# Patient Record
Sex: MALE | Race: BLACK OR AFRICAN AMERICAN | Employment: PART TIME | URBAN - METROPOLITAN AREA
[De-identification: names, ages, dates, MRNs, and addresses within clinical notes are randomized per-mention and may not be internally consistent; named-entity substitution may affect disease eponyms.]

---

## 2017-09-12 ENCOUNTER — HOSPITAL ENCOUNTER (EMERGENCY)
Age: 52
Discharge: HOME | End: 2017-09-12
Attending: EMERGENCY MEDICINE | Admitting: EMERGENCY MEDICINE
Payer: SELF-PAY

## 2017-09-12 ENCOUNTER — APPOINTMENT (OUTPATIENT)
Dept: CT IMAGING | Age: 52
End: 2017-09-12
Payer: SELF-PAY

## 2017-09-12 ENCOUNTER — APPOINTMENT (OUTPATIENT)
Dept: GENERAL RADIOLOGY | Age: 52
End: 2017-09-12
Payer: SELF-PAY

## 2017-09-12 VITALS
SYSTOLIC BLOOD PRESSURE: 172 MMHG | BODY MASS INDEX: 29.17 KG/M2 | HEIGHT: 72 IN | OXYGEN SATURATION: 94 % | DIASTOLIC BLOOD PRESSURE: 98 MMHG | RESPIRATION RATE: 13 BRPM | HEART RATE: 65 BPM | TEMPERATURE: 98.3 F | WEIGHT: 215.39 LBS

## 2017-09-12 DIAGNOSIS — R20.2 NUMBNESS AND TINGLING: ICD-10-CM

## 2017-09-12 DIAGNOSIS — I10 UNCONTROLLED HYPERTENSION: Primary | ICD-10-CM

## 2017-09-12 DIAGNOSIS — R20.0 NUMBNESS AND TINGLING: ICD-10-CM

## 2017-09-12 DIAGNOSIS — M50.30 DDD (DEGENERATIVE DISC DISEASE), CERVICAL: ICD-10-CM

## 2017-09-12 LAB
ALBUMIN SERPL-MCNC: 3.4 G/DL (ref 3.5–5)
ALBUMIN/GLOB SERPL: 0.8 {RATIO} (ref 1.1–2.2)
ALP SERPL-CCNC: 115 U/L (ref 45–117)
ALT SERPL-CCNC: 52 U/L (ref 12–78)
ANION GAP SERPL CALC-SCNC: 7 MMOL/L (ref 5–15)
AST SERPL-CCNC: 15 U/L (ref 15–37)
ATRIAL RATE: 75 BPM
BASOPHILS # BLD: 0 K/UL (ref 0–0.1)
BASOPHILS NFR BLD: 0 % (ref 0–1)
BILIRUB SERPL-MCNC: 0.3 MG/DL (ref 0.2–1)
BUN SERPL-MCNC: 15 MG/DL (ref 6–20)
BUN/CREAT SERPL: 12 (ref 12–20)
CALCIUM SERPL-MCNC: 8.7 MG/DL (ref 8.5–10.1)
CALCULATED P AXIS, ECG09: 29 DEGREES
CALCULATED R AXIS, ECG10: 0 DEGREES
CALCULATED T AXIS, ECG11: 31 DEGREES
CHLORIDE SERPL-SCNC: 105 MMOL/L (ref 97–108)
CK SERPL-CCNC: 168 U/L (ref 39–308)
CO2 SERPL-SCNC: 28 MMOL/L (ref 21–32)
CREAT SERPL-MCNC: 1.25 MG/DL (ref 0.7–1.3)
DIAGNOSIS, 93000: NORMAL
EOSINOPHIL # BLD: 0.3 K/UL (ref 0–0.4)
EOSINOPHIL NFR BLD: 3 % (ref 0–7)
ERYTHROCYTE [DISTWIDTH] IN BLOOD BY AUTOMATED COUNT: 15.8 % (ref 11.5–14.5)
GLOBULIN SER CALC-MCNC: 4.5 G/DL (ref 2–4)
GLUCOSE SERPL-MCNC: 105 MG/DL (ref 65–100)
HCT VFR BLD AUTO: 42.4 % (ref 36.6–50.3)
HGB BLD-MCNC: 14.4 G/DL (ref 12.1–17)
LYMPHOCYTES # BLD: 4.3 K/UL (ref 0.8–3.5)
LYMPHOCYTES NFR BLD: 47 % (ref 12–49)
MCH RBC QN AUTO: 28 PG (ref 26–34)
MCHC RBC AUTO-ENTMCNC: 34 G/DL (ref 30–36.5)
MCV RBC AUTO: 82.5 FL (ref 80–99)
MONOCYTES # BLD: 0.7 K/UL (ref 0–1)
MONOCYTES NFR BLD: 7 % (ref 5–13)
NEUTS SEG # BLD: 3.9 K/UL (ref 1.8–8)
NEUTS SEG NFR BLD: 43 % (ref 32–75)
P-R INTERVAL, ECG05: 176 MS
PLATELET # BLD AUTO: 363 K/UL (ref 150–400)
POTASSIUM SERPL-SCNC: 3.7 MMOL/L (ref 3.5–5.1)
PROT SERPL-MCNC: 7.9 G/DL (ref 6.4–8.2)
Q-T INTERVAL, ECG07: 380 MS
QRS DURATION, ECG06: 94 MS
QTC CALCULATION (BEZET), ECG08: 424 MS
RBC # BLD AUTO: 5.14 M/UL (ref 4.1–5.7)
SODIUM SERPL-SCNC: 140 MMOL/L (ref 136–145)
TROPONIN I SERPL-MCNC: <0.04 NG/ML
VENTRICULAR RATE, ECG03: 75 BPM
WBC # BLD AUTO: 9.2 K/UL (ref 4.1–11.1)

## 2017-09-12 PROCEDURE — 82550 ASSAY OF CK (CPK): CPT | Performed by: PHYSICIAN ASSISTANT

## 2017-09-12 PROCEDURE — 84484 ASSAY OF TROPONIN QUANT: CPT | Performed by: PHYSICIAN ASSISTANT

## 2017-09-12 PROCEDURE — 70450 CT HEAD/BRAIN W/O DYE: CPT

## 2017-09-12 PROCEDURE — 99285 EMERGENCY DEPT VISIT HI MDM: CPT

## 2017-09-12 PROCEDURE — 71020 XR CHEST PA LAT: CPT

## 2017-09-12 PROCEDURE — 80053 COMPREHEN METABOLIC PANEL: CPT | Performed by: PHYSICIAN ASSISTANT

## 2017-09-12 PROCEDURE — 74011250637 HC RX REV CODE- 250/637: Performed by: PHYSICIAN ASSISTANT

## 2017-09-12 PROCEDURE — 72125 CT NECK SPINE W/O DYE: CPT

## 2017-09-12 PROCEDURE — 93005 ELECTROCARDIOGRAM TRACING: CPT

## 2017-09-12 PROCEDURE — 85025 COMPLETE CBC W/AUTO DIFF WBC: CPT | Performed by: PHYSICIAN ASSISTANT

## 2017-09-12 PROCEDURE — 96374 THER/PROPH/DIAG INJ IV PUSH: CPT

## 2017-09-12 PROCEDURE — 36415 COLL VENOUS BLD VENIPUNCTURE: CPT | Performed by: PHYSICIAN ASSISTANT

## 2017-09-12 PROCEDURE — 96375 TX/PRO/DX INJ NEW DRUG ADDON: CPT

## 2017-09-12 PROCEDURE — 74011250636 HC RX REV CODE- 250/636: Performed by: PHYSICIAN ASSISTANT

## 2017-09-12 RX ORDER — HYDROMORPHONE HYDROCHLORIDE 2 MG/ML
1 INJECTION, SOLUTION INTRAMUSCULAR; INTRAVENOUS; SUBCUTANEOUS
Status: COMPLETED | OUTPATIENT
Start: 2017-09-12 | End: 2017-09-12

## 2017-09-12 RX ORDER — SODIUM CHLORIDE 0.9 % (FLUSH) 0.9 %
5-10 SYRINGE (ML) INJECTION AS NEEDED
Status: DISCONTINUED | OUTPATIENT
Start: 2017-09-12 | End: 2017-09-12 | Stop reason: HOSPADM

## 2017-09-12 RX ORDER — ONDANSETRON 2 MG/ML
4 INJECTION INTRAMUSCULAR; INTRAVENOUS
Status: COMPLETED | OUTPATIENT
Start: 2017-09-12 | End: 2017-09-12

## 2017-09-12 RX ORDER — DIAZEPAM 5 MG/1
5 TABLET ORAL
Status: COMPLETED | OUTPATIENT
Start: 2017-09-12 | End: 2017-09-12

## 2017-09-12 RX ORDER — OXYCODONE AND ACETAMINOPHEN 5; 325 MG/1; MG/1
1 TABLET ORAL
Qty: 15 TAB | Refills: 0 | Status: SHIPPED | OUTPATIENT
Start: 2017-09-12 | End: 2019-12-08

## 2017-09-12 RX ORDER — OXYCODONE AND ACETAMINOPHEN 5; 325 MG/1; MG/1
1 TABLET ORAL
Status: COMPLETED | OUTPATIENT
Start: 2017-09-12 | End: 2017-09-12

## 2017-09-12 RX ORDER — AMLODIPINE BESYLATE 5 MG/1
5 TABLET ORAL DAILY
Qty: 30 TAB | Refills: 0 | Status: SHIPPED | OUTPATIENT
Start: 2017-09-12 | End: 2017-10-12

## 2017-09-12 RX ORDER — AMLODIPINE BESYLATE 5 MG/1
5 TABLET ORAL
Status: COMPLETED | OUTPATIENT
Start: 2017-09-12 | End: 2017-09-12

## 2017-09-12 RX ORDER — SODIUM CHLORIDE 0.9 % (FLUSH) 0.9 %
5-10 SYRINGE (ML) INJECTION EVERY 8 HOURS
Status: DISCONTINUED | OUTPATIENT
Start: 2017-09-12 | End: 2017-09-12 | Stop reason: HOSPADM

## 2017-09-12 RX ORDER — PREDNISONE 20 MG/1
60 TABLET ORAL DAILY
Qty: 15 TAB | Refills: 0 | Status: SHIPPED | OUTPATIENT
Start: 2017-09-12 | End: 2017-09-17

## 2017-09-12 RX ORDER — METHOCARBAMOL 750 MG/1
750 TABLET, FILM COATED ORAL 3 TIMES DAILY
Qty: 15 TAB | Refills: 0 | Status: SHIPPED | OUTPATIENT
Start: 2017-09-12 | End: 2017-09-17

## 2017-09-12 RX ORDER — DIAZEPAM 10 MG/2ML
5 INJECTION INTRAMUSCULAR
Status: DISCONTINUED | OUTPATIENT
Start: 2017-09-12 | End: 2017-09-12

## 2017-09-12 RX ADMIN — Medication 10 ML: at 16:09

## 2017-09-12 RX ADMIN — DIAZEPAM 5 MG: 5 TABLET ORAL at 16:05

## 2017-09-12 RX ADMIN — HYDROMORPHONE HYDROCHLORIDE 1 MG: 2 INJECTION, SOLUTION INTRAMUSCULAR; INTRAVENOUS; SUBCUTANEOUS at 16:16

## 2017-09-12 RX ADMIN — AMLODIPINE BESYLATE 5 MG: 5 TABLET ORAL at 16:05

## 2017-09-12 RX ADMIN — ONDANSETRON 4 MG: 2 INJECTION INTRAMUSCULAR; INTRAVENOUS at 16:06

## 2017-09-12 RX ADMIN — OXYCODONE HYDROCHLORIDE AND ACETAMINOPHEN 1 TABLET: 5; 325 TABLET ORAL at 17:01

## 2017-09-12 NOTE — ED NOTES
Bedside and Verbal shift change report given to Denisse Graham (oncoming nurse) by Mauro Chapman  (offgoing nurse). Report included the following information SBAR and ED Summary    Pt resting on stretcher with family at bedside. Pt updated on plan of care. Aditi Segura

## 2017-09-12 NOTE — ED NOTES
Instructions provided by Michelle Huizar and patient discharged home with instructions. Medicated prior to discharge prior to discharge.

## 2017-09-12 NOTE — Clinical Note
Rest, take blood pressure medications daily. Follow up with primary care for recheck of current symptoms/blood pressure. List of primary care/clinics provided. Have also provided contact information for Orthopedist if symptoms persist.  Return to the  Emergency Dept for any worsening pain, numbness/tingling, weakness, confusion, or problems with speech/walking. [facility] Departments For adult and child immunizations, family planning, TB screening, STD testing and women's health services. [place]: Vinicio [phone] PACCAR [facility] [phone] [facility]   [phone] [place]   [phone] Haydee Wyattshorn   [phone] [place]: NORMA [phone] Roy [phone] Katelyn brown [phone] [address] [address] For primary care services, woman and child wellness, and some clinics providing specialty care. [facility] -- 1011 [facility]. [address] [place] [address] [phone] [facility] [address] [place] [address] [phone] [facility] [address] [phone] [address] [place] [address] [address]  [phone] 7700 [facility] [phone] [address] [phone] [facility] [address], 809 [facility]: [facility] [address], ext [phone] [facility] [address] [phone] [facility] 37 [phone] [address]  [phone] [employer]  [address], [phone] 3550 Gissel [address] ([email or web address]) [phone] Sexual Health/Woman Wellness Clinics For STD/HIV testing and treatment, pregnancy testing a nd services, men's health, birth control services, LGBT services, and hepatitis/HPV vaccine services. [place] [facility] [place] for [employer] [address] [facility] [phone] [facility] of [address] [phone] [place] [address] [phone] [address], 5th floor [phone] [facility] of 1065 [facility] 427 [facility] [address] Doug Rao [phone] Specialty Service C linics [address] [phone] 6655 [facility]   [phone] [place]   [phone] Women, Infant and Children's Services: [address] [phone] [address] [phone] 128 [facility] 66 7661 [facility]     [phone] 1325 [facility]   [phone] 2 [facility] [phone] [facility] [address] [facility] [address] Physicians [phone] MD Meera Cruz MD Caprice Doom, MD [facility] [phone] [facility] Kelly, [facility] MD Radha Maldonado MD Buck Doles, MD [address] [address] [phone] MD Ksenia Spencer MD [facility] [phone] MD Melani Shankar MD Linde Beverage, MD Lani Eglin, MD 
[facility] [phone] MD Dane Bradford MD Flo Paul, NP 3050 [facility] [address] [phone] MD Verónica Wade MD Lang Bongo, MD Octaviano Gayer, MD Emmit Joiner, MD Moss Como, MD Cheril Clas, Toshia Fagan [address] [phone] Chris Kathleen Brittani Irwin MD [facility] [phone] Luma Houser, MD Zenobia Cruz, NP Kaila Norris, MD Marcelo Alfredo, MD [place], MD Skyler Hood, MD Adis Santos, MD 
[facility] [phone] Dorene Ghosh, MD Vaibhav Styles, P Marylene Medici, NP 
Layla Campos, MD Juan Francisco Price, MD Claude Klein, MD Omi Javier, MD [facility] [phone] Blanch Shone, MD Luke Nice, MD Elmer Gordon, MD Juan Watkins, MD Lenis Nissen, MD 
1700 [facility] [phone] Isra Grant, MD Jessika Jeronimo, MD [facility] [phone] MD Helen Mcrae, MD Raul Nolen MD 
5073 [facility] [phone] Heidy Jimenez, MD Isis Reardon, MD Timi Huggins, MD Stephanie Cespedes, MD Barron Hickman, MD Steffi Boateng, NP May Rubinstein, MD 1619  [phone] MD Valerio Petersen MD Candido Knapp, MD 
2102 [facility] [phone] MD Albaro Brush, FNP Livia Auguste, BRIANA Auguste, FNP BRIANA Comer MD Chaney Balint, NP Janes Flores,  Miscellaneous: 
Karo Kapoor [phone]

## 2017-09-12 NOTE — DISCHARGE INSTRUCTIONS
Cervical Disc Disease: Care Instructions  Your Care Instructions    Cervical disc disease results from damage, disease, or wear and tear to the discs between the bones (vertebra) in your neck. The discs act as shock absorbers for the spine and keep the spine flexible. When a disc is damaged, it can bulge out and press against the nerve roots or spinal cord. This is sometimes called a herniated or \"slipped disc. \" This pressure can cause pain and numbness or tingling in your arms and hands. It can also cause weakness in your legs. An accident can damage a disc and cause it to break open (rupture). Aging and hard physical work can also cause damage to cervical discs. The first treatments for cervical disc disease include physical therapy, special neck exercises, heat, and pain medicine. If these fail, your doctor may inject steroids and pain medicine into your neck. Surgery is usually done only if other treatments have not worked. Follow-up care is a key part of your treatment and safety. Be sure to make and go to all appointments, and call your doctor if you are having problems. It's also a good idea to know your test results and keep a list of the medicines you take. How can you care for yourself at home? · Take pain medicines exactly as directed. ¨ If the doctor gave you a prescription medicine for pain, take it as prescribed. ¨ If you are not taking a prescription pain medicine, ask your doctor if you can take an over-the-counter medicine. · Don't spend too long in one position. Take short breaks to move around and change positions. · Wear a seat belt and shoulder harness when you are in a car. · Sleep with a pillow under your head and neck that keeps your neck straight. · Follow your doctor's instructions for gentle neck-stretching exercises. · Do not smoke. Smoking can slow healing of your discs. If you need help quitting, talk to your doctor about stop-smoking programs and medicines.  These can increase your chances of quitting for good. · Avoid strenuous work or exercise until your doctor says it is okay. When should you call for help? Call 911 anytime you think you may need emergency care. For example, call if:  · You are unable to move an arm or a leg at all. Call your doctor now or seek immediate medical care if:  · You have new or worse symptoms in your arms, legs, chest, belly, or buttocks. Symptoms may include:  ¨ Numbness or tingling. ¨ Weakness. ¨ Pain. · You lose bladder or bowel control. Watch closely for changes in your health, and be sure to contact your doctor if:  · You are not getting better as expected. Where can you learn more? Go to http://edilberto-gamal.info/. Enter N118 in the search box to learn more about \"Cervical Disc Disease: Care Instructions. \"  Current as of: March 21, 2017  Content Version: 11.3  © 4803-0825 [facility]. Care instructions adapted under license by [employer] (which disclaims liability or warranty for this information). If you have questions about a medical condition or this instruction, always ask your healthcare professional. [place] [address] any warranty or liability for your use of this information. High Blood Pressure: Care Instructions  Your Care Instructions  If your blood pressure is usually above 140/90, you have high blood pressure, or hypertension. That means the top number is 140 or higher or the bottom number is 90 or higher, or both. Despite what a lot of people think, high blood pressure usually doesn't cause headaches or make you feel dizzy or lightheaded. It usually has no symptoms. But it does increase your risk for heart attack, stroke, and kidney or eye damage. The higher your blood pressure, the more your risk increases. Your doctor will give you a goal for your blood pressure. Your goal will be based on your health and your age.  An example of a goal is to keep your blood pressure below 140/90. Lifestyle changes, such as eating healthy and being active, are always important to help lower blood pressure. You might also take medicine to reach your blood pressure goal.  Follow-up care is a key part of your treatment and safety. Be sure to make and go to all appointments, and call your doctor if you are having problems. It's also a good idea to know your test results and keep a list of the medicines you take. How can you care for yourself at home? Medical treatment  · If you stop taking your medicine, your blood pressure will go back up. You may take one or more types of medicine to lower your blood pressure. Be safe with medicines. Take your medicine exactly as prescribed. Call your doctor if you think you are having a problem with your medicine. · Talk to your doctor before you start taking aspirin every day. Aspirin can help certain people lower their risk of a heart attack or stroke. But taking aspirin isn't right for everyone, because it can cause serious bleeding. · See your doctor regularly. You may need to see the doctor more often at first or until your blood pressure comes down. · If you are taking blood pressure medicine, talk to your doctor before you take decongestants or anti-inflammatory medicine, such as ibuprofen. Some of these medicines can raise blood pressure. · Learn how to check your blood pressure at home. Lifestyle changes  · Stay at a healthy weight. This is especially important if you put on weight around the waist. Losing even 10 pounds can help you lower your blood pressure. · If your doctor recommends it, get more exercise. Walking is a good choice. Bit by bit, increase the amount you walk every day. Try for at least 30 minutes on most days of the week. You also may want to swim, bike, or do other activities. · Avoid or limit alcohol. Talk to your doctor about whether you can drink any alcohol.   · Try to limit how much sodium you eat to less than 2,300 milligrams (mg) a day. Your doctor may ask you to try to eat less than 1,500 mg a day. · Eat plenty of fruits (such as bananas and oranges), vegetables, legumes, whole grains, and low-fat dairy products. · Lower the amount of saturated fat in your diet. Saturated fat is found in animal products such as milk, cheese, and meat. Limiting these foods may help you lose weight and also lower your risk for heart disease. · Do not smoke. Smoking increases your risk for heart attack and stroke. If you need help quitting, talk to your doctor about stop-smoking programs and medicines. These can increase your chances of quitting for good. When should you call for help? Call 911 anytime you think you may need emergency care. This may mean having symptoms that suggest that your blood pressure is causing a serious heart or blood vessel problem. Your blood pressure may be over 180/110. For example, call 911 if:  · You have symptoms of a heart attack. These may include:  ¨ Chest pain or pressure, or a strange feeling in the chest.  ¨ Sweating. ¨ Shortness of breath. ¨ Nausea or vomiting. ¨ Pain, pressure, or a strange feeling in the back, neck, jaw, or upper belly or in one or both shoulders or arms. ¨ Lightheadedness or sudden weakness. ¨ A fast or irregular heartbeat. · You have symptoms of a stroke. These may include:  ¨ Sudden numbness, tingling, weakness, or loss of movement in your face, arm, or leg, especially on only one side of your body. ¨ Sudden vision changes. ¨ Sudden trouble speaking. ¨ Sudden confusion or trouble understanding simple statements. ¨ Sudden problems with walking or balance. ¨ A sudden, severe headache that is different from past headaches. · You have severe back or belly pain. Do not wait until your blood pressure comes down on its own. Get help right away.   Call your doctor now or seek immediate care if:  · Your blood pressure is much higher than normal (such as 180/110 or higher), but you don't have symptoms. · You think high blood pressure is causing symptoms, such as:  ¨ Severe headache. ¨ Blurry vision. Watch closely for changes in your health, and be sure to contact your doctor if:  · Your blood pressure measures 140/90 or higher at least 2 times. That means the top number is 140 or higher or the bottom number is 90 or higher, or both. · You think you may be having side effects from your blood pressure medicine. · Your blood pressure is usually normal, but it goes above normal at least 2 times. Where can you learn more? Go to http://edilberto-gamla.info/. Enter B716 in the search box to learn more about \"High Blood Pressure: Care Instructions. \"  Current as of: August 8, 2016  Content Version: 11.3  © 6997-9228 [employer]. Care instructions adapted under license by [employer] (which disclaims liability or warranty for this information). If you have questions about a medical condition or this instruction, always ask your healthcare professional. [place] [address] any warranty or liability for your use of this information. Numbness and Tingling: Care Instructions  Your Care Instructions  Many things can cause numbness or tingling. Swelling may put pressure on a nerve. This could cause you to lose feeling or have a pins-and-needles sensation on part of your body. Nerves may be damaged from trauma, toxins, or diseases, such as diabetes or multiple sclerosis (MS). Sometimes, though, the cause is not clear. If there is no clear reason for your symptoms, and you are not having any other symptoms, your doctor may suggest watching and waiting for a while to see if the numbness or tingling goes away on its own. Your doctor may want you to have blood or nerve tests to find the cause of your symptoms. Follow-up care is a key part of your treatment and safety.  Be sure to make and go to all appointments, and call your doctor if you are having problems. It's also a good idea to know your test results and keep a list of the medicines you take. How can you care for yourself at home? · If your doctor prescribes medicine, take it exactly as directed. Call your doctor if you think you are having a problem with your medicine. · If you have any swelling, put ice or a cold pack on the area for 10 to 20 minutes at a time. Put a thin cloth between the ice and your skin. When should you call for help? Call 911 anytime you think you may need emergency care. For example, call if:  · You have weakness, numbness, or tingling in both legs. · You lose bowel or bladder control. · You have symptoms of a stroke. These may include:  ¨ Sudden numbness, tingling, weakness, or loss of movement in your face, arm, or leg, especially on only one side of your body. ¨ Sudden vision changes. ¨ Sudden trouble speaking. ¨ Sudden confusion or trouble understanding simple statements. ¨ Sudden problems with walking or balance. ¨ A sudden, severe headache that is different from past headaches. Watch closely for changes in your health, and be sure to contact your doctor if you have any problems, or if:  · You do not get better as expected. Where can you learn more? Go to http://edilberto-gamal.info/. Enter I509 in the search box to learn more about \"Numbness and Tingling: Care Instructions. \"  Current as of: October 14, 2016  Content Version: 11.3  © 1730-3569 [employer]. Care instructions adapted under license by [facility] (which disclaims liability or warranty for this information). If you have questions about a medical condition or this instruction, always ask your healthcare professional. [address] any warranty or liability for your use of this information.

## 2017-09-12 NOTE — LETTER
[address] 
[facility] EMERGENCY DEPT 
[address] [place] [address] [address] [id number] [phone] Work/School Note Date: 9/12/2017 To Whom It May concern: 
 
Dax Mancia was seen and treated today in the emergency room. He may return to work in 1 to 2 days, as symptoms improve. Sincerely, Allison Franklin

## 2017-09-12 NOTE — ED PROVIDER NOTES
HPI Comments: Kee Tuttle is a 54 y.o. male with PMHx significant for HTN, who presents ambulatory to [facility] ED with cc of acute onset L posterior neck pain that radiates into his L shoulder and LUE x 1 week. He reports associated mild tingling in his L side. Pt notes subjective fever, but denies any documented fevers. He denies any change in physical activity, injury, or trauma. Pt denies a hx of neck or back pain. He states he has a hx of HTN, but does not believe he has any other PMHx and states he does not see a PCP. Pt specifically denies any recent weakness of LUE, slurred speech, confusion, visual disturbance, CP, cough, SOB, or N/V/D. Social History: (+1ppd) Tobacco, (+) EtOH, (-) Illicit Drugs         There are no other complaints, changes, or physical findings at this time. The history is provided by the patient. No  was used. Past Medical History:   Diagnosis Date    Hypertension        History reviewed. No pertinent surgical history. Family History:   Problem Relation Age of Onset    Stroke Father     Cancer Sister        Social History     Social History    Marital status: SINGLE     Spouse name: N/A    Number of children: N/A    Years of education: N/A     Occupational History    Not on file. Social History Main Topics    Smoking status: Current Every Day Smoker     Packs/day: 1.00    Smokeless tobacco: Never Used    Alcohol use 3.0 oz/week     6 Cans of beer per week      Comment: Occasionally    Drug use: No    Sexual activity: Yes     Partners: Female     Other Topics Concern    Not on file     Social History Narrative    ** Merged History Encounter **              ALLERGIES: Review of patient's allergies indicates no known allergies. Review of Systems   Constitutional: Negative for chills and fever. HENT: Negative for congestion, rhinorrhea and sore throat. Eyes: Negative for visual disturbance.    Respiratory: Negative for cough and shortness of breath. Cardiovascular: Negative for chest pain and palpitations. Gastrointestinal: Negative for abdominal pain, diarrhea, nausea and vomiting. Genitourinary: Negative for dysuria and hematuria. Musculoskeletal: Positive for arthralgias (L shoulder), myalgias (LUE) and neck pain (L posterior). Negative for neck stiffness. Skin: Negative for rash and wound. Neurological: Positive for numbness (tingling in L side). Negative for dizziness, speech difficulty, weakness and headaches. Psychiatric/Behavioral: Negative for agitation and confusion. Patient Vitals for the past 12 hrs:   Temp Pulse Resp BP SpO2   09/12/17 1645 - 65 13 (!) 172/98 94 %   09/12/17 1630 - 73 13 (!) 176/99 94 %   09/12/17 1615 - 67 16 (!) 173/107 97 %   09/12/17 1340 - - - - 97 %   09/12/17 1308 98.3 °F (36.8 °C) 68 16 (!) 209/117 97 %   09/12/17 1219 - 74 18 (!) 185/118 100 %   09/12/17 1124 98.3 °F (36.8 °C) 77 18 (!) 190/122 97 %       Physical Exam   Constitutional: He is oriented to person, place, and time. He appears well-developed and well-nourished. No distress. HENT:   Head: Normocephalic and atraumatic. Nose: Nose normal.   Mouth/Throat: Oropharynx is clear and moist. No oropharyngeal exudate. Large lipoma noted to L forehead, unchanged per pt   Eyes: Conjunctivae and EOM are normal. Right eye exhibits no discharge. Left eye exhibits no discharge. No scleral icterus. Neck: Normal range of motion. Neck supple. No JVD present. No tracheal deviation present. No thyromegaly present. Cardiovascular: Normal rate, regular rhythm and normal heart sounds. Pulmonary/Chest: Effort normal and breath sounds normal. No respiratory distress. He has no wheezes. Abdominal: Soft. There is no tenderness. Musculoskeletal: Normal range of motion. He exhibits no edema. Lymphadenopathy:     He has no cervical adenopathy. Neurological: He is alert and oriented to person, place, and time.  He exhibits normal muscle tone. Coordination normal.   CN 2-12 intact. F-N-F and H-T-S negative. No pronator drift. Skin: Skin is warm and dry. He is not diaphoretic. Psychiatric: He has a normal mood and affect. His behavior is normal. Judgment normal.   Nursing note and vitals reviewed. MDM  Number of Diagnoses or Management Options  DDD (degenerative disc disease), cervical:   Numbness and tingling:   Uncontrolled hypertension:   Diagnosis management comments: DDx: DDD, radiculopathy, ACS, ICH/mass       Amount and/or Complexity of Data Reviewed  Clinical lab tests: ordered and reviewed  Tests in the radiology section of CPT®: ordered and reviewed  Tests in the medicine section of CPT®: ordered and reviewed  Review and summarize past medical records: yes  Discuss the patient with other providers: yes (attending)  Independent visualization of images, tracings, or specimens: yes    Patient Progress  Patient progress: stable    ED Course       Procedures    EKG interpretation: (Preliminary) 11:37 AM  Rhythm: normal sinus rhythm; and regular . Rate (approx.): 75; Axis: normal; MO interval: normal; QRS interval: normal ; ST/T wave: normal;  Other findings: septal infarct, age undetermined. No acute ischemic changes. PROGRESS NOTE  2:45 PM   Discussed case with Katey Segura DO. He will evaluate pt and agrees with follow up and prescribing him BP medication. PROGRESS NOTE  2:58 PM   Katey Segura DO evaluated pt. He agrees with the plan of care. PROGRESS NOTE  3:02 PM   Just being notified by nursing staff pt has not been given any medication because he was taken directly to CT. Pharmacy also stating there is not IV Valium. Will change to oral Valium and IV pain medication. PROGRESS NOTE  3:20 PM   Checked with nursing staff. Pt has not been given any medication yet. PROGRESS NOTE  3:38 PM   Per nursing staff, pt has still not received any medication yet.     PROGRESS NOTE  4:00 PM   Spoke with charge nurse to assist with getting pt medicated. LABORATORY TESTS:  Recent Results (from the past 12 hour(s))   EKG, 12 LEAD, INITIAL    Collection Time: 09/12/17 11:37 AM   Result Value Ref Range    Ventricular Rate 75 BPM    Atrial Rate 75 BPM    P-R Interval 176 ms    QRS Duration 94 ms    Q-T Interval 380 ms    QTC Calculation (Bezet) 424 ms    Calculated P Axis 29 degrees    Calculated R Axis 0 degrees    Calculated T Axis 31 degrees    Diagnosis       Normal sinus rhythm  Septal infarct , age undetermined  Abnormal ECG  When compared with ECG of 04-DEC-2011 16:20,  No significant change was found     CBC WITH AUTOMATED DIFF    Collection Time: 09/12/17  1:45 PM   Result Value Ref Range    WBC 9.2 4.1 - 11.1 K/uL    RBC 5.14 4.10 - 5.70 M/uL    HGB 14.4 12.1 - 17.0 g/dL    HCT 42.4 36.6 - 50.3 %    MCV 82.5 80.0 - 99.0 FL    MCH 28.0 26.0 - 34.0 PG    MCHC 34.0 30.0 - 36.5 g/dL    RDW 15.8 (H) 11.5 - 14.5 %    PLATELET 211 872 - 364 K/uL    NEUTROPHILS 43 32 - 75 %    LYMPHOCYTES 47 12 - 49 %    MONOCYTES 7 5 - 13 %    EOSINOPHILS 3 0 - 7 %    BASOPHILS 0 0 - 1 %    ABS. NEUTROPHILS 3.9 1.8 - 8.0 K/UL    ABS. LYMPHOCYTES 4.3 (H) 0.8 - 3.5 K/UL    ABS. MONOCYTES 0.7 0.0 - 1.0 K/UL    ABS. EOSINOPHILS 0.3 0.0 - 0.4 K/UL    ABS. BASOPHILS 0.0 0.0 - 0.1 K/UL   METABOLIC PANEL, COMPREHENSIVE    Collection Time: 09/12/17  1:45 PM   Result Value Ref Range    Sodium 140 136 - 145 mmol/L    Potassium 3.7 3.5 - 5.1 mmol/L    Chloride 105 97 - 108 mmol/L    CO2 28 21 - 32 mmol/L    Anion gap 7 5 - 15 mmol/L    Glucose 105 (H) 65 - 100 mg/dL    BUN 15 6 - 20 MG/DL    Creatinine 1.25 0.70 - 1.30 MG/DL    BUN/Creatinine ratio 12 12 - 20      GFR est AA >60 >60 ml/min/1.73m2    GFR est non-AA >60 >60 ml/min/1.73m2    Calcium 8.7 8.5 - 10.1 MG/DL    Bilirubin, total 0.3 0.2 - 1.0 MG/DL    ALT (SGPT) 52 12 - 78 U/L    AST (SGOT) 15 15 - 37 U/L    Alk.  phosphatase 115 45 - 117 U/L    Protein, total 7.9 6.4 - 8.2 g/dL    Albumin 3.4 (L) 3.5 - 5.0 g/dL    Globulin 4.5 (H) 2.0 - 4.0 g/dL    A-G Ratio 0.8 (L) 1.1 - 2.2     CK W/ REFLX CKMB    Collection Time: 09/12/17  1:45 PM   Result Value Ref Range     39 - 308 U/L   TROPONIN I    Collection Time: 09/12/17  1:45 PM   Result Value Ref Range    Troponin-I, Qt. <0.04 <0.05 ng/mL       IMAGING RESULTS:  XR CHEST PA LAT   Final Result   PA AND LATERAL CHEST RADIOGRAPHS: 9/12/2017 2:39 PM     INDICATION: Neck pain, numbness, tingling extremities, hypertensive.     COMPARISON: 12/4/2011.     TECHNIQUE: Frontal and lateral radiographs of the chest.     FINDINGS:  The lungs are clear. The central airways are patent. The heart size is normal.  No pneumothorax or pleural effusion.     IMPRESSION  IMPRESSION:   Clear lungs. CT SPINE CERV WO CONT   Final Result   EXAM:  CT CERVICAL SPINE WITHOUT CONTRAST     INDICATION:   neck pain, numbness/tingling in the left upper and lower  extremities.     COMPARISON: None.     TECHNIQUE: Multislice helical CT of the cervical spine was performed without  intravenous contrast administration. Sagittal and coronal reconstructions were  generated. CT dose reduction was achieved through use of a standardized protocol  tailored for this examination and automatic exposure control for dose  modulation.     FINDINGS:  Cervical alignment is normal. No displaced fracture or dislocation. The  craniocervical junction is normal. Atrophic or surgically absent left thyroid  lobe. Bovine arch is a normal variant. Overlying shoulders cause photon  starvation artifact below C4 (602-32).    The cervical canal is congenitally narrow (8 mm at the level C4). C2-C3:  A disc bulge causes mild canal stenosis. Uncovertebral and facet  osteoarthritis cause left and mild right neural foraminal stenosis. C3-C4:  A disc osteophyte complex causes moderate canal stenosis in conjunction  with congenital canal narrowing.   C4-C5:  Disc osteophyte complex probably causes moderate to severe canal  stenosis. Overlying shoulders obscures somewhat. Uncovertebral and facet  osteoarthritis cause left neural foraminal stenosis. C5-C6:  Overlying shoulders obscure. A disc osteophyte complex probably causes  moderate to severe canal stenosis. Uncovertebral and facet osteoarthritis cause  left and mild right neural foraminal stenosis. C6-C7:  Overlying shoulders obscure. Uncovertebral and facet osteoarthritis  cause left neural foraminal stenosis. C7-T1:  Overlying shoulders obscure. No gross canal or neural foraminal  stenosis.     IMPRESSION  IMPRESSION:   1. No displaced fracture. 2. Congenitally narrow canal.  3. Degenerative disease and stenoses as described above. CT HEAD WO CONT   Final Result   HEAD CT WITHOUT CONTRAST: 9/12/2017 1:50 PM     INDICATION: Neck pain, numbness and tingling in the left upper and lower  extremities.     COMPARISON: None.     PROCEDURE: Axial images of the head were obtained without contrast. Coronal and  sagittal reformats were performed. CT dose reduction was achieved through use of  a standardized protocol tailored for this examination and automatic exposure  control for dose modulation.     FINDINGS: There is a relatively large lipoma over the left forehead/left frontal  lobe. The ventricles and sulci are appropriate in size and configuration for  age. No loss of gray-white differentiation to suggest late acute or early  subacute infarction. No mass effect or intracranial hemorrhage.     IMPRESSION  IMPRESSION: No acute intracranial abnormality. Left forehead lipoma.        MEDICATIONS GIVEN:  Medications   sodium chloride (NS) flush 5-10 mL (10 mL IntraVENous Given 9/12/17 1609)   sodium chloride (NS) flush 5-10 mL (10 mL IntraVENous Given 9/12/17 1609)   amLODIPine (NORVASC) tablet 5 mg (5 mg Oral Given 9/12/17 1605)   diazePAM (VALIUM) tablet 5 mg (5 mg Oral Given 9/12/17 1605)   HYDROmorphone (PF) (DILAUDID) injection 1 mg (1 mg IntraVENous Given 9/12/17 1616) ondansetron [facility]) injection 4 mg (4 mg IntraVENous Given 9/12/17 1606)   oxyCODONE-acetaminophen (PERCOCET) 5-325 mg per tablet 1 Tab (1 Tab Oral Given 9/12/17 1701)       IMPRESSION:  1. Uncontrolled hypertension    2. DDD (degenerative disc disease), cervical    3. Numbness and tingling        PLAN:  1. Current Discharge Medication List      START taking these medications    Details   oxyCODONE-acetaminophen (PERCOCET) 5-325 mg per tablet Take 1 Tab by mouth every six (6) hours as needed for Pain for up to 15 doses. Max Daily Amount: 4 Tabs. Qty: 15 Tab, Refills: 0      methocarbamol (ROBAXIN) 750 mg tablet Take 1 Tab by mouth three (3) times daily for 5 days. Qty: 15 Tab, Refills: 0      predniSONE (DELTASONE) 20 mg tablet Take 3 Tabs by mouth daily for 5 days. Qty: 15 Tab, Refills: 0      amLODIPine (NORVASC) 5 mg tablet Take 1 Tab by mouth daily for 30 days. Qty: 30 Tab, Refills: 0           2. Follow-up Information     Follow up With Details Comments [facility]   [address] [address]  [phone]    Ever Sanchez MD  As needed [address] [facility]  [address]  [facility]  [phone]      [facility] EMERGENCY DEPT  If symptoms worsen [address]  [address] [facility]  [phone]          Return to ED if worse     Discharge Note:  4:55 PM  The patient has been re-evaluated and is ready for discharge. Reviewed available results with patient. Counseled patient on diagnosis and care plan. Patient has expressed understanding, and all questions have been answered. Patient agrees with plan and agrees to follow up as recommended, or to return to the ED if their symptoms worsen. Discharge instructions have been provided and explained to the patient, along with reasons to return to the ED. Written by Gulshan Crowder, ED Scribe, as dictated by Elkin Tejada.     ATTESTATION:   This note is prepared by Gulshan Crowder, acting as Scribe for [employer]. BRIANA Amin: The scribe's documentation has been prepared under my direction and personally reviewed by me in its entirety. I confirm that the note above accurately reflects all work, treatment, procedures, and medical decision making performed by me.

## 2017-09-12 NOTE — ED TRIAGE NOTES
Patient states he had some neck stiffness about a week ago and around the same time, he began having pain and tingling to his left arm as well as his left hip/leg. Patient states when he turns his head to the right, it's painful to his left neck. Tingling and pain still present in bilateral limbs. Patient has not taken his BP medication in several weeks, hypertensive in triage.

## 2019-12-08 ENCOUNTER — APPOINTMENT (OUTPATIENT)
Dept: GENERAL RADIOLOGY | Age: 54
DRG: 281 | End: 2019-12-08
Attending: EMERGENCY MEDICINE
Payer: SELF-PAY

## 2019-12-08 ENCOUNTER — HOSPITAL ENCOUNTER (INPATIENT)
Age: 54
LOS: 3 days | Discharge: HOME | DRG: 281 | End: 2019-12-12
Attending: EMERGENCY MEDICINE | Admitting: INTERNAL MEDICINE
Payer: SELF-PAY

## 2019-12-08 DIAGNOSIS — R94.39 ABNORMAL STRESS ECG: ICD-10-CM

## 2019-12-08 DIAGNOSIS — I10 HYPERTENSION, UNSPECIFIED TYPE: ICD-10-CM

## 2019-12-08 DIAGNOSIS — R77.8 ELEVATED TROPONIN: ICD-10-CM

## 2019-12-08 DIAGNOSIS — R07.9 CHEST PAIN, UNSPECIFIED TYPE: Primary | ICD-10-CM

## 2019-12-08 LAB
ALBUMIN SERPL-MCNC: 3.2 G/DL (ref 3.5–5)
ALBUMIN/GLOB SERPL: 0.6 {RATIO} (ref 1.1–2.2)
ALP SERPL-CCNC: 102 U/L (ref 45–117)
ALT SERPL-CCNC: 30 U/L (ref 12–78)
ANION GAP SERPL CALC-SCNC: 5 MMOL/L (ref 5–15)
AST SERPL-CCNC: 18 U/L (ref 15–37)
BASOPHILS # BLD: 0 K/UL (ref 0–0.1)
BASOPHILS NFR BLD: 0 % (ref 0–1)
BILIRUB SERPL-MCNC: 0.3 MG/DL (ref 0.2–1)
BNP SERPL-MCNC: 576 PG/ML
BUN SERPL-MCNC: 23 MG/DL (ref 6–20)
BUN/CREAT SERPL: 16 (ref 12–20)
CALCIUM SERPL-MCNC: 9.5 MG/DL (ref 8.5–10.1)
CHLORIDE SERPL-SCNC: 107 MMOL/L (ref 97–108)
CO2 SERPL-SCNC: 28 MMOL/L (ref 21–32)
COMMENT, HOLDF: NORMAL
CREAT SERPL-MCNC: 1.41 MG/DL (ref 0.7–1.3)
DIFFERENTIAL METHOD BLD: ABNORMAL
EOSINOPHIL # BLD: 0.2 K/UL (ref 0–0.4)
EOSINOPHIL NFR BLD: 2 % (ref 0–7)
ERYTHROCYTE [DISTWIDTH] IN BLOOD BY AUTOMATED COUNT: 15.9 % (ref 11.5–14.5)
GLOBULIN SER CALC-MCNC: 5.2 G/DL (ref 2–4)
GLUCOSE SERPL-MCNC: 137 MG/DL (ref 65–100)
HCT VFR BLD AUTO: 43.7 % (ref 36.6–50.3)
HGB BLD-MCNC: 13.6 G/DL (ref 12.1–17)
IMM GRANULOCYTES # BLD AUTO: 0 K/UL (ref 0–0.04)
IMM GRANULOCYTES NFR BLD AUTO: 0 % (ref 0–0.5)
LYMPHOCYTES # BLD: 3.6 K/UL (ref 0.8–3.5)
LYMPHOCYTES NFR BLD: 41 % (ref 12–49)
MAGNESIUM SERPL-MCNC: 2 MG/DL (ref 1.6–2.4)
MCH RBC QN AUTO: 26.9 PG (ref 26–34)
MCHC RBC AUTO-ENTMCNC: 31.1 G/DL (ref 30–36.5)
MCV RBC AUTO: 86.4 FL (ref 80–99)
MONOCYTES # BLD: 0.7 K/UL (ref 0–1)
MONOCYTES NFR BLD: 8 % (ref 5–13)
NEUTS SEG # BLD: 4.3 K/UL (ref 1.8–8)
NEUTS SEG NFR BLD: 49 % (ref 32–75)
NRBC # BLD: 0 K/UL (ref 0–0.01)
NRBC BLD-RTO: 0 PER 100 WBC
PLATELET # BLD AUTO: 306 K/UL (ref 150–400)
PMV BLD AUTO: 9.4 FL (ref 8.9–12.9)
POTASSIUM SERPL-SCNC: 3.4 MMOL/L (ref 3.5–5.1)
PROT SERPL-MCNC: 8.4 G/DL (ref 6.4–8.2)
RBC # BLD AUTO: 5.06 M/UL (ref 4.1–5.7)
SAMPLES BEING HELD,HOLD: NORMAL
SODIUM SERPL-SCNC: 140 MMOL/L (ref 136–145)
TROPONIN I SERPL-MCNC: 0.17 NG/ML
WBC # BLD AUTO: 8.9 K/UL (ref 4.1–11.1)

## 2019-12-08 PROCEDURE — 36415 COLL VENOUS BLD VENIPUNCTURE: CPT

## 2019-12-08 PROCEDURE — 93005 ELECTROCARDIOGRAM TRACING: CPT

## 2019-12-08 PROCEDURE — 99284 EMERGENCY DEPT VISIT MOD MDM: CPT

## 2019-12-08 PROCEDURE — 74011250637 HC RX REV CODE- 250/637: Performed by: EMERGENCY MEDICINE

## 2019-12-08 PROCEDURE — 83735 ASSAY OF MAGNESIUM: CPT

## 2019-12-08 PROCEDURE — 85025 COMPLETE CBC W/AUTO DIFF WBC: CPT

## 2019-12-08 PROCEDURE — 74011250636 HC RX REV CODE- 250/636: Performed by: EMERGENCY MEDICINE

## 2019-12-08 PROCEDURE — 80053 COMPREHEN METABOLIC PANEL: CPT

## 2019-12-08 PROCEDURE — 71046 X-RAY EXAM CHEST 2 VIEWS: CPT

## 2019-12-08 PROCEDURE — 96372 THER/PROPH/DIAG INJ SC/IM: CPT

## 2019-12-08 PROCEDURE — 83880 ASSAY OF NATRIURETIC PEPTIDE: CPT

## 2019-12-08 PROCEDURE — 84484 ASSAY OF TROPONIN QUANT: CPT

## 2019-12-08 RX ORDER — CLONIDINE HYDROCHLORIDE 0.1 MG/1
0.1 TABLET ORAL
Status: COMPLETED | OUTPATIENT
Start: 2019-12-08 | End: 2019-12-08

## 2019-12-08 RX ORDER — GUAIFENESIN 100 MG/5ML
162 LIQUID (ML) ORAL
Status: COMPLETED | OUTPATIENT
Start: 2019-12-08 | End: 2019-12-08

## 2019-12-08 RX ORDER — AMLODIPINE BESYLATE 10 MG/1
10 TABLET ORAL DAILY
COMMUNITY
End: 2019-12-12

## 2019-12-08 RX ORDER — ENOXAPARIN SODIUM 100 MG/ML
1 INJECTION SUBCUTANEOUS
Status: COMPLETED | OUTPATIENT
Start: 2019-12-08 | End: 2019-12-08

## 2019-12-08 RX ADMIN — ASPIRIN 81 MG 162 MG: 81 TABLET ORAL at 23:55

## 2019-12-08 RX ADMIN — NITROGLYCERIN 1 INCH: 20 OINTMENT TOPICAL at 23:55

## 2019-12-08 RX ADMIN — ENOXAPARIN SODIUM 100 MG: 100 INJECTION SUBCUTANEOUS at 23:55

## 2019-12-08 RX ADMIN — CLONIDINE HYDROCHLORIDE 0.1 MG: 0.1 TABLET ORAL at 23:29

## 2019-12-08 NOTE — Clinical Note
TRANSFER - IN REPORT:     Verbal report received from: [address] Report consisted of patient's Situation, Background, Assessment and   Recommendations(SBAR). Opportunity for questions and clarification was provided. Assessment completed upon patient's arrival to unit and care assumed. Patient transported with a Registered Nurse.

## 2019-12-09 ENCOUNTER — APPOINTMENT (OUTPATIENT)
Dept: NON INVASIVE DIAGNOSTICS | Age: 54
DRG: 281 | End: 2019-12-09
Attending: INTERNAL MEDICINE
Payer: SELF-PAY

## 2019-12-09 PROBLEM — I21.4 NSTEMI (NON-ST ELEVATED MYOCARDIAL INFARCTION) (HCC): Status: ACTIVE | Noted: 2019-12-09

## 2019-12-09 LAB
ALBUMIN SERPL-MCNC: 3 G/DL (ref 3.5–5)
ALBUMIN/GLOB SERPL: 0.6 {RATIO} (ref 1.1–2.2)
ALP SERPL-CCNC: 93 U/L (ref 45–117)
ALT SERPL-CCNC: 29 U/L (ref 12–78)
ANION GAP SERPL CALC-SCNC: 5 MMOL/L (ref 5–15)
AST SERPL-CCNC: 17 U/L (ref 15–37)
ATRIAL RATE: 90 BPM
BILIRUB SERPL-MCNC: 0.4 MG/DL (ref 0.2–1)
BUN SERPL-MCNC: 20 MG/DL (ref 6–20)
BUN/CREAT SERPL: 15 (ref 12–20)
CALCIUM SERPL-MCNC: 9.2 MG/DL (ref 8.5–10.1)
CALCULATED P AXIS, ECG09: 61 DEGREES
CALCULATED R AXIS, ECG10: -31 DEGREES
CALCULATED T AXIS, ECG11: 64 DEGREES
CHLORIDE SERPL-SCNC: 105 MMOL/L (ref 97–108)
CHOLEST SERPL-MCNC: 166 MG/DL
CO2 SERPL-SCNC: 28 MMOL/L (ref 21–32)
CREAT SERPL-MCNC: 1.32 MG/DL (ref 0.7–1.3)
DIAGNOSIS, 93000: NORMAL
ERYTHROCYTE [DISTWIDTH] IN BLOOD BY AUTOMATED COUNT: 15.8 % (ref 11.5–14.5)
EST. AVERAGE GLUCOSE BLD GHB EST-MCNC: 128 MG/DL
GLOBULIN SER CALC-MCNC: 4.8 G/DL (ref 2–4)
GLUCOSE SERPL-MCNC: 117 MG/DL (ref 65–100)
HBA1C MFR BLD: 6.1 % (ref 4–5.6)
HCT VFR BLD AUTO: 41.5 % (ref 36.6–50.3)
HDLC SERPL-MCNC: 47 MG/DL
HDLC SERPL: 3.5 {RATIO} (ref 0–5)
HGB BLD-MCNC: 13 G/DL (ref 12.1–17)
LDLC SERPL CALC-MCNC: 99.6 MG/DL (ref 0–100)
LIPID PROFILE,FLP: NORMAL
MAGNESIUM SERPL-MCNC: 1.9 MG/DL (ref 1.6–2.4)
MCH RBC QN AUTO: 26.9 PG (ref 26–34)
MCHC RBC AUTO-ENTMCNC: 31.3 G/DL (ref 30–36.5)
MCV RBC AUTO: 85.9 FL (ref 80–99)
NRBC # BLD: 0 K/UL (ref 0–0.01)
NRBC BLD-RTO: 0 PER 100 WBC
P-R INTERVAL, ECG05: 176 MS
PLATELET # BLD AUTO: 300 K/UL (ref 150–400)
PMV BLD AUTO: 9.6 FL (ref 8.9–12.9)
POTASSIUM SERPL-SCNC: 3.5 MMOL/L (ref 3.5–5.1)
PROT SERPL-MCNC: 7.8 G/DL (ref 6.4–8.2)
Q-T INTERVAL, ECG07: 394 MS
QRS DURATION, ECG06: 100 MS
QTC CALCULATION (BEZET), ECG08: 481 MS
RBC # BLD AUTO: 4.83 M/UL (ref 4.1–5.7)
SODIUM SERPL-SCNC: 138 MMOL/L (ref 136–145)
TRIGL SERPL-MCNC: 97 MG/DL (ref ?–150)
TROPONIN I SERPL-MCNC: 0.13 NG/ML
TROPONIN I SERPL-MCNC: 0.17 NG/ML
TSH SERPL DL<=0.05 MIU/L-ACNC: 1.91 UIU/ML (ref 0.36–3.74)
VENTRICULAR RATE, ECG03: 90 BPM
VLDLC SERPL CALC-MCNC: 19.4 MG/DL
WBC # BLD AUTO: 9.3 K/UL (ref 4.1–11.1)

## 2019-12-09 PROCEDURE — 84443 ASSAY THYROID STIM HORMONE: CPT

## 2019-12-09 PROCEDURE — 94640 AIRWAY INHALATION TREATMENT: CPT

## 2019-12-09 PROCEDURE — 74011250637 HC RX REV CODE- 250/637: Performed by: NURSE PRACTITIONER

## 2019-12-09 PROCEDURE — 74011000250 HC RX REV CODE- 250: Performed by: INTERNAL MEDICINE

## 2019-12-09 PROCEDURE — 74011000250 HC RX REV CODE- 250: Performed by: NURSE PRACTITIONER

## 2019-12-09 PROCEDURE — 84484 ASSAY OF TROPONIN QUANT: CPT

## 2019-12-09 PROCEDURE — 80061 LIPID PANEL: CPT

## 2019-12-09 PROCEDURE — 74011250637 HC RX REV CODE- 250/637: Performed by: INTERNAL MEDICINE

## 2019-12-09 PROCEDURE — 93306 TTE W/DOPPLER COMPLETE: CPT

## 2019-12-09 PROCEDURE — 83735 ASSAY OF MAGNESIUM: CPT

## 2019-12-09 PROCEDURE — 36415 COLL VENOUS BLD VENIPUNCTURE: CPT

## 2019-12-09 PROCEDURE — 83036 HEMOGLOBIN GLYCOSYLATED A1C: CPT

## 2019-12-09 PROCEDURE — 94664 DEMO&/EVAL PT USE INHALER: CPT

## 2019-12-09 PROCEDURE — 74011250636 HC RX REV CODE- 250/636: Performed by: INTERNAL MEDICINE

## 2019-12-09 PROCEDURE — 74011250636 HC RX REV CODE- 250/636: Performed by: NURSE PRACTITIONER

## 2019-12-09 PROCEDURE — 65660000000 HC RM CCU STEPDOWN

## 2019-12-09 PROCEDURE — 80053 COMPREHEN METABOLIC PANEL: CPT

## 2019-12-09 PROCEDURE — 85027 COMPLETE CBC AUTOMATED: CPT

## 2019-12-09 RX ORDER — AMLODIPINE BESYLATE 5 MG/1
5 TABLET ORAL ONCE
Status: DISCONTINUED | OUTPATIENT
Start: 2019-12-09 | End: 2019-12-09

## 2019-12-09 RX ORDER — AMLODIPINE BESYLATE 5 MG/1
5 TABLET ORAL DAILY
Status: DISCONTINUED | OUTPATIENT
Start: 2019-12-09 | End: 2019-12-09

## 2019-12-09 RX ORDER — HYDRALAZINE HYDROCHLORIDE 20 MG/ML
10 INJECTION INTRAMUSCULAR; INTRAVENOUS ONCE
Status: DISCONTINUED | OUTPATIENT
Start: 2019-12-09 | End: 2019-12-09

## 2019-12-09 RX ORDER — GUAIFENESIN 100 MG/5ML
81 LIQUID (ML) ORAL DAILY
Status: DISCONTINUED | OUTPATIENT
Start: 2019-12-09 | End: 2019-12-12 | Stop reason: HOSPADM

## 2019-12-09 RX ORDER — GUAIFENESIN 600 MG/1
600 TABLET, EXTENDED RELEASE ORAL EVERY 12 HOURS
Status: DISCONTINUED | OUTPATIENT
Start: 2019-12-09 | End: 2019-12-12 | Stop reason: HOSPADM

## 2019-12-09 RX ORDER — SODIUM CHLORIDE 0.9 % (FLUSH) 0.9 %
5-40 SYRINGE (ML) INJECTION AS NEEDED
Status: DISCONTINUED | OUTPATIENT
Start: 2019-12-09 | End: 2019-12-12 | Stop reason: HOSPADM

## 2019-12-09 RX ORDER — ACETAMINOPHEN 325 MG/1
650 TABLET ORAL
Status: DISCONTINUED | OUTPATIENT
Start: 2019-12-09 | End: 2019-12-12 | Stop reason: HOSPADM

## 2019-12-09 RX ORDER — HYDRALAZINE HYDROCHLORIDE 20 MG/ML
20 INJECTION INTRAMUSCULAR; INTRAVENOUS
Status: DISCONTINUED | OUTPATIENT
Start: 2019-12-09 | End: 2019-12-12 | Stop reason: HOSPADM

## 2019-12-09 RX ORDER — CARVEDILOL 12.5 MG/1
12.5 TABLET ORAL 2 TIMES DAILY WITH MEALS
Status: DISCONTINUED | OUTPATIENT
Start: 2019-12-09 | End: 2019-12-11

## 2019-12-09 RX ORDER — SODIUM CHLORIDE 0.9 % (FLUSH) 0.9 %
5-40 SYRINGE (ML) INJECTION EVERY 8 HOURS
Status: DISCONTINUED | OUTPATIENT
Start: 2019-12-09 | End: 2019-12-12 | Stop reason: HOSPADM

## 2019-12-09 RX ORDER — FUROSEMIDE 10 MG/ML
20 INJECTION INTRAMUSCULAR; INTRAVENOUS ONCE
Status: COMPLETED | OUTPATIENT
Start: 2019-12-09 | End: 2019-12-09

## 2019-12-09 RX ORDER — ARFORMOTEROL TARTRATE 15 UG/2ML
15 SOLUTION RESPIRATORY (INHALATION)
Status: DISCONTINUED | OUTPATIENT
Start: 2019-12-09 | End: 2019-12-12 | Stop reason: HOSPADM

## 2019-12-09 RX ORDER — ENOXAPARIN SODIUM 100 MG/ML
1 INJECTION SUBCUTANEOUS EVERY 12 HOURS
Status: DISCONTINUED | OUTPATIENT
Start: 2019-12-09 | End: 2019-12-09

## 2019-12-09 RX ORDER — AMLODIPINE BESYLATE 5 MG/1
10 TABLET ORAL DAILY
Status: DISCONTINUED | OUTPATIENT
Start: 2019-12-10 | End: 2019-12-12 | Stop reason: HOSPADM

## 2019-12-09 RX ORDER — BUDESONIDE 0.5 MG/2ML
500 INHALANT ORAL
Status: DISCONTINUED | OUTPATIENT
Start: 2019-12-09 | End: 2019-12-12 | Stop reason: HOSPADM

## 2019-12-09 RX ORDER — PANTOPRAZOLE SODIUM 40 MG/1
40 TABLET, DELAYED RELEASE ORAL
Status: DISCONTINUED | OUTPATIENT
Start: 2019-12-09 | End: 2019-12-12 | Stop reason: HOSPADM

## 2019-12-09 RX ORDER — IPRATROPIUM BROMIDE AND ALBUTEROL SULFATE 2.5; .5 MG/3ML; MG/3ML
3 SOLUTION RESPIRATORY (INHALATION)
Status: DISCONTINUED | OUTPATIENT
Start: 2019-12-09 | End: 2019-12-11

## 2019-12-09 RX ORDER — METOPROLOL TARTRATE 25 MG/1
25 TABLET, FILM COATED ORAL ONCE
Status: COMPLETED | OUTPATIENT
Start: 2019-12-09 | End: 2019-12-09

## 2019-12-09 RX ORDER — HYDRALAZINE HYDROCHLORIDE 20 MG/ML
20 INJECTION INTRAMUSCULAR; INTRAVENOUS ONCE
Status: COMPLETED | OUTPATIENT
Start: 2019-12-09 | End: 2019-12-09

## 2019-12-09 RX ADMIN — BENZOCAINE AND MENTHOL 1 LOZENGE: 15; 3.6 LOZENGE ORAL at 18:46

## 2019-12-09 RX ADMIN — ACETAMINOPHEN 650 MG: 325 TABLET ORAL at 16:23

## 2019-12-09 RX ADMIN — AMLODIPINE BESYLATE 5 MG: 5 TABLET ORAL at 08:34

## 2019-12-09 RX ADMIN — BUDESONIDE 500 MCG: 0.5 INHALANT RESPIRATORY (INHALATION) at 08:55

## 2019-12-09 RX ADMIN — BUDESONIDE 500 MCG: 0.5 INHALANT RESPIRATORY (INHALATION) at 20:23

## 2019-12-09 RX ADMIN — LIDOCAINE HYDROCHLORIDE 40 ML: 20 SOLUTION ORAL; TOPICAL at 12:06

## 2019-12-09 RX ADMIN — BENZOCAINE AND MENTHOL 1 LOZENGE: 15; 3.6 LOZENGE ORAL at 21:33

## 2019-12-09 RX ADMIN — HYDRALAZINE HYDROCHLORIDE 20 MG: 20 INJECTION INTRAMUSCULAR; INTRAVENOUS at 11:33

## 2019-12-09 RX ADMIN — ACETAMINOPHEN 650 MG: 325 TABLET ORAL at 08:34

## 2019-12-09 RX ADMIN — FUROSEMIDE 20 MG: 10 INJECTION, SOLUTION INTRAMUSCULAR; INTRAVENOUS at 06:12

## 2019-12-09 RX ADMIN — Medication 20 ML: at 14:00

## 2019-12-09 RX ADMIN — PANTOPRAZOLE SODIUM 40 MG: 40 TABLET, DELAYED RELEASE ORAL at 12:10

## 2019-12-09 RX ADMIN — IPRATROPIUM BROMIDE AND ALBUTEROL SULFATE 3 ML: .5; 3 SOLUTION RESPIRATORY (INHALATION) at 08:55

## 2019-12-09 RX ADMIN — Medication 10 ML: at 03:10

## 2019-12-09 RX ADMIN — CARVEDILOL 12.5 MG: 12.5 TABLET, FILM COATED ORAL at 12:10

## 2019-12-09 RX ADMIN — IPRATROPIUM BROMIDE AND ALBUTEROL SULFATE 3 ML: .5; 3 SOLUTION RESPIRATORY (INHALATION) at 20:23

## 2019-12-09 RX ADMIN — Medication 10 ML: at 21:31

## 2019-12-09 RX ADMIN — ENOXAPARIN SODIUM 100 MG: 100 INJECTION SUBCUTANEOUS at 11:32

## 2019-12-09 RX ADMIN — IPRATROPIUM BROMIDE AND ALBUTEROL SULFATE 3 ML: .5; 3 SOLUTION RESPIRATORY (INHALATION) at 11:58

## 2019-12-09 RX ADMIN — ASPIRIN 81 MG 81 MG: 81 TABLET ORAL at 08:34

## 2019-12-09 RX ADMIN — CARVEDILOL 12.5 MG: 12.5 TABLET, FILM COATED ORAL at 16:23

## 2019-12-09 RX ADMIN — NITROGLYCERIN 1 INCH: 20 OINTMENT TOPICAL at 11:32

## 2019-12-09 RX ADMIN — GUAIFENESIN 600 MG: 600 TABLET, EXTENDED RELEASE ORAL at 20:35

## 2019-12-09 RX ADMIN — METOPROLOL TARTRATE 25 MG: 25 TABLET ORAL at 03:06

## 2019-12-09 NOTE — H&P
[address] [facility] Adult  Hospitalist Group  History and Physical    Primary Care Provider: None    Subjective: Josey Duong is a 48 y.o. male with HTN who presents with SOB and chest pain. Patient reports PLASCENCIA for 2 weeks but it was not until today patient started experiencing SOB and substernal chest pain. Pain is non radiating. Described as a pressure. Also, reports orthopnea and cough. No PND. No fever or chills or cough. Denies abdominal pain, nausea, vomiting or LE edema. In the ER he was found to be hypertensive. Given clonidine and nitropaste with improvement of his blood pressure, c chest pain and SOB. Work up revealed elevated troponin. Admission for NSTEMI requested. Review of Systems:    General: HPI, no fever, no changes of weight or sleep  HEENT: no headache, no vision changes, no nose discharge, no hearing changes   RES: no wheezing, no cough, reports sob  CVS: reports cp, no palpitation. Muscular: no joint swelling, no muscle pain, no leg swelling  Skin: no rash, no itching   GI: no vomiting, no diarrhea  : no dysuria, no hematuria  Hemo: no gum bleeding, no petechial   Neuro: no focal weakness, sensory disturbance. Endo: no polydipsia   Psych: denied depression     Past Medical History:   Diagnosis Date    Hypertension       History reviewed. No pertinent surgical history. Prior to Admission medications    Medication Sig Start Date End Date Taking? Authorizing Provider   amLODIPine (NORVASC) 10 mg tablet Take 10 mg by mouth daily. Pt. States he does not take regularly; only when he feels he needs it. Yes Other, MD Valerie     No Known Allergies   Family History   Problem Relation Age of Onset    Stroke Father     Cancer Sister         SOCIAL HISTORY:  Patient resides at home  Patient ambulates indepently  Smoking history: active tobacco use.  1ppd  Alcohol history: Occasional         Objective:       Physical Exam:   Visit Vitals  /85   Pulse 82   Temp 98.4 °F (36.9 °C) Resp 16   Ht 5' 9\" (1.753 m)   Wt 98.9 kg (218 lb 1.6 oz)   SpO2 95%   BMI 32.21 kg/m²     General:          Alert, cooperative, no distress, appears stated age. HEENT:           Atraumatic, anicteric sclerae, pink conjunctivae                          No oral ulcers, mucosa moist, throat clear, dentition fair  Neck:               Supple, symmetrical,  thyroid: non tender  Lungs:             Crackles at the bases. No Wheezing or Rhonchi. No rales. Chest wall:      No tenderness  No Accessory muscle use. Heart:              Regular  rhythm,  No  murmur   No edema  Abdomen:        Soft, non-tender. Not distended. Bowel sounds normal  Extremities:     No cyanosis. No clubbing,                            Skin turgor normal, Capillary refill normal, Radial dial pulse 2+  Skin:                Not pale. Not Jaundiced  No rashes   Psych:             Not anxious or agitated. Neurologic:      Alert, moves all extremities, oriented X 3. Assessment:     Active Problems:    NSTEMI (non-ST elevated myocardial infarction) ([facility] [address] (12/9/2019)        Plan:     1. NSTEMI- given risk factors concerned for acs. Elevated troponin could be also related to uncontrolled HTN  - trend CE  - Continue with lovenox 1mg/kg  - Cardiology consult  - Start beta blocker, asa 81mg daily, statin  - check A1c, lipid panel and TSH    2. Hypertensive urgency - likely due to lack of compliance with medications as he does not take his medications at home. - continue beta bocker. Restart amlodipine  - Echo in am.    3. SOB- in the setting of elevated probnp. No h/o HF  - check echo  - try lasix 20mg iv x1    DVT prophy: on lovenox  Code; full code   surrogate decision maker:  wife    FUNCTIONAL STATUS PRIOR TO HOSPITALIZATION: independent.      Signed By: Mag Patel MD     December 9, 2019

## 2019-12-09 NOTE — ED NOTES
[address] [facility] [address] Logan Porras @ bedside updating patient    [address] [facility] [address] TRANSFER - OUT REPORT:    Verbal report given to Ilir Muñiz RN (name) on Westwood Hayder  being transferred to CVICU (unit) for routine progression of care       Report consisted of patients Situation, Background, Assessment and   Recommendations(SBAR). Information from the following report(s) SBAR and ED Summary was reviewed with the receiving nurse. Lines:   Peripheral IV 12/08/19 Right Forearm (Active)   Site Assessment Clean, dry, & intact 12/8/2019 10:37 PM   Phlebitis Assessment 0 12/8/2019 10:37 PM   Infiltration Assessment 0 12/8/2019 10:37 PM   Dressing Status Clean, dry, & intact 12/8/2019 10:37 PM   Dressing Type Transparent 12/8/2019 10:37 PM   Hub Color/Line Status Pink 12/8/2019 10:37 PM   Action Taken Blood drawn 12/8/2019 10:37 PM   Alcohol Cap Used Yes 12/8/2019 10:37 PM        Opportunity for questions and clarification was provided.

## 2019-12-09 NOTE — PROGRESS NOTES
Spiritual Care Assessment/Progress Note  [facility]      NAME: Juliane Villegas      MRN: [id number]  AGE: 48 y.o. SEX: male  Rastafarian Affiliation: No Faith   Language: English     12/9/2019           Spiritual Assessment begun in [facility] [place] [facility] CV INTNSV CARE through conversation with:         [x]Patient        [x] Family    [] Friend(s)        Reason for Consult: Initial/Spiritual assessment, critical care     Spiritual beliefs: (Please include comment if needed)     [x] Identifies with a adryan tradition:  Congregation       [] Supported by a adryan community:            [] Claims no spiritual orientation:           [] Seeking spiritual identity:                [] Adheres to an individual form of spirituality:           [] Not able to assess:                           Identified resources for coping:      [x] Prayer                               [] Music                  [] Guided Imagery     [x] Family/friends                 [] Pet visits     [] Devotional reading                         [] Unknown     [] Other:                                               Interventions offered during this visit: (See comments for more details)    Patient Interventions: Affirmation of emotions/emotional suffering, Catharsis/review of pertinent events in supportive environment, Initial/Spiritual assessment, Critical care, Prayer (assurance of)     Family/Friend(s):  Affirmation of emotions/emotional suffering, Catharsis/review of pertinent events in supportive environment, Initial Assessment, Prayer (assurance of)     Plan of Care:     [] Support spiritual and/or cultural needs    [] Support AMD and/or advance care planning process      [] Support grieving process   [] Coordinate Rites and/or Rituals    [] Coordination with community clergy   [x] No spiritual needs identified at this time   [] Detailed Plan of Care below (See Comments)  [] Make referral to Music Therapy  [] Make referral to Pet Therapy     [] Make referral to Addiction services  [] Make referral to [facility]  [] Make referral to Spiritual Care Partner  [] No future visits requested        [x] Follow up visits as needed     Comments: Visited Mr Judy Vogel in [place] for initial spiritual assessment. Mr Judy Vogel was lying quietly in bed and his Significant Other (SO) was at his bedside. Provided active listening as Mr Judy Vogel shared that he was feeling about the same as upon admission. He and his SO denied having any concerns to share with . Mr Judy Vogel stated that he identified as [facility] and would appreciate being kept in prayer. Assured patient and family of prayers on their behalf and of ongoing  availability for support. : . Yocasta Gray.  Lj; to contact [phone] [address] call: [phone]

## 2019-12-09 NOTE — PROGRESS NOTES
Patient seen and examined. Admitted by my colleague, Teresa Hoffman, earlier this AM.     Patient reports history of progressive SOB with exertion and associated chest tightness. Significant tobacco abuse, 1 pack per day. Uncontrolled hypertension, noncompliant with outpatient regimen. Ha1c 6.1, LDL 99.6. In the ED, troponin elevated. EKG with no ST changes.      A/P:   NSTEMI:   -cardiology consult  -patient may benefit from further ischemic work up  -continue ASA  -keep NPO  -echo pending    Probable COPD with tobacco abuse:  -brovana/pulmicort, aa nebs as needed  -outpatient PFTs    Hypertension: home amlodipine    Transfer to telemetry      [employer] [place], DO

## 2019-12-09 NOTE — PROGRESS NOTES
Reason for Admission:                      RRAT Score:                     Plan for utilizing home health: No home health needs identified at this time                         Current Advanced Directive/Advance Care Plan: Full code, not on file                          Transition of Care Plan: Home    The CM met with the patient and his significant other Umm Sethi at bedside in order to introduce the role of CM and assess for patient needs. The patient lives at home in [facility] with his significant other. The patient verified demographics- currently uninsured. The patient is independent with ADLs and mobility, denied use of DME in the home. 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 The patient is uninsured, CM provided [place] at bedside- also sent referral to [facility] to have the patient screened for Medicaid. The patient endorses he can afford his medication- goes to [facility], however, he has not been taking it regularly due to lack of PCP and no connection to a Physician to write refills of prescriptions. The patient is agreeable for [facility] Referrals, lives in [place], [place] in [facility] and prefer clinic closer to their home- CM found [facility] and provided the patient with the intake phone number to get connected with the [facility]. The CM will continue to follow for transitions of care- will have to monitor discharging medications/discuss affordability upon discharge. The patient endorses Umm Sethi will transport home. CARMEN Enrique    Care Management Interventions  PCP Verified by CM: Yes(Patient does not have PCP- CM provided information for [facility] at bedside for patient and significant other )  Mode of Transport at Discharge:  Other (see comment)(Family to transport home )  Transition of Care Consult (CM Consult): Discharge Planning  MyChart Signup: No  Discharge Durable Medical Equipment: No  Health Maintenance Reviewed: Yes  Physical Therapy Consult: No  Occupational Therapy Consult: No  Speech Therapy Consult: No  Current Support Network: Own Home, Other(Patient lives with his significant other Ritu )  Confirm Follow Up Transport: Family  Plan discussed with Pt/Family/Caregiver: Yes  Discharge Location  Discharge Placement: Home

## 2019-12-09 NOTE — CONSULTS
[employer]: [employer]  [phone]    HPI: Mackenzie Soto, a 48y.o. year-old who presents for evaluation of PLASCENCIA. Has PMH of essential HTN. Reports new onset PLASCENCIA x 2 week which has progressively worsened. He reports he had some substernal chest pain at times with his symptoms but no radiation to jaw. Also reports nonproductive cough for past couple days and recent orthopnea. States that he started having chest pain this a.m. again after he drank a soda. Describes pain as a burning sensation and  it has been ongoing since it started. Reports left chest wall tenderness. He Reports he has numbness from left neck to left thumb which he has had for 1 month. Denies BLE edema, weight gain, abdominal swelling, fevers, chills, dizziness, lightheadedness, syncope. States he takes the amlodipine \"off and on\" trying to spare pills as he has no insurance. Work up thus far noted mild troponin elevation but flat at 0.17 x 2. EKG NSR with t wave abnormality lateral leads. He did receive clonidine and nitropaste with improvement in BP. He has also since received lopressor 25 mg po x 1 and amlodipine 5 mg. SH:  Current 1 ppd smoker x 31 years. Drinks socially but not every day. Works in lawn care  FH: no FH of early CAD    Old patient of Dr. Anitha Drew, he will assume care tomorrow. Assessment/Plan:    1. Elevated troponin:     -On ASA. -Troponin mildly elevated but flat (0.17-->> 0.17) not indicative of NSTEMI. -Chest pain atypical-   12 lead EKG NSR with LVH and t wave abnormality lateral leads   -Will trial GI cocktail and protonix for atypical chest discomfort (burning sensation)   -Suspect mildly elevated, flat troponin elevation due to HTN urgency. However, does have risk factors, will check echo and plan stress test tomorrow.    -Do not need to continue Lovenox 1 mg/kg.      2. HTN:  Hypertensive urgency: BP remains elevated    -Increase norvasc to 10 mg daily   -nitropaste 1 inch x 1 now   -Hydralazine 20 mg IV x 1 now   -Add coreg 12.5 mg BID    3. PLASCENCIA:     -Pro  (cutoff 900 for age). CXR negative for pulmonary edema.   -Lexiscan stress test in a.m.after improved blood pressure control. 4. Elevated creatinine:  Creatinine 1.32 (from 1.41 yesterday). -Monitor. 5. Tobacco abuse: discuss cessation. 6. Possible COPD:  Nebs per priamry team. Outpatient PFTs planned. Pt with chief c/o PLASCENCIA x 2 weeks, presented with hypertensive urgency. Troponin mildly elevated but flat, NOT indicative of NSTEMI. EKG with t wave abnormality lateral leads. His chest pain this a.m. occurred after drinking soda, will trial protonix and GI cocktail. Agree with echocardiogram. Given risk factors, would proceed with Lexiscan stress test tomorrow after improved BP control with above regimen. Dr. Jessica Serna will follow pt tomorrow. He  has a past medical history of Hypertension. Cardiovascular ROS: +chest \"burning, pressure. \", + PLASCENCIA, no syncope, no dizziness. Respiratory ROS: PLASCENCIA, + cough  Neurological ROS: + numbness left neck to thumb  All other systems negative except as above. PE  Vitals:    12/09/19 0800 12/09/19 0855 12/09/19 0900 12/09/19 1000   BP: (!) 158/112  (!) 150/121 (!) 157/119   Pulse: 87  78 77   Resp: 19  15 15   Temp: 98.4 °F (36.9 °C)      SpO2: 97% 97% 96% 95%   Weight:       Height:        Body mass index is 32.21 kg/m². General appearance - alert, well appearing, and in no distress  Mental status - affect appropriate to mood  Head:  Soft tissue swelling forehead  Eyes - sclera anicteric, moist mucous membranes  Neck - supple,   Lymphatics - no  lymphadenopathy  Chest - Wheezes noted bilaterally. Chest wall:  Midsternal, left chest wall mild ttp.   Heart - RRR, normal S1, S2, no murmurs, rubs, clicks or gallops  Abdomen - soft, nontender, nondistended, no masses or organomegaly  Back exam -symmetric  Neurological - alert, oriented, YANG  Musculoskeletal _+left chest wall ttp  Extremities - peripheral pulses normal, no pedal edema  Skin - normal coloration  no rashes    Recent Labs:  Lab Results   Component Value Date/Time    Cholesterol, total 166 12/09/2019 06:13 AM    HDL Cholesterol 47 12/09/2019 06:13 AM    LDL, calculated 99.6 12/09/2019 06:13 AM    Triglyceride 97 12/09/2019 06:13 AM    CHOL/HDL Ratio 3.5 12/09/2019 06:13 AM     Lab Results   Component Value Date/Time    Creatinine 1.32 (H) 12/09/2019 04:28 AM     Lab Results   Component Value Date/Time    BUN 20 12/09/2019 04:28 AM     Lab Results   Component Value Date/Time    Potassium 3.5 12/09/2019 04:28 AM     Lab Results   Component Value Date/Time    Hemoglobin A1c 6.1 (H) 12/09/2019 06:13 AM     Lab Results   Component Value Date/Time    HGB 13.0 12/09/2019 04:28 AM     Lab Results   Component Value Date/Time    PLATELET 036 80/43/4399 04:28 AM       Reviewed:  Past Medical History:   Diagnosis Date    Hypertension      Social History     Tobacco Use   Smoking Status Current Every Day Smoker    Packs/day: 1.00   Smokeless Tobacco Never Used     Social History     Substance and Sexual Activity   Alcohol Use Yes    Alcohol/week: 5.0 standard drinks    Types: 6 Cans of beer per week    Comment: Occasionally     No Known Allergies    Current Facility-Administered Medications   Medication Dose Route Frequency    sodium chloride (NS) flush 5-40 mL  5-40 mL IntraVENous Q8H    sodium chloride (NS) flush 5-40 mL  5-40 mL IntraVENous PRN    enoxaparin (LOVENOX) injection 100 mg  1 mg/kg SubCUTAneous Q12H    aspirin chewable tablet 81 mg  81 mg Oral DAILY    acetaminophen (TYLENOL) tablet 650 mg  650 mg Oral Q4H PRN    amLODIPine (NORVASC) tablet 5 mg  5 mg Oral DAILY    arformoterol (BROVANA) neb solution 15 mcg  15 mcg Nebulization BID RT    budesonide (PULMICORT) 500 mcg/2 ml nebulizer suspension  500 mcg Nebulization BID RT    albuterol-ipratropium (DUO-NEB) 2.5 MG-0.5 MG/3 ML  3 mL Nebulization Q4H RT    nitroglycerin (NITROBID) 2 % ointment 1 Inch  1 Inch Topical NOW       Raimundo Atkins.  JENNIFER Arita  [facility] [place]  [address]  [address] [place] [address]

## 2019-12-09 NOTE — PROGRESS NOTES
Problem: Unstable angina/NSTEMI: Day of Admission/Day 1  Goal: Activity/Safety  Outcome: Progressing Towards Goal  Goal: Diagnostic Test/Procedures  Outcome: Progressing Towards Goal  Goal: Medications  Outcome: Progressing Towards Goal  Goal: Respiratory  Outcome: Progressing Towards Goal  Goal: Psychosocial  Outcome: Progressing Towards Goal  Goal: *Hemodynamically stable  Outcome: Progressing Towards Goal  Goal: *Optimal pain control at patient's stated goal  Outcome: Progressing Towards Goal

## 2019-12-09 NOTE — PROGRESS NOTES
0730: Bedside and Verbal shift change report given to CHRISTY RN (oncoming nurse) by Faisal Ledesma RN (offgoing nurse). Report included the following information SBAR, Kardex, Intake/Output, MAR, Recent Results, Med Rec Status and Cardiac Rhythm NSR.   0840: Mitzy Kaur MD at bedside. Orders to keep patient NPO until cards sees him. Orders to start neb treatments for possible COPD. Orders placed for PRN PO Tylenol and start PO Norvasc daily for BP, and to start daily PO Aspirin. 1150: Tana Rodas MD at bedside. Orders for stress test tomorrow AM. Pt will be NPO at midnight. Pt back on cardiac regular diet for today. Orders to give 20 mg IV Hydralazine now, start patient on 12.5 PO Coreg and give another 5 mg Norvasc if pressure still doesn't come down. Will continue to monitor. 1300: 2D Echo at bedside. 1330: Jean-Paul Howard NP at bedside. Orders to hold 2nd 5 mg PO Norvasc d/t BP in the 556J-082K systolically. 1635: Spoke with Jean-Paul Howard NP regarding patient's BP elevated into the 738O systolically. Orders to give 20 mg IV PRN Hydralazine for systolic greater than 104. PO 12.5 mg Coreg given now. Will continue to monitor. 1640: Spoke with Mitzy Kaur MD regarding patient's cough throughout the day. Orders to give PRN Cepacol and scheduled Mucinex. 1930: Bedside and Verbal shift change report given to Harry Powell RN (oncoming nurse) by JAMES HARRISON (offgoing nurse). Report included the following information SBAR, Kardex, Intake/Output, MAR, Recent Results, Med Rec Status and Cardiac Rhythm NSR.

## 2019-12-09 NOTE — PROGRESS NOTES
[address] December 9, 2019       RE: Marta Cole      To Whom It May Concern,    This is to certify that Marta Cole was admitted into the hospital on 12/8 and is still here on 12/9/19. Haylie Michael (patient's spouse) has been at the bedside assisting with his hospital stay. Please feel free to contact my office if you have any questions or concerns. Thank you for your assistance in this matter.       Sincerely,  Greg Curry RN   [phone]

## 2019-12-09 NOTE — ED PROVIDER NOTES
HPI     74-year-old male with a history of hypertension, presents to the ED with shortness of breath with exertion for the past several weeks. He also reports feeling better sitting upright at night to sleep. He denies any calf pain or swelling. He does report some chest discomfort with his symptoms. But no radiation to his shoulder or jaw. He currently has no chest pain or shortness of breath while at rest.  He states when the symptoms come on while doing activity he sits down and rests in about 10 minutes his symptoms go away. He is never seen a cardiologist before. He is a smoker. He does have hypertension. He has a prescription for amlodipine but admits he does not take it on a regular basis. He denies any history of diabetes, high cholesterol, or family history of heart disease. He has never seen a cardiologist.  He denies any productive cough. He does have some nasal drainage. No fever. He has no recent travel or surgery. He denies calf pain or swelling. He denies history of DVT or PE. Past Medical History:   Diagnosis Date    Hypertension        History reviewed. No pertinent surgical history. Family History:   Problem Relation Age of Onset    Stroke Father     Cancer Sister        Social History     Socioeconomic History    Marital status: SINGLE     Spouse name: Not on file    Number of children: Not on file    Years of education: Not on file    Highest education level: Not on file   Occupational History    Not on file   Social Needs    Financial resource strain: Not on file    Food insecurity:     Worry: Not on file     Inability: Not on file    Transportation needs:     Medical: Not on file     Non-medical: Not on file   Tobacco Use    Smoking status: Current Every Day Smoker     Packs/day: 1.00    Smokeless tobacco: Never Used   Substance and Sexual Activity    Alcohol use:  Yes     Alcohol/week: 5.0 standard drinks     Types: 6 Cans of beer per week     Comment: Occasionally    Drug use: No    Sexual activity: Yes     Partners: Female   Lifestyle    Physical activity:     Days per week: Not on file     Minutes per session: Not on file    Stress: Not on file   Relationships    Social connections:     Talks on phone: Not on file     Gets together: Not on file     Attends Christian service: Not on file     Active member of club or organization: Not on file     Attends meetings of clubs or organizations: Not on file     Relationship status: Not on file    Intimate partner violence:     Fear of current or ex partner: Not on file     Emotionally abused: Not on file     Physically abused: Not on file     Forced sexual activity: Not on file   Other Topics Concern    Not on file   Social History Narrative    ** Merged History Encounter **              ALLERGIES: Patient has no known allergies. Review of Systems   Constitutional: Negative for fever. HENT: Positive for congestion. Eyes: Negative for visual disturbance. Respiratory: Positive for chest tightness and shortness of breath. Cardiovascular: Positive for chest pain. Negative for palpitations and leg swelling. Gastrointestinal: Negative for abdominal pain, nausea and vomiting. Genitourinary: Negative for dysuria. Musculoskeletal: Negative for gait problem. Skin: Negative for rash. Neurological: Negative for headaches. Psychiatric/Behavioral: Negative for dysphoric mood. Vitals:    12/08/19 2150   BP: (!) 178/123   Pulse: 98   Resp: 16   Temp: 98.3 °F (36.8 °C)   SpO2: 95%   Weight: 100.5 kg (221 lb 9 oz)   Height: 5' 9\" (1.753 m)            Physical Exam  Constitutional:       General: He is not in acute distress. Appearance: He is well-developed. HENT:      Head: Normocephalic and atraumatic. Mouth/Throat:      Pharynx: No oropharyngeal exudate. Eyes:      General: No scleral icterus. Right eye: No discharge. Left eye: No discharge.       Pupils: Pupils are equal, round, and reactive to light. Neck:      Musculoskeletal: Normal range of motion and neck supple. Vascular: No JVD. Cardiovascular:      Rate and Rhythm: Normal rate and regular rhythm. Heart sounds: Normal heart sounds. No murmur. Pulmonary:      Effort: Pulmonary effort is normal. No respiratory distress. Breath sounds: Normal breath sounds. No stridor. No wheezing or rales. Chest:      Chest wall: No tenderness. Abdominal:      General: Bowel sounds are normal. There is no distension. Palpations: Abdomen is soft. There is no mass. Tenderness: There is no tenderness. There is no guarding or rebound. Musculoskeletal: Normal range of motion. Skin:     General: Skin is warm and dry. Capillary Refill: Capillary refill takes less than 2 seconds. Findings: No rash. Neurological:      Mental Status: He is oriented to person, place, and time. Psychiatric:         Behavior: Behavior normal.         Thought Content: Thought content normal.         Judgment: Judgment normal.          MDM  Number of Diagnoses or Management Options  Chest pain, unspecified type:   Elevated troponin:   Hypertension, unspecified type:   Critical Care  Total time providing critical care: 30-74 minutes  35 minutes       Procedures          ED EKG interpretation:  Rhythm: normal sinus rhythm; and regular . Rate (approx.): 89; Axis: left axis deviation; P wave: normal; QRS interval: normal ; ST/T wave: non-specific changes; new T wave inversions I and aVL compared to prior EKG's, LVH. This EKG was interpreted by Robbin Cabot, MD,ED Provider. Exertional CP/SOB c/w angina, new lateral t wave inversion with elevated troponin. Aspirin, NItro paste, Lovenox and admit. Currently no symptoms. Carlita Pete Serve for Admission  11:50 PM    ED Room Number: ER09/09  Patient Name and age: Laura Casillas 48 y.o.  male  Working Diagnosis:   1. Chest pain, unspecified type    2. Elevated troponin    3. Hypertension, unspecified type      Readmission: no  Isolation Requirements:  no  Recommended Level of Care:  telemetry  Code Status:  Full Code  Department:[facility] Adult ED - [phone]  Other:  48 Year old male with h/o HTN, smoker, admittedly non compliant with BP meds, presents with exertional CP/SOB for several weeks. Currently symptom free. New t wave inversions laterally on EKG with Trop of 0.17. mild increase BNP at 576. No known cardiac disease. He has received aspirin, Nitro paste, clonidine for BP, and lovenox.

## 2019-12-09 NOTE — CARDIO/PULMONARY
Cardiac Rehab: Per EMR, patient is a current smoker.  Smoking Cessation Program information placed on the AVS.   
Elisabeth Mckoy RN

## 2019-12-09 NOTE — PROGRESS NOTES
0215 - TRANSFER - IN REPORT:    Verbal report received from Jazmin Del Valle RN(name) on Shaista Comment  being received from ED(unit) for routine progression of care      Report consisted of patients Situation, Background, Assessment and   Recommendations(SBAR). Information from the following report(s) SBAR, Kardex, Intake/Output, MAR, Recent Results, Cardiac Rhythm Sinus Rhythm and Alarm Parameters  was reviewed with the receiving nurse. Opportunity for questions and clarification was provided. Assessment completed upon patients arrival to unit and care assumed. Pt arrived via stretcher on room air and was ambulatory to standing scale and to bed. Pt AOx4, moving all extremities, following commands, no complaints of cp or shortness of breath, strong and nonproductive cough noted. 6394 - Dr. Jean-Paul Gilmore, hospitalist, in [place] [address], updated on pt condition and bp 142/102 (map 114), hr 89. Orders for 25mg po metoprolol ordered. Will continue to monitor. 3544 - Pt girlfriend, Judith Bean, at bedside. Updated on pt condition. Opportunity for questions and concerns provided. Pt gf states she will be waiting in pt waiting room. 1414 - Dr. Jean-Paul Gilmore, hospitalist, at pt bedside to assess pt.    0400 - Pt reassessed. Pt still has no complaints of cp or sob. 0430 - Troponin resulted = 0.17 - same value as 12/8/19 2239. Will continue to monitor. 7209 - Pt sat 88% when deep sleep - 2L NC placed while asleep.  0700 - Bedside and Verbal shift change report given to JAMES HARRISON (oncoming nurse) by Vinicio Chi RN (offgoing nurse). Report included the following information SBAR, Kardex, Intake/Output, MAR, Recent Results, Cardiac Rhythm Sinus Rhythm and Alarm Parameters .

## 2019-12-09 NOTE — ED TRIAGE NOTES
Ambulatory from home with c/o SOB X2 weeks. Cough, nonproductive. States has noticed that when he walks any distance he gets short winded. States his wife made him come tonight that he isn't feeling SOB right now. Wife states when he lays down is when he gets SOB. Denies chest pain.
no inflammation/no petechia/no scaly patches on skin/no vomiting/no fever/no chills/no decreased eating/drinking/no bruising

## 2019-12-10 ENCOUNTER — APPOINTMENT (OUTPATIENT)
Dept: NON INVASIVE DIAGNOSTICS | Age: 54
DRG: 281 | End: 2019-12-10
Attending: NURSE PRACTITIONER
Payer: SELF-PAY

## 2019-12-10 ENCOUNTER — APPOINTMENT (OUTPATIENT)
Dept: NUCLEAR MEDICINE | Age: 54
DRG: 281 | End: 2019-12-10
Attending: NURSE PRACTITIONER
Payer: SELF-PAY

## 2019-12-10 LAB
AMPHET UR QL SCN: NEGATIVE
ANION GAP SERPL CALC-SCNC: 6 MMOL/L (ref 5–15)
AV VELOCITY RATIO: 0.56
BARBITURATES UR QL SCN: NEGATIVE
BENZODIAZ UR QL: NEGATIVE
BUN SERPL-MCNC: 25 MG/DL (ref 6–20)
BUN/CREAT SERPL: 17 (ref 12–20)
CALCIUM SERPL-MCNC: 9.2 MG/DL (ref 8.5–10.1)
CANNABINOIDS UR QL SCN: NEGATIVE
CHLORIDE SERPL-SCNC: 103 MMOL/L (ref 97–108)
CO2 SERPL-SCNC: 27 MMOL/L (ref 21–32)
COCAINE UR QL SCN: NEGATIVE
CREAT SERPL-MCNC: 1.45 MG/DL (ref 0.7–1.3)
DRUG SCRN COMMENT,DRGCM: NORMAL
ECHO AO ROOT DIAM: 3.75 CM
ECHO AV AREA PEAK VELOCITY: 3.2 CM2
ECHO AV PEAK GRADIENT: 15.2 MMHG
ECHO AV PEAK VELOCITY: 194.84 CM/S
ECHO EST RA PRESSURE: 3 MMHG
ECHO LA AREA 4C: 24.2 CM2
ECHO LA MAJOR AXIS: 4.47 CM
ECHO LA TO AORTIC ROOT RATIO: 1.19
ECHO LA VOL 2C: 67.95 ML (ref 18–58)
ECHO LA VOL 4C: 71.07 ML (ref 18–58)
ECHO LA VOLUME INDEX A2C: 31.71 ML/M2 (ref 16–28)
ECHO LA VOLUME INDEX A4C: 33.16 ML/M2 (ref 16–28)
ECHO LV INTERNAL DIMENSION DIASTOLIC: 5.15 CM (ref 4.2–5.9)
ECHO LV INTERNAL DIMENSION SYSTOLIC: 3.72 CM
ECHO LV IVSD: 1.19 CM (ref 0.6–1)
ECHO LV MASS 2D: 359.7 G (ref 88–224)
ECHO LV MASS INDEX 2D: 167.8 G/M2 (ref 49–115)
ECHO LV POSTERIOR WALL DIASTOLIC: 1.57 CM (ref 0.6–1)
ECHO LVOT DIAM: 2.69 CM
ECHO LVOT PEAK GRADIENT: 4.8 MMHG
ECHO LVOT PEAK VELOCITY: 109.55 CM/S
ECHO MV A VELOCITY: 1.62 CM/S
ECHO MV AREA PHT: 5.7 CM2
ECHO MV E DECELERATION TIME (DT): 133.3 MS
ECHO MV E VELOCITY: 109.88 CM/S
ECHO MV E/A RATIO: 67.83
ECHO MV PRESSURE HALF TIME (PHT): 38.6 MS
ECHO PULMONARY ARTERY SYSTOLIC PRESSURE (PASP): 13.9 MMHG
ECHO PV MAX VELOCITY: 153.62 CM/S
ECHO PV PEAK GRADIENT: 9.4 MMHG
ECHO RIGHT VENTRICULAR SYSTOLIC PRESSURE (RVSP): 13.9 MMHG
ECHO RV INTERNAL DIMENSION: 4.73 CM
ECHO RV TAPSE: 3.17 CM (ref 1.5–2)
ECHO TV REGURGITANT MAX VELOCITY: 165.42 CM/S
ECHO TV REGURGITANT PEAK GRADIENT: 10.9 MMHG
GLUCOSE SERPL-MCNC: 113 MG/DL (ref 65–100)
LVFS 2D: 27.79 %
METHADONE UR QL: NEGATIVE
MV DEC SLOPE: 8.25
OPIATES UR QL: NEGATIVE
PCP UR QL: NEGATIVE
POTASSIUM SERPL-SCNC: 3.7 MMOL/L (ref 3.5–5.1)
SODIUM SERPL-SCNC: 136 MMOL/L (ref 136–145)
STRESS BASELINE HR: 85 BPM
STRESS ESTIMATED WORKLOAD: 1 METS
STRESS EXERCISE DUR MIN: NORMAL
STRESS PEAK DIAS BP: 107 MMHG
STRESS PEAK SYS BP: 157 MMHG
STRESS PERCENT HR ACHIEVED: 62 %
STRESS POST PEAK HR: 105 BPM
STRESS RATE PRESSURE PRODUCT: NORMAL BPM*MMHG
STRESS TARGET HR: 169 BPM

## 2019-12-10 PROCEDURE — 94640 AIRWAY INHALATION TREATMENT: CPT

## 2019-12-10 PROCEDURE — 74011250637 HC RX REV CODE- 250/637: Performed by: INTERNAL MEDICINE

## 2019-12-10 PROCEDURE — 74011250637 HC RX REV CODE- 250/637: Performed by: NURSE PRACTITIONER

## 2019-12-10 PROCEDURE — 78452 HT MUSCLE IMAGE SPECT MULT: CPT

## 2019-12-10 PROCEDURE — 80307 DRUG TEST PRSMV CHEM ANLYZR: CPT

## 2019-12-10 PROCEDURE — 65660000000 HC RM CCU STEPDOWN

## 2019-12-10 PROCEDURE — 74011250636 HC RX REV CODE- 250/636: Performed by: INTERNAL MEDICINE

## 2019-12-10 PROCEDURE — 77030040361 HC SLV COMPR DVT MDII -B

## 2019-12-10 PROCEDURE — 74011250636 HC RX REV CODE- 250/636: Performed by: NURSE PRACTITIONER

## 2019-12-10 PROCEDURE — 80048 BASIC METABOLIC PNL TOTAL CA: CPT

## 2019-12-10 PROCEDURE — A9500 TC99M SESTAMIBI: HCPCS

## 2019-12-10 PROCEDURE — 36415 COLL VENOUS BLD VENIPUNCTURE: CPT

## 2019-12-10 PROCEDURE — 74011000250 HC RX REV CODE- 250: Performed by: INTERNAL MEDICINE

## 2019-12-10 RX ORDER — SODIUM CHLORIDE 9 MG/ML
75 INJECTION, SOLUTION INTRAVENOUS CONTINUOUS
Status: DISCONTINUED | OUTPATIENT
Start: 2019-12-10 | End: 2019-12-11

## 2019-12-10 RX ORDER — SODIUM CHLORIDE 0.9 % (FLUSH) 0.9 %
20 SYRINGE (ML) INJECTION
Status: COMPLETED | OUTPATIENT
Start: 2019-12-10 | End: 2019-12-10

## 2019-12-10 RX ADMIN — CARVEDILOL 12.5 MG: 12.5 TABLET, FILM COATED ORAL at 16:09

## 2019-12-10 RX ADMIN — GUAIFENESIN 600 MG: 600 TABLET, EXTENDED RELEASE ORAL at 21:15

## 2019-12-10 RX ADMIN — GUAIFENESIN 600 MG: 600 TABLET, EXTENDED RELEASE ORAL at 08:22

## 2019-12-10 RX ADMIN — BENZOCAINE AND MENTHOL 1 LOZENGE: 15; 3.6 LOZENGE ORAL at 12:29

## 2019-12-10 RX ADMIN — REGADENOSON 0.4 MG: 0.08 INJECTION, SOLUTION INTRAVENOUS at 11:02

## 2019-12-10 RX ADMIN — BUDESONIDE 500 MCG: 0.5 INHALANT RESPIRATORY (INHALATION) at 20:15

## 2019-12-10 RX ADMIN — IPRATROPIUM BROMIDE AND ALBUTEROL SULFATE 3 ML: .5; 3 SOLUTION RESPIRATORY (INHALATION) at 04:08

## 2019-12-10 RX ADMIN — BENZOCAINE AND MENTHOL 1 LOZENGE: 15; 3.6 LOZENGE ORAL at 22:25

## 2019-12-10 RX ADMIN — IPRATROPIUM BROMIDE AND ALBUTEROL SULFATE 3 ML: .5; 3 SOLUTION RESPIRATORY (INHALATION) at 00:53

## 2019-12-10 RX ADMIN — AMLODIPINE BESYLATE 10 MG: 5 TABLET ORAL at 08:22

## 2019-12-10 RX ADMIN — Medication 20 ML: at 11:02

## 2019-12-10 RX ADMIN — Medication 10 ML: at 08:11

## 2019-12-10 RX ADMIN — SODIUM CHLORIDE 75 ML/HR: 900 INJECTION, SOLUTION INTRAVENOUS at 14:35

## 2019-12-10 RX ADMIN — IPRATROPIUM BROMIDE AND ALBUTEROL SULFATE 3 ML: .5; 3 SOLUTION RESPIRATORY (INHALATION) at 08:28

## 2019-12-10 RX ADMIN — BENZOCAINE AND MENTHOL 1 LOZENGE: 15; 3.6 LOZENGE ORAL at 16:09

## 2019-12-10 RX ADMIN — IPRATROPIUM BROMIDE AND ALBUTEROL SULFATE 3 ML: .5; 3 SOLUTION RESPIRATORY (INHALATION) at 20:15

## 2019-12-10 RX ADMIN — ASPIRIN 81 MG 81 MG: 81 TABLET ORAL at 08:22

## 2019-12-10 RX ADMIN — CARVEDILOL 12.5 MG: 12.5 TABLET, FILM COATED ORAL at 12:29

## 2019-12-10 RX ADMIN — Medication 10 ML: at 21:12

## 2019-12-10 RX ADMIN — BUDESONIDE 500 MCG: 0.5 INHALANT RESPIRATORY (INHALATION) at 08:28

## 2019-12-10 RX ADMIN — Medication 10 ML: at 14:36

## 2019-12-10 RX ADMIN — BENZOCAINE AND MENTHOL 1 LOZENGE: 15; 3.6 LOZENGE ORAL at 03:58

## 2019-12-10 NOTE — PROGRESS NOTES
Hospitalist Progress Note  Solange Parker MD  Answering service: [phone] from in house phone        Date of Service:  12/10/2019  NAME:  Zeus Vazquez  :  1965  MRN:  [id number]  PCP: None    Chief Complaint:   Chief Complaint   Patient presents with    Shortness of Breath         Admission Summary:     Zeus Vazquez is a 48 y.o. male who presented with  As per initial history   Zeus Vazquez is a 48 y.o. male with HTN who presents with SOB and chest pain. Patient reports PLASCENCIA for 2 weeks but it was not until today patient started experiencing SOB and substernal chest pain. Pain is non radiating. Described as a pressure. Also, reports orthopnea and cough. No PND. No fever or chills or cough. Denies abdominal pain, nausea, vomiting or LE edema. In the ER he was found to be hypertensive. Given clonidine and nitropaste with improvement of his blood pressure, c chest pain and SOB. Work up revealed elevated troponin. Admission for NSTEMI requested.       Interval history / Subjective:   Patient seen for Follow up of Chest pain     Patient seen and examined by the bedside, Labs, images and notes reviewed  Patient is comfortable, denies any chest pain, SOB, abdominal pain, fevers, chills. No N/V/D. However he mentioned that he somewhat get SOB on physical exertion   Discussed with nursing staff, no acute issues overnight, orders reviewed. Assessment & Plan:     1. NSTEMI- given risk factors concerned for acs. Elevated troponin could be also related to uncontrolled HTN  - trend CE  - initially started on lovenox but stopped by cardiology   - Cardiology consult  - Start beta blocker, asa 81mg daily, statin  - check A1c, lipid panel and TSH  -lexiscan today, echo today   Plan for cath tomorrowe     2. Hypertensive urgency - likely due to lack of compliance with medications as he does not take his medications at home. Resolving   - continue beta bocker. Restart amlodipine       3.  SOB- in the setting of elevated probnp. No h/o HF  - check echo  Improved      DVT prophy: on scds  Code; full code   surrogate decision maker:  wife    Code status: Full Code    DVT prophylaxis: scds     Care Plan discussed with: Patient/Family    Disposition: Home w/Family and TBD    Hospital Problems  Date Reviewed: 2016          Codes Class Noted POA    NSTEMI (non-ST elevated myocardial infarction) [facility]) ICD-10-CM: I21.4  ICD-9-CM: 410.70  2019 Unknown                Review of Systems:   A comprehensive review of systems was negative except for that written in the HPI. Physical Examination:     Visit Vitals  BP (!) 145/93 (BP 1 Location: Left arm, BP Patient Position: At rest)   Pulse 80   Temp 98.6 °F (37 °C)   Resp 18   Ht 5' 9\" (1.753 m)   Wt 97.5 kg (215 lb)   SpO2 94%   BMI 31.75 kg/m²     General:  Alert, cooperative, no distress, appears stated age. Head:  Normocephalic, without obvious abnormality, atraumatic. Lungs:   Clear to auscultation bilaterally. Heart:  Regular rate and rhythm, S1, S2 normal, no murmur, click, rub or gallop. Abdomen:   Soft, non-tender. Bowel sounds normal. No masses,  No organomegaly. Extremities: Extremities normal, atraumatic, no cyanosis or edema. Pulses: 2+ and symmetric all extremities. Skin: Skin color, texture, turgor normal. No rashes or lesions   Neurologic: CNII-XII intact. Normal strength, sensation and reflexes throughout. Vital Signs:    Last 24hrs VS reviewed since prior progress note.  Most recent are:    Visit Vitals  BP (!) 145/93 (BP 1 Location: Left arm, BP Patient Position: At rest)   Pulse 80   Temp 98.6 °F (37 °C)   Resp 18   Ht 5' 9\" (1.753 m)   Wt 97.5 kg (215 lb)   SpO2 94%   BMI 31.75 kg/m²         Intake/Output Summary (Last 24 hours) at 12/10/2019 1740  Last data filed at 12/10/2019 1600  Gross per 24 hour   Intake 886.25 ml   Output 1475 ml   Net -588.75 ml        Tmax:  Temp (24hrs), Av.3 °F (36.8 °C), Min:97.2 °F (36.2 °C), Max:99 °F (37.2 °C)      Data Review:   Data reviewed by myself:  Xr Chest Pa Lat    Result Date: 12/8/2019  INDICATION:   short of breath COMPARISON: September 12, 2017 FINDINGS: Frontal and lateral views of the chest demonstrate a normal cardiomediastinal silhouette. The lungs are adequately expanded. There is no edema, effusion, consolidation, or pneumothorax. The osseous structures are unremarkable. IMPRESSION: No acute process. No results found for: SDES  No results found for: CULT  All Micro Results     None          Labs: reviewed by myself. Recent Labs     12/09/19 0428 12/08/19 2238   WBC 9.3 8.9   HGB 13.0 13.6   HCT 41.5 43.7    306     Recent Labs     12/10/19  0355 12/09/19 0428 12/08/19 2239    138 140   K 3.7 3.5 3.4*    105 107   CO2 27 28 28   BUN 25* 20 23*   CREA 1.45* 1.32* 1.41*   * 117* 137*   CA 9.2 9.2 9.5   MG  --  1.9 2.0     Recent Labs     12/09/19 0428 12/08/19 2239   SGOT 17 18   ALT 29 30   AP 93 102   TBILI 0.4 0.3   TP 7.8 8.4*   ALB 3.0* 3.2*   GLOB 4.8* 5.2*     No results for input(s): INR, PTP, APTT, INREXT in the last 72 hours. No results for input(s): FE, TIBC, PSAT, FERR in the last 72 hours. No results found for: FOL, RBCF   No results for input(s): PH, PCO2, PO2 in the last 72 hours.   Recent Labs     12/09/19  1144 12/09/19 0428 12/08/19 2239   TROIQ 0.13* 0.17* 0.17*     Lab Results   Component Value Date/Time    Cholesterol, total 166 12/09/2019 06:13 AM    HDL Cholesterol 47 12/09/2019 06:13 AM    LDL, calculated 99.6 12/09/2019 06:13 AM    Triglyceride 97 12/09/2019 06:13 AM    CHOL/HDL Ratio 3.5 12/09/2019 06:13 AM     No results found for: GLUCPOC  Lab Results   Component Value Date/Time    Color YELLOW 12/04/2011 03:30 PM    Appearance CLEAR 12/04/2011 03:30 PM    Specific gravity 1.018 12/04/2011 03:30 PM    pH (UA) 7.0 12/04/2011 03:30 PM    Protein 30 (A) 12/04/2011 03:30 PM    Glucose NEGATIVE 12/04/2011 03:30 PM    Ketone NEGATIVE  12/04/2011 03:30 PM    Bilirubin NEGATIVE  12/04/2011 03:30 PM    Urobilinogen 1.0 12/04/2011 03:30 PM    Nitrites NEGATIVE  12/04/2011 03:30 PM    Leukocyte Esterase NEGATIVE  12/04/2011 03:30 PM    Epithelial cells 0-5 12/04/2011 03:30 PM    Bacteria NEGATIVE  12/04/2011 03:30 PM    WBC 0-4 12/04/2011 03:30 PM    RBC 0-3 12/04/2011 03:30 PM         Medications Reviewed:     Current Facility-Administered Medications   Medication Dose Route Frequency    0.9% sodium chloride infusion  75 mL/hr IntraVENous CONTINUOUS    sodium chloride (NS) flush 5-40 mL  5-40 mL IntraVENous Q8H    sodium chloride (NS) flush 5-40 mL  5-40 mL IntraVENous PRN    aspirin chewable tablet 81 mg  81 mg Oral DAILY    acetaminophen (TYLENOL) tablet 650 mg  650 mg Oral Q4H PRN    arformoterol (BROVANA) neb solution 15 mcg  15 mcg Nebulization BID RT    budesonide (PULMICORT) 500 mcg/2 ml nebulizer suspension  500 mcg Nebulization BID RT    albuterol-ipratropium (DUO-NEB) 2.5 MG-0.5 MG/3 ML  3 mL Nebulization Q4H RT    carvedilol (COREG) tablet 12.5 mg  12.5 mg Oral BID WITH MEALS    amLODIPine (NORVASC) tablet 10 mg  10 mg Oral DAILY    pantoprazole (PROTONIX) tablet 40 mg  40 mg Oral ACB    hydrALAZINE (APRESOLINE) 20 mg/mL injection 20 mg  20 mg IntraVENous Q6H PRN    guaiFENesin ER (MUCINEX) tablet 600 mg  600 mg Oral Q12H    benzocaine-menthol (CEPACOL) lozenge 1 Lozenge  1 Lozenge Mucous Membrane PRN     ______________________________________________________________________  EXPECTED LENGTH OF STAY: 1d 19h  ACTUAL LENGTH OF STAY:          1                 Yury Pacheco MD     Patient's emergency contacts:  Extended Emergency Contact Information  Primary Emergency Contact: [address] Phone: [phone]  Relation: Girlfriend

## 2019-12-10 NOTE — PROGRESS NOTES
Transitions of Care:     -Anticipate discharge home when medically stable   -Medicaid pending   -[employer] information/resources given to family    The CM participated in [id number] [facility] rounds with RN- patient is to receive stress test today, still managing BP. The patient's lovenox was discontinued. CM reviewed Account Notes and [facility] has accepted and completed screening for Medicaid- patient is currently Medicaid pending. The CM provided local free clinic information to the patient and his significant other Verdis Tian. CM to review discharging medications for cost. RN reports patient is up at ronal and now on room air, no mobility concerns at this time. CM will continue to follow for transitions of care.  CARMEN Avendano

## 2019-12-10 NOTE — CARDIO/PULMONARY
Cardiac Rehab: [place] Coronary artery procedure education  given to [employer]. Educated using teach back method. Reviewed purpose of cardiac catheterization and potential CAD diagnosis, for understanding. Discussed risk factors for CAD to include the following: family history, elevated BMI, hyperlipidemia, hypertension, diabetes, stress, and smoking. Smoking Cessation Program link added to AVS. Also discussed the role CAD can play in the weakened cardiac muscle noted today on his stress test. Encouraged consideration of lifestyle changes, regardless of cath outcome. Offered Cardiac Rehab Program for support in making lifestyle changes, if indicated. [employer] verbalized understanding with questions answered. Will continue to follow for educational needs and cardiac catheterization results.  Lonny Hernandez RN

## 2019-12-10 NOTE — PROGRESS NOTES
0730: Bedside and Verbal shift change report given to CHRISTY RN (oncoming nurse) by Stephany Emerson RN (offgoing nurse). Report included the following information SBAR, Kardex, Intake/Output, MAR, Recent Results, Med Rec Status and Cardiac Rhythm NSR.   0815: Dipti Marcos MD at bedside. Orders to hold PO Coreg this morning in anticipation of stress test. Will give PO Norvasc. Pt's wife at bedside during rounding. Update given. 0845: Pt injected with stress test lexiscan dye.   0945: Pt transported downstairs to stress test.  1235: Pt back on floor settled into room. VSS. 1430: Lew Bragg NP at bedside. Pt scheduled for cardiac cath tomorrow AM at 10:45 d/t abnormal stress test results. Pt also going to be started on 75 mL/hr of NS to protect kidneys. Will not LUCAS protocol tomorrow AM.  1445: Cardiac cath consent signed. Pt had no questions or concerns at this time. 1930: Bedside and Verbal shift change report given to , RN (oncoming nurse) by JAMES HARRISON (offgoing nurse). Report included the following information SBAR, Kardex, Intake/Output, MAR, Recent Results, Med Rec Status and Cardiac Rhythm NSR.

## 2019-12-10 NOTE — PROGRESS NOTES
Cardiology Progress Note            Admit Date: 12/8/2019  Admit Diagnosis: NSTEMI (non-ST elevated myocardial infarction) [facility]) [I21.4]  Date: 12/10/2019     Time: 1:23 PM    HPI: Natasha Alas, a 48y.o. year-old who presents for evaluation of PLASCENCIA. Has PMH of essential HTN. Reports new onset PLASCENCIA x 2 week which has progressively worsened. He reports he had some substernal chest pain at times with his symptoms but no radiation to jaw. Also reports nonproductive cough for past couple days and recent orthopnea. EKG with mildly elevated troponin but flat at 0.17. He was very hypertensive on admission and was not compliant with his medications. SH:  Current 1 ppd smoker x 31 years. Drinks socially but not every day. Works in lawn care  FH: no FH of early CAD        Assessment and Plan   1. Elevated troponin:                -On ASA. -Troponin mildly elevated but flat (0.17-->> 0.17-->>0.13) not consistent with NSTEMI. -Chest pain resolved. -Suspect mildly elevated, flat troponin elevation due to HTN urgency. However, does have risk factors, Echo read pending. Lexiscan stress test today.                  2. HTN/ Hypertensive urgency: BP now controlled              -Amlodipine  to 10 mg daily              -Continue coreg 12.5 mg BID   -prn iv hydralazine (none used since 11AM yesterday)   -Stressed importance of compliance with pt.      3. PLASCENCIA:                -Pro  (cutoff 900 for age). CXR negative for pulmonary edema.              -Lexiscan stress test test today.     4. Elevated creatinine:  Creatinine  Up to 1.45 today from 1.32 (from 1.41 12/8/19). -Monitor.    -?baseline creatinine  5. Tobacco abuse: discuss cessation. 6. Possible COPD:  Nebs per primary team. Outpatient PFTs planned.     Addendum:  Stress test noted LV dilation, EF 26%, fixed defect inferior wall akinetic, may be infarct vs diaphragmatic attenuation, global hypokinesia. Trying to track down echo result to confirm EF. Nonetheless, pt is currently scheduled for [facility] tomorrow at 10:45 AM with Dr. Kvng Sheikh. Cardiology attending: seen and examined. Agree with assess and plan  Still wheezing, no cp. bp overall better. Chest scattered wheezes, cv rrr no murmur, ext no edema. With low ef, elevated trop and stress test abnormalities, needs cath to further define risk. Will hydrate overnight. Subjective: Karrie Kelley denies chest pain. No SOB currently. PMH  Past Medical History:   Diagnosis Date    Hypertension       Social Hx  Social History     Socioeconomic History    Marital status: SINGLE     Spouse name: Not on file    Number of children: Not on file    Years of education: Not on file    Highest education level: Not on file   Occupational History    Not on file   Social Needs    Financial resource strain: Not on file    Food insecurity:     Worry: Not on file     Inability: Not on file    Transportation needs:     Medical: Not on file     Non-medical: Not on file   Tobacco Use    Smoking status: Current Every Day Smoker     Packs/day: 1.00    Smokeless tobacco: Never Used   Substance and Sexual Activity    Alcohol use:  Yes     Alcohol/week: 5.0 standard drinks     Types: 6 Cans of beer per week     Comment: Occasionally    Drug use: No    Sexual activity: Yes     Partners: Female   Lifestyle    Physical activity:     Days per week: Not on file     Minutes per session: Not on file    Stress: Not on file   Relationships    Social connections:     Talks on phone: Not on file     Gets together: Not on file     Attends [employer] service: Not on file     Active member of club or organization: Not on file     Attends meetings of clubs or organizations: Not on file     Relationship status: Not on file    Intimate partner violence:     Fear of current or ex partner: Not on file     Emotionally abused: Not on file     Physically abused: Not on file     Forced sexual activity: Not on file   Other Topics Concern    Not on file   Social History Narrative    ** Merged History Encounter **            Objective:      Physical Exam:                Visit Vitals  BP (!) 162/99 (BP 1 Location: Left arm, BP Patient Position: At rest)   Pulse 96   Temp 99 °F (37.2 °C)   Resp 16   Ht 5' 9\" (1.753 m)   Wt 215 lb (97.5 kg)   SpO2 96%   BMI 31.75 kg/m²          General Appearance:   Well developed, alert and oriented x 3, and   individual in no acute distress. Ears/Nose/Mouth/Throat:    Hearing grossly normal.         Neck:  Supple. Chest:    Lungs with some wheeze noted bilaterally. Cardiovascular:   Regular rate and rhythm, S1, S2 normal, no murmur. Abdomen:    Soft, non-tender, bowel sounds are active. Extremities:  No edema bilaterally. Skin:  Warm and dry.      Telemetry: NSR          Data Review:    Labs:    Recent Results (from the past 24 hour(s))   METABOLIC PANEL, BASIC    Collection Time: 12/10/19  3:55 AM   Result Value Ref Range    Sodium 136 136 - 145 mmol/L    Potassium 3.7 3.5 - 5.1 mmol/L    Chloride 103 97 - 108 mmol/L    CO2 27 21 - 32 mmol/L    Anion gap 6 5 - 15 mmol/L    Glucose 113 (H) 65 - 100 mg/dL    BUN 25 (H) 6 - 20 MG/DL    Creatinine 1.45 (H) 0.70 - 1.30 MG/DL    BUN/Creatinine ratio 17 12 - 20      GFR est AA >60 >60 ml/min/1.73m2    GFR est non-AA 51 (L) >60 ml/min/1.73m2    Calcium 9.2 8.5 - 10.1 MG/DL   DRUG SCREEN, URINE    Collection Time: 12/10/19  3:59 AM   Result Value Ref Range    AMPHETAMINES NEGATIVE  NEG      BARBITURATES NEGATIVE  NEG      BENZODIAZEPINES NEGATIVE  NEG      COCAINE NEGATIVE  NEG      METHADONE NEGATIVE  NEG      OPIATES NEGATIVE  NEG      PCP(PHENCYCLIDINE) NEGATIVE  NEG      THC (TH-CANNABINOL) NEGATIVE  NEG      Drug screen comment (NOTE)    NUCLEAR CARDIAC STRESS TEST    Collection Time: 12/10/19 12:39 PM   Result Value Ref Range    Target  bpm Exercise duration time 00:03:00     Stress Base Systolic  mmHg    Stress Base Diastolic  mmHg    Post peak  BPM    Baseline HR 85 BPM    Estimated workload 1.0 METS    Percent HR 62 %    Stress Rate Pressure Product 16,485 BPM*mmHg          Radiology:        Current Facility-Administered Medications   Medication Dose Route Frequency    sodium chloride (NS) flush 5-40 mL  5-40 mL IntraVENous Q8H    sodium chloride (NS) flush 5-40 mL  5-40 mL IntraVENous PRN    aspirin chewable tablet 81 mg  81 mg Oral DAILY    acetaminophen (TYLENOL) tablet 650 mg  650 mg Oral Q4H PRN    arformoterol (BROVANA) neb solution 15 mcg  15 mcg Nebulization BID RT    budesonide (PULMICORT) 500 mcg/2 ml nebulizer suspension  500 mcg Nebulization BID RT    albuterol-ipratropium (DUO-NEB) 2.5 MG-0.5 MG/3 ML  3 mL Nebulization Q4H RT    carvedilol (COREG) tablet 12.5 mg  12.5 mg Oral BID WITH MEALS    amLODIPine (NORVASC) tablet 10 mg  10 mg Oral DAILY    pantoprazole (PROTONIX) tablet 40 mg  40 mg Oral ACB    hydrALAZINE (APRESOLINE) 20 mg/mL injection 20 mg  20 mg IntraVENous Q6H PRN    guaiFENesin ER (MUCINEX) tablet 600 mg  600 mg Oral Q12H    benzocaine-menthol (CEPACOL) lozenge 1 Lozenge  1 Lozenge Mucous Membrane PRN          [place] Nail.  JENNIFER Arita     [facility] [address] [place] [address] [address]   Rubens Swenson   [phone]

## 2019-12-10 NOTE — PROGRESS NOTES
Problem: Unstable angina/NSTEMI: Day of Admission/Day 1  Goal: Off Pathway (Use only if patient is Off Pathway)  Outcome: Resolved/Met  Goal: Activity/Safety  Outcome: Resolved/Met  Goal: Consults, if ordered  Outcome: Resolved/Met  Goal: Diagnostic Test/Procedures  Outcome: Resolved/Met  Goal: Nutrition/Diet  Outcome: Resolved/Met  Goal: Discharge Planning  Outcome: Resolved/Met  Goal: Medications  Outcome: Resolved/Met  Goal: Respiratory  Outcome: Resolved/Met  Goal: Treatments/Interventions/Procedures  Outcome: Resolved/Met  Goal: Psychosocial  Outcome: Resolved/Met  Goal: *Hemodynamically stable  Outcome: Resolved/Met  Goal: *Optimal pain control at patient's stated goal  Outcome: Resolved/Met  Goal: *Lungs clear or at baseline  Outcome: Resolved/Met

## 2019-12-10 NOTE — PROGRESS NOTES
2000: Report received from CALLIE MONTGOMERY RN. No drips to verify. Pt sitting up in chair, watching tv, calm and cooperative, and on 2L NC sating low-mid 90s. Plan for stress test tmw, NPO at midnight. 2036: Pt sating 92%, increased to 3L NC.    2133: PRN throat lozenge given. 0000: Pt made NPO for stress test today. 0400: Pt requesting water, reminded of NPO status. Throat lozenge given. Pt verbalizing feeling worried regarding health status. Informed pt that we would know more once the 2D ECHO results are interpreted. 0430: Labs and drug urine screen sent. 0630: Pt removed 3L NC. ..on RA, sating mid 90s.    0800: Bedside and Verbal shift change report given to CALLIE MONTGOMERY RN. Report included the following information SBAR, Intake/Output, MAR, Recent Results, Cardiac Rhythm NSR and Alarm Parameters .

## 2019-12-10 NOTE — PROGRESS NOTES
Problem: Unstable angina/NSTEMI: Day 2  Goal: Activity/Safety  Outcome: Progressing Towards Goal  Goal: Consults, if ordered  Outcome: Progressing Towards Goal  Goal: Diagnostic Test/Procedures  Outcome: Progressing Towards Goal  Goal: Nutrition/Diet  Outcome: Progressing Towards Goal  Goal: Discharge Planning  Outcome: Progressing Towards Goal  Goal: Medications  Outcome: Progressing Towards Goal  Goal: Respiratory  Outcome: Progressing Towards Goal  Goal: Treatments/Interventions/Procedures  Outcome: Progressing Towards Goal  Goal: Psychosocial  Outcome: Progressing Towards Goal  Goal: *Hemodynamically stable  Outcome: Progressing Towards Goal  Note:   No drips. Transfer orders for tele. Goal: *Optimal pain control at patient's stated goal  Outcome: Progressing Towards Goal  Note:   PRN tylenol, pt denies pain. Goal: *Lungs clear or at baseline  Outcome: Progressing Towards Goal  Note:   Pt receiving q4h scheduled BTs. Mucinex added to [facility]. 3L NC. Problem: Falls - Risk of  Goal: *Absence of Falls  Description  Document Vickii Bridges Fall Risk and appropriate interventions in the flowsheet.   Outcome: Progressing Towards Goal  Note: Fall Risk Interventions:  Medication Interventions: Evaluate medications/consider consulting pharmacy  History of Falls Interventions: Consult care management for discharge planning, Evaluate medications/consider consulting pharmacy, Room close to nurse's station

## 2019-12-11 LAB
ANION GAP SERPL CALC-SCNC: 9 MMOL/L (ref 5–15)
BUN SERPL-MCNC: 27 MG/DL (ref 6–20)
BUN/CREAT SERPL: 20 (ref 12–20)
CALCIUM SERPL-MCNC: 8.9 MG/DL (ref 8.5–10.1)
CHLORIDE SERPL-SCNC: 105 MMOL/L (ref 97–108)
CO2 SERPL-SCNC: 24 MMOL/L (ref 21–32)
CREAT SERPL-MCNC: 1.38 MG/DL (ref 0.7–1.3)
GLUCOSE SERPL-MCNC: 125 MG/DL (ref 65–100)
POTASSIUM SERPL-SCNC: 3.9 MMOL/L (ref 3.5–5.1)
SODIUM SERPL-SCNC: 138 MMOL/L (ref 136–145)

## 2019-12-11 PROCEDURE — 65660000000 HC RM CCU STEPDOWN

## 2019-12-11 PROCEDURE — 77030010221 HC SPLNT WR POS TELE -B: Performed by: INTERNAL MEDICINE

## 2019-12-11 PROCEDURE — 77030019569 HC BND COMPR RAD TERU -B: Performed by: INTERNAL MEDICINE

## 2019-12-11 PROCEDURE — 74011636320 HC RX REV CODE- 636/320: Performed by: INTERNAL MEDICINE

## 2019-12-11 PROCEDURE — B2151ZZ FLUOROSCOPY OF LEFT HEART USING LOW OSMOLAR CONTRAST: ICD-10-PCS | Performed by: INTERNAL MEDICINE

## 2019-12-11 PROCEDURE — 74011250637 HC RX REV CODE- 250/637: Performed by: INTERNAL MEDICINE

## 2019-12-11 PROCEDURE — 99152 MOD SED SAME PHYS/QHP 5/>YRS: CPT | Performed by: INTERNAL MEDICINE

## 2019-12-11 PROCEDURE — 74011000250 HC RX REV CODE- 250: Performed by: INTERNAL MEDICINE

## 2019-12-11 PROCEDURE — 74011250637 HC RX REV CODE- 250/637: Performed by: NURSE PRACTITIONER

## 2019-12-11 PROCEDURE — 94640 AIRWAY INHALATION TREATMENT: CPT

## 2019-12-11 PROCEDURE — 93458 L HRT ARTERY/VENTRICLE ANGIO: CPT | Performed by: INTERNAL MEDICINE

## 2019-12-11 PROCEDURE — 80048 BASIC METABOLIC PNL TOTAL CA: CPT

## 2019-12-11 PROCEDURE — 74011250636 HC RX REV CODE- 250/636: Performed by: INTERNAL MEDICINE

## 2019-12-11 PROCEDURE — C1894 INTRO/SHEATH, NON-LASER: HCPCS | Performed by: INTERNAL MEDICINE

## 2019-12-11 PROCEDURE — 74011250636 HC RX REV CODE- 250/636: Performed by: NURSE PRACTITIONER

## 2019-12-11 PROCEDURE — 36415 COLL VENOUS BLD VENIPUNCTURE: CPT

## 2019-12-11 PROCEDURE — 77030004532 HC CATH ANGI DX IMP BSC -A: Performed by: INTERNAL MEDICINE

## 2019-12-11 PROCEDURE — 4A023N7 MEASUREMENT OF CARDIAC SAMPLING AND PRESSURE, LEFT HEART, PERCUTANEOUS APPROACH: ICD-10-PCS | Performed by: INTERNAL MEDICINE

## 2019-12-11 PROCEDURE — 77030013744: Performed by: INTERNAL MEDICINE

## 2019-12-11 PROCEDURE — B2111ZZ FLUOROSCOPY OF MULTIPLE CORONARY ARTERIES USING LOW OSMOLAR CONTRAST: ICD-10-PCS | Performed by: INTERNAL MEDICINE

## 2019-12-11 PROCEDURE — 74011250637 HC RX REV CODE- 250/637: Performed by: PHYSICIAN ASSISTANT

## 2019-12-11 RX ORDER — IPRATROPIUM BROMIDE AND ALBUTEROL SULFATE 2.5; .5 MG/3ML; MG/3ML
3 SOLUTION RESPIRATORY (INHALATION)
Status: DISCONTINUED | OUTPATIENT
Start: 2019-12-12 | End: 2019-12-11

## 2019-12-11 RX ORDER — ISOSORBIDE MONONITRATE 30 MG/1
30 TABLET, EXTENDED RELEASE ORAL DAILY
Status: DISCONTINUED | OUTPATIENT
Start: 2019-12-11 | End: 2019-12-12 | Stop reason: HOSPADM

## 2019-12-11 RX ORDER — HEPARIN SODIUM 1000 [USP'U]/ML
INJECTION, SOLUTION INTRAVENOUS; SUBCUTANEOUS AS NEEDED
Status: DISCONTINUED | OUTPATIENT
Start: 2019-12-11 | End: 2019-12-11 | Stop reason: HOSPADM

## 2019-12-11 RX ORDER — HEPARIN SODIUM 200 [USP'U]/100ML
INJECTION, SOLUTION INTRAVENOUS
Status: COMPLETED | OUTPATIENT
Start: 2019-12-11 | End: 2019-12-11

## 2019-12-11 RX ORDER — CARVEDILOL 12.5 MG/1
25 TABLET ORAL 2 TIMES DAILY WITH MEALS
Status: DISCONTINUED | OUTPATIENT
Start: 2019-12-11 | End: 2019-12-12 | Stop reason: HOSPADM

## 2019-12-11 RX ORDER — LIDOCAINE HYDROCHLORIDE 10 MG/ML
INJECTION INFILTRATION; PERINEURAL AS NEEDED
Status: DISCONTINUED | OUTPATIENT
Start: 2019-12-11 | End: 2019-12-11 | Stop reason: HOSPADM

## 2019-12-11 RX ORDER — FENTANYL CITRATE 50 UG/ML
INJECTION, SOLUTION INTRAMUSCULAR; INTRAVENOUS AS NEEDED
Status: DISCONTINUED | OUTPATIENT
Start: 2019-12-11 | End: 2019-12-11 | Stop reason: HOSPADM

## 2019-12-11 RX ORDER — MIDAZOLAM HYDROCHLORIDE 1 MG/ML
INJECTION, SOLUTION INTRAMUSCULAR; INTRAVENOUS AS NEEDED
Status: DISCONTINUED | OUTPATIENT
Start: 2019-12-11 | End: 2019-12-11 | Stop reason: HOSPADM

## 2019-12-11 RX ORDER — CARVEDILOL 12.5 MG/1
12.5 TABLET ORAL 2 TIMES DAILY WITH MEALS
Status: CANCELLED | OUTPATIENT
Start: 2019-12-11

## 2019-12-11 RX ORDER — IPRATROPIUM BROMIDE AND ALBUTEROL SULFATE 2.5; .5 MG/3ML; MG/3ML
3 SOLUTION RESPIRATORY (INHALATION)
Status: DISCONTINUED | OUTPATIENT
Start: 2019-12-11 | End: 2019-12-12 | Stop reason: HOSPADM

## 2019-12-11 RX ADMIN — CARVEDILOL 12.5 MG: 12.5 TABLET, FILM COATED ORAL at 08:30

## 2019-12-11 RX ADMIN — CARVEDILOL 25 MG: 12.5 TABLET, FILM COATED ORAL at 17:25

## 2019-12-11 RX ADMIN — IPRATROPIUM BROMIDE AND ALBUTEROL SULFATE 3 ML: .5; 3 SOLUTION RESPIRATORY (INHALATION) at 16:29

## 2019-12-11 RX ADMIN — Medication 10 ML: at 15:54

## 2019-12-11 RX ADMIN — AMLODIPINE BESYLATE 10 MG: 5 TABLET ORAL at 08:31

## 2019-12-11 RX ADMIN — IPRATROPIUM BROMIDE AND ALBUTEROL SULFATE 3 ML: .5; 3 SOLUTION RESPIRATORY (INHALATION) at 05:15

## 2019-12-11 RX ADMIN — SODIUM CHLORIDE 75 ML/HR: 900 INJECTION, SOLUTION INTRAVENOUS at 04:26

## 2019-12-11 RX ADMIN — IPRATROPIUM BROMIDE AND ALBUTEROL SULFATE 3 ML: .5; 3 SOLUTION RESPIRATORY (INHALATION) at 20:17

## 2019-12-11 RX ADMIN — GUAIFENESIN 600 MG: 600 TABLET, EXTENDED RELEASE ORAL at 08:30

## 2019-12-11 RX ADMIN — Medication 10 ML: at 06:47

## 2019-12-11 RX ADMIN — PANTOPRAZOLE SODIUM 40 MG: 40 TABLET, DELAYED RELEASE ORAL at 06:47

## 2019-12-11 RX ADMIN — GUAIFENESIN 600 MG: 600 TABLET, EXTENDED RELEASE ORAL at 20:48

## 2019-12-11 RX ADMIN — BUDESONIDE 500 MCG: 0.5 INHALANT RESPIRATORY (INHALATION) at 08:32

## 2019-12-11 RX ADMIN — HYDRALAZINE HYDROCHLORIDE 10 MG: 20 INJECTION INTRAMUSCULAR; INTRAVENOUS at 12:25

## 2019-12-11 RX ADMIN — ASPIRIN 81 MG 81 MG: 81 TABLET ORAL at 08:31

## 2019-12-11 RX ADMIN — IPRATROPIUM BROMIDE AND ALBUTEROL SULFATE 3 ML: .5; 3 SOLUTION RESPIRATORY (INHALATION) at 08:32

## 2019-12-11 RX ADMIN — Medication 20 ML: at 20:48

## 2019-12-11 RX ADMIN — BUDESONIDE 500 MCG: 0.5 INHALANT RESPIRATORY (INHALATION) at 20:17

## 2019-12-11 RX ADMIN — ACETAMINOPHEN 650 MG: 325 TABLET ORAL at 15:51

## 2019-12-11 RX ADMIN — ISOSORBIDE MONONITRATE 30 MG: 30 TABLET ORAL at 17:25

## 2019-12-11 NOTE — PROGRESS NOTES
1945: Report received from CALLIE MONTGOMERY RN. MIV drip verified. Pt sitting up in chair, on RA sating low to mid 90s. Plan for cath tmw, 12/11/19 around 1045.    2000: ST depression noted on tele rhythm strip. ~1-2 blocks measure. 2200: Preop CHG bath #1 completed. 0054: Pt sleeping and sating 90-91%, applied 2L NC.    0400: Pt removed NC, sating mid 90s. RA.    0600: Pt sleeping soundly. 0700: Wife at bedside before going to work, 2nd preop bath to be completed this morning prior to procedure. 0745: Bedside and Verbal shift change report given to Cassandra Nguyen RN. Report included the following information SBAR, Intake/Output, MAR, Recent Results, Cardiac Rhythm NSR-ST and Alarm Parameters . Problem: Unstable angina/NSTEMI: Day 2  Goal: Activity/Safety  Outcome: Progressing Towards Goal  Goal: Consults, if ordered  Outcome: Progressing Towards Goal  Goal: Diagnostic Test/Procedures  Outcome: Progressing Towards Goal  Goal: Nutrition/Diet  Outcome: Progressing Towards Goal  Goal: Discharge Planning  Outcome: Progressing Towards Goal  Goal: Medications  Outcome: Progressing Towards Goal  Goal: Respiratory  Outcome: Progressing Towards Goal  Goal: Treatments/Interventions/Procedures  Outcome: Progressing Towards Goal  Goal: Psychosocial  Outcome: Progressing Towards Goal  Goal: *Hemodynamically stable  Outcome: Progressing Towards Goal  Note:   Cath scheduled for this morning. No drips. Goal: *Optimal pain control at patient's stated goal  Outcome: Progressing Towards Goal  Goal: *Lungs clear or at baseline  Outcome: Progressing Towards Goal  Note:   Receiving breathing treatments. PRN throat lozenge. Problem: Falls - Risk of  Goal: *Absence of Falls  Description  Document Livia Jama Fall Risk and appropriate interventions in the flowsheet.   Outcome: Progressing Towards Goal  Note: Fall Risk Interventions:  Medication Interventions: Assess postural VS orthostatic hypotension, Bed/chair exit alarm, Evaluate medications/consider consulting pharmacy  History of Falls Interventions: Evaluate medications/consider consulting pharmacy, Investigate reason for fall, Room close to nurse's station

## 2019-12-11 NOTE — PROGRESS NOTES
Cardiology Progress Note            Admit Date: 12/8/2019  Admit Diagnosis: NSTEMI (non-ST elevated myocardial infarction) [facility]) [I21.4]  Date: 12/11/2019     Time: 1:23 PM    HPI: Greg Wilkins, a 48y.o. year-old who presents for evaluation of PLASCENCIA. Has PMH of essential HTN. Reports new onset PLASCENCIA x 2 week which has progressively worsened. He reports he had some substernal chest pain at times with his symptoms but no radiation to jaw. Also reports nonproductive cough for past couple days and recent orthopnea. EKG with mildly elevated troponin but flat at 0.17. He was very hypertensive on admission and was not compliant with his medications. SH:  Current 1 ppd smoker x 31 years. Drinks socially but not every day. Works in lawn care  FH: no FH of early CAD        Assessment and Plan   1. Elevated troponin:                -On ASA. -Troponin mildly elevated but flat (0.17-->> 0.17-->>0.13) not consistent with NSTEMI. -Chest pain resolved. Cardiac cath with normal coronaries--may be discharged following BP control. Follow up with Oj Buitrago in 7-10 days.              -Suspect mildly elevated, flat troponin elevation due to HTN urgency. However, does have risk factors, Echo EF 45%--mildly reduction likely due to hypertensive heart disease.                  2. HTN/ Hypertensive urgency: BP now controlled although diastolic high              -Amlodipine  to 10 mg daily              -Increase coreg to 25 mg BID at discharge   -prn iv hydralazine (none used since 11AM yesterday)   -Stressed importance of compliance with pt. Low salt diet     3. PLASCENCIA:                -Pro  (cutoff 900 for age).  CXR negative for pulmonary edema.              -Likely related to diastolic dysfunction with hypertensive heart disease and midly reduced LV function     4. Elevated creatinine:  Creatinine  Up to 1.45 today from 1.32 (from 1.41 12/8/19). -Monitor.    -Used 50 cc dye today--risk of LUCAS low--getting limited IV hydration due to elevated LVEDP. 5. Tobacco abuse: discuss cessation. 6. Possible COPD:  Nebs per primary team. Outpatient PFTs planned. Subjective: Harvinder Chong denies chest pain. No SOB currently. PMH  Past Medical History:   Diagnosis Date    Hypertension       Social Hx  Social History     Socioeconomic History    Marital status: SINGLE     Spouse name: Not on file    Number of children: Not on file    Years of education: Not on file    Highest education level: Not on file   Occupational History    Not on file   Social Needs    Financial resource strain: Not on file    Food insecurity:     Worry: Not on file     Inability: Not on file    Transportation needs:     Medical: Not on file     Non-medical: Not on file   Tobacco Use    Smoking status: Current Every Day Smoker     Packs/day: 1.00    Smokeless tobacco: Never Used   Substance and Sexual Activity    Alcohol use:  Yes     Alcohol/week: 5.0 standard drinks     Types: 6 Cans of beer per week     Comment: Occasionally    Drug use: No    Sexual activity: Yes     Partners: Female   Lifestyle    Physical activity:     Days per week: Not on file     Minutes per session: Not on file    Stress: Not on file   Relationships    Social connections:     Talks on phone: Not on file     Gets together: Not on file     Attends Taoist service: Not on file     Active member of club or organization: Not on file     Attends meetings of clubs or organizations: Not on file     Relationship status: Not on file    Intimate partner violence:     Fear of current or ex partner: Not on file     Emotionally abused: Not on file     Physically abused: Not on file     Forced sexual activity: Not on file   Other Topics Concern    Not on file   Social History Narrative    ** Merged History Encounter **            Objective:      Physical Exam:                Visit Vitals  BP (!) 148/109 (BP Patient Position: Sitting) Comment (BP Patient Position): side of bed   Pulse 78   Temp 98.5 °F (36.9 °C)   Resp 19   Ht 5' 9\" (1.753 m)   Wt 217 lb 6 oz (98.6 kg)   SpO2 94%   BMI 32.10 kg/m²          General Appearance:   Well developed, alert and oriented x 3, and   individual in no acute distress. Ears/Nose/Mouth/Throat:    Hearing grossly normal.         Neck:  Supple. Chest:    Lungs with some wheeze noted bilaterally. Cardiovascular:   Regular rate and rhythm, S1, S2 normal, no murmur. Abdomen:    Soft, non-tender, bowel sounds are active. Extremities:  No edema bilaterally. Skin:  Warm and dry.      Telemetry: NSR          Data Review:    Labs:    Recent Results (from the past 24 hour(s))   NUCLEAR CARDIAC STRESS TEST    Collection Time: 12/10/19 12:39 PM   Result Value Ref Range    Target  bpm    Exercise duration time 00:03:00     Stress Base Systolic  mmHg    Stress Base Diastolic  mmHg    Post peak  BPM    Baseline HR 85 BPM    Estimated workload 1.0 METS    Percent HR 62 %    Stress Rate Pressure Product 16,485 BPM*mmHg   METABOLIC PANEL, BASIC    Collection Time: 12/11/19  4:24 AM   Result Value Ref Range    Sodium 138 136 - 145 mmol/L    Potassium 3.9 3.5 - 5.1 mmol/L    Chloride 105 97 - 108 mmol/L    CO2 24 21 - 32 mmol/L    Anion gap 9 5 - 15 mmol/L    Glucose 125 (H) 65 - 100 mg/dL    BUN 27 (H) 6 - 20 MG/DL    Creatinine 1.38 (H) 0.70 - 1.30 MG/DL    BUN/Creatinine ratio 20 12 - 20      GFR est AA >60 >60 ml/min/1.73m2    GFR est non-AA 54 (L) >60 ml/min/1.73m2    Calcium 8.9 8.5 - 10.1 MG/DL          Radiology:        Current Facility-Administered Medications   Medication Dose Route Frequency    0.9% sodium chloride infusion  75 mL/hr IntraVENous CONTINUOUS    sodium chloride (NS) flush 5-40 mL  5-40 mL IntraVENous Q8H    sodium chloride (NS) flush 5-40 mL  5-40 mL IntraVENous PRN    aspirin chewable tablet 81 mg  81 mg Oral DAILY    acetaminophen (TYLENOL) tablet 650 mg  650 mg Oral Q4H PRN    arformoterol (BROVANA) neb solution 15 mcg  15 mcg Nebulization BID RT    budesonide (PULMICORT) 500 mcg/2 ml nebulizer suspension  500 mcg Nebulization BID RT    albuterol-ipratropium (DUO-NEB) 2.5 MG-0.5 MG/3 ML  3 mL Nebulization Q4H RT    carvedilol (COREG) tablet 12.5 mg  12.5 mg Oral BID WITH MEALS    amLODIPine (NORVASC) tablet 10 mg  10 mg Oral DAILY    pantoprazole (PROTONIX) tablet 40 mg  40 mg Oral ACB    hydrALAZINE (APRESOLINE) 20 mg/mL injection 20 mg  20 mg IntraVENous Q6H PRN    guaiFENesin ER (MUCINEX) tablet 600 mg  600 mg Oral Q12H    benzocaine-menthol (CEPACOL) lozenge 1 Lozenge  1 Lozenge Mucous Membrane PRN          Marah Bennett MD     [facility] [address] [place] [address] [address] [facility] [address]   Rubens Swenson   [phone]

## 2019-12-11 NOTE — PROGRESS NOTES
TRANSFER - OUT REPORT:    Verbal report given to Angela Menjivar RN on Fredia Meter being transferred to CVICU for routine progression of care       Report consisted of patients Situation, Background, Assessment and   Recommendations(SBAR). Information from the following report(s) Procedure Summary was reviewed with the receiving nurse. Opportunity for questions and clarification was provided.

## 2019-12-11 NOTE — PROGRESS NOTES
Hospitalist Progress Note  Mary Liu MD  Answering service: [phone] from in house phone        Date of Service:  2019  NAME:  Harry Maya  :  1965  MRN:  [id number]  PCP: None    Chief Complaint:   Chief Complaint   Patient presents with    Shortness of Breath         Admission Summary:     Harry Maya is a 48 y.o. male who presented with  As per initial history   Harry Maya is a 48 y.o. male with HTN who presents with SOB and chest pain. Patient reports PLASCENCIA for 2 weeks but it was not until today patient started experiencing SOB and substernal chest pain. Pain is non radiating. Described as a pressure. Also, reports orthopnea and cough. No PND. No fever or chills or cough. Denies abdominal pain, nausea, vomiting or LE edema. In the ER he was found to be hypertensive. Given clonidine and nitropaste with improvement of his blood pressure, c chest pain and SOB. Work up revealed elevated troponin. Admission for NSTEMI requested.       Interval history / Subjective:   Patient seen for Follow up of Chest pain     Patient seen and examined by the bedside, Labs, images and notes reviewed  Patient is comfortable, denies any chest pain, SOB, abdominal pain, fevers, chills. No N/V/D. However he mentioned that he somewhat get SOB on physical exertion   Discussed with nursing staff, no acute issues overnight, orders reviewed. Assessment & Plan:     1. NSTEMI- given risk factors concerned for acs. Elevated troponin could be also related to uncontrolled HTN  - trend CE  - initially started on lovenox but stopped by cardiology   - Cardiology consult  - Start beta blocker, asa 81mg daily, statin  - check A1c, lipid panel and TSH  -as per cardio Cardiac cath with normal coronaries--may be discharged following BP control. Follow up with Oj Buitrago in 7-10 days.   CARDIAC CATHETERIZATION  Findings:  1)Elevated LVEDP  2)Normal coronary arteries     Recommendations:  1)GDMT for cardiomyopathy and HTN    2. Hypertensive urgency - likely due to lack of compliance with medications as he does not take his medications at home. Resolving   - continue beta bocker. Restart amlodipine  Betablocker dose increased as per cardiology recommendations  Likely discharge tomorrow if BP is better controlled       3. SOB- in the setting of elevated probnp. EF 45 %  Improved      DVT prophy: on scds  Code; full code   surrogate decision maker:  wife    Code status: Full Code    DVT prophylaxis: scds     Care Plan discussed with: Patient/Family    Disposition: Home w/Family and TBD    Hospital Problems  Date Reviewed: 11/17/2016          Codes Class Noted POA    NSTEMI (non-ST elevated myocardial infarction) [facility]) ICD-10-CM: I21.4  ICD-9-CM: 410.70  12/9/2019 Unknown                Review of Systems:   A comprehensive review of systems was negative except for that written in the HPI. Physical Examination:     Visit Vitals  BP (!) 159/91   Pulse (!) 114   Temp 98.5 °F (36.9 °C)   Resp 20   Ht 5' 9\" (1.753 m)   Wt 98.6 kg (217 lb 6 oz)   SpO2 93%   BMI 32.10 kg/m²     General:  Alert, cooperative, no distress, appears stated age. Head:  Normocephalic, without obvious abnormality, atraumatic. Lungs:   Clear to auscultation bilaterally. Heart:  Regular rate and rhythm, S1, S2 normal, no murmur, click, rub or gallop. Abdomen:   Soft, non-tender. Bowel sounds normal. No masses,  No organomegaly. Extremities: Extremities normal, atraumatic, no cyanosis or edema. Pulses: 2+ and symmetric all extremities. Skin: Skin color, texture, turgor normal. No rashes or lesions   Neurologic: CNII-XII intact. Normal strength, sensation and reflexes throughout. Vital Signs:    Last 24hrs VS reviewed since prior progress note.  Most recent are:    Visit Vitals  BP (!) 159/91   Pulse (!) 114   Temp 98.5 °F (36.9 °C)   Resp 20   Ht 5' 9\" (1.753 m)   Wt 98.6 kg (217 lb 6 oz)   SpO2 93%   BMI 32.10 kg/m² Intake/Output Summary (Last 24 hours) at 2019 1554  Last data filed at 2019 1321  Gross per 24 hour   Intake 2326.25 ml   Output 3775 ml   Net -1448.75 ml        Tmax:  Temp (24hrs), Av.2 °F (36.8 °C), Min:97.8 °F (36.6 °C), Max:98.6 °F (37 °C)      Data Review:   Data reviewed by myself:  Xr Chest Pa Lat    Result Date: 2019  INDICATION:   short of breath COMPARISON: 2017 FINDINGS: Frontal and lateral views of the chest demonstrate a normal cardiomediastinal silhouette. The lungs are adequately expanded. There is no edema, effusion, consolidation, or pneumothorax. The osseous structures are unremarkable. IMPRESSION: No acute process. No results found for: SDES  No results found for: CULT  All Micro Results     None          Labs: reviewed by myself. Recent Labs     19   WBC 9.3 8.9   HGB 13.0 13.6   HCT 41.5 43.7    306     Recent Labs     19  0424 12/10/19  0355 19    136 138 140   K 3.9 3.7 3.5 3.4*    103 105 107   CO2 24 27 28 28   BUN 27* 25* 20 23*   CREA 1.38* 1.45* 1.32* 1.41*   * 113* 117* 137*   CA 8.9 9.2 9.2 9.5   MG  --   --  1.9 2.0     Recent Labs     198 19   SGOT 17 18   ALT 29 30   AP 93 102   TBILI 0.4 0.3   TP 7.8 8.4*   ALB 3.0* 3.2*   GLOB 4.8* 5.2*     No results for input(s): INR, PTP, APTT, INREXT, INREXT in the last 72 hours. No results for input(s): FE, TIBC, PSAT, FERR in the last 72 hours. No results found for: FOL, RBCF   No results for input(s): PH, PCO2, PO2 in the last 72 hours.   Recent Labs     19  1144 19  0428 19  2239   TROIQ 0.13* 0.17* 0.17*     Lab Results   Component Value Date/Time    Cholesterol, total 166 2019 06:13 AM    HDL Cholesterol 47 2019 06:13 AM    LDL, calculated 99.6 2019 06:13 AM    Triglyceride 97 2019 06:13 AM    CHOL/HDL Ratio 3.5 2019 06:13 AM No results found for: [facility]  Lab Results   Component Value Date/Time    Color YELLOW 12/04/2011 03:30 PM    Appearance CLEAR 12/04/2011 03:30 PM    Specific gravity 1.018 12/04/2011 03:30 PM    pH (UA) 7.0 12/04/2011 03:30 PM    Protein 30 (A) 12/04/2011 03:30 PM    Glucose NEGATIVE  12/04/2011 03:30 PM    Ketone NEGATIVE  12/04/2011 03:30 PM    Bilirubin NEGATIVE  12/04/2011 03:30 PM    Urobilinogen 1.0 12/04/2011 03:30 PM    Nitrites NEGATIVE  12/04/2011 03:30 PM    Leukocyte Esterase NEGATIVE  12/04/2011 03:30 PM    Epithelial cells 0-5 12/04/2011 03:30 PM    Bacteria NEGATIVE  12/04/2011 03:30 PM    WBC 0-4 12/04/2011 03:30 PM    RBC 0-3 12/04/2011 03:30 PM         Medications Reviewed:     Current Facility-Administered Medications   Medication Dose Route Frequency    carvedilol (COREG) tablet 25 mg  25 mg Oral BID WITH MEALS    0.9% sodium chloride infusion  75 mL/hr IntraVENous CONTINUOUS    sodium chloride (NS) flush 5-40 mL  5-40 mL IntraVENous Q8H    sodium chloride (NS) flush 5-40 mL  5-40 mL IntraVENous PRN    aspirin chewable tablet 81 mg  81 mg Oral DAILY    acetaminophen (TYLENOL) tablet 650 mg  650 mg Oral Q4H PRN    arformoterol (BROVANA) neb solution 15 mcg  15 mcg Nebulization BID RT    budesonide (PULMICORT) 500 mcg/2 ml nebulizer suspension  500 mcg Nebulization BID RT    albuterol-ipratropium (DUO-NEB) 2.5 MG-0.5 MG/3 ML  3 mL Nebulization Q4H RT    amLODIPine (NORVASC) tablet 10 mg  10 mg Oral DAILY    pantoprazole (PROTONIX) tablet 40 mg  40 mg Oral ACB    hydrALAZINE (APRESOLINE) 20 mg/mL injection 20 mg  20 mg IntraVENous Q6H PRN    guaiFENesin ER (MUCINEX) tablet 600 mg  600 mg Oral Q12H    benzocaine-menthol (CEPACOL) lozenge 1 Lozenge  1 Lozenge Mucous Membrane PRN     ______________________________________________________________________  EXPECTED LENGTH OF STAY: 1d 19h  ACTUAL LENGTH OF STAY:          2                 Yury Pacheco MD     Patient's emergency contacts:  Extended Emergency Contact Information  Primary Emergency Contact: [address] Phone: [phone]  Relation: Girlfriend

## 2019-12-11 NOTE — PROGRESS NOTES
Transitions of Care:     -Patient to discharge home when medically stable   -Patient's family has information for intake for [address] [place]   -Medicaid pending, screened by [employer]    The CM messaged attending MD, likely discharge tomorrow pending BP control. The CM requested hard prescriptions for medications to assess cost, GoodRX coupons, etc. So patient can take to the most affordable pharmacy. IF patient cannot afford medications, will discuss with CM supervisor to assess for one-time jose if needed- MD aware and agreeable. CM will offer [facility] for NSTEMI. RN aware of above information- will reach out to Cardiology for their input. Anticipate discharge tomorrow pending BP control. CARMEN Bhandari      15:13 p.m.- CM met with the patient at bedside, offered [facility] for NSTEMI, patient politely declined. The patient is appreciative of CM going over medication coupons/costs upon discharge. CM continues to follow, patient's significant other will transport home upon discharge.  CARMEN Bhandari

## 2019-12-11 NOTE — PROCEDURES
Findings:  1)Elevated LVEDP  2)Normal coronary arteries    Recommendations:  1)GDMT for cardiomyopathy and HTN    Access right radial.--no issues      Contrast 50 cc

## 2019-12-11 NOTE — PROGRESS NOTES
1750 Bedside shift change report given to Maliha Lyon (oncoming nurse) by Marisela Wilson (offgoing nurse). Report included the following information SBAR, Kardex, ED Summary, OR Summary, Procedure Summary, Intake/Output, MAR, Accordion, Recent Results, Med Rec Status, Cardiac Rhythm NSR, Alarm Parameters , Quality Measures and Dual Neuro Assessment. Skyler Corbin RN reported MIV 75ml/hr - she turned off and disconnected. Reported pt drinking and eating. TRANSFER - IN REPORT:    Opportunity for questions and clarification was provided. Assessment completed upon patients arrival to unit and care assumed. Pt placed into recliner. RN care assumed for patient from [phone].     1940: Bedside shift change report given to Jeff VEGAS (oncoming nurse) by Maliha Lyon (offgoing nurse). Report included the following information SBAR, Kardex, ED Summary, Procedure Summary, Intake/Output, MAR, Accordion, Recent Results, Med Rec Status, Cardiac Rhythm NSR, Alarm Parameters , Quality Measures and Dual Neuro Assessment.

## 2019-12-11 NOTE — PROGRESS NOTES
0800:  Bedside and Verbal shift change report given to JAMES MCCORMICK by Shannon Martinez RN. Report included the following information SBAR, Kardex, Intake/Output, MAR, Recent Results and Cardiac Rhythm NSR.     0905:  TRANSFER - OUT REPORT:    Verbal report given to Naveen Diaz RN on Zeus Vazquez  being transferred to Cath Lab for ordered procedure       Report consisted of patients Situation, Background, Assessment and   Recommendations(SBAR). Information from the following report(s) SBAR, Kardex, Intake/Output, MAR and Cardiac Rhythm NSR was reviewed with the receiving nurse. Lines:   Peripheral IV 12/08/19 Right Forearm (Active)   Phlebitis Assessment 0 12/10/2019 11:00 AM   Infiltration Assessment 0 12/10/2019  8:00 AM   Dressing Status Clean, dry, & intact 12/10/2019 11:00 AM   Dressing Type Tape;Transparent 12/10/2019 11:00 AM   Hub Color/Line Status Pink;Capped;Flushed;Patent 12/10/2019 11:00 AM   Action Taken Open ports on tubing capped 12/10/2019 11:00 AM   Alcohol Cap Used Yes 12/10/2019 11:00 AM       Peripheral IV 12/10/19 Anterior; Left Forearm (Active)   Phlebitis Assessment 0 12/10/2019  7:50 PM   Infiltration Assessment 0 12/10/2019  7:50 PM   Dressing Status Clean, dry, & intact 12/10/2019  7:50 PM   Dressing Type Transparent;Tape 12/10/2019  7:50 PM   Hub Color/Line Status Pink; Infusing;Flushed;Patent 12/10/2019  7:50 PM   Action Taken Open ports on tubing capped 12/10/2019  7:50 PM   Alcohol Cap Used Yes 12/10/2019  7:50 PM        Opportunity for questions and clarification was provided. Patient transported with:   Monitor  Registered Nurse  Tech      4209:  Patient transported to Cath Lab with cath lab RN on monitor. 2nd bath given, weighed, sent down with spouse. 1005:  Patient back from cath lab on monitor. TR band inflated, no bleeding or hematoma. 1145:  Hospitalist at bedside, updating family and patient.     1200:  /107, recheck 148/109.      1225:  10mg hydralizine given.    1415:  58 beat run V-tach. All other VSS, asymptomatic. Will page cardiology. 0020:  TRANSFER - OUT REPORT:    Verbal report given to JAMES CHI on Mackenzie Soto  being transferred to CVSU for routine progression of care       Report consisted of patients Situation, Background, Assessment and   Recommendations(SBAR). Information from the following report(s) SBAR, Kardex, Intake/Output and MAR was reviewed with the receiving nurse. Lines:   Peripheral IV 12/08/19 Right Forearm (Active)   Phlebitis Assessment 0 12/11/2019  8:00 AM   Infiltration Assessment 0 12/11/2019  8:00 AM   Dressing Status Clean, dry, & intact 12/11/2019  8:00 AM   Dressing Type Transparent 12/11/2019  8:00 AM   Hub Color/Line Status Pink;Capped;Flushed 12/11/2019  8:00 AM   Action Taken Open ports on tubing capped 12/11/2019  8:00 AM   Alcohol Cap Used Yes 12/11/2019  8:00 AM       Peripheral IV 12/10/19 Anterior; Left Forearm (Active)   Phlebitis Assessment 0 12/11/2019  8:00 AM   Infiltration Assessment 0 12/11/2019  8:00 AM   Dressing Status Clean, dry, & intact 12/11/2019  8:00 AM   Dressing Type Transparent;Tape 12/11/2019  8:00 AM   Hub Color/Line Status Pink; Infusing 12/11/2019  8:00 AM   Action Taken Open ports on tubing capped 12/11/2019  8:00 AM   Alcohol Cap Used Yes 12/11/2019  8:00 AM        Opportunity for questions and clarification was provided.       Patient transported with:   Monitor  O2 @ NSR liters  Registered Nurse

## 2019-12-12 VITALS
RESPIRATION RATE: 18 BRPM | HEIGHT: 69 IN | DIASTOLIC BLOOD PRESSURE: 77 MMHG | OXYGEN SATURATION: 98 % | SYSTOLIC BLOOD PRESSURE: 123 MMHG | HEART RATE: 76 BPM | BODY MASS INDEX: 32.23 KG/M2 | WEIGHT: 217.59 LBS | TEMPERATURE: 98 F

## 2019-12-12 LAB
ANION GAP SERPL CALC-SCNC: 3 MMOL/L (ref 5–15)
BUN SERPL-MCNC: 23 MG/DL (ref 6–20)
BUN/CREAT SERPL: 16 (ref 12–20)
CALCIUM SERPL-MCNC: 8.8 MG/DL (ref 8.5–10.1)
CHLORIDE SERPL-SCNC: 106 MMOL/L (ref 97–108)
CO2 SERPL-SCNC: 27 MMOL/L (ref 21–32)
CREAT SERPL-MCNC: 1.44 MG/DL (ref 0.7–1.3)
GLUCOSE SERPL-MCNC: 123 MG/DL (ref 65–100)
POTASSIUM SERPL-SCNC: 4 MMOL/L (ref 3.5–5.1)
SODIUM SERPL-SCNC: 136 MMOL/L (ref 136–145)

## 2019-12-12 PROCEDURE — 36415 COLL VENOUS BLD VENIPUNCTURE: CPT

## 2019-12-12 PROCEDURE — 74011250637 HC RX REV CODE- 250/637: Performed by: NURSE PRACTITIONER

## 2019-12-12 PROCEDURE — 80048 BASIC METABOLIC PNL TOTAL CA: CPT

## 2019-12-12 PROCEDURE — 74011000250 HC RX REV CODE- 250: Performed by: INTERNAL MEDICINE

## 2019-12-12 PROCEDURE — 94640 AIRWAY INHALATION TREATMENT: CPT

## 2019-12-12 PROCEDURE — 74011250637 HC RX REV CODE- 250/637: Performed by: INTERNAL MEDICINE

## 2019-12-12 PROCEDURE — 74011250637 HC RX REV CODE- 250/637: Performed by: PHYSICIAN ASSISTANT

## 2019-12-12 RX ORDER — PANTOPRAZOLE SODIUM 40 MG/1
40 TABLET, DELAYED RELEASE ORAL
Qty: 30 TAB | Refills: 0 | Status: SHIPPED | OUTPATIENT
Start: 2019-12-13 | End: 2020-12-21

## 2019-12-12 RX ORDER — CARVEDILOL 25 MG/1
25 TABLET ORAL 2 TIMES DAILY WITH MEALS
Qty: 180 TAB | Refills: 0 | Status: SHIPPED | OUTPATIENT
Start: 2019-12-12 | End: 2020-01-16 | Stop reason: SDUPTHER

## 2019-12-12 RX ORDER — GUAIFENESIN 600 MG/1
600 TABLET, EXTENDED RELEASE ORAL EVERY 12 HOURS
Qty: 30 TAB | Refills: 0 | Status: SHIPPED | OUTPATIENT
Start: 2019-12-12 | End: 2020-01-16

## 2019-12-12 RX ORDER — GUAIFENESIN 100 MG/5ML
81 LIQUID (ML) ORAL DAILY
Qty: 90 TAB | Refills: 0 | Status: SHIPPED | OUTPATIENT
Start: 2019-12-13 | End: 2021-08-04 | Stop reason: ALTCHOICE

## 2019-12-12 RX ORDER — ISOSORBIDE MONONITRATE 30 MG/1
30 TABLET, EXTENDED RELEASE ORAL DAILY
Qty: 90 TAB | Refills: 0 | Status: SHIPPED | OUTPATIENT
Start: 2019-12-13 | End: 2020-01-16 | Stop reason: SDUPTHER

## 2019-12-12 RX ORDER — AMLODIPINE BESYLATE 10 MG/1
10 TABLET ORAL DAILY
Qty: 90 TAB | Refills: 0 | Status: SHIPPED | OUTPATIENT
Start: 2019-12-13 | End: 2020-01-16 | Stop reason: SDUPTHER

## 2019-12-12 RX ADMIN — BUDESONIDE 500 MCG: 0.5 INHALANT RESPIRATORY (INHALATION) at 07:30

## 2019-12-12 RX ADMIN — ASPIRIN 81 MG 81 MG: 81 TABLET ORAL at 08:30

## 2019-12-12 RX ADMIN — ARFORMOTEROL TARTRATE 15 MCG: 15 SOLUTION RESPIRATORY (INHALATION) at 07:31

## 2019-12-12 RX ADMIN — AMLODIPINE BESYLATE 10 MG: 5 TABLET ORAL at 08:30

## 2019-12-12 RX ADMIN — PANTOPRAZOLE SODIUM 40 MG: 40 TABLET, DELAYED RELEASE ORAL at 07:32

## 2019-12-12 RX ADMIN — ACETAMINOPHEN 650 MG: 325 TABLET ORAL at 04:37

## 2019-12-12 RX ADMIN — ISOSORBIDE MONONITRATE 30 MG: 30 TABLET ORAL at 08:30

## 2019-12-12 RX ADMIN — CARVEDILOL 25 MG: 12.5 TABLET, FILM COATED ORAL at 08:30

## 2019-12-12 RX ADMIN — Medication 10 ML: at 04:37

## 2019-12-12 RX ADMIN — GUAIFENESIN 600 MG: 600 TABLET, EXTENDED RELEASE ORAL at 08:30

## 2019-12-12 NOTE — DISCHARGE SUMMARY
Inpatient hospitalist discharge summary                Brief Overview    PATIENT ID: Nadiya Cramer    MRN: [id number]     YOB: 1965    Admitting Provider: Vinita Newell MD    Discharging Provider: Sincere Allen MD   To contact this individual call [phone] and ask the  to page. If unavailable ask to be transferred the Adult Hospitalist Department. PCP at discharge: None None   None ([phone]) Patient stated that they have no PCP    Admission date: 12/8/2019  Date of Discharge: 12/12/19    Chief complaint:   Chief Complaint   Patient presents with    Shortness of Breath     Patient Active Problem List   Diagnosis Code    HTN (hypertension) I10    [facility]         Discharge diagnosis, hospital course/plan:  As per initial admission history      Nadiya Cramer is a 48 y.o. male who presented with    Barnhart Galas a 48 y. o. male with HTN who presents with SOB and chest pain. Patient reports PLASCENCIA for 2 weeks but it was not until today patient started experiencing SOB and substernal chest pain. Pain is non radiating. Described as a pressure. Also, reports orthopnea and cough. No PND. No fever or chills or cough. Denies abdominal pain, nausea, vomiting or LE edema. In the ER he was found to be hypertensive. Given clonidine and nitropaste with improvement of his blood pressure, c chest pain and SOB. Work up revealed elevated troponin. Admission for NSTEMI requested. 1. NSTEMI- resolved   - on beta blocker, asa 81mg daily, statin  -as per cardio Cardiac cath with normal coronaries--may be discharged following BP control. Follow up with Oj Buitrago in 7-10 days. CARDIAC CATHETERIZATION Findings:  1)Elevated LVEDP  2)Normal coronary arteries  Recommendations:  1)GDMT for cardiomyopathy and HTN  As per cardiology on amlodipine 10, coreg 25, imdur 30 mg  Need to follow up with cardiology as an outpatient      2.  Hypertensive urgency -resolved    likely due to lack of compliance with medications as he does not take his medications at home. Resolving   - continue beta bocker. on amlodipine  Ok to discharge as per cardiology      3. SOB- resolved   EF 45 %   Started on cardiac meds     On the date of discharge, diagnostic face to face encounter was performed. Patient was hemodynamically stable, offering no new complaints. Denies any shortness of breath at rest, no fevers or chills, no diarrhea or constipation. Patient is agreeable for discharge. Patient understood and verbalized the understanding of the discharge plan. Patient was advised to seek medical help/ care or return to ED, if symptoms recur, worsen or new symptoms develop. Discharge Disposition:  Home or Self Care    Discharge activity:  Activity as tolerated    Code status at discharge:  Full Code     Outpatient follow up:  Please follow up with your PCP in one week  In addition follow up with cardiology       Future appointments-  Future Appointments   Date Time Provider Alberto Walton   12/26/2019 11:00 AM Venkat Sheikh  [address]     Follow-up Information     Follow up With Specialties Details Why Contact Info    Venkat Sheikh MD Cardiology On 12/26/2019 11 AM [place] [address]  [address] In 1 week follow up with your primary care physician.   [place] [address]  [phone]    None    None (395) Patient stated that they have no PCP            Operative procedures performed:  Procedure(s):  LEFT HEART CATH / CORONARY ANGIOGRAPHY    Consults: IP CONSULT TO HOSPITALIST  IP CONSULT TO CARDIOLOGY    Procedures:  Procedure(s):  LEFT HEART CATH / CORONARY ANGIOGRAPHY    Diet:  DIET CARDIAC    Pertinent test results:  Xr Chest Pa Lat    Result Date: 12/8/2019  INDICATION:   short of breath COMPARISON: September 12, 2017 FINDINGS: Frontal and lateral views of the chest demonstrate a normal cardiomediastinal silhouette. The lungs are adequately expanded. There is no edema, effusion, consolidation, or pneumothorax. The osseous structures are unremarkable. IMPRESSION: No acute process. Recent Results (from the past 168 hour(s))   EKG, 12 LEAD, INITIAL    Collection Time: 12/08/19 10:27 PM   Result Value Ref Range    Ventricular Rate 90 BPM    Atrial Rate 90 BPM    P-R Interval 176 ms    QRS Duration 100 ms    Q-T Interval 394 ms    QTC Calculation (Bezet) 481 ms    Calculated P Axis 61 degrees    Calculated R Axis -31 degrees    Calculated T Axis 64 degrees    Diagnosis       Normal sinus rhythm  Left axis deviation  Voltage criteria for left ventricular hypertrophy  When compared with ECG of 12-SEP-2017 11:37,  QT has lengthened  Confirmed by Mateo Levine MD, Tony Damon ([id number]) on 12/9/2019 11:18:08 PM     SAMPLES BEING HELD    Collection Time: 12/08/19 10:38 PM   Result Value Ref Range    SAMPLES BEING HELD [id number]     COMMENT        Add-on orders for these samples will be processed based on acceptable specimen integrity and analyte stability, which may vary by analyte. CBC WITH AUTOMATED DIFF    Collection Time: 12/08/19 10:38 PM   Result Value Ref Range    WBC 8.9 4.1 - 11.1 K/uL    RBC 5.06 4.10 - 5.70 M/uL    HGB 13.6 12.1 - 17.0 g/dL    HCT 43.7 36.6 - 50.3 %    MCV 86.4 80.0 - 99.0 FL    MCH 26.9 26.0 - 34.0 PG    MCHC 31.1 30.0 - 36.5 g/dL    RDW 15.9 (H) 11.5 - 14.5 %    PLATELET 434 862 - 713 K/uL    MPV 9.4 8.9 - 12.9 FL    NRBC 0.0 0  WBC    ABSOLUTE NRBC 0.00 0.00 - 0.01 K/uL    NEUTROPHILS 49 32 - 75 %    LYMPHOCYTES 41 12 - 49 %    MONOCYTES 8 5 - 13 %    EOSINOPHILS 2 0 - 7 %    BASOPHILS 0 0 - 1 %    IMMATURE GRANULOCYTES 0 0.0 - 0.5 %    ABS. NEUTROPHILS 4.3 1.8 - 8.0 K/UL    ABS. LYMPHOCYTES 3.6 (H) 0.8 - 3.5 K/UL    ABS. MONOCYTES 0.7 0.0 - 1.0 K/UL    ABS. EOSINOPHILS 0.2 0.0 - 0.4 K/UL    ABS.  BASOPHILS 0.0 0.0 - 0.1 K/UL    ABS. IMM. GRANS. 0.0 0.00 - 0.04 K/UL    DF AUTOMATED     NT-PRO BNP    Collection Time: 12/08/19 10:38 PM   Result Value Ref Range    NT pro- (H) <905 PG/ML   METABOLIC PANEL, COMPREHENSIVE    Collection Time: 12/08/19 10:39 PM   Result Value Ref Range    Sodium 140 136 - 145 mmol/L    Potassium 3.4 (L) 3.5 - 5.1 mmol/L    Chloride 107 97 - 108 mmol/L    CO2 28 21 - 32 mmol/L    Anion gap 5 5 - 15 mmol/L    Glucose 137 (H) 65 - 100 mg/dL    BUN 23 (H) 6 - 20 MG/DL    Creatinine 1.41 (H) 0.70 - 1.30 MG/DL    BUN/Creatinine ratio 16 12 - 20      GFR est AA >60 >60 ml/min/1.73m2    GFR est non-AA 53 (L) >60 ml/min/1.73m2    Calcium 9.5 8.5 - 10.1 MG/DL    Bilirubin, total 0.3 0.2 - 1.0 MG/DL    ALT (SGPT) 30 12 - 78 U/L    AST (SGOT) 18 15 - 37 U/L    Alk. phosphatase 102 45 - 117 U/L    Protein, total 8.4 (H) 6.4 - 8.2 g/dL    Albumin 3.2 (L) 3.5 - 5.0 g/dL    Globulin 5.2 (H) 2.0 - 4.0 g/dL    A-G Ratio 0.6 (L) 1.1 - 2.2     TROPONIN I    Collection Time: 12/08/19 10:39 PM   Result Value Ref Range    Troponin-I, Qt. 0.17 (H) <0.05 ng/mL   MAGNESIUM    Collection Time: 12/08/19 10:39 PM   Result Value Ref Range    Magnesium 2.0 1.6 - 2.4 mg/dL   METABOLIC PANEL, COMPREHENSIVE    Collection Time: 12/09/19  4:28 AM   Result Value Ref Range    Sodium 138 136 - 145 mmol/L    Potassium 3.5 3.5 - 5.1 mmol/L    Chloride 105 97 - 108 mmol/L    CO2 28 21 - 32 mmol/L    Anion gap 5 5 - 15 mmol/L    Glucose 117 (H) 65 - 100 mg/dL    BUN 20 6 - 20 MG/DL    Creatinine 1.32 (H) 0.70 - 1.30 MG/DL    BUN/Creatinine ratio 15 12 - 20      GFR est AA >60 >60 ml/min/1.73m2    GFR est non-AA 57 (L) >60 ml/min/1.73m2    Calcium 9.2 8.5 - 10.1 MG/DL    Bilirubin, total 0.4 0.2 - 1.0 MG/DL    ALT (SGPT) 29 12 - 78 U/L    AST (SGOT) 17 15 - 37 U/L    Alk.  phosphatase 93 45 - 117 U/L    Protein, total 7.8 6.4 - 8.2 g/dL    Albumin 3.0 (L) 3.5 - 5.0 g/dL    Globulin 4.8 (H) 2.0 - 4.0 g/dL    A-G Ratio 0.6 (L) 1.1 - 2.2 MAGNESIUM    Collection Time: 12/09/19  4:28 AM   Result Value Ref Range    Magnesium 1.9 1.6 - 2.4 mg/dL   CBC W/O DIFF    Collection Time: 12/09/19  4:28 AM   Result Value Ref Range    WBC 9.3 4.1 - 11.1 K/uL    RBC 4.83 4.10 - 5.70 M/uL    HGB 13.0 12.1 - 17.0 g/dL    HCT 41.5 36.6 - 50.3 %    MCV 85.9 80.0 - 99.0 FL    MCH 26.9 26.0 - 34.0 PG    MCHC 31.3 30.0 - 36.5 g/dL    RDW 15.8 (H) 11.5 - 14.5 %    PLATELET 273 828 - 266 K/uL    MPV 9.6 8.9 - 12.9 FL    NRBC 0.0 0  WBC    ABSOLUTE NRBC 0.00 0.00 - 0.01 K/uL   TROPONIN I    Collection Time: 12/09/19  4:28 AM   Result Value Ref Range    Troponin-I, Qt. 0.17 (H) <0.05 ng/mL   LIPID PANEL    Collection Time: 12/09/19  6:13 AM   Result Value Ref Range    LIPID PROFILE          Cholesterol, total 166 <200 MG/DL    Triglyceride 97 <150 MG/DL    HDL Cholesterol 47 MG/DL    LDL, calculated 99.6 0 - 100 MG/DL    VLDL, calculated 19.4 MG/DL    CHOL/HDL Ratio 3.5 0.0 - 5.0     HEMOGLOBIN A1C WITH EAG    Collection Time: 12/09/19  6:13 AM   Result Value Ref Range    Hemoglobin A1c 6.1 (H) 4.0 - 5.6 %    Est. average glucose 128 mg/dL   TSH 3RD GENERATION    Collection Time: 12/09/19  6:13 AM   Result Value Ref Range    TSH 1.91 0.36 - 3.74 uIU/mL   TROPONIN I    Collection Time: 12/09/19 11:44 AM   Result Value Ref Range    Troponin-I, Qt. 0.13 (H) <0.05 ng/mL   ECHO ADULT COMPLETE    Collection Time: 12/09/19  1:13 PM   Result Value Ref Range    Aortic Valve Systolic Peak Velocity 373.33 cm/s    Aortic Valve Area by Continuity of Peak Velocity 3.2 cm2    AoV PG 15.2 mmHg    LVIDd 5.15 4.2 - 5.9 cm    LVPWd 1.57 (A) 0.6 - 1.0 cm    LVIDs 3.72 cm    IVSd 1.19 (A) 0.6 - 1.0 cm    LVOT d 2.69 cm    LVOT Peak Velocity 109.55 cm/s    LVOT Peak Gradient 4.8 mmHg    MVA (PHT) 5.7 cm2    MV A Buster 1.62 cm/s    MV E Buster 109.88 cm/s    MV E/A 67.83     Left Atrium to Aortic Root Ratio 1.19     RVIDd 4.73 cm    LA Vol 4C 71.07 (A) 18 - 58 mL    LA Vol 2C 67.95 (A) 18 - 58 mL    LA Area 4C 24.2 cm2    LV Mass .7 (A) 88 - 224 g    LV Mass AL Index 167.8 49 - 115 g/m2    RVSP 13.9 mmHg    Est. RA Pressure 3.0 mmHg    Mitral Valve E Wave Deceleration Time 133.3 ms    Mitral Valve Pressure Half-time 38.6 ms    Left Atrium Major Axis 4.47 cm    Tapse 3.17 (A) 1.5 - 2.0 cm    Triscuspid Valve Regurgitation Peak Gradient 10.9 mmHg    Pulmonic Valve Max Velocity 153.62 cm/s    TR Max Velocity 165.42 cm/s    PASP 13.9 mmHg    LA Vol Index 31.71 16 - 28 ml/m2    LA Vol Index 33.16 16 - 28 ml/m2    Ao Root D 3.75 cm    Left Ventricular Fractional Shortening by 2D [id number] %    Mitral Valve Deceleration Hardeman [id number]     AV Velocity Ratio 0.56     PV peak gradient 9.4 mmHg   METABOLIC PANEL, BASIC    Collection Time: 12/10/19  3:55 AM   Result Value Ref Range    Sodium 136 136 - 145 mmol/L    Potassium 3.7 3.5 - 5.1 mmol/L    Chloride 103 97 - 108 mmol/L    CO2 27 21 - 32 mmol/L    Anion gap 6 5 - 15 mmol/L    Glucose 113 (H) 65 - 100 mg/dL    BUN 25 (H) 6 - 20 MG/DL    Creatinine 1.45 (H) 0.70 - 1.30 MG/DL    BUN/Creatinine ratio 17 12 - 20      GFR est AA >60 >60 ml/min/1.73m2    GFR est non-AA 51 (L) >60 ml/min/1.73m2    Calcium 9.2 8.5 - 10.1 MG/DL   DRUG SCREEN, URINE    Collection Time: 12/10/19  3:59 AM   Result Value Ref Range    AMPHETAMINES NEGATIVE  NEG      BARBITURATES NEGATIVE  NEG      BENZODIAZEPINES NEGATIVE  NEG      COCAINE NEGATIVE  NEG      METHADONE NEGATIVE  NEG      OPIATES NEGATIVE  NEG      PCP(PHENCYCLIDINE) NEGATIVE  NEG      THC (TH-CANNABINOL) NEGATIVE  NEG      Drug screen comment (NOTE)    NUCLEAR CARDIAC STRESS TEST    Collection Time: 12/10/19 12:39 PM   Result Value Ref Range    Target  bpm    Exercise duration time 00:03:00     Stress Base Systolic  mmHg    Stress Base Diastolic  mmHg    Post peak  BPM    Baseline HR 85 BPM    Estimated workload 1.0 METS    Percent HR 62 %    Stress Rate Pressure Product 16,485 BPM*mmHg METABOLIC PANEL, BASIC    Collection Time: 12/11/19  4:24 AM   Result Value Ref Range    Sodium 138 136 - 145 mmol/L    Potassium 3.9 3.5 - 5.1 mmol/L    Chloride 105 97 - 108 mmol/L    CO2 24 21 - 32 mmol/L    Anion gap 9 5 - 15 mmol/L    Glucose 125 (H) 65 - 100 mg/dL    BUN 27 (H) 6 - 20 MG/DL    Creatinine 1.38 (H) 0.70 - 1.30 MG/DL    BUN/Creatinine ratio 20 12 - 20      GFR est AA >60 >60 ml/min/1.73m2    GFR est non-AA 54 (L) >60 ml/min/1.73m2    Calcium 8.9 8.5 - 73.9 MG/DL   METABOLIC PANEL, BASIC    Collection Time: 12/12/19  3:03 AM   Result Value Ref Range    Sodium 136 136 - 145 mmol/L    Potassium 4.0 3.5 - 5.1 mmol/L    Chloride 106 97 - 108 mmol/L    CO2 27 21 - 32 mmol/L    Anion gap 3 (L) 5 - 15 mmol/L    Glucose 123 (H) 65 - 100 mg/dL    BUN 23 (H) 6 - 20 MG/DL    Creatinine 1.44 (H) 0.70 - 1.30 MG/DL    BUN/Creatinine ratio 16 12 - 20      GFR est AA >60 >60 ml/min/1.73m2    GFR est non-AA 52 (L) >60 ml/min/1.73m2    Calcium 8.8 8.5 - 10.1 MG/DL           Physical Exam on Discharge:    Discharge condition: good    Vital signs:   Patient Vitals for the past 12 hrs:   Temp Pulse Resp BP SpO2   12/12/19 0836    (!) 151/98    12/12/19 0733 98 °F (36.7 °C) 79 18 (!) 147/100 96 %   12/12/19 0301 97.9 °F (36.6 °C) 86 16 140/88 97 %   12/11/19 2305 98 °F (36.7 °C) 82 18 119/80 94 %       Visit Vitals  BP (!) 151/98   Pulse 79   Temp 98 °F (36.7 °C)   Resp 18   Ht 5' 9\" (1.753 m)   Wt 98.7 kg (217 lb 9.5 oz)   SpO2 96%   BMI 32.13 kg/m²     General:  Alert, cooperative, no distress, appears stated age. Head:  Normocephalic, without obvious abnormality, atraumatic. Lungs:   Clear to auscultation bilaterally. Chest wall:  No tenderness or deformity. Heart:  Regular rate and rhythm, S1, S2 normal, no murmur, click, rub or gallop. Abdomen:   Soft, non-tender. Bowel sounds normal. No masses,  No organomegaly. Extremities: Extremities normal, atraumatic, no cyanosis or edema.    Pulses: 2+ and symmetric all extremities. Neurologic: CNII-XII intact. Normal strength, sensation and reflexes throughout. Current Discharge Medication List      START taking these medications    Details   aspirin 81 mg chewable tablet Take 1 Tab by mouth daily. Qty: 90 Tab, Refills: 0      carvedilol (COREG) 25 mg tablet Take 1 Tab by mouth two (2) times daily (with meals). Qty: 180 Tab, Refills: 0      guaiFENesin ER (MUCINEX) 600 mg ER tablet Take 1 Tab by mouth every twelve (12) hours. Qty: 30 Tab, Refills: 0      isosorbide mononitrate ER (IMDUR) 30 mg tablet Take 1 Tab by mouth daily. Qty: 90 Tab, Refills: 0      pantoprazole (PROTONIX) 40 mg tablet Take 1 Tab by mouth Daily (before breakfast). Qty: 30 Tab, Refills: 0         CONTINUE these medications which have CHANGED    Details   amLODIPine (NORVASC) 10 mg tablet Take 1 Tab by mouth daily.   Qty: 90 Tab, Refills: 0               Total time spent on discharge planning, counseling and co-ordination of care:   35 minutes    Henny Tan MD  12/12/19  10:33 AM

## 2019-12-12 NOTE — PROGRESS NOTES
Transitions of Care:     -Patient will discharge home with his significant other   -Cardiology follow-up   -Maryann Archibald, coupons given for medications   -Patient has information for [facility]    The CM reviewed discharging medications, (aspirin under $1.00 at [facility], Coreg $4.00, Imdur $4.00, Musinex $11.86, Protonix $19.62 at [facility] or $9.96 at [facility], Norvasc $9.00 at [facility] or $6.86 at [facility]). The CM met with the patient and Harjit Yusuf regarding estimated total cost for $51.00, patient and s/o endorse that they can afford these medications. The family has [facility] coupons, Medicaid pending, and patient has information for [facility] to complete the intake process (patient has to call by phone). Harjit Yusuf will transport home, no other needs or concerns identified.  CARMEN Joel

## 2019-12-12 NOTE — PROGRESS NOTES
NUTRITION       Pt and wife visited for diet education s/p NSTEMI. Reviewed cardiac diet recommendations for salt and fat intake. Pt eats breakfast and lunch out most days and wife prepares dinner. Encouraged focus on reducing intake of processed meats and cutting back on saturated fats. Label reading reviewed with pt as well. Handouts provided. Pt would likely benefit from reinforcement and encouragement.    Lisa Pacheco, RD

## 2019-12-12 NOTE — PROGRESS NOTES
Cardiology Progress Note            Admit Date: 12/8/2019  Admit Diagnosis: NSTEMI (non-ST elevated myocardial infarction) [facility]) [I21.4]  Date: 12/12/2019     Time: 1:23 PM    HPI: Jose Wagoner, a 48y.o. year-old who presents for evaluation of PLASCENCIA. Has PMH of essential HTN. Reports new onset PLASCENCIA x 2 week which has progressively worsened. He reports he had some substernal chest pain at times with his symptoms but no radiation to jaw. Also reports nonproductive cough for past couple days and recent orthopnea. EKG with mildly elevated troponin but flat at 0.17. He was very hypertensive on admission and was not compliant with his medications. SH:  Current 1 ppd smoker x 31 years. Drinks socially but not every day. Works in lawn care  FH: no FH of early CAD     Subjective: Jose Wagoner denies chest pain. No SOB currently. NSR overnight, no ectopy on review. BP better     Assessment and Plan   1. Elevated troponin:                -On ASA. -Troponin mildly elevated but flat (0.17-->> 0.17-->>0.13) not consistent with NSTEMI. -Chest pain resolved. Cardiac cath with normal coronaries--may be discharged following BP control. Follow up with Oj Buitrago in 7-10 days.              -Suspect mildly elevated, flat troponin elevation due to HTN urgency. However, does have risk factors,     Echo EF 45%--mildly reduction likely due to hypertensive heart disease.                  2. HTN/ Hypertensive urgency: BP now controlled although diastolic high              -Amlodipine  to 10 mg daily              -Coreg to 25 mg BID at discharge   -Imdur 30 mg daily   -prn iv hydralazine (none used since 11AM yesterday)   -Stressed importance of compliance with pt. Low salt diet     3. PLASCENCIA:                -Pro  (cutoff 900 for age).  CXR negative for pulmonary edema.              -Likely related to diastolic dysfunction with hypertensive heart disease and midly reduced LV function     4. Elevated creatinine:  Creatinine  Up to 1.45 today from 1.32 (from 1.41 12/8/19). -Monitor.    -suspect baseline 1.3-1.5 - stable  5. Tobacco abuse: discuss cessation. 6. Possible COPD:  Nebs per primary team. Outpatient PFTs planned. BP improved on Imdur. Can increase dose if needed. No ectopy overnight on telemetry. OK for discharge. Attempting to keep meds to the minimal number ot times per day to use and keep the cost as low as possible. Compliance is an issue, cost as well. D/w him all his meds are on [place] $4 list.  Also discussed Heart and Renal disease progression if his BP continues to remain high, and untreated. OK for discharge today from Cardiology standpoint. [facility]  Past Medical History:   Diagnosis Date    Hypertension       Social Hx  Social History     Socioeconomic History    Marital status: SINGLE     Spouse name: Not on file    Number of children: Not on file    Years of education: Not on file    Highest education level: Not on file   Occupational History    Not on file   Social Needs    Financial resource strain: Not on file    Food insecurity:     Worry: Not on file     Inability: Not on file    Transportation needs:     Medical: Not on file     Non-medical: Not on file   Tobacco Use    Smoking status: Current Every Day Smoker     Packs/day: 1.00    Smokeless tobacco: Never Used   Substance and Sexual Activity    Alcohol use:  Yes     Alcohol/week: 5.0 standard drinks     Types: 6 Cans of beer per week     Comment: Occasionally    Drug use: No    Sexual activity: Yes     Partners: Female   Lifestyle    Physical activity:     Days per week: Not on file     Minutes per session: Not on file    Stress: Not on file   Relationships    Social connections:     Talks on phone: Not on file     Gets together: Not on file     Attends Gnosticist service: Not on file     Active member of club or organization: Not on file     Attends meetings of clubs or organizations: Not on file     Relationship status: Not on file    Intimate partner violence:     Fear of current or ex partner: Not on file     Emotionally abused: Not on file     Physically abused: Not on file     Forced sexual activity: Not on file   Other Topics Concern    Not on file   Social History Narrative    ** Merged History Encounter **            Objective:      Physical Exam:                Visit Vitals  BP (!) 151/98   Pulse 79   Temp 98 °F (36.7 °C)   Resp 18   Ht 5' 9\" (1.753 m)   Wt 217 lb 9.5 oz (98.7 kg)   SpO2 96%   BMI 32.13 kg/m²          General Appearance:   Well developed, alert and oriented x 3, and   individual in no acute distress. Ears/Nose/Mouth/Throat:    Hearing grossly normal.         Neck:  Supple. Chest:    Lungs with some wheeze noted bilaterally. Cardiovascular:   Regular rate and rhythm, S1, S2 normal, no murmur. Abdomen:    Soft, non-tender, bowel sounds are active. Extremities:  No edema bilaterally. Skin:  Warm and dry.      Telemetry: NSR          Data Review:    Labs:    Recent Results (from the past 24 hour(s))   METABOLIC PANEL, BASIC    Collection Time: 12/12/19  3:03 AM   Result Value Ref Range    Sodium 136 136 - 145 mmol/L    Potassium 4.0 3.5 - 5.1 mmol/L    Chloride 106 97 - 108 mmol/L    CO2 27 21 - 32 mmol/L    Anion gap 3 (L) 5 - 15 mmol/L    Glucose 123 (H) 65 - 100 mg/dL    BUN 23 (H) 6 - 20 MG/DL    Creatinine 1.44 (H) 0.70 - 1.30 MG/DL    BUN/Creatinine ratio 16 12 - 20      GFR est AA >60 >60 ml/min/1.73m2    GFR est non-AA 52 (L) >60 ml/min/1.73m2    Calcium 8.8 8.5 - 10.1 MG/DL          Radiology:        Current Facility-Administered Medications   Medication Dose Route Frequency    carvedilol (COREG) tablet 25 mg  25 mg Oral BID WITH MEALS    isosorbide mononitrate ER (IMDUR) tablet 30 mg  30 mg Oral DAILY    albuterol-ipratropium (DUO-NEB) 2.5 MG-0.5 MG/3 ML  3 mL Nebulization QID PRN    sodium chloride (NS) flush 5-40 mL  5-40 mL IntraVENous Q8H    sodium chloride (NS) flush 5-40 mL  5-40 mL IntraVENous PRN    aspirin chewable tablet 81 mg  81 mg Oral DAILY    acetaminophen (TYLENOL) tablet 650 mg  650 mg Oral Q4H PRN    arformoterol (BROVANA) neb solution 15 mcg  15 mcg Nebulization BID RT    budesonide (PULMICORT) 500 mcg/2 ml nebulizer suspension  500 mcg Nebulization BID RT    amLODIPine (NORVASC) tablet 10 mg  10 mg Oral DAILY    pantoprazole (PROTONIX) tablet 40 mg  40 mg Oral ACB    hydrALAZINE (APRESOLINE) 20 mg/mL injection 20 mg  20 mg IntraVENous Q6H PRN    guaiFENesin ER (MUCINEX) tablet 600 mg  600 mg Oral Q12H    benzocaine-menthol (CEPACOL) lozenge 1 Lozenge  1 Lozenge Mucous Membrane PRN          BRIANA Campos MD     [facility] [address] [place] [address], [address] [place] [address] [address]   Rubens Swenson   [phone]

## 2019-12-12 NOTE — PROGRESS NOTES
0730 Bedside shift change report given to Syl Cummings RN (oncoming nurse) by Lenell Goodell, RN (offgoing nurse). Report included the following information SBAR, Kardex, Intake/Output, MAR and Recent Results. 1300 PIVs and tele removed for pt discharge. I have reviewed discharge instructions with the patient and spouse. The patient and spouse verbalized understanding. Current Discharge Medication List      START taking these medications    Details   aspirin 81 mg chewable tablet Take 1 Tab by mouth daily. Qty: 90 Tab, Refills: 0      carvedilol (COREG) 25 mg tablet Take 1 Tab by mouth two (2) times daily (with meals). Qty: 180 Tab, Refills: 0      guaiFENesin ER (MUCINEX) 600 mg ER tablet Take 1 Tab by mouth every twelve (12) hours. Qty: 30 Tab, Refills: 0      isosorbide mononitrate ER (IMDUR) 30 mg tablet Take 1 Tab by mouth daily. Qty: 90 Tab, Refills: 0      pantoprazole (PROTONIX) 40 mg tablet Take 1 Tab by mouth Daily (before breakfast). Qty: 30 Tab, Refills: 0         CONTINUE these medications which have CHANGED    Details   amLODIPine (NORVASC) 10 mg tablet Take 1 Tab by mouth daily.   Qty: 90 Tab, Refills: 0

## 2019-12-12 NOTE — DISCHARGE INSTRUCTIONS
Smoking Cessation Program: This is a free, phone/text/email based, smoking cessation program. The program is individualized to meet each patient's needs. To enroll use the link - [facility]/quit or text Ella Tucker to [phone] from any smart phone. Radial Cardiac Catheterization/Angiography Discharge Instructions    It is normal to feel tired the first couple days. Take it easy and follow the physicians instructions. CHECK THE CATHETER INSERTION SITE DAILY:    Remove the wrist dressing 24 hours after the procedure. You may shower 24 hours after the procedure. Wash with soap and water and pat dry. Gentle cleaning of the site with soap and water is sufficient, cover with a dry clean dressing or bandage. Do not apply creams or powders to the area. No soaking the wrist for 3 days. Leave the puncture site open to air after 24 hours post-procedure. CALL THE PHYSICIANS:     If the site becomes red, swollen or feels warm to the touch  If there is bleeding or drainage or if there is unusual pain at the radial site. If there is any minor oozing, you may apply a band-aid and remove after 12 hours. If the bleeding continues, hold pressure with the middle finger against the puncture site and the thumb against the back of the wrist,call 911 to be transported to the hospital.  DO NOT DRIVE YOURSELF, [facility] 776. ACTIVITY:   For the first 24 hours do not manipulate the wrist.  No lifting, pushing or pulling over 3-5 pounds with the affected wrist for 7 daysand no straining the insertion site. Do not life grocery bags or the garbage can, do not run the vacuum  or  for 7 days. Start with short walks as in the hospital and gradually increase as tolerated each day. It is recommended to walk 30 minutes 5-7 days per week. Follow your physicians instructions on activity. Avoid walking outside in extremes of heat or cold.   Walk inside when it is cold and windy or hot and humid. Things to keep in mind:  No driving for at least 24 hours, or as designated by your physician. Limit the number of times you go up and down the stairs  Take rests and pace yourself with activity. Be careful and do not strain with bowel movements. MEDICATIONS:    Take all medications as prescribed  Call your physician if you have any questions  Keep an updated list of your medications with you at all times and give a list to your physician and pharmacist    SIGNS AND SYMPTOMS:   Be cautious of symptoms of angina or recurrent symptoms such as chest discomfort, unusual shortness of breath or fatigue. These could be symptoms of restenosis, a new blockage or a heart attack. If your symptoms are relieved with rest it is still recommended that you notify your physician of recurrent chest pain or discomfort. For CHEST PAIN or symptoms of angina not relieved with rest:  If the discomfort is not relieved with rest, and you have been prescribed Nitroglycerin, take as directed (taken under the tongue, one at a time 5 minutes apart for a total of 3 doses). If the discomfort is not relieved after the 3rd nitroglycerin, call 911. If you have not been prescribed Nitroglycerin  and your chest discomfort is not relieved with rest, call 911. AFTER CARE:   Follow up with your physician as instructed. Follow a heart healthy diet with proper portion control, daily stress management, daily exercise, blood pressure and cholesterol control , and smoking cessation. Take your blood pressure daily if possible and document recordings.  Bring record of BP to physician office visits       Discharge Instructions       PATIENT ID: Xiomy Truong  MRN: [id number]   YOB: 1965    DATE OF ADMISSION: 12/8/2019 10:22 PM    DATE OF DISCHARGE: 12/12/2019    PRIMARY CARE PROVIDER: None     ATTENDING PHYSICIAN: Joe Sharpe MD  DISCHARGING PROVIDER: Varsha Luong MD    To contact this individual call [phone] and ask the  to page. If unavailable ask to be transferred the Adult Hospitalist Department. DISCHARGE DIAGNOSES CAD    CONSULTATIONS: IP CONSULT TO HOSPITALIST  IP CONSULT TO CARDIOLOGY    PROCEDURES/SURGERIES: Procedure(s):  LEFT HEART CATH / CORONARY ANGIOGRAPHY      FOLLOW UP APPOINTMENTS:   Follow-up Information     Follow up With Specialties Details Why Contact Info    Shantelle Eagle MD Cardiology On 12/26/2019 11 AM [address]  [address]  [facility] [phone]      [facility] In 1 week follow up with your primary care physician. [place] [address]  [phone]           ADDITIONAL CARE RECOMMENDATIONS:   Follow up with primary care physician as an outpatient in one week  Follow up with your cardiologist as scheduled in 1-2 weeks     DIET: Cardiac Diet    ACTIVITY: Activity as tolerated      DISCHARGE MEDICATIONS:   See Medication Reconciliation Form    · It is important that you take the medication exactly as they are prescribed. · Keep your medication in the bottles provided by the pharmacist and keep a list of the medication names, dosages, and times to be taken in your wallet. · Do not take other medications without consulting your doctor. NOTIFY YOUR PHYSICIAN FOR ANY OF THE FOLLOWING:   Fever over 101 degrees for 24 hours. Chest pain, shortness of breath, fever, chills, nausea, vomiting, diarrhea, change in mentation, falling, weakness, bleeding. Severe pain or pain not relieved by medications. Or, any other signs or symptoms that you may have questions about.       DISPOSITION:    Home With:   OT  PT  HH  RN       SNF/Inpatient Rehab/LTAC    Independent/assisted living    Hospice    Other:     CDMP Checked:   Yes ***     PROBLEM LIST Updated:  Yes ***       Information obtained by :   I understand that if any problems occur once I am at home I am to contact my physician. I understand and acknowledge receipt of the instructions indicated above.                                                                                                                                              Physician's or R.N.'s Signature                                                                  Date/Time                                                                                                                                              Patient or Representative Signature                                                          Date/Time          Signed:   Ameya Keating MD  12/12/2019  10:42 AM

## 2019-12-12 NOTE — PROGRESS NOTES
[address] December 12, 2019       RE: Josey Duong      To Whom It May Concern,    This is to certify that Josey Duong may may return to work on 12/19/19. Please feel free to contact my office if you have any questions or concerns. Thank you for your assistance in this matter.       Sincerely,  Leticia Sinclair , YAZN, RN  [facility], [address] [place] [address]

## 2019-12-12 NOTE — PROGRESS NOTES
1930: Bedside shift change report given to [address] [place] [address] Sam (oncoming nurse) by Maverick Guerra (offgoing nurse). Report included the following information SBAR, Intake/Output, MAR, Accordion, Recent Results, Med Rec Status and Cardiac Rhythm NSR.

## 2020-01-06 PROBLEM — I42.0 DILATED CARDIOMYOPATHY (HCC): Status: ACTIVE | Noted: 2020-01-06

## 2020-01-16 ENCOUNTER — OFFICE VISIT (OUTPATIENT)
Dept: CARDIOLOGY CLINIC | Age: 55
End: 2020-01-16

## 2020-01-16 VITALS
RESPIRATION RATE: 16 BRPM | OXYGEN SATURATION: 94 % | HEART RATE: 69 BPM | SYSTOLIC BLOOD PRESSURE: 148 MMHG | HEIGHT: 69 IN | WEIGHT: 219.6 LBS | BODY MASS INDEX: 32.53 KG/M2 | DIASTOLIC BLOOD PRESSURE: 98 MMHG

## 2020-01-16 DIAGNOSIS — Z72.0 TOBACCO ABUSE: ICD-10-CM

## 2020-01-16 DIAGNOSIS — I10 ESSENTIAL HYPERTENSION: Primary | ICD-10-CM

## 2020-01-16 DIAGNOSIS — I42.0 DILATED CARDIOMYOPATHY (HCC): ICD-10-CM

## 2020-01-16 DIAGNOSIS — R03.0 ELEVATED BLOOD PRESSURE READING: ICD-10-CM

## 2020-01-16 RX ORDER — CLONIDINE HYDROCHLORIDE 0.1 MG/1
0.1 TABLET ORAL 2 TIMES DAILY
Qty: 180 TAB | Refills: 1 | Status: SHIPPED | OUTPATIENT
Start: 2020-01-16 | End: 2021-03-01 | Stop reason: SDUPTHER

## 2020-01-16 RX ORDER — CARVEDILOL 25 MG/1
25 TABLET ORAL 2 TIMES DAILY WITH MEALS
Qty: 180 TAB | Refills: 1 | Status: SHIPPED | OUTPATIENT
Start: 2020-01-16 | End: 2020-07-16 | Stop reason: SDUPTHER

## 2020-01-16 RX ORDER — AMLODIPINE BESYLATE 10 MG/1
10 TABLET ORAL DAILY
Qty: 90 TAB | Refills: 1 | Status: SHIPPED | OUTPATIENT
Start: 2020-01-16 | End: 2020-12-21

## 2020-01-16 RX ORDER — ISOSORBIDE MONONITRATE 30 MG/1
30 TABLET, EXTENDED RELEASE ORAL DAILY
Qty: 90 TAB | Refills: 1 | Status: SHIPPED | OUTPATIENT
Start: 2020-01-16 | End: 2020-07-16 | Stop reason: SDUPTHER

## 2020-01-16 NOTE — PROGRESS NOTES
HISTORY OF PRESENT ILLNESS  Carlito Diana is a 48 y.o. male     SUMMARY:   Problem List  Date Reviewed: 1/15/2020          Codes Class Noted    Dilated cardiomyopathy ([facility] [address] ICD-10-CM: I42.0  ICD-9-CM: 425.4  1/6/2020        NSTEMI (non-ST elevated myocardial infarction) [facility]) ICD-10-CM: I21.4  ICD-9-CM: 410.70  12/9/2019        HTN (hypertension) ICD-10-CM: I10  ICD-9-CM: 401.9  9/1/2016              Current Outpatient Medications on File Prior to Visit   Medication Sig    aspirin 81 mg chewable tablet Take 1 Tab by mouth daily.  pantoprazole (PROTONIX) 40 mg tablet Take 1 Tab by mouth Daily (before breakfast). (Patient taking differently: Take 40 mg by mouth two (2) times a day.)     No current facility-administered medications on file prior to visit. CARDIOLOGY STUDIES TO DATE:  12/19 echo lvef 40-45%  12/19 lexiscan with fixed inf defect  12/19 normal cardiac cath    Chief Complaint   Patient presents with    Hypertension     HPI :  Mr. Caty Salazar was recently hospitalized with chest pain, shortness of breath and elevated troponin. Ultimately, he underwent cardiac catheterization, which showed normal coronaries and he was started some additional blood pressure medicines. He is down to smoking ten cigarettes a day, which is really good for him and feels much better than he did before this episode. CARDIAC ROS:   negative for chest pain, dyspnea, palpitations, syncope, orthopnea, paroxysmal nocturnal dyspnea, exertional chest pressure/discomfort, claudication, lower extremity edema    Family History   Problem Relation Age of Onset    Stroke Father     Cancer Sister        Past Medical History:   Diagnosis Date    Hypertension        GENERAL ROS:  A comprehensive review of systems was negative except for that written in the HPI.     Visit Vitals  BP (!) 148/98   Pulse 69   Resp 16   Ht 5' 9\" (1.753 m)   Wt 219 lb 9.6 oz (99.6 kg)   SpO2 94%   BMI 32.43 kg/m²       Wt Readings from Last 3 Encounters:   01/16/20 219 lb 9.6 oz (99.6 kg)   12/12/19 217 lb 9.5 oz (98.7 kg)   09/12/17 215 lb 6.2 oz (97.7 kg)            BP Readings from Last 3 Encounters:   01/16/20 (!) 148/98   12/12/19 123/77   09/12/17 (!) 172/98       PHYSICAL EXAM  General appearance: alert, cooperative, no distress, appears stated age  Neurologic: Alert and oriented X 3  Neck: supple, symmetrical, trachea midline, no adenopathy, no carotid bruit and no JVD  Lungs: clear to auscultation bilaterally  Heart: regular rate and rhythm, S1, S2 normal, no murmur, click, rub or gallop  Extremities: extremities normal, atraumatic, no cyanosis or edema    Lab Results   Component Value Date/Time    Cholesterol, total 166 12/09/2019 06:13 AM    Cholesterol, total 189 07/08/2015 07:45 PM    Cholesterol, total 207 (H) 01/14/2015 07:25 PM    Cholesterol, total 207 (H) 10/15/2014 06:45 PM    Cholesterol, total 211 (H) 04/02/2014 10:51 PM    HDL Cholesterol 47 12/09/2019 06:13 AM    HDL Cholesterol 34 07/08/2015 07:45 PM    HDL Cholesterol 43 01/14/2015 07:25 PM    HDL Cholesterol 34 10/15/2014 06:45 PM    HDL Cholesterol 35 04/02/2014 10:51 PM    LDL, calculated 99.6 12/09/2019 06:13 AM    LDL, calculated 124.6 (H) 07/08/2015 07:45 PM    LDL, calculated 142.2 (H) 01/14/2015 07:25 PM    LDL, calculated 118.8 (H) 10/15/2014 06:45 PM    LDL, calculated 117.4 (H) 04/02/2014 10:51 PM    Triglyceride 97 12/09/2019 06:13 AM    Triglyceride 152 (H) 07/08/2015 07:45 PM    Triglyceride 109 01/14/2015 07:25 PM    Triglyceride 271 (H) 10/15/2014 06:45 PM    Triglyceride 293 (H) 04/02/2014 10:51 PM    CHOL/HDL Ratio 3.5 12/09/2019 06:13 AM    CHOL/HDL Ratio 5.6 (H) 07/08/2015 07:45 PM    CHOL/HDL Ratio 4.8 01/14/2015 07:25 PM    CHOL/HDL Ratio 6.1 (H) 10/15/2014 06:45 PM    CHOL/HDL Ratio 6.0 (H) 04/02/2014 10:51 PM     ASSESSMENT :      Mr. Scooby Bardales' blood pressure is still not ideal, even on recheck, so we are going to add Clonidine 0.1 mg twice a day to his current regimen and refill everything. We talked at length about the importance of blood pressure control. current treatment plan is effective, no change in therapy  lab results and schedule of future lab studies reviewed with patient  reviewed diet, exercise and weight control    Encounter Diagnoses   Name Primary?  Essential hypertension Yes    Elevated blood pressure reading     Dilated cardiomyopathy ([address]     Tobacco abuse      Orders Placed This Encounter    cloNIDine HCL (CATAPRES) 0.1 mg tablet    carvediloL (COREG) 25 mg tablet    amLODIPine (NORVASC) 10 mg tablet    isosorbide mononitrate ER (IMDUR) 30 mg tablet       Follow-up and Dispositions    · Return in about 6 weeks (around 2/27/2020).          Beth Hogan MD  1/16/2020

## 2020-01-16 NOTE — PATIENT INSTRUCTIONS
Start taking clonidine 0.1 mg twice daily. Monitor your blood pressure. Call in 1 week with BP readings or to schedule time to come in for us to check BP.

## 2020-07-10 ENCOUNTER — APPOINTMENT (OUTPATIENT)
Dept: GENERAL RADIOLOGY | Age: 55
End: 2020-07-10
Attending: EMERGENCY MEDICINE
Payer: OTHER GOVERNMENT

## 2020-07-10 ENCOUNTER — HOSPITAL ENCOUNTER (OUTPATIENT)
Age: 55
Setting detail: OBSERVATION
Discharge: HOME | End: 2020-07-12
Attending: EMERGENCY MEDICINE | Admitting: HOSPITALIST
Payer: OTHER GOVERNMENT

## 2020-07-10 ENCOUNTER — APPOINTMENT (OUTPATIENT)
Dept: CT IMAGING | Age: 55
End: 2020-07-10
Attending: EMERGENCY MEDICINE
Payer: OTHER GOVERNMENT

## 2020-07-10 DIAGNOSIS — R51.9 ACUTE INTRACTABLE HEADACHE, UNSPECIFIED HEADACHE TYPE: ICD-10-CM

## 2020-07-10 DIAGNOSIS — R20.2 ARM PARESTHESIA, LEFT: ICD-10-CM

## 2020-07-10 DIAGNOSIS — M54.12 CERVICAL RADICULOPATHY: ICD-10-CM

## 2020-07-10 DIAGNOSIS — R06.02 SOB (SHORTNESS OF BREATH): Primary | ICD-10-CM

## 2020-07-10 LAB
ALBUMIN SERPL-MCNC: 3 G/DL (ref 3.5–5)
ALBUMIN/GLOB SERPL: 0.5 {RATIO} (ref 1.1–2.2)
ALP SERPL-CCNC: 80 U/L (ref 45–117)
ALT SERPL-CCNC: 23 U/L (ref 12–78)
ANION GAP SERPL CALC-SCNC: 6 MMOL/L (ref 5–15)
AST SERPL-CCNC: 9 U/L (ref 15–37)
BASOPHILS # BLD: 0 K/UL (ref 0–0.1)
BASOPHILS NFR BLD: 0 % (ref 0–1)
BILIRUB SERPL-MCNC: 0.4 MG/DL (ref 0.2–1)
BNP SERPL-MCNC: 307 PG/ML
BUN SERPL-MCNC: 17 MG/DL (ref 6–20)
BUN/CREAT SERPL: 12 (ref 12–20)
CALCIUM SERPL-MCNC: 9 MG/DL (ref 8.5–10.1)
CHLORIDE SERPL-SCNC: 105 MMOL/L (ref 97–108)
CO2 SERPL-SCNC: 24 MMOL/L (ref 21–32)
COMMENT, HOLDF: NORMAL
CREAT SERPL-MCNC: 1.41 MG/DL (ref 0.7–1.3)
DIFFERENTIAL METHOD BLD: ABNORMAL
EOSINOPHIL # BLD: 0.1 K/UL (ref 0–0.4)
EOSINOPHIL NFR BLD: 1 % (ref 0–7)
ERYTHROCYTE [DISTWIDTH] IN BLOOD BY AUTOMATED COUNT: 14.7 % (ref 11.5–14.5)
GLOBULIN SER CALC-MCNC: 5.5 G/DL (ref 2–4)
GLUCOSE SERPL-MCNC: 99 MG/DL (ref 65–100)
HCT VFR BLD AUTO: 39.2 % (ref 36.6–50.3)
HGB BLD-MCNC: 12.6 G/DL (ref 12.1–17)
IMM GRANULOCYTES # BLD AUTO: 0.1 K/UL (ref 0–0.04)
IMM GRANULOCYTES NFR BLD AUTO: 0 % (ref 0–0.5)
LYMPHOCYTES # BLD: 1.7 K/UL (ref 0.8–3.5)
LYMPHOCYTES NFR BLD: 12 % (ref 12–49)
MCH RBC QN AUTO: 27.1 PG (ref 26–34)
MCHC RBC AUTO-ENTMCNC: 32.1 G/DL (ref 30–36.5)
MCV RBC AUTO: 84.3 FL (ref 80–99)
MONOCYTES # BLD: 0.9 K/UL (ref 0–1)
MONOCYTES NFR BLD: 6 % (ref 5–13)
NEUTS SEG # BLD: 11.9 K/UL (ref 1.8–8)
NEUTS SEG NFR BLD: 81 % (ref 32–75)
NRBC # BLD: 0 K/UL (ref 0–0.01)
NRBC BLD-RTO: 0 PER 100 WBC
PLATELET # BLD AUTO: 312 K/UL (ref 150–400)
PMV BLD AUTO: 9.5 FL (ref 8.9–12.9)
POTASSIUM SERPL-SCNC: 3.7 MMOL/L (ref 3.5–5.1)
PROT SERPL-MCNC: 8.5 G/DL (ref 6.4–8.2)
RBC # BLD AUTO: 4.65 M/UL (ref 4.1–5.7)
SAMPLES BEING HELD,HOLD: NORMAL
SODIUM SERPL-SCNC: 135 MMOL/L (ref 136–145)
TROPONIN I SERPL-MCNC: <0.05 NG/ML
WBC # BLD AUTO: 14.7 K/UL (ref 4.1–11.1)

## 2020-07-10 PROCEDURE — 96374 THER/PROPH/DIAG INJ IV PUSH: CPT

## 2020-07-10 PROCEDURE — 74011250637 HC RX REV CODE- 250/637: Performed by: HOSPITALIST

## 2020-07-10 PROCEDURE — 80053 COMPREHEN METABOLIC PANEL: CPT

## 2020-07-10 PROCEDURE — 0100U RESPIRATORY PANEL,PCR,NASOPHARYNGEAL: CPT

## 2020-07-10 PROCEDURE — 36415 COLL VENOUS BLD VENIPUNCTURE: CPT

## 2020-07-10 PROCEDURE — 99285 EMERGENCY DEPT VISIT HI MDM: CPT

## 2020-07-10 PROCEDURE — 85025 COMPLETE CBC W/AUTO DIFF WBC: CPT

## 2020-07-10 PROCEDURE — 99218 HC RM OBSERVATION: CPT

## 2020-07-10 PROCEDURE — 87635 SARS-COV-2 COVID-19 AMP PRB: CPT

## 2020-07-10 PROCEDURE — 84484 ASSAY OF TROPONIN QUANT: CPT

## 2020-07-10 PROCEDURE — 83880 ASSAY OF NATRIURETIC PEPTIDE: CPT

## 2020-07-10 PROCEDURE — 96375 TX/PRO/DX INJ NEW DRUG ADDON: CPT

## 2020-07-10 PROCEDURE — 74011250636 HC RX REV CODE- 250/636: Performed by: EMERGENCY MEDICINE

## 2020-07-10 PROCEDURE — 93005 ELECTROCARDIOGRAM TRACING: CPT

## 2020-07-10 PROCEDURE — 71046 X-RAY EXAM CHEST 2 VIEWS: CPT

## 2020-07-10 PROCEDURE — 70450 CT HEAD/BRAIN W/O DYE: CPT

## 2020-07-10 RX ORDER — CLONIDINE HYDROCHLORIDE 0.1 MG/1
0.1 TABLET ORAL 2 TIMES DAILY
Status: DISCONTINUED | OUTPATIENT
Start: 2020-07-10 | End: 2020-07-12 | Stop reason: HOSPADM

## 2020-07-10 RX ORDER — ISOSORBIDE MONONITRATE 30 MG/1
30 TABLET, EXTENDED RELEASE ORAL DAILY
Status: DISCONTINUED | OUTPATIENT
Start: 2020-07-11 | End: 2020-07-12 | Stop reason: HOSPADM

## 2020-07-10 RX ORDER — DIPHENHYDRAMINE HYDROCHLORIDE 50 MG/ML
25 INJECTION, SOLUTION INTRAMUSCULAR; INTRAVENOUS
Status: COMPLETED | OUTPATIENT
Start: 2020-07-10 | End: 2020-07-10

## 2020-07-10 RX ORDER — BUTALBITAL, ACETAMINOPHEN AND CAFFEINE 50; 325; 40 MG/1; MG/1; MG/1
1 TABLET ORAL
Status: DISCONTINUED | OUTPATIENT
Start: 2020-07-10 | End: 2020-07-12 | Stop reason: HOSPADM

## 2020-07-10 RX ORDER — ACETAMINOPHEN 325 MG/1
650 TABLET ORAL
Status: DISCONTINUED | OUTPATIENT
Start: 2020-07-10 | End: 2020-07-12 | Stop reason: HOSPADM

## 2020-07-10 RX ORDER — METOCLOPRAMIDE HYDROCHLORIDE 5 MG/ML
10 INJECTION INTRAMUSCULAR; INTRAVENOUS
Status: COMPLETED | OUTPATIENT
Start: 2020-07-10 | End: 2020-07-10

## 2020-07-10 RX ADMIN — ACETAMINOPHEN 650 MG: 325 TABLET ORAL at 23:14

## 2020-07-10 RX ADMIN — DIPHENHYDRAMINE HYDROCHLORIDE 25 MG: 50 INJECTION, SOLUTION INTRAMUSCULAR; INTRAVENOUS at 16:48

## 2020-07-10 RX ADMIN — METOCLOPRAMIDE 10 MG: 5 INJECTION, SOLUTION INTRAMUSCULAR; INTRAVENOUS at 16:48

## 2020-07-10 RX ADMIN — CLONIDINE HYDROCHLORIDE 0.1 MG: 0.1 TABLET ORAL at 18:55

## 2020-07-10 NOTE — ED PROVIDER NOTES
46-year-old male with a history of hypertension, and STEMI presents with headache, shortness of breath, left arm numbness, diaphoresis starting 3 days ago. He states that he has not been urinating as much as well. He has neck pain as well. He denies any fevers or chills. He denies any cough. States that the headache is excruciating. He was seen in December and diagnosed with a end STEMI. Headache    Associated symptoms include shortness of breath. Shortness of Breath   Associated symptoms include headaches. Past Medical History:   Diagnosis Date    Hypertension        History reviewed. No pertinent surgical history. Family History:   Problem Relation Age of Onset    Stroke Father     Cancer Sister        Social History     Socioeconomic History    Marital status: SINGLE     Spouse name: Not on file    Number of children: Not on file    Years of education: Not on file    Highest education level: Not on file   Occupational History    Not on file   Social Needs    Financial resource strain: Not on file    Food insecurity     Worry: Not on file     Inability: Not on file   [employer] needs     Medical: Not on file     Non-medical: Not on file   Tobacco Use    Smoking status: Current Every Day Smoker     Packs/day: 1.00    Smokeless tobacco: Never Used   Substance and Sexual Activity    Alcohol use:  Yes     Alcohol/week: 5.0 standard drinks     Types: 6 Cans of beer per week     Comment: Occasionally    Drug use: No    Sexual activity: Yes     Partners: Female   Lifestyle    Physical activity     Days per week: Not on file     Minutes per session: Not on file    Stress: Not on file   Relationships    Social connections     Talks on phone: Not on file     Gets together: Not on file     Attends Rastafari service: Not on file     Active member of club or organization: Not on file     Attends meetings of clubs or organizations: Not on file     Relationship status: Not on file  Intimate partner violence     Fear of current or ex partner: Not on file     Emotionally abused: Not on file     Physically abused: Not on file     Forced sexual activity: Not on file   Other Topics Concern    Not on file   Social History Narrative    ** Merged History Encounter **              ALLERGIES: Patient has no known allergies. Review of Systems   Respiratory: Positive for shortness of breath. Neurological: Positive for headaches. All other systems reviewed and are negative. Vitals:    07/10/20 1404 07/10/20 1407   BP: (!) 167/100    Pulse: 89    Resp: 17    Temp: 99.1 °F (37.3 °C)    SpO2: 94% 94%            Physical Exam  Vitals signs and nursing note reviewed. Constitutional:       General: He is not in acute distress. HENT:      Head: Normocephalic and atraumatic. Eyes:      General: No scleral icterus. Conjunctiva/sclera: Conjunctivae normal.      Pupils: Pupils are equal, round, and reactive to light. Neck:      Musculoskeletal: No neck rigidity. Trachea: No tracheal deviation. Cardiovascular:      Rate and Rhythm: Normal rate and regular rhythm. Pulmonary:      Effort: Pulmonary effort is normal. No respiratory distress. Breath sounds: Normal breath sounds. No stridor. No decreased breath sounds, wheezing, rhonchi or rales. Abdominal:      General: There is no distension. Palpations: Abdomen is soft. Tenderness: There is no abdominal tenderness. Genitourinary:     Comments: deferred  Musculoskeletal:         General: No deformity. Comments: Neck tenderness   Skin:     General: Skin is warm and dry. Neurological:      Mental Status: He is alert and oriented to person, place, and time. Cranial Nerves: No cranial nerve deficit. Motor: No weakness.       Comments: Decreased sensation in the left upper extremity   Psychiatric:         Mood and Affect: Mood normal.         Behavior: Behavior normal.          MDM Procedures    14:09  EKG shows normal sinus rhythm at rate 83, normal intervals, normal axis, normal ST segments and T waves, left ventricular hypertrophy. Hospitalist Perfect Serve for Admission  4:37 PM    ED Room Number: R38/R38  Patient Name and age: Shaista Soler 48 y.o.  male  Working Diagnosis:   1. SOB (shortness of breath)    2. Acute intractable headache, unspecified headache type    3. Arm paresthesia, left        COVID-19 Suspicion:  no    Code Status:  Full Code  Readmission: no  Isolation Requirements:  no  Recommended Level of Care:  telemetry  Department:[facility] Adult ED - 21   Other:  Rule out ACS/ CVA           LABORATORY TESTS:  Labs Reviewed   CBC WITH AUTOMATED DIFF - Abnormal; Notable for the following components:       Result Value    WBC 14.7 (*)     RDW 14.7 (*)     NEUTROPHILS 81 (*)     ABS. NEUTROPHILS 11.9 (*)     ABS. IMM. GRANS. 0.1 (*)     All other components within normal limits   METABOLIC PANEL, COMPREHENSIVE - Abnormal; Notable for the following components:    Sodium 135 (*)     Creatinine 1.41 (*)     GFR est non-AA 53 (*)     AST (SGOT) 9 (*)     Protein, total 8.5 (*)     Albumin 3.0 (*)     Globulin 5.5 (*)     A-G Ratio 0.5 (*)     All other components within normal limits   NT-PRO BNP - Abnormal; Notable for the following components:    NT pro- (*)     All other components within normal limits   SAMPLES BEING HELD   TROPONIN I   SARS-COV-2       IMAGING RESULTS:  XR CHEST PA LAT   Final Result   Impression: No acute process or change compared to the prior exam.         CT HEAD WO CONT   Final Result   IMPRESSION:    No acute abnormality. Stable large frontal lipoma. MRI CERV SPINE WO CONT    (Results Pending)   MRI BRAIN WO CONT    (Results Pending)               IMPRESSION/ PLAN:  1. SOB (shortness of breath)    2. Acute intractable headache, unspecified headache type    3.  Arm paresthesia, left      - admit for rule out ACS/ CVA    Total critical care time spent exclusive of procedures:  0 minutes      Kalpesh Posey MD

## 2020-07-10 NOTE — ED NOTES
TRANSFER - OUT REPORT:    Verbal report given to Radha(name) on Joao Bath  being transferred to [facility]) for routine progression of care       Report consisted of patients Situation, Background, Assessment and   Recommendations(SBAR). Information from the following report(s) SBAR and ED Summary was reviewed with the receiving nurse. Lines:   Peripheral IV 07/10/20 Left Antecubital (Active)   Site Assessment Clean, dry, & intact 07/10/20 1416   Phlebitis Assessment 0 07/10/20 1416   Infiltration Assessment 0 07/10/20 1416   Dressing Status Clean, dry, & intact 07/10/20 1416   Dressing Type Transparent 07/10/20 1416   Hub Color/Line Status Patent; Flushed;Capped;Pink 07/10/20 1416   Action Taken Blood drawn 07/10/20 1416        Opportunity for questions and clarification was provided.       Patient transported with:   RenÃ©Sim

## 2020-07-11 LAB
ALBUMIN SERPL-MCNC: 2.7 G/DL (ref 3.5–5)
ALBUMIN/GLOB SERPL: 0.5 {RATIO} (ref 1.1–2.2)
ALP SERPL-CCNC: 77 U/L (ref 45–117)
ALT SERPL-CCNC: 26 U/L (ref 12–78)
ANION GAP SERPL CALC-SCNC: 7 MMOL/L (ref 5–15)
APPEARANCE UR: CLEAR
AST SERPL-CCNC: 14 U/L (ref 15–37)
ATRIAL RATE: 74 BPM
ATRIAL RATE: 83 BPM
B PERT DNA SPEC QL NAA+PROBE: NOT DETECTED
BACTERIA URNS QL MICRO: NEGATIVE /HPF
BASOPHILS # BLD: 0 K/UL (ref 0–0.1)
BASOPHILS NFR BLD: 0 % (ref 0–1)
BILIRUB DIRECT SERPL-MCNC: 0.1 MG/DL (ref 0–0.2)
BILIRUB SERPL-MCNC: 0.3 MG/DL (ref 0.2–1)
BILIRUB UR QL: NEGATIVE
BORDETELLA PARAPERTUSSIS PCR, BORPAR: NOT DETECTED
BUN SERPL-MCNC: 19 MG/DL (ref 6–20)
BUN/CREAT SERPL: 15 (ref 12–20)
C PNEUM DNA SPEC QL NAA+PROBE: NOT DETECTED
CALCIUM SERPL-MCNC: 9 MG/DL (ref 8.5–10.1)
CALCULATED P AXIS, ECG09: 26 DEGREES
CALCULATED P AXIS, ECG09: 58 DEGREES
CALCULATED R AXIS, ECG10: -4 DEGREES
CALCULATED R AXIS, ECG10: -7 DEGREES
CALCULATED T AXIS, ECG11: 0 DEGREES
CALCULATED T AXIS, ECG11: 85 DEGREES
CHLORIDE SERPL-SCNC: 104 MMOL/L (ref 97–108)
CO2 SERPL-SCNC: 23 MMOL/L (ref 21–32)
COLOR UR: ABNORMAL
COMMENT, HOLDF: NORMAL
CREAT SERPL-MCNC: 1.31 MG/DL (ref 0.7–1.3)
DIAGNOSIS, 93000: NORMAL
DIAGNOSIS, 93000: NORMAL
DIFFERENTIAL METHOD BLD: ABNORMAL
EOSINOPHIL # BLD: 0.2 K/UL (ref 0–0.4)
EOSINOPHIL NFR BLD: 2 % (ref 0–7)
EPITH CASTS URNS QL MICRO: ABNORMAL /LPF
ERYTHROCYTE [DISTWIDTH] IN BLOOD BY AUTOMATED COUNT: 14.7 % (ref 11.5–14.5)
FLUAV H1 2009 PAND RNA SPEC QL NAA+PROBE: NOT DETECTED
FLUAV H1 RNA SPEC QL NAA+PROBE: NOT DETECTED
FLUAV H3 RNA SPEC QL NAA+PROBE: NOT DETECTED
FLUAV SUBTYP SPEC NAA+PROBE: NOT DETECTED
FLUBV RNA SPEC QL NAA+PROBE: NOT DETECTED
GLOBULIN SER CALC-MCNC: 5.4 G/DL (ref 2–4)
GLUCOSE SERPL-MCNC: 119 MG/DL (ref 65–100)
GLUCOSE UR STRIP.AUTO-MCNC: NEGATIVE MG/DL
HADV DNA SPEC QL NAA+PROBE: NOT DETECTED
HCOV 229E RNA SPEC QL NAA+PROBE: NOT DETECTED
HCOV HKU1 RNA SPEC QL NAA+PROBE: NOT DETECTED
HCOV NL63 RNA SPEC QL NAA+PROBE: NOT DETECTED
HCOV OC43 RNA SPEC QL NAA+PROBE: NOT DETECTED
HCT VFR BLD AUTO: 38.8 % (ref 36.6–50.3)
HGB BLD-MCNC: 12.4 G/DL (ref 12.1–17)
HGB UR QL STRIP: NEGATIVE
HMPV RNA SPEC QL NAA+PROBE: NOT DETECTED
HPIV1 RNA SPEC QL NAA+PROBE: NOT DETECTED
HPIV2 RNA SPEC QL NAA+PROBE: NOT DETECTED
HPIV3 RNA SPEC QL NAA+PROBE: NOT DETECTED
HPIV4 RNA SPEC QL NAA+PROBE: NOT DETECTED
HYALINE CASTS URNS QL MICRO: ABNORMAL /LPF (ref 0–5)
IMM GRANULOCYTES # BLD AUTO: 0 K/UL (ref 0–0.04)
IMM GRANULOCYTES NFR BLD AUTO: 0 % (ref 0–0.5)
KETONES UR QL STRIP.AUTO: NEGATIVE MG/DL
LEUKOCYTE ESTERASE UR QL STRIP.AUTO: NEGATIVE
LYMPHOCYTES # BLD: 2.2 K/UL (ref 0.8–3.5)
LYMPHOCYTES NFR BLD: 22 % (ref 12–49)
M PNEUMO DNA SPEC QL NAA+PROBE: NOT DETECTED
MCH RBC QN AUTO: 27 PG (ref 26–34)
MCHC RBC AUTO-ENTMCNC: 32 G/DL (ref 30–36.5)
MCV RBC AUTO: 84.5 FL (ref 80–99)
MONOCYTES # BLD: 0.8 K/UL (ref 0–1)
MONOCYTES NFR BLD: 8 % (ref 5–13)
NEUTS SEG # BLD: 7 K/UL (ref 1.8–8)
NEUTS SEG NFR BLD: 68 % (ref 32–75)
NITRITE UR QL STRIP.AUTO: NEGATIVE
NRBC # BLD: 0 K/UL (ref 0–0.01)
NRBC BLD-RTO: 0 PER 100 WBC
P-R INTERVAL, ECG05: 174 MS
P-R INTERVAL, ECG05: 178 MS
PH UR STRIP: 5.5 [PH] (ref 5–8)
PLATELET # BLD AUTO: 328 K/UL (ref 150–400)
PMV BLD AUTO: 9.7 FL (ref 8.9–12.9)
POTASSIUM SERPL-SCNC: 3.3 MMOL/L (ref 3.5–5.1)
PROT SERPL-MCNC: 8.1 G/DL (ref 6.4–8.2)
PROT UR STRIP-MCNC: 100 MG/DL
Q-T INTERVAL, ECG07: 394 MS
Q-T INTERVAL, ECG07: 408 MS
QRS DURATION, ECG06: 104 MS
QRS DURATION, ECG06: 98 MS
QTC CALCULATION (BEZET), ECG08: 452 MS
QTC CALCULATION (BEZET), ECG08: 462 MS
RBC # BLD AUTO: 4.59 M/UL (ref 4.1–5.7)
RBC #/AREA URNS HPF: ABNORMAL /HPF (ref 0–5)
RSV RNA SPEC QL NAA+PROBE: NOT DETECTED
RV+EV RNA SPEC QL NAA+PROBE: NOT DETECTED
SAMPLES BEING HELD,HOLD: NORMAL
SODIUM SERPL-SCNC: 134 MMOL/L (ref 136–145)
SP GR UR REFRACTOMETRY: 1.03 (ref 1–1.03)
TROPONIN I SERPL-MCNC: <0.05 NG/ML
UROBILINOGEN UR QL STRIP.AUTO: 1 EU/DL (ref 0.2–1)
VENTRICULAR RATE, ECG03: 74 BPM
VENTRICULAR RATE, ECG03: 83 BPM
WBC # BLD AUTO: 10.2 K/UL (ref 4.1–11.1)
WBC URNS QL MICRO: ABNORMAL /HPF (ref 0–4)

## 2020-07-11 PROCEDURE — 74011250637 HC RX REV CODE- 250/637: Performed by: INTERNAL MEDICINE

## 2020-07-11 PROCEDURE — 80048 BASIC METABOLIC PNL TOTAL CA: CPT

## 2020-07-11 PROCEDURE — 84484 ASSAY OF TROPONIN QUANT: CPT

## 2020-07-11 PROCEDURE — 74011250636 HC RX REV CODE- 250/636: Performed by: PSYCHIATRY & NEUROLOGY

## 2020-07-11 PROCEDURE — 99218 HC RM OBSERVATION: CPT

## 2020-07-11 PROCEDURE — 36415 COLL VENOUS BLD VENIPUNCTURE: CPT

## 2020-07-11 PROCEDURE — 74011250637 HC RX REV CODE- 250/637: Performed by: HOSPITALIST

## 2020-07-11 PROCEDURE — 81001 URINALYSIS AUTO W/SCOPE: CPT

## 2020-07-11 PROCEDURE — 80076 HEPATIC FUNCTION PANEL: CPT

## 2020-07-11 PROCEDURE — 93005 ELECTROCARDIOGRAM TRACING: CPT

## 2020-07-11 PROCEDURE — 74011250636 HC RX REV CODE- 250/636: Performed by: INTERNAL MEDICINE

## 2020-07-11 PROCEDURE — 96372 THER/PROPH/DIAG INJ SC/IM: CPT

## 2020-07-11 PROCEDURE — 96375 TX/PRO/DX INJ NEW DRUG ADDON: CPT

## 2020-07-11 PROCEDURE — 85025 COMPLETE CBC W/AUTO DIFF WBC: CPT

## 2020-07-11 RX ORDER — ENOXAPARIN SODIUM 100 MG/ML
40 INJECTION SUBCUTANEOUS DAILY
Status: DISCONTINUED | OUTPATIENT
Start: 2020-07-11 | End: 2020-07-12 | Stop reason: HOSPADM

## 2020-07-11 RX ORDER — POTASSIUM CHLORIDE 750 MG/1
40 TABLET, FILM COATED, EXTENDED RELEASE ORAL
Status: COMPLETED | OUTPATIENT
Start: 2020-07-11 | End: 2020-07-11

## 2020-07-11 RX ORDER — CALCIUM CARBONATE 200(500)MG
200 TABLET,CHEWABLE ORAL
Status: DISCONTINUED | OUTPATIENT
Start: 2020-07-11 | End: 2020-07-12 | Stop reason: HOSPADM

## 2020-07-11 RX ORDER — KETOROLAC TROMETHAMINE 30 MG/ML
30 INJECTION, SOLUTION INTRAMUSCULAR; INTRAVENOUS
Status: COMPLETED | OUTPATIENT
Start: 2020-07-11 | End: 2020-07-11

## 2020-07-11 RX ADMIN — POTASSIUM CHLORIDE 40 MEQ: 750 TABLET, FILM COATED, EXTENDED RELEASE ORAL at 10:22

## 2020-07-11 RX ADMIN — CALCIUM CARBONATE (ANTACID) CHEW TAB 500 MG 200 MG: 500 CHEW TAB at 12:30

## 2020-07-11 RX ADMIN — KETOROLAC TROMETHAMINE 30 MG: 30 INJECTION, SOLUTION INTRAMUSCULAR at 17:30

## 2020-07-11 RX ADMIN — CLONIDINE HYDROCHLORIDE 0.1 MG: 0.1 TABLET ORAL at 08:52

## 2020-07-11 RX ADMIN — ISOSORBIDE MONONITRATE 30 MG: 30 TABLET ORAL at 08:53

## 2020-07-11 RX ADMIN — ENOXAPARIN SODIUM 40 MG: 40 INJECTION SUBCUTANEOUS at 10:22

## 2020-07-11 RX ADMIN — CALCIUM CARBONATE (ANTACID) CHEW TAB 500 MG 200 MG: 500 CHEW TAB at 17:31

## 2020-07-11 RX ADMIN — BUTALBITAL, ACETAMINOPHEN, AND CAFFEINE 1 TABLET: 50; 325; 40 TABLET ORAL at 22:31

## 2020-07-11 RX ADMIN — CLONIDINE HYDROCHLORIDE 0.1 MG: 0.1 TABLET ORAL at 17:30

## 2020-07-11 NOTE — PROGRESS NOTES
Verbal shift change report given to Sherry Ledesma RN (oncoming nurse) by Gretel Saleem RN (offgoing nurse). Report included the following information SBAR, Kardex, Intake/Output, MAR, Cardiac Rhythm NSR and Quality Measures.

## 2020-07-11 NOTE — CONSULTS
Consult dictated. 53-year-old admitted with headache, neck pain, numbness in the left upper extremity. Also has cough and shortness of breath. Being ruled out for COVID-19. Differential diagnosis includes cervical spondylosis with radiculopathy, migraine headache or less likely CVA. Needs MRI of brain and C-spine for clarification once COVID test negative.    Danielle Nayak MD

## 2020-07-11 NOTE — CONSULTS
[address]    Name:  Noah Lyons  MR#:  [id number]  :  1965  ACCOUNT #:  [de-identified]  DATE OF SERVICE:  2020    REQUESTING PHYSICIAN:  Dr. Susan Santos. REASON FOR EVALUATION:  Headache, neck pain, and left arm numbness. HISTORY:  The patient is a 57-year-old female with history of nonischemic cardiomyopathy, hypertension, who says that for the last 3-4 days he has been feeling numbness and tingling sensation in the left forearm and first and second digits. He has also been experiencing dull left-sided headache and occipital pain for the last 2 or 3 days. He denies any history of migraines, but this one feels like one. Denies any associated photophobia, nausea, or vomiting. He also developed some shortness of breath and cough over the last 4 days, and presented for further workup. He is being ruled out for COVID-19. Denies any weakness in the hands. No changes to his mentation. No chest pain, shortness of breath, palpitations, nausea, or vomiting. PAST MEDICAL HISTORY:  As mentioned above. HOME MEDICATIONS:  Clonidine, carvedilol, amlodipine, isosorbide, Protonix. SOCIAL HISTORY:  Lives at home and smokes about 10 cigarettes per day. Drinks alcohol occasionally. REVIEW OF SYSTEMS:  Negative except as mentioned in the HPI. PHYSICAL EXAMINATION:  GENERAL:  Upon examination, the patient is alert, fully oriented, answers all questions and follows all commands. VITAL SIGNS:  Blood pressure 135/82, temperature 98.9, pulse is 76. NEUROLOGIC:  Speech is clear. Comprehension is normal.  Pupils are equal, round, reactive. Extraocular movements are full. Face is symmetric. Tongue is midline. Hearing is baseline. Muscle tone and bulk normal.  Strength normal in all extremities. DTRs 2/2, symmetric. Sensation intact. Gait steady. HEART:  Regular rate and rhythm. CHEST:  Clear. ABDOMEN:  Soft, nontender.   Positive bowel sounds. EXTREMITIES:  No edema. LABORATORY DATA:  CBC is unremarkable. Chemistries:  Sodium 134, potassium 3.3, BUN 19, creatinine 1.31.  CT scan of the brain did not show any acute abnormalities. There is a stable large frontal lipoma when compared to the scan from 2017. ASSESSMENT AND PLAN:  A 32-year-old male who is admitted with three to four-day symptoms of headache, neck pain, numbness in the left upper extremity. Also has cough and shortness of breath and is being ruled out for COVID-19. Differential diagnosis of his neurological symptoms include cervical spondylosis with radiculopathy, migraine headache, or less likely cerebrovascular accident. He also has a large left frontal lipoma, but that has been present for many years. We will need MRI of the brain and cervical spine for clarification once COVID test is negative, and I will follow up. Thank you for this consultation.       Idania Soto MD      [id number]  D:  07/11/2020 16:23  T:  07/11/2020 19:56  JOB #:  [id number]

## 2020-07-11 NOTE — PROGRESS NOTES
Patient is progressing towards goals. Discussed Stroke Book patient, started patient on lovenox for DVT.

## 2020-07-11 NOTE — H&P
[address] [facility] Adult  Hospitalist Group  History and Physical    Primary Care Provider: Wendy Jimenez MD  Date of Service:  7/10/2020    Subjective: Harry Maya is a 48 y.o. male with past medical history of nonischemic cardiomyopathy, hypertension came to the hospital with a chief complaint of headache, left upper extremity numbness and shortness of breath. Patient states that he has left upper extremity numbness and feels that his left upper extremity is weaker since last 4 days. He also complains of a dull left-sided headache radiating to his neck-rates it as 6/10 intensity. He states that he does not take any medications. Patient also complains of shortness of breath for the last 4 days associated with cough. Patient states that he is currently working 2 jobs. States that he had some chills. Constipated for the last 5 days. In the emergency room, CT head showed a previous frontal lipoma. No acute abnormalities. Hospitalist was consulted for admission for further evaluation. Review of Systems:    A comprehensive review of systems was negative except for that written in the History of Present Illness. Past Medical History:   Diagnosis Date    Hypertension       History reviewed. No pertinent surgical history. Prior to Admission medications    Medication Sig Start Date End Date Taking? Authorizing Provider   cloNIDine HCL (CATAPRES) 0.1 mg tablet Take 1 Tab by mouth two (2) times a day. 1/16/20   Lottie Hall MD   carvediloL (COREG) 25 mg tablet Take 1 Tab by mouth two (2) times daily (with meals). 1/16/20   Lottie Hall MD   amLODIPine (NORVASC) 10 mg tablet Take 1 Tab by mouth daily. 1/16/20   Lottie Hall MD   isosorbide mononitrate ER (IMDUR) 30 mg tablet Take 1 Tab by mouth daily. 1/16/20   Lottie Hall MD   aspirin 81 mg chewable tablet Take 1 Tab by mouth daily.  12/13/19   Vanessa Bravo MD   pantoprazole (PROTONIX) 40 mg tablet Take 1 Tab by mouth Daily (before breakfast). Patient taking differently: Take 40 mg by mouth two (2) times a day. 12/13/19   Ilir Frias MD     No Known Allergies   Family History   Problem Relation Age of Onset    Stroke Father     Cancer Sister         SOCIAL HISTORY:  Patient resides at Home. Patient ambulates with independently. Smoking history: Smokes around 10 cigarettes/day  Alcohol history: States that he drinks alcohol occasionally. Objective:       Physical Exam:   Gen:  Well-developed, well-nourished, in no acute distress  HEENT:   PERRL, EOMI,anicteric, no pallor,hearing intact to voice, moist mucous membranes,sinus non tender  Neck:  Supple, without masses, thyroid non-tender  Resp:  No accessory muscle use, clear breath sounds without wheezes rales or rhonchi  Card:  No murmurs, normal S1, S2 without thrills, bruits or peripheral edema  Abd:  Soft, non-tender, non-distended, normoactive bowel sounds are present  Lymph:  No palpable lymph nodes  Musc:  No cyanosis or clubbing  Skin:  No rashes or ulcers, skin turgor is good  Neuro:  cranial nerves grossly intact 2-12, Left upper extremity sensation decreased,sensation otherwise wnl. Muscle strength LE 5/5, LUE 4+/5 ,RUE 5/5  Psych:  Alert with good insight. Oriented to person, place, and time         ECG: Normal sinus rhythm, left ventricular hypertrophy. Data Review: All diagnostic labs and studies have been reviewed. Xr Chest Pa Lat    Result Date: 7/10/2020  Impression: No acute process or change compared to the prior exam.     Ct Head Wo Cont    Result Date: 7/10/2020  IMPRESSION: No acute abnormality. Stable large frontal lipoma.          Assessment:     #Headache, left upper extremity numbness/weakness:  -Patient has history of hypertension, current smoker-he has risk factors for stroke-we will obtain MRI of the brain for further evaluation.  -Left upper extremity weakness/numbness Could also be related to cervical radiculopathy-MRI C-spine for further evaluation.  -Headache could be due to migraine . PRN Tylenol and Fioricet for headache. He received Benadryl and Reglan in the emergency room. #Shortness of breath/cough:  -Will need to exclude any viral etiology. Check COVID-19 and respiratory viral PCR. -No obvious evidence of infiltrates on chest x-ray.  -He could have underlying COPD due to chronic tobacco use. No wheezing on exam.    #Hypertension:  -Resume clonidine. Will add other medications based on his blood pressure. #History of nonischemic cardiomyopathy:  -Coronary angiogram in December 2019 was within normal limits  -Slightly decreased EF. He does not appear to be volume overloaded. #Chronic tobacco abuse:  -Smoking cessation encouraged    #Left frontal lipoma-outpatient follow-up for surgical excision.     #CKD stage 2:  -likely due to hypertension      Signed By: Pierce Blankenship MD     July 10, 2020 Strong peripheral pulses

## 2020-07-11 NOTE — PROGRESS NOTES
TRANSFER - IN REPORT:    Verbal report received from Tori RN(name) on Silverio John  being received from ED(unit) for routine progression of care      Report consisted of patients Situation, Background, Assessment and   Recommendations(SBAR). Information from the following report(s) SBAR, Kardex, Intake/Output, Recent Results and Cardiac Rhythm NSR was reviewed with the receiving nurse. Opportunity for questions and clarification was provided. Assessment completed upon patients arrival to unit and care assumed.      Patient refused skin check

## 2020-07-11 NOTE — PROGRESS NOTES
Hospitalist Progress Note  Cristobal Stark MD  Answering service: [phone] from in house phone      Date of Service:  2020  NAME:  Silverio John  :  1965  MRN:  [id number]    Admission Summary:   54M p/w sob, LUE paresthesia, headaches. Interval history / Subjective:   Patient seen and examined at bedside, Rapid response call was made this morning for Chest pain, middle of his chest. Started 3 hours ago, EKG from yesterday normal, no radiation, no association, non tender on area. Had same pain several times before, at times noted relation to food. Not severe. Assessment & Plan:     #Headache, LUE numbness: r/o CVA, vs Cervical radiculopathy vs migrain  - PRN Tylenol and Fioricet for headache. CT head: NAD, MRI head/C-spine pending, Neuro cs pending  - check LDL, A1c recently 6.1. control BP. #. Chest pain: doesn't sound cardiac, possible Reflux related  - recent negative LHC. EKG unchanged- check serial trops. Antacids prn, monitor    #Shortness of breath/cough:  - Check COVID-19 and respiratory viral PCR. -No obvious evidence of infiltrates on chest x-ray.  -He could have underlying COPD due to chronic tobacco use. No wheezing on exam.     #Hypertension:-Resume clonidine. Monitor  #History of nonischemic cardiomyopathy:  -Coronary angiogram in 2019 was within normal limits  -Slightly decreased EF. He does not appear to be volume overloaded.     #Chronic tobacco abuse:-Smoking cessation encouraged  #Left frontal lipoma-outpatient follow-up for surgical excision.   #CKD stage 2:-likely due to hypertension    Code status: Full  DVT prophylaxis: Lovenox  Care Plan discussed with: Patient/Family and Nurse  Disposition: TBD     Hospital Problems  Date Reviewed: 3/4/2020          Codes Class Noted POA    Arm paresthesia, left ICD-10-CM: R20.2  ICD-9-CM: 782.0  7/10/2020 Unknown        Intractable headache ICD-10-CM: R51  ICD-9-CM: 784.0  7/10/2020 Unknown            Review of Systems:   Pertinent items are mentioned in interval history. Vital Signs:    Last 24hrs VS reviewed since prior progress note. Most recent are:  Visit Vitals  BP (!) 166/108 (BP 1 Location: Right arm, BP Patient Position: At rest)   Pulse 75   Temp 98.3 °F (36.8 °C)   Resp 18   Wt 96.6 kg (212 lb 15.4 oz)   SpO2 96%   BMI 29.70 kg/m²         Intake/Output Summary (Last 24 hours) at 7/11/2020 0919  Last data filed at 7/11/2020 0400  Gross per 24 hour   Intake 480 ml   Output    Net 480 ml        Physical Examination:   General:  Alert, oriented, No acute distress  Card:  S1, S2 without murmurs, good peripheral perfusion  Resp:  No accessory muscle use, Good AE, no wheezes, no rhonchi  Abd:  Soft, non-tender, non-distended  Extremities:  No cyanosis or clubbing, no significant edema  Neuro:  Grossly normal, no focal neuro deficits, follows commands   Psych:  Good insight, AAOx3, not agitated. Data Review:    Review and/or order of clinical lab test  Review and/or order of tests in the radiology section of CPT  Review and/or order of tests in the medicine section of CPT  Labs:     Recent Labs     07/11/20  0221 07/10/20  1411   WBC 10.2 14.7*   HGB 12.4 12.6   HCT 38.8 39.2    312     Recent Labs     07/11/20  0221 07/10/20  1411   * 135*   K 3.3* 3.7    105   CO2 23 24   BUN 19 17   CREA 1.31* 1.41*   * 99   CA 9.0 9.0     Recent Labs     07/10/20  1411   ALT 23   AP 80   TBILI 0.4   TP 8.5*   ALB 3.0*   GLOB 5.5*     No results for input(s): INR, PTP, APTT, INREXT in the last 72 hours. No results for input(s): FE, TIBC, PSAT, FERR in the last 72 hours. No results found for: FOL, RBCF   No results for input(s): PH, PCO2, PO2 in the last 72 hours.   Recent Labs     07/10/20  1411   TROIQ <0.05     Lab Results   Component Value Date/Time    Cholesterol, total 166 12/09/2019 06:13 AM    HDL Cholesterol 47 12/09/2019 06:13 AM LDL, calculated 99.6 12/09/2019 06:13 AM    Triglyceride 97 12/09/2019 06:13 AM    CHOL/HDL Ratio 3.5 12/09/2019 06:13 AM     No results found for: [facility]  Lab Results   Component Value Date/Time    Color DARK YELLOW 07/11/2020 06:11 AM    Appearance CLEAR 07/11/2020 06:11 AM    Specific gravity 1.029 07/11/2020 06:11 AM    pH (UA) 5.5 07/11/2020 06:11 AM    Protein 100 (A) 07/11/2020 06:11 AM    Glucose Negative 07/11/2020 06:11 AM    Ketone Negative 07/11/2020 06:11 AM    Bilirubin Negative 07/11/2020 06:11 AM    Urobilinogen 1.0 07/11/2020 06:11 AM    Nitrites Negative 07/11/2020 06:11 AM    Leukocyte Esterase Negative 07/11/2020 06:11 AM    Epithelial cells FEW 07/11/2020 06:11 AM    Bacteria Negative 07/11/2020 06:11 AM    WBC 0-4 07/11/2020 06:11 AM    RBC 0-5 07/11/2020 06:11 AM     Medications Reviewed:     Current Facility-Administered Medications   Medication Dose Route Frequency    acetaminophen (TYLENOL) tablet 650 mg  650 mg Oral Q4H PRN    isosorbide mononitrate ER (IMDUR) tablet 30 mg  30 mg Oral DAILY    cloNIDine HCL (CATAPRES) tablet 0.1 mg  0.1 mg Oral BID    butalbital-acetaminophen-caffeine (FIORICET, ESGIC) -40 mg per tablet 1 Tab  1 Tab Oral Q8H PRN   ______________________________________________________________________  EXPECTED LENGTH OF STAY: - - -  ACTUAL LENGTH OF STAY:          0               Maura Ramos MD

## 2020-07-11 NOTE — PROGRESS NOTES
Problem: Falls - Risk of  Goal: *Absence of Falls  Description: Document Odette Vazquez Fall Risk and appropriate interventions in the flowsheet.   Outcome: Progressing Towards Goal  Note: Fall Risk Interventions:     Medication Interventions: Patient to call before getting OOB, Teach patient to arise slowly

## 2020-07-12 ENCOUNTER — APPOINTMENT (OUTPATIENT)
Dept: MRI IMAGING | Age: 55
End: 2020-07-12
Attending: HOSPITALIST
Payer: OTHER GOVERNMENT

## 2020-07-12 VITALS
HEART RATE: 76 BPM | DIASTOLIC BLOOD PRESSURE: 92 MMHG | BODY MASS INDEX: 32.32 KG/M2 | SYSTOLIC BLOOD PRESSURE: 167 MMHG | WEIGHT: 231.7 LBS | RESPIRATION RATE: 18 BRPM | TEMPERATURE: 97.8 F | OXYGEN SATURATION: 100 %

## 2020-07-12 PROBLEM — M54.12 CERVICAL RADICULOPATHY: Status: ACTIVE | Noted: 2020-07-12

## 2020-07-12 LAB
CHOLEST SERPL-MCNC: 121 MG/DL
HDLC SERPL-MCNC: 31 MG/DL
HDLC SERPL: 3.9 {RATIO} (ref 0–5)
LDLC SERPL CALC-MCNC: 77.8 MG/DL (ref 0–100)
LIPID PROFILE,FLP: NORMAL
SARS-COV-2, COV2: NOT DETECTED
SOURCE, COVRS: NORMAL
SPECIMEN SOURCE, FCOV2M: NORMAL
TRIGL SERPL-MCNC: 61 MG/DL (ref ?–150)
TROPONIN I SERPL-MCNC: <0.05 NG/ML
VLDLC SERPL CALC-MCNC: 12.2 MG/DL

## 2020-07-12 PROCEDURE — 36415 COLL VENOUS BLD VENIPUNCTURE: CPT

## 2020-07-12 PROCEDURE — 96372 THER/PROPH/DIAG INJ SC/IM: CPT

## 2020-07-12 PROCEDURE — 72141 MRI NECK SPINE W/O DYE: CPT

## 2020-07-12 PROCEDURE — 74011250636 HC RX REV CODE- 250/636: Performed by: INTERNAL MEDICINE

## 2020-07-12 PROCEDURE — 74011250637 HC RX REV CODE- 250/637: Performed by: HOSPITALIST

## 2020-07-12 PROCEDURE — 99218 HC RM OBSERVATION: CPT

## 2020-07-12 PROCEDURE — 80061 LIPID PANEL: CPT

## 2020-07-12 PROCEDURE — 74011250637 HC RX REV CODE- 250/637: Performed by: INTERNAL MEDICINE

## 2020-07-12 PROCEDURE — 84484 ASSAY OF TROPONIN QUANT: CPT

## 2020-07-12 PROCEDURE — 70551 MRI BRAIN STEM W/O DYE: CPT

## 2020-07-12 RX ORDER — PANTOPRAZOLE SODIUM 40 MG/1
40 TABLET, DELAYED RELEASE ORAL
Status: CANCELLED | OUTPATIENT
Start: 2020-07-12

## 2020-07-12 RX ORDER — AMLODIPINE BESYLATE 5 MG/1
10 TABLET ORAL DAILY
Status: CANCELLED | OUTPATIENT
Start: 2020-07-12

## 2020-07-12 RX ORDER — CARVEDILOL 12.5 MG/1
25 TABLET ORAL 2 TIMES DAILY WITH MEALS
Status: CANCELLED | OUTPATIENT
Start: 2020-07-12

## 2020-07-12 RX ADMIN — CALCIUM CARBONATE (ANTACID) CHEW TAB 500 MG 200 MG: 500 CHEW TAB at 08:30

## 2020-07-12 RX ADMIN — CALCIUM CARBONATE (ANTACID) CHEW TAB 500 MG 200 MG: 500 CHEW TAB at 11:21

## 2020-07-12 RX ADMIN — ISOSORBIDE MONONITRATE 30 MG: 30 TABLET ORAL at 08:30

## 2020-07-12 RX ADMIN — ENOXAPARIN SODIUM 40 MG: 40 INJECTION SUBCUTANEOUS at 08:29

## 2020-07-12 RX ADMIN — CLONIDINE HYDROCHLORIDE 0.1 MG: 0.1 TABLET ORAL at 08:32

## 2020-07-12 RX ADMIN — ACETAMINOPHEN 650 MG: 325 TABLET ORAL at 11:24

## 2020-07-12 NOTE — PROGRESS NOTES
Bedside and Verbal shift change report given to Lian Faye RN (oncoming nurse) by Baldo Aguillon RN (offgoing nurse). Report included the following information SBAR, Kardex, Intake/Output, MAR, Recent Results, Med Rec Status, Cardiac Rhythm NSR, Alarm Parameters  and Dual Neuro Assessment.

## 2020-07-12 NOTE — PROGRESS NOTES
Problem: Falls - Risk of  Goal: *Absence of Falls  Description: Document Koko Wilkins Fall Risk and appropriate interventions in the flowsheet.   Outcome: Progressing Towards Goal  Note: Fall Risk Interventions:            Medication Interventions: Evaluate medications/consider consulting pharmacy, Patient to call before getting OOB, Teach patient to arise slowly                   Problem: TIA/CVA Stroke: 0-24 hours  Goal: Activity/Safety  Outcome: Progressing Towards Goal  Goal: Consults, if ordered  Outcome: Progressing Towards Goal  Goal: Diagnostic Test/Procedures  Outcome: Progressing Towards Goal  Goal: Nutrition/Diet  Outcome: Progressing Towards Goal  Goal: Discharge Planning  Outcome: Progressing Towards Goal  Goal: Medications  Outcome: Progressing Towards Goal  Goal: Respiratory  Outcome: Progressing Towards Goal  Goal: Treatments/Interventions/Procedures  Outcome: Progressing Towards Goal  Goal: Minimize risk of bleeding post-thrombolytic infusion  Outcome: Progressing Towards Goal  Goal: Monitor for complications post-thrombolytic infusion  Outcome: Progressing Towards Goal  Goal: Psychosocial  Outcome: Progressing Towards Goal  Goal: *Hemodynamically stable  Outcome: Progressing Towards Goal  Goal: *Neurologically stable  Description: Absence of additional neurological deficits    Outcome: Progressing Towards Goal  Goal: *Verbalizes anxiety and depression are reduced or absent  Outcome: Progressing Towards Goal  Goal: *Absence of Signs of Aspiration on Current Diet  Outcome: Progressing Towards Goal  Goal: *Absence of deep venous thrombosis signs and symptoms(Stroke Metric)  Outcome: Progressing Towards Goal  Goal: *Ability to perform ADLs and demonstrates progressive mobility and function  Outcome: Progressing Towards Goal  Goal: *Stroke education started(Stroke Metric)  Outcome: Progressing Towards Goal  Goal: *Dysphagia screen performed(Stroke Metric)  Outcome: Progressing Towards Goal  Goal: *Rehab consulted(Stroke Metric)  Outcome: Progressing Towards Goal

## 2020-07-12 NOTE — DISCHARGE INSTRUCTIONS
Discharge Instructions       PATIENT ID: Harvinder Chong  MRN: [id number]   YOB: 1965    DATE OF ADMISSION: 7/10/2020  2:07 PM    DATE OF DISCHARGE: 7/12/2020    PRIMARY CARE PROVIDER: Jaydon Childs MD     ATTENDING PHYSICIAN: Hilario Funk MD  DISCHARGING PROVIDER: Michael Eddy MD    To contact this individual call [phone] and ask the  to page. If unavailable ask to be transferred the Adult Hospitalist Department. DISCHARGE DIAGNOSES suspected CVA- ruled out. Possible cervical radiculopathy    CONSULTATIONS: IP CONSULT TO HOSPITALIST  IP CONSULT TO NEUROLOGY    PROCEDURES/SURGERIES: * No surgery found *    FOLLOW UP APPOINTMENTS:   Follow-up Information     Follow up With Specialties Details Why Contact Info    Jaydon Childs MD [address]  [phone]      Jaydon Childs MD Family Practice In 1 week  [address] [facility] [place] [address]      Kaila Alfaro MD Neurology In 4 weeks  [address] [place] [address]  [phone]             ADDITIONAL CARE RECOMMENDATIONS: follow up with PCP and Neurology- for conductive nerve studies op    DIET: Resume previous diet    DISCHARGE MEDICATIONS:  None new    · It is important that you take the medication exactly as they are prescribed. · Keep your medication in the bottles provided by the pharmacist and keep a list of the medication names, dosages, and times to be taken in your wallet. · Do not take other medications without consulting your doctor. NOTIFY YOUR PHYSICIAN FOR ANY OF THE FOLLOWING:   Fever over 101 degrees for 24 hours. Chest pain, shortness of breath, fever, chills, nausea, vomiting, diarrhea, change in mentation, falling, weakness, bleeding. Severe pain or pain not relieved by medications. Or, any other signs or symptoms that you may have questions about.   It was our pleasure to help take care of you and we hope you get well very soon.     DISPOSITION:    Home With:   OT  PT  HH  RN       SNF/Inpatient Rehab/LTAC   x Independent/assisted living    Hospice    Other:     CDMP Checked:   Yes x     PROBLEM LIST Updated:  Yes x     Signed:   Julia Valentino MD  7/12/2020  3:24 PM

## 2020-07-12 NOTE — DISCHARGE SUMMARY
Discharge Summary       PATIENT ID: Mery Ruelas  MRN: [id number]   YOB: 1965    DATE OF ADMISSION: 7/10/2020  2:07 PM    DATE OF DISCHARGE: 7/12/2020   PRIMARY CARE PROVIDER: Julissa Perkins MD     ATTENDING PHYSICIAN: Jean Marie Christian MD  DISCHARGING PROVIDER: Jean Marie Christian MD    To contact this individual call [phone] and ask the  to page. If unavailable ask to be transferred the Adult Hospitalist Department. CONSULTATIONS: IP CONSULT TO HOSPITALIST  IP CONSULT TO NEUROLOGY    PROCEDURES/SURGERIES: * No surgery found *    ADMITTING DIAGNOSES & HOSPITAL COURSE:   Admitted with L arm numbness and mild transient weakness, evaluated and CVA ruled out. Neurology suspect cervical radiculopathy. Will need f/u op with neuro for further studies and treatment. Risk of Re-Admission: mod  DISCHARGE DIAGNOSES / PLAN:      #Headache, LUE numbness: CVA ruled out with negative MRI brain,  suspected Cervical radiculopathy, neurology evaluated and reviewed MRI C-spine and mri brain, will f/u op  - PRN Tylenol and Fioricet for headache. CT head: NAD  - LDL 78, A1c recently 6.1. control BP. Clinically stable, mild numbesss only in LUE on dc.     #. Chest pain: doesn't sound cardiac, possible Reflux related  - recent negative LHC. EKG unchanged- serial trops -ve. Antacids prn, monitor     #Shortness of breath/cough:- Check COVID-19 -ve. respiratory viral screen -ve. -No obvious evidence of infiltrates on chest x-ray.  -He could have underlying COPD due to chronic tobacco use.  No wheezing on exam.     #Hypertension:-Resume home meds. Monitor  #History of nonischemic cardiomyopathy:  -Coronary angiogram in December 2019 was within normal limits  -Slightly decreased EF.  He does not appear to be volume overloaded.     #Chronic tobacco abuse:-Smoking cessation encouraged  #Left frontal lipoma-outpatient follow-up for surgical excision.   #CKD stage 2:-likely due to hypertension     FOLLOW UP APPOINTMENTS:    Follow-up Information     Follow up With Specialties Details Why Contact Info    Colonel Cl MD [address]  [phone]      Colonel Cl MD Family Practice In 1 week  [address] [facility] [place] [place] [address]      Mauricio Tenorio MD Neurology In 4 weeks  [address] [facility]  [phone]           ADDITIONAL CARE RECOMMENDATIONS:  Follow up with PCP and Neurology    DIET: Resume previous diet    ACTIVITY: Activity as tolerated    DISCHARGE MEDICATIONS:  Current Discharge Medication List      CONTINUE these medications which have NOT CHANGED    Details   cloNIDine HCL (CATAPRES) 0.1 mg tablet Take 1 Tab by mouth two (2) times a day. Qty: 180 Tab, Refills: 1    Associated Diagnoses: Elevated blood pressure reading      carvediloL (COREG) 25 mg tablet Take 1 Tab by mouth two (2) times daily (with meals). Qty: 180 Tab, Refills: 1    Associated Diagnoses: Elevated blood pressure reading      amLODIPine (NORVASC) 10 mg tablet Take 1 Tab by mouth daily. Qty: 90 Tab, Refills: 1    Associated Diagnoses: Elevated blood pressure reading      isosorbide mononitrate ER (IMDUR) 30 mg tablet Take 1 Tab by mouth daily. Qty: 90 Tab, Refills: 1    Associated Diagnoses: Elevated blood pressure reading      aspirin 81 mg chewable tablet Take 1 Tab by mouth daily. Qty: 90 Tab, Refills: 0      pantoprazole (PROTONIX) 40 mg tablet Take 1 Tab by mouth Daily (before breakfast). Qty: 30 Tab, Refills: 0           NOTIFY YOUR PHYSICIAN FOR ANY OF THE FOLLOWING:   Fever over 101 degrees for 24 hours. Chest pain, shortness of breath, fever, chills, nausea, vomiting, diarrhea, change in mentation, falling, weakness, bleeding. Severe pain or pain not relieved by medications. Or, any other signs or symptoms that you may have questions about.     DISPOSITION:    Home With:   OT  PT  HH  RN       Long term SNF/Inpatient Rehab   x Independent/assisted living    Hospice    Other:     PATIENT CONDITION AT DISCHARGE:     Functional status    Poor     Deconditioned     Independent      Cognition     Lucid     Forgetful     Dementia      Catheters/lines (plus indication)    [place]     PICC     PEG     None      Code status     Full code     DNR      PHYSICAL EXAMINATION AT DISCHARGE:  Patient seen and examined at bedside, Condition stable, explained discharge and follow up plans. BP (!) 167/92 (BP 1 Location: Right arm, BP Patient Position: At rest)   Pulse 76   Temp 97.8 °F (36.6 °C)   Resp 18   Wt 105.1 kg (231 lb 11.3 oz)   SpO2 100%   BMI 32.32 kg/m²   General:  Alert, oriented, No acute distress. Large frontal subcutaneous lipoma  Resp:  No accessory muscle use, Good AE. Neuro:  Grossly normal, no focal neuro deficits, follows commands     CHRONIC MEDICAL DIAGNOSES:  Problem List as of 7/12/2020 Date Reviewed: 3/4/2020          Codes Class Noted - Resolved    Cervical radiculopathy ICD-10-CM: M54.12  ICD-9-CM: 723.4  7/12/2020 - Present        Arm paresthesia, left ICD-10-CM: R20.2  ICD-9-CM: 782.0  7/10/2020 - Present        Intractable headache ICD-10-CM: R51  ICD-9-CM: 784.0  7/10/2020 - Present        Dilated cardiomyopathy ([facility] [address] ICD-10-CM: I42.0  ICD-9-CM: 425.4  1/6/2020 - Present        NSTEMI (non-ST elevated myocardial infarction) ([facility] [address] ICD-10-CM: I21.4  ICD-9-CM: 410.70  12/9/2019 - Present        HTN (hypertension) ICD-10-CM: I10  ICD-9-CM: 401.9  9/1/2016 - Present              36 minutes were spent with the patient on counseling and coordination of care.     Signed:   Julia Valentino MD  7/12/2020  3:25 PM

## 2020-07-12 NOTE — ROUTINE PROCESS
Bedside and Verbal shift change report given to Ernesto Alexandre (oncoming nurse) by Katarina Amos RN (offgoing nurse). Report included the following information SBAR, Kardex, Intake/Output, MAR, Recent Results and Dual Neuro Assessment.  NSR

## 2020-07-12 NOTE — PROGRESS NOTES
I have reviewed discharge instructions with the patient. The patient verbalized understanding.  [facility] friend [facility]

## 2020-07-12 NOTE — PROGRESS NOTES
Hospitalist Progress Note  Joanna Cordero MD  Answering service: [phone] from in house phone      Date of Service:  2020  NAME:  Santo Mead  :  1965  MRN:  [id number]    Admission Summary:   54M p/w sob, LUE paresthesia, headaches. Interval history / Subjective:   Patient seen and examined at bedside, feels well, no cp, no sob. Still has some numbness in LUE. COVID still pending then will need MRIs and neuro re-eval     Assessment & Plan:     #Headache, LUE numbness: r/o CVA, vs Cervical radiculopathy vs migrain  - PRN Tylenol and Fioricet for headache. CT head: NAD, MRI head/C-spine pending, Neuro cs pending  - LDL 78, A1c recently 6.1. control BP. #. Chest pain: doesn't sound cardiac, possible Reflux related  - recent negative LHC. EKG unchanged- serial trops -ve. Antacids prn, monitor    #Shortness of breath/cough:- Check COVID-19 pwnding. respiratory viral screen -ve. -No obvious evidence of infiltrates on chest x-ray.  -He could have underlying COPD due to chronic tobacco use. No wheezing on exam.     #Hypertension:-Resume home meds. Monitor  #History of nonischemic cardiomyopathy:  -Coronary angiogram in 2019 was within normal limits  -Slightly decreased EF. He does not appear to be volume overloaded.     #Chronic tobacco abuse:-Smoking cessation encouraged  #Left frontal lipoma-outpatient follow-up for surgical excision. #CKD stage 2:-likely due to hypertension    Code status: Full  DVT prophylaxis: Lovenox  Care Plan discussed with: Patient/Family and Nurse  Disposition: TBD     Hospital Problems  Date Reviewed: 3/4/2020          Codes Class Noted POA    Arm paresthesia, left ICD-10-CM: R20.2  ICD-9-CM: 782.0  7/10/2020 Unknown        Intractable headache ICD-10-CM: R51  ICD-9-CM: 784.0  7/10/2020 Unknown            Review of Systems:   Pertinent items are mentioned in interval history.     Vital Signs:    Last 24hrs VS reviewed since prior progress note. Most recent are:  Visit Vitals  BP (!) 168/104   Pulse 86   Temp 98.6 °F (37 °C)   Resp 18   Wt 105.1 kg (231 lb 11.3 oz)   SpO2 96%   BMI 32.32 kg/m²       No intake or output data in the 24 hours ending 07/12/20 0854     Physical Examination:   General:  Alert, oriented, No acute distress  Card:  S1, S2 without murmurs, good peripheral perfusion  Resp:  No accessory muscle use, Good AE, no wheezes, no rhonchi  Abd:  Soft, non-tender, non-distended  Extremities:  No cyanosis or clubbing, no significant edema  Neuro:  Grossly normal, no focal neuro deficits, follows commands   Psych:  Good insight, AAOx3, not agitated. Data Review:    Review and/or order of clinical lab test  Review and/or order of tests in the radiology section of CPT  Review and/or order of tests in the medicine section of CPT  Labs:     Recent Labs     07/11/20  0221 07/10/20  1411   WBC 10.2 14.7*   HGB 12.4 12.6   HCT 38.8 39.2    312     Recent Labs     07/11/20  0221 07/10/20  1411   * 135*   K 3.3* 3.7    105   CO2 23 24   BUN 19 17   CREA 1.31* 1.41*   * 99   CA 9.0 9.0     Recent Labs     07/11/20  0908 07/10/20  1411   ALT 26 23   AP 77 80   TBILI 0.3 0.4   TP 8.1 8.5*   ALB 2.7* 3.0*   GLOB 5.4* 5.5*     No results for input(s): INR, PTP, APTT, INREXT, INREXT in the last 72 hours. No results for input(s): FE, TIBC, PSAT, FERR in the last 72 hours. No results found for: FOL, RBCF   No results for input(s): PH, PCO2, PO2 in the last 72 hours.   Recent Labs     07/12/20  0141 07/11/20  0908 07/10/20  1411   TROIQ <0.05 <0.05 <0.05     Lab Results   Component Value Date/Time    Cholesterol, total 121 07/12/2020 01:42 AM    HDL Cholesterol 31 07/12/2020 01:42 AM    LDL, calculated 77.8 07/12/2020 01:42 AM    Triglyceride 61 07/12/2020 01:42 AM    CHOL/HDL Ratio 3.9 07/12/2020 01:42 AM     No results found for: [facility]  Lab Results   Component Value Date/Time    Color DARK YELLOW 07/11/2020 06:11 AM    Appearance CLEAR 07/11/2020 06:11 AM    Specific gravity 1.029 07/11/2020 06:11 AM    pH (UA) 5.5 07/11/2020 06:11 AM    Protein 100 (A) 07/11/2020 06:11 AM    Glucose Negative 07/11/2020 06:11 AM    Ketone Negative 07/11/2020 06:11 AM    Bilirubin Negative 07/11/2020 06:11 AM    Urobilinogen 1.0 07/11/2020 06:11 AM    Nitrites Negative 07/11/2020 06:11 AM    Leukocyte Esterase Negative 07/11/2020 06:11 AM    Epithelial cells FEW 07/11/2020 06:11 AM    Bacteria Negative 07/11/2020 06:11 AM    WBC 0-4 07/11/2020 06:11 AM    RBC 0-5 07/11/2020 06:11 AM     Medications Reviewed:     Current Facility-Administered Medications   Medication Dose Route Frequency    enoxaparin (LOVENOX) injection 40 mg  40 mg SubCUTAneous DAILY    calcium carbonate (TUMS) chewable tablet 200 mg [elemental]  200 mg Oral TID WITH MEALS    acetaminophen (TYLENOL) tablet 650 mg  650 mg Oral Q4H PRN    isosorbide mononitrate ER (IMDUR) tablet 30 mg  30 mg Oral DAILY    cloNIDine HCL (CATAPRES) tablet 0.1 mg  0.1 mg Oral BID    butalbital-acetaminophen-caffeine (FIORICET, ESGIC) -40 mg per tablet 1 Tab  1 Tab Oral Q8H PRN   ______________________________________________________________________  EXPECTED LENGTH OF STAY: - - -  ACTUAL LENGTH OF STAY:          0               Dennis Paredes MD

## 2020-07-12 NOTE — PROGRESS NOTES
SUBJECTIVE  Greg Wilkins is a 48 y.o. male admitted with three to four-day symptoms of headache, neck pain, numbness in the left upper extremity. Headache is better today but paresthesias in the left arm persist.    EXAM  Exam:  Visit Vitals  BP (!) 167/92 (BP 1 Location: Right arm, BP Patient Position: At rest)   Pulse 76   Temp 97.8 °F (36.6 °C)   Resp 18   Wt 105.1 kg (231 lb 11.3 oz)   SpO2 100%   BMI 32.32 kg/m²     Gen:  CV: RRR  Lungs: non labored breathing  Abd: non distending  Neuro: A&O x 3, no dysarthria or aphasia  CN II-XII: PERRL, EOMI, face symmetric, tongue/palate midline  Motor: strength 5/5 all four ext  Sensory: intact to LT  Gait: normal    LABS/ IMAGING  MRI Results (most recent):  Results from Hospital Encounter encounter on 07/10/20   MRI BRAIN WO CONT    Narrative EXAM: MRI BRAIN WO CONT    INDICATION: eval for stroke-left upper extremity paresthesia,weakness    COMPARISON: CT head 7/10/2012. CONTRAST: None. TECHNIQUE:    Multiplanar multisequence acquisition without contrast of the brain. FINDINGS:  The ventricles are normal in size and position. Minimal periventricular white  matter T2 and FLAIR hyperintensity with scattered similar foci in the deep white  matter tracts of both cerebral hemispheres. There is no cerebellar tonsillar  herniation. Expected arterial flow-voids are present. The paranasal sinuses, mastoid air cells, and middle ears are clear. The orbital  contents are within normal limits. Large left frontal subcutaneous lipoma as  previously described. Impression IMPRESSION:     1. No or subacute ischemia or other acute intracranial abnormality. 2. Mild microvascular ischemic and age-related change. 3. Large left frontal lipoma.      MRI images were independently reviewed    MRI of the cervical spine shows degenerative disc and joint disease at C3 through C6 levels with moderate degree of spinal stenosis and neuroforaminal narrowing    [facility] Problems  Date Reviewed: 3/4/2020          Codes Class Noted POA    Cervical radiculopathy ICD-10-CM: M54.12  ICD-9-CM: 723.4  7/12/2020 Unknown        Arm paresthesia, left ICD-10-CM: R20.2  ICD-9-CM: 782.0  7/10/2020 Unknown               PLAN  Suspect he has cervical radiculopathy causing paresthesias in the left arm and possibly cervicogenic headache  Recommend EMG/NCV as outpatient  Outpatient PT  Okay to discharge home from a neurological standpoint    Sandor Hatfield MD

## 2020-07-12 NOTE — PROGRESS NOTES
Problem: TIA/CVA Stroke: Day 2 Until Discharge  Goal: Diagnostic Test/Procedures  Outcome: Progressing Towards Goal   Patient covid negative. Will plan for MRI today when patient is moved to neuro unit.

## 2020-07-16 ENCOUNTER — TELEPHONE (OUTPATIENT)
Dept: CARDIOLOGY CLINIC | Age: 55
End: 2020-07-16

## 2020-07-16 DIAGNOSIS — R03.0 ELEVATED BLOOD PRESSURE READING: ICD-10-CM

## 2020-07-16 RX ORDER — CARVEDILOL 25 MG/1
25 TABLET ORAL 2 TIMES DAILY WITH MEALS
Qty: 180 TAB | Refills: 1 | Status: SHIPPED | OUTPATIENT
Start: 2020-07-16 | End: 2021-03-01 | Stop reason: SDUPTHER

## 2020-07-16 RX ORDER — ISOSORBIDE MONONITRATE 30 MG/1
30 TABLET, EXTENDED RELEASE ORAL DAILY
Qty: 90 TAB | Refills: 1 | Status: SHIPPED | OUTPATIENT
Start: 2020-07-16 | End: 2021-01-07

## 2020-07-16 NOTE — TELEPHONE ENCOUNTER
Pt would like to see if he can have the aspirin prescribed through Dr. Saira Jolly, he does not have a PCP and only sees [facility] doctor at the [facility] when necessary. Pharmacy confirmed.     Call back # [phone]

## 2020-07-16 NOTE — TELEPHONE ENCOUNTER
Hide copied text  Rudolph for details   Pt would like to see if he can have the aspirin prescribed through Dr. Naa Baron, he does not have a PCP and only sees the doctor at the [facility] clinic when necessary. Pharmacy confirmed.

## 2020-07-16 NOTE — TELEPHONE ENCOUNTER
Looks like he had a recent [place]. Discharge summary says asa 81mg per day, he shouldn't need a prescription. Make sure he has called to make fup appt with neurology, dr Lacy Quintanilla.

## 2020-12-21 ENCOUNTER — APPOINTMENT (OUTPATIENT)
Dept: GENERAL RADIOLOGY | Age: 55
DRG: 207 | End: 2020-12-21
Attending: EMERGENCY MEDICINE

## 2020-12-21 ENCOUNTER — APPOINTMENT (OUTPATIENT)
Dept: CT IMAGING | Age: 55
DRG: 207 | End: 2020-12-21
Attending: NURSE PRACTITIONER

## 2020-12-21 ENCOUNTER — HOSPITAL ENCOUNTER (INPATIENT)
Age: 55
LOS: 17 days | Discharge: HOME | DRG: 207 | End: 2021-01-07
Attending: EMERGENCY MEDICINE | Admitting: INTERNAL MEDICINE

## 2020-12-21 DIAGNOSIS — R77.8 TROPONIN I ABOVE REFERENCE RANGE: ICD-10-CM

## 2020-12-21 DIAGNOSIS — F17.200 SMOKING: ICD-10-CM

## 2020-12-21 DIAGNOSIS — J96.00 ACUTE RESPIRATORY FAILURE, UNSPECIFIED WHETHER WITH HYPOXIA OR HYPERCAPNIA (HCC): Primary | ICD-10-CM

## 2020-12-21 PROBLEM — R09.02 HYPOXIA: Status: ACTIVE | Noted: 2020-12-21

## 2020-12-21 LAB
ALBUMIN SERPL-MCNC: 3.1 G/DL (ref 3.5–5)
ALBUMIN/GLOB SERPL: 0.6 {RATIO} (ref 1.1–2.2)
ALP SERPL-CCNC: 114 U/L (ref 45–117)
ALT SERPL-CCNC: 56 U/L (ref 12–78)
AMPHET UR QL SCN: NEGATIVE
ANION GAP SERPL CALC-SCNC: 4 MMOL/L (ref 5–15)
ANION GAP SERPL CALC-SCNC: 7 MMOL/L (ref 5–15)
APPEARANCE UR: CLEAR
ARTERIAL PATENCY WRIST A: YES
AST SERPL-CCNC: 60 U/L (ref 15–37)
BACTERIA URNS QL MICRO: NEGATIVE /HPF
BARBITURATES UR QL SCN: NEGATIVE
BASE DEFICIT BLD-SCNC: 1 MMOL/L
BASOPHILS # BLD: 0 K/UL (ref 0–0.1)
BASOPHILS NFR BLD: 0 % (ref 0–1)
BDY SITE: ABNORMAL
BENZODIAZ UR QL: NEGATIVE
BILIRUB SERPL-MCNC: 0.3 MG/DL (ref 0.2–1)
BILIRUB UR QL: NEGATIVE
BNP SERPL-MCNC: 313 PG/ML
BNP SERPL-MCNC: 436 PG/ML
BUN SERPL-MCNC: 27 MG/DL (ref 6–20)
BUN SERPL-MCNC: 28 MG/DL (ref 6–20)
BUN/CREAT SERPL: 15 (ref 12–20)
BUN/CREAT SERPL: 20 (ref 12–20)
CA-I BLD-SCNC: 1.29 MMOL/L (ref 1.12–1.32)
CALCIUM SERPL-MCNC: 7.3 MG/DL (ref 8.5–10.1)
CALCIUM SERPL-MCNC: 9.4 MG/DL (ref 8.5–10.1)
CANNABINOIDS UR QL SCN: NEGATIVE
CHLORIDE SERPL-SCNC: 103 MMOL/L (ref 97–108)
CHLORIDE SERPL-SCNC: 113 MMOL/L (ref 97–108)
CO2 SERPL-SCNC: 23 MMOL/L (ref 21–32)
CO2 SERPL-SCNC: 26 MMOL/L (ref 21–32)
COCAINE UR QL SCN: NEGATIVE
COLOR UR: ABNORMAL
COMMENT, HOLDF: NORMAL
COVID-19 RAPID TEST, COVR: NOT DETECTED
CREAT SERPL-MCNC: 1.35 MG/DL (ref 0.7–1.3)
CREAT SERPL-MCNC: 1.91 MG/DL (ref 0.7–1.3)
CRP SERPL-MCNC: 0.56 MG/DL (ref 0–0.6)
D DIMER PPP FEU-MCNC: 1.96 MG/L FEU (ref 0–0.65)
DIFFERENTIAL METHOD BLD: ABNORMAL
DRUG SCRN COMMENT,DRGCM: NORMAL
EOSINOPHIL # BLD: 0.2 K/UL (ref 0–0.4)
EOSINOPHIL NFR BLD: 3 % (ref 0–7)
EPITH CASTS URNS QL MICRO: ABNORMAL /LPF
ERYTHROCYTE [DISTWIDTH] IN BLOOD BY AUTOMATED COUNT: 15.1 % (ref 11.5–14.5)
FIBRINOGEN PPP-MCNC: 416 MG/DL (ref 200–475)
GAS FLOW.O2 O2 DELIVERY SYS: ABNORMAL L/MIN
GAS FLOW.O2 SETTING OXYMISER: 30 BPM
GLOBULIN SER CALC-MCNC: 5.6 G/DL (ref 2–4)
GLUCOSE SERPL-MCNC: 313 MG/DL (ref 65–100)
GLUCOSE SERPL-MCNC: 82 MG/DL (ref 65–100)
GLUCOSE UR STRIP.AUTO-MCNC: NEGATIVE MG/DL
GRAN CASTS URNS QL MICRO: ABNORMAL /LPF
HCO3 BLD-SCNC: 27.8 MMOL/L (ref 22–26)
HCT VFR BLD AUTO: 47 % (ref 36.6–50.3)
HGB BLD-MCNC: 14.1 G/DL (ref 12.1–17)
HGB UR QL STRIP: ABNORMAL
IMM GRANULOCYTES # BLD AUTO: 0.1 K/UL (ref 0–0.04)
IMM GRANULOCYTES NFR BLD AUTO: 1 % (ref 0–0.5)
INSPIRATION.DURATION SETTING TIME VENT: 0.66 SEC
KETONES UR QL STRIP.AUTO: NEGATIVE MG/DL
LACTATE SERPL-SCNC: 1.4 MMOL/L (ref 0.4–2)
LEUKOCYTE ESTERASE UR QL STRIP.AUTO: NEGATIVE
LYMPHOCYTES # BLD: 3.6 K/UL (ref 0.8–3.5)
LYMPHOCYTES NFR BLD: 49 % (ref 12–49)
MAGNESIUM SERPL-MCNC: 2.1 MG/DL (ref 1.6–2.4)
MCH RBC QN AUTO: 26.9 PG (ref 26–34)
MCHC RBC AUTO-ENTMCNC: 30 G/DL (ref 30–36.5)
MCV RBC AUTO: 89.5 FL (ref 80–99)
METHADONE UR QL: NEGATIVE
MONOCYTES # BLD: 0.4 K/UL (ref 0–1)
MONOCYTES NFR BLD: 6 % (ref 5–13)
NEUTS SEG # BLD: 3 K/UL (ref 1.8–8)
NEUTS SEG NFR BLD: 41 % (ref 32–75)
NITRITE UR QL STRIP.AUTO: NEGATIVE
NRBC # BLD: 0 K/UL (ref 0–0.01)
NRBC BLD-RTO: 0 PER 100 WBC
O2/TOTAL GAS SETTING VFR VENT: 100 %
OPIATES UR QL: NEGATIVE
PCO2 BLD: 77.6 MMHG (ref 35–45)
PCP UR QL: NEGATIVE
PEEP RESPIRATORY: 12 CMH2O
PH BLD: 7.16 [PH] (ref 7.35–7.45)
PH UR STRIP: 5.5 [PH] (ref 5–8)
PLATELET # BLD AUTO: 329 K/UL (ref 150–400)
PMV BLD AUTO: 9.7 FL (ref 8.9–12.9)
PO2 BLD: 93 MMHG (ref 80–100)
POTASSIUM SERPL-SCNC: 3.5 MMOL/L (ref 3.5–5.1)
POTASSIUM SERPL-SCNC: 4.5 MMOL/L (ref 3.5–5.1)
PRESSURE CONTROL, IPC: YES
PROCALCITONIN SERPL-MCNC: <0.05 NG/ML
PROT SERPL-MCNC: 8.7 G/DL (ref 6.4–8.2)
PROT UR STRIP-MCNC: 300 MG/DL
RBC # BLD AUTO: 5.25 M/UL (ref 4.1–5.7)
RBC #/AREA URNS HPF: ABNORMAL /HPF (ref 0–5)
SAMPLES BEING HELD,HOLD: NORMAL
SAO2 % BLD: 94 % (ref 92–97)
SODIUM SERPL-SCNC: 133 MMOL/L (ref 136–145)
SODIUM SERPL-SCNC: 143 MMOL/L (ref 136–145)
SOURCE, COVRS: NORMAL
SP GR UR REFRACTOMETRY: 1.01 (ref 1–1.03)
SPECIMEN SOURCE, FCOV2M: NORMAL
SPECIMEN TYPE, XMCV1T: NORMAL
SPECIMEN TYPE: ABNORMAL
TROPONIN I SERPL-MCNC: 0.17 NG/ML
TROPONIN I SERPL-MCNC: 0.24 NG/ML
UROBILINOGEN UR QL STRIP.AUTO: 0.2 EU/DL (ref 0.2–1)
VENTILATION MODE VENT: ABNORMAL
WBC # BLD AUTO: 7.4 K/UL (ref 4.1–11.1)
WBC URNS QL MICRO: ABNORMAL /HPF (ref 0–4)

## 2020-12-21 PROCEDURE — 81001 URINALYSIS AUTO W/SCOPE: CPT

## 2020-12-21 PROCEDURE — 74011250636 HC RX REV CODE- 250/636: Performed by: EMERGENCY MEDICINE

## 2020-12-21 PROCEDURE — 84484 ASSAY OF TROPONIN QUANT: CPT

## 2020-12-21 PROCEDURE — 87147 CULTURE TYPE IMMUNOLOGIC: CPT

## 2020-12-21 PROCEDURE — 87449 NOS EACH ORGANISM AG IA: CPT

## 2020-12-21 PROCEDURE — 74011000250 HC RX REV CODE- 250

## 2020-12-21 PROCEDURE — 74011250636 HC RX REV CODE- 250/636

## 2020-12-21 PROCEDURE — 74011000250 HC RX REV CODE- 250: Performed by: EMERGENCY MEDICINE

## 2020-12-21 PROCEDURE — 83605 ASSAY OF LACTIC ACID: CPT

## 2020-12-21 PROCEDURE — 83735 ASSAY OF MAGNESIUM: CPT

## 2020-12-21 PROCEDURE — 87635 SARS-COV-2 COVID-19 AMP PRB: CPT

## 2020-12-21 PROCEDURE — 86140 C-REACTIVE PROTEIN: CPT

## 2020-12-21 PROCEDURE — 96365 THER/PROPH/DIAG IV INF INIT: CPT

## 2020-12-21 PROCEDURE — 74018 RADEX ABDOMEN 1 VIEW: CPT

## 2020-12-21 PROCEDURE — 93005 ELECTROCARDIOGRAM TRACING: CPT

## 2020-12-21 PROCEDURE — 87040 BLOOD CULTURE FOR BACTERIA: CPT

## 2020-12-21 PROCEDURE — 74011250636 HC RX REV CODE- 250/636: Performed by: NURSE PRACTITIONER

## 2020-12-21 PROCEDURE — 85384 FIBRINOGEN ACTIVITY: CPT

## 2020-12-21 PROCEDURE — 74011000258 HC RX REV CODE- 258: Performed by: EMERGENCY MEDICINE

## 2020-12-21 PROCEDURE — 0100U RESPIRATORY PANEL,PCR,NASOPHARYNGEAL: CPT

## 2020-12-21 PROCEDURE — 87070 CULTURE OTHR SPECIMN AEROBIC: CPT

## 2020-12-21 PROCEDURE — 80053 COMPREHEN METABOLIC PANEL: CPT

## 2020-12-21 PROCEDURE — 85025 COMPLETE CBC W/AUTO DIFF WBC: CPT

## 2020-12-21 PROCEDURE — 71045 X-RAY EXAM CHEST 1 VIEW: CPT

## 2020-12-21 PROCEDURE — 94002 VENT MGMT INPAT INIT DAY: CPT

## 2020-12-21 PROCEDURE — 87899 AGENT NOS ASSAY W/OPTIC: CPT

## 2020-12-21 PROCEDURE — 83880 ASSAY OF NATRIURETIC PEPTIDE: CPT

## 2020-12-21 PROCEDURE — 82803 BLOOD GASES ANY COMBINATION: CPT

## 2020-12-21 PROCEDURE — 65270000029 HC RM PRIVATE

## 2020-12-21 PROCEDURE — 36415 COLL VENOUS BLD VENIPUNCTURE: CPT

## 2020-12-21 PROCEDURE — 31500 INSERT EMERGENCY AIRWAY: CPT

## 2020-12-21 PROCEDURE — 99285 EMERGENCY DEPT VISIT HI MDM: CPT

## 2020-12-21 PROCEDURE — 84145 PROCALCITONIN (PCT): CPT

## 2020-12-21 PROCEDURE — 74011000250 HC RX REV CODE- 250: Performed by: NURSE PRACTITIONER

## 2020-12-21 PROCEDURE — 96375 TX/PRO/DX INJ NEW DRUG ADDON: CPT

## 2020-12-21 PROCEDURE — 80307 DRUG TEST PRSMV CHEM ANLYZR: CPT

## 2020-12-21 PROCEDURE — 36600 WITHDRAWAL OF ARTERIAL BLOOD: CPT

## 2020-12-21 PROCEDURE — 51702 INSERT TEMP BLADDER CATH: CPT

## 2020-12-21 PROCEDURE — 77030005513 HC CATH URETH FOL11 MDII -B

## 2020-12-21 PROCEDURE — 85379 FIBRIN DEGRADATION QUANT: CPT

## 2020-12-21 RX ORDER — MIDAZOLAM HYDROCHLORIDE 1 MG/ML
4 INJECTION, SOLUTION INTRAMUSCULAR; INTRAVENOUS ONCE
Status: COMPLETED | OUTPATIENT
Start: 2020-12-21 | End: 2020-12-21

## 2020-12-21 RX ORDER — ROCURONIUM BROMIDE 10 MG/ML
120 INJECTION, SOLUTION INTRAVENOUS
Status: COMPLETED | OUTPATIENT
Start: 2020-12-21 | End: 2020-12-21

## 2020-12-21 RX ORDER — PROPOFOL 10 MG/ML
0-50 VIAL (ML) INTRAVENOUS
Status: DISCONTINUED | OUTPATIENT
Start: 2020-12-21 | End: 2021-01-02

## 2020-12-21 RX ORDER — ETOMIDATE 2 MG/ML
30 INJECTION INTRAVENOUS
Status: COMPLETED | OUTPATIENT
Start: 2020-12-21 | End: 2020-12-21

## 2020-12-21 RX ORDER — POLYETHYLENE GLYCOL 3350 17 G/17G
17 POWDER, FOR SOLUTION ORAL DAILY PRN
Status: DISCONTINUED | OUTPATIENT
Start: 2020-12-21 | End: 2021-01-07 | Stop reason: HOSPADM

## 2020-12-21 RX ORDER — SUCCINYLCHOLINE CHLORIDE 20 MG/ML
INJECTION INTRAMUSCULAR; INTRAVENOUS
Status: DISPENSED
Start: 2020-12-21 | End: 2020-12-22

## 2020-12-21 RX ORDER — FUROSEMIDE 10 MG/ML
20 INJECTION INTRAMUSCULAR; INTRAVENOUS ONCE
Status: COMPLETED | OUTPATIENT
Start: 2020-12-21 | End: 2020-12-21

## 2020-12-21 RX ORDER — DEXAMETHASONE SODIUM PHOSPHATE 10 MG/ML
10 INJECTION INTRAMUSCULAR; INTRAVENOUS ONCE
Status: COMPLETED | OUTPATIENT
Start: 2020-12-21 | End: 2020-12-21

## 2020-12-21 RX ORDER — IPRATROPIUM BROMIDE AND ALBUTEROL SULFATE 2.5; .5 MG/3ML; MG/3ML
3 SOLUTION RESPIRATORY (INHALATION)
Status: DISCONTINUED | OUTPATIENT
Start: 2020-12-21 | End: 2020-12-23

## 2020-12-21 RX ORDER — EPINEPHRINE 1 MG/ML
INJECTION, SOLUTION, CONCENTRATE INTRAVENOUS
Status: DISPENSED
Start: 2020-12-21 | End: 2020-12-22

## 2020-12-21 RX ORDER — ONDANSETRON 2 MG/ML
4 INJECTION INTRAMUSCULAR; INTRAVENOUS
Status: DISCONTINUED | OUTPATIENT
Start: 2020-12-21 | End: 2021-01-07 | Stop reason: HOSPADM

## 2020-12-21 RX ORDER — SODIUM CHLORIDE 0.9 % (FLUSH) 0.9 %
5-40 SYRINGE (ML) INJECTION AS NEEDED
Status: DISCONTINUED | OUTPATIENT
Start: 2020-12-21 | End: 2021-01-07 | Stop reason: HOSPADM

## 2020-12-21 RX ORDER — ENOXAPARIN SODIUM 100 MG/ML
40 INJECTION SUBCUTANEOUS DAILY
Status: DISCONTINUED | OUTPATIENT
Start: 2020-12-22 | End: 2020-12-23

## 2020-12-21 RX ORDER — PROMETHAZINE HYDROCHLORIDE 25 MG/1
12.5 TABLET ORAL
Status: DISCONTINUED | OUTPATIENT
Start: 2020-12-21 | End: 2021-01-07 | Stop reason: HOSPADM

## 2020-12-21 RX ORDER — IBUPROFEN 600 MG/1
600 TABLET ORAL
COMMUNITY
End: 2022-01-05

## 2020-12-21 RX ORDER — PROPOFOL 10 MG/ML
INJECTION, EMULSION INTRAVENOUS
Status: DISPENSED
Start: 2020-12-21 | End: 2020-12-22

## 2020-12-21 RX ORDER — ACETAMINOPHEN 650 MG/1
650 SUPPOSITORY RECTAL
Status: DISCONTINUED | OUTPATIENT
Start: 2020-12-21 | End: 2021-01-07 | Stop reason: HOSPADM

## 2020-12-21 RX ORDER — ALPHA-D-GALACTOSIDASE 400 UNIT
1 TABLET ORAL 2 TIMES DAILY
COMMUNITY
End: 2021-08-04 | Stop reason: ALTCHOICE

## 2020-12-21 RX ORDER — SODIUM CHLORIDE 0.9 % (FLUSH) 0.9 %
5-40 SYRINGE (ML) INJECTION EVERY 8 HOURS
Status: DISCONTINUED | OUTPATIENT
Start: 2020-12-21 | End: 2021-01-07 | Stop reason: HOSPADM

## 2020-12-21 RX ORDER — EPINEPHRINE 0.1 MG/ML
1 INJECTION INTRACARDIAC; INTRAVENOUS
Status: ACTIVE | OUTPATIENT
Start: 2020-12-21 | End: 2020-12-22

## 2020-12-21 RX ORDER — DEXAMETHASONE SODIUM PHOSPHATE 10 MG/ML
INJECTION INTRAMUSCULAR; INTRAVENOUS
Status: DISPENSED
Start: 2020-12-21 | End: 2020-12-22

## 2020-12-21 RX ORDER — ACETAMINOPHEN 325 MG/1
650 TABLET ORAL
Status: DISCONTINUED | OUTPATIENT
Start: 2020-12-21 | End: 2021-01-07 | Stop reason: HOSPADM

## 2020-12-21 RX ORDER — PROPOFOL 10 MG/ML
0-50 VIAL (ML) INTRAVENOUS
Status: COMPLETED | OUTPATIENT
Start: 2020-12-21 | End: 2020-12-21

## 2020-12-21 RX ORDER — KETAMINE HYDROCHLORIDE 50 MG/ML
INJECTION, SOLUTION INTRAMUSCULAR; INTRAVENOUS
Status: DISCONTINUED
Start: 2020-12-21 | End: 2020-12-21 | Stop reason: WASHOUT

## 2020-12-21 RX ORDER — MIDAZOLAM HYDROCHLORIDE 1 MG/ML
INJECTION, SOLUTION INTRAMUSCULAR; INTRAVENOUS
Status: COMPLETED
Start: 2020-12-21 | End: 2020-12-21

## 2020-12-21 RX ORDER — PROPOFOL 10 MG/ML
INJECTION, EMULSION INTRAVENOUS
Status: COMPLETED
Start: 2020-12-21 | End: 2020-12-21

## 2020-12-21 RX ORDER — IBUPROFEN 600 MG/1
200 TABLET ORAL
COMMUNITY
End: 2020-12-21

## 2020-12-21 RX ORDER — ETOMIDATE 2 MG/ML
INJECTION INTRAVENOUS
Status: COMPLETED
Start: 2020-12-21 | End: 2020-12-21

## 2020-12-21 RX ORDER — IPRATROPIUM BROMIDE AND ALBUTEROL SULFATE 2.5; .5 MG/3ML; MG/3ML
3 SOLUTION RESPIRATORY (INHALATION)
Status: ACTIVE | OUTPATIENT
Start: 2020-12-21 | End: 2020-12-22

## 2020-12-21 RX ADMIN — PROPOFOL 30 MCG/KG/MIN: 10 INJECTION, EMULSION INTRAVENOUS at 20:26

## 2020-12-21 RX ADMIN — AZITHROMYCIN MONOHYDRATE 500 MG: 500 INJECTION, POWDER, LYOPHILIZED, FOR SOLUTION INTRAVENOUS at 19:22

## 2020-12-21 RX ADMIN — CEFTRIAXONE SODIUM 1 G: 1 INJECTION, POWDER, FOR SOLUTION INTRAMUSCULAR; INTRAVENOUS at 19:10

## 2020-12-21 RX ADMIN — ETOMIDATE 30 MG: 2 INJECTION INTRAVENOUS at 17:18

## 2020-12-21 RX ADMIN — ROCURONIUM BROMIDE 120 MG: 50 INJECTION, SOLUTION INTRAVENOUS at 17:19

## 2020-12-21 RX ADMIN — MIDAZOLAM 4 MG: 1 INJECTION INTRAMUSCULAR; INTRAVENOUS at 23:01

## 2020-12-21 RX ADMIN — Medication 50 MCG/HR: at 23:14

## 2020-12-21 RX ADMIN — DEXAMETHASONE SODIUM PHOSPHATE 10 MG: 10 INJECTION, SOLUTION INTRAMUSCULAR; INTRAVENOUS at 17:02

## 2020-12-21 RX ADMIN — SODIUM CHLORIDE 5 MG/HR: 9 INJECTION, SOLUTION INTRAVENOUS at 19:17

## 2020-12-21 RX ADMIN — SODIUM CHLORIDE 20 MG: 9 INJECTION INTRAMUSCULAR; INTRAVENOUS; SUBCUTANEOUS at 20:27

## 2020-12-21 RX ADMIN — PROPOFOL 10 MCG/KG/MIN: 10 INJECTION, EMULSION INTRAVENOUS at 17:26

## 2020-12-21 RX ADMIN — FUROSEMIDE 20 MG: 10 INJECTION, SOLUTION INTRAMUSCULAR; INTRAVENOUS at 17:49

## 2020-12-21 RX ADMIN — Medication 10 MCG/KG/MIN: at 17:26

## 2020-12-21 RX ADMIN — PROPOFOL 10 MCG/KG/MIN: 10 INJECTION, EMULSION INTRAVENOUS at 21:08

## 2020-12-21 RX ADMIN — MIDAZOLAM HYDROCHLORIDE 4 MG: 1 INJECTION, SOLUTION INTRAMUSCULAR; INTRAVENOUS at 23:01

## 2020-12-21 NOTE — H&P
[facility]    ICU TEAM History and Physical    Name: Fadumo Vega   : 1965   MRN: [id number]   Date: 2020      Assessment:  1. Acute hypoxic respiratory failure  2. Elevated troponin   3. Malignant hypertension   4. Obesity   5. Nx nonischemic cardiomyopathy   6. Chronic tobacco abuse  7. ru COVID  8. CKD stage 2  9. Left frontal lipoma     ICU Problems:  10. Acute hypoxic respiratory failure-Continue ventilator support, currently on 100% fio2 and 10 peep, will wean as appropriate and tolerate  -Serial ABG, fu in 2 hours  -ECHO  -Sp lasix, strict I and O   -Duonebs now  -Pulm hygiene  -HOB >30  -Propofol and fent for sedation   -WBC normal, send LA , pro sherry, and BNP,       11. Elevated troponin-monitor and trend  -ECHO     12. Malignant hypertension-Cardene drip to keep SBP <160    13. Obesity- wt loss counseling when appropriate   14. Hx nonischemic cardiomyopathy   15. Chronic tobacco abuse-smoking cessation when able   16. ru COVID-pending, inflammatory markers sent and pending   17. CKD stage 2-Cont to monitor and trend, strict I and O, monitor UP   18. Left frontal lipoma- CT scan head wo now  19. Hyperglycemia-SSI   20. Cont broad spectrum abx on rocpehin and azithro     Plan:  Move to ICU  Continue ventiator support   BNP, serial troponin, LA, pro sherry pending   Cardene to keep SBP <160  Propofol and fent for sedation   ECHO and CT head pending      Neuro: S/p intubation medications on board, on propofol drip. Sedated and unable to participate in assessment   Pulm: Ventilator support on 100% FiO2 and 10 Peep, will wean as tolerated   -Serial ABG ordered  -Sp lasix  -FU CXR  -Watch UO   -Cont prop and fent for sedation    - [facility] ventilated patients- aim to keep peak plateau pressure less than or equal to 30cm H2O.   - Titrate FiO2 for goal SPO2> 90%,VAP prevention bundle, head of the bed at 30' all times. - Daily SBT. Supplemental O2 via NC, titrate flow for goal SPO2> 90%.    - Continue bronchodilators, pulmonary hygiene care, Aspiration precautions  -  Duo-Nebs Chlorhexidine   Optimize PEEP/Ventilation/Oxygenation  Head of bed > 30 degrees  Aggressive bronchopulmonary hygiene  Cardiac: Cont cardene drip to keep SBP <106  -ECHO pending   -Sp lasix   -BNP and trop pending, will monitor and trend   Renal: CKD 2, monitor UO and serial BMP   - Granados    ID: Cont broad spectrum abx-on rocephin and azithro, LA and pro sherry pending   - Sepsis bundle per hospital protocol, Blood, Sputum, and Urine cultures drawn and will be followed. - q4h Lactate  - ABX: cont  Heme:  <7 g/dL    MSK:  PT/OT will reassess in am     COVID Treatment:  a. COVID pending, inflammatory labs pending       Discussed Plan of Care/Code Status: Full Code  Appreciate Consultants Input   Discussed Care Plan with Bedside RN  Documentation of Current Medications  Rest of Plan Below:    F - Feeding:  Pending   A - Analgesia: Fentanyl  S - Sedation: Propofol  T - DVT Prophylaxis: SCD's or Sequential Compression Device   H - Head of Bed: > 30 Degrees  U - Ulcer Prophylaxis: Protonix (pantoprazole)   G - Glycemic Control: Insulin  S - Spontaneous Breathing Trial: Pending  B - Bowel Regimen: Docusate (Colace)  I - Indwelling Catheter:   Tubes: ETT  Lines: Peripheral IV  Drains: Granados Catheter  D - De-escalation of Antibiotics: Azithromycin  Ceftriaxone    Subjective:   Progress Note: 12/21/2020      Reason for ICU Admission: Acute hypoxic respiratory failure     HPI: Sherron Hagan is a 51 yo AAM with a PMH significant for nonischemic cardiomyopathy, left frontal lipoma, CKD, obesity, chronic tobacco abuse, who presented to the ER via EMS with co increased SOB X2 weeks. Pt is currently sedated and on the ventilator, unable to provide HPI thereofre obtained via chart and provider. Pt failed bipap while in the ER with continued worsening hypoxia, therefore pt was intubated and placed on the ventilator.  On arrival to ED pt was found to be hypertensive with /100. Patient was then transferred to ICU for further management in care. Overnight Events:   12/21/2020      POD:  * No surgery found *    S/P:       Active Problem List:     Problem List  Date Reviewed: 9/28/2020          Codes Class    Cervical radiculopathy ICD-10-CM: M54.12  ICD-9-CM: 723.4         Arm paresthesia, left ICD-10-CM: R20.2  ICD-9-CM: 782.0         Intractable headache ICD-10-CM: R51.9  ICD-9-CM: 784.0         Dilated cardiomyopathy (HCC) ICD-10-CM: I42.0  ICD-9-CM: 425.4         NSTEMI (non-ST elevated myocardial infarction) ([facility] [address] ICD-10-CM: I21.4  ICD-9-CM: 410.70         HTN (hypertension) ICD-10-CM: I10  ICD-9-CM: 401.9               Past Medical History:      has a past medical history of Hypertension. Past Surgical History:      has no past surgical history on file. Home Medications:     Prior to Admission medications    Medication Sig Start Date End Date Taking? Authorizing Provider   carvediloL (COREG) 25 mg tablet Take 1 Tab by mouth two (2) times daily (with meals). 7/16/20   Lori Sethi MD   isosorbide mononitrate ER (IMDUR) 30 mg tablet Take 1 Tab by mouth daily. 7/16/20   Lori Sethi MD   cloNIDine HCL (CATAPRES) 0.1 mg tablet Take 1 Tab by mouth two (2) times a day. 1/16/20   Lori Sethi MD   amLODIPine (NORVASC) 10 mg tablet Take 1 Tab by mouth daily. 1/16/20   Lori Sethi MD   aspirin 81 mg chewable tablet Take 1 Tab by mouth daily. 12/13/19   Laura Nicole MD   pantoprazole (PROTONIX) 40 mg tablet Take 1 Tab by mouth Daily (before breakfast). Patient taking differently: Take 40 mg by mouth two (2) times a day. 12/13/19   Laura Nicole MD       Allergies/Social/Family History:     No Known Allergies   Social History     Tobacco Use    Smoking status: Current Every Day Smoker     Packs/day: 1.00    Smokeless tobacco: Never Used   Substance Use Topics    Alcohol use:  Yes     Alcohol/week: 5.0 standard drinks     Types: 6 Cans of beer per week     Comment: Occasionally      Family History   Problem Relation Age of Onset    Stroke Father     Cancer Sister        Review of Systems:     Review of systems not obtained due to patient factors. Objective:   Vital Signs:  Visit Vitals  BP (!) 229/138   Pulse (!) 114   Resp 30   Ht 5' 11\" (1.803 m)   Wt 113.4 kg (250 lb)   SpO2 94%   BMI 34.87 kg/m²    O2 Flow Rate (L/min): 15 l/min O2 Device: Ventilator No data recorded. Intake/Output:   No intake or output data in the 24 hours ending 12/21/20 1825    Physical Exam:    General:  Sedated and on the ventilator. No acute distress. Eyes:  Sclera anicteric. Pupils equal, round, reactive to light. Mouth/Throat: Orotracheal tube in place. Neck: Supple. Lungs:   Scattered wheezes and rhonchi TH    Cardiovascular:  Regular rate and rhythm, no murmur, click, rub, or gallop. Abdomen:   Distended, obese +BS< nontender    Extremities: No cyanosis or edema. Skin: No acute rash or lesions. Lymph Nodes: Cervical and supraclavicular normal.   Musculoskeletal:  No swelling or deformity. Lines/Devices:  Intact, no erythema, drainage, or tenderness. Psychiatric: Sedated and appears comfortable on ventilator. LABS AND  DATA: Personally reviewed  Recent Labs     12/21/20  1704   WBC 7.4   HGB 14.1   HCT 47.0        Recent Labs     12/21/20  1704   *   K 4.5      CO2 26   BUN 28*   CREA 1.91*   *   CA 9.4     Recent Labs     12/21/20  1704      TP 8.7*   ALB 3.1*   GLOB 5.6*     No results for input(s): INR, PTP, APTT, INREXT in the last 72 hours. No results for input(s): PHI, PCO2I, PO2I, FIO2I in the last 72 hours.   Recent Labs     12/21/20  1704   TROIQ 0.17*       Hemodynamics:   PAP:   CO:     Wedge:   CI:     CVP:    SVR:       PVR:       Ventilator Settings:  Mode Rate Tidal Volume Pressure FiO2 PEEP   Assist control, Pressure control        100 % 12 cm H20 Peak airway pressure: 38 cm H2O    Minute ventilation: 11.3 l/min        MEDS: Reviewed    Chest X-Ray:  CXR Results  (Last 48 hours)               12/21/20 1741  XR CHEST PORT Final result    Impression:  IMPRESSION:       1. Endotracheal tube in appropriate position. 2. Significant airspace disease in both lung bases, greater on the right. Narrative:  EXAM: XR CHEST PORT       HISTORY: ET tube placement. COMPARISON: None       FINDINGS: Single view(s) of the chest. The endotracheal tube terminates 6 cm   above the shamir. There is significant consolidation in both lung bases, right   greater than left. Air bronchograms are present. The cardiomediastinal   silhouette is unremarkable. The visualized osseous structures are unremarkable. ECHO:  Pending     Multidisciplinary Rounds Completed:  Pending    ABCDEF Bundle/Checklist Completed:  Yes    SPECIAL EQUIPMENT  None    DISPOSITION  Stay in ICU    CRITICAL CARE CONSULTANT NOTE  I had a face to face encounter with the patient, reviewed and interpreted patient data including clinical events, labs, images, vital signs, I/O's, and examined patient. I have discussed the case and the plan and management of the patient's care with the consulting services, the bedside nurses and the respiratory therapist.      NOTE OF PERSONAL INVOLVEMENT IN CARE   This patient has a high probability of imminent, clinically significant deterioration, which requires the highest level of preparedness to intervene urgently. I participated in the decision-making and personally managed or directed the management of the following life and organ supporting interventions that required my frequent assessment to treat or prevent imminent deterioration. I personally spent 60 minutes of critical care time.   This is time spent at this critically ill patient's bedside actively involved in patient care as well as the coordination of care and discussions with the patient's family. This does not include any procedural time which has been billed separately.     Marii Cook NP       [facility]

## 2020-12-21 NOTE — ED TRIAGE NOTES
Pt arrives to department via EMS from work with a CC of SOB. Pt reported to be hypoxic on scene with tripoding. Pt is every day smoker with cough he noticed a couple of days ago. Pt presents to department diaphoretic with dyspnia on non-rebreather.

## 2020-12-21 NOTE — ED PROVIDER NOTES
Please note that this dictation was completed with [employer], the computer voice recognition software.  Quite often unanticipated grammatical, syntax, homophones, and other interpretive errors are inadvertently transcribed by the computer software.  Please disregard these errors.  Please excuse any errors that have escaped final proofreading. 42-year-old male past medical history markable for hypertension and smoking presents to the ER by EMS complaining of increased shortness of breath x2 weeks. EMS gave patient 1 g of magnesium in route stated he failed BiPAP he arrives on nonrebreather satting 88%. Discussed with patient the need for trial of BiPAP he agreed as he did not wish to be intubated unless it was necessary, we switched him to BiPAP which she had tolerated initially sats got up to 100% respiratory rate went from the mid 30s to mid 20s and then patient became noncompliant with BiPAP pulling his mask off saying he could not take it he was choking. Patient then we discussed the situation he agreed with being intubated. He denies recent illnesses said he has has been smoking but none for the past couple days has a swollen spot on his forehead he said from an old prior injury. 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 pt denies HA, vison changes, diff swallowing, CP, SOB, Abd pain, F/Ch, N/V, D/Cons or other current systemic complaints    Social/ PSH reviewed in EMR    EMR Chart Reviewed           Past Medical History:   Diagnosis Date    Hypertension        No past surgical history on file.       Family History:   Problem Relation Age of Onset    Stroke Father     Cancer Sister        Social History     Socioeconomic History    Marital status: SINGLE     Spouse name: Not on file    Number of children: Not on file    Years of education: Not on file    Highest education level: Not on file   Occupational History    Not on file Social Needs    Financial resource strain: Not on file    Food insecurity     Worry: Not on file     Inability: Not on file    Transportation needs     Medical: Not on file     Non-medical: Not on file   Tobacco Use    Smoking status: Current Every Day Smoker     Packs/day: 1.00    Smokeless tobacco: Never Used   Substance and Sexual Activity    Alcohol use: Yes     Alcohol/week: 5.0 standard drinks     Types: 6 Cans of beer per week     Comment: Occasionally    Drug use: No    Sexual activity: Yes     Partners: Female   Lifestyle    Physical activity     Days per week: Not on file     Minutes per session: Not on file    Stress: Not on file   Relationships    Social connections     Talks on phone: Not on file     Gets together: Not on file     Attends Synagogue service: Not on file     Active member of club or organization: Not on file     Attends meetings of clubs or organizations: Not on file     Relationship status: Not on file    Intimate partner violence     Fear of current or ex partner: Not on file     Emotionally abused: Not on file     Physically abused: Not on file     Forced sexual activity: Not on file   Other Topics Concern    Not on file   Social History Narrative    ** Merged History Encounter **              ALLERGIES: Patient has no known allergies. Review of Systems   Unable to perform ROS: Acuity of condition   Constitutional: Positive for diaphoresis. Respiratory: Positive for cough and shortness of breath. Neurological: Positive for speech difficulty. All other systems reviewed and are negative. Vitals:    12/23/20 0900 12/23/20 1000 12/23/20 1036 12/23/20 1121   BP: 99/72 101/69  92/68   Pulse: (!) 53 (!) 53  (!) 54   Resp: 22 22  20   Temp:       SpO2: 97% 97%  94%   Weight:       Height:   5' 11\" (1.803 m)             Physical Exam  Vitals signs and nursing note reviewed. Constitutional:       General: He is not in acute distress.      Appearance: Normal appearance. He is well-developed. He is not ill-appearing, toxic-appearing or diaphoretic. Comments: Uncomfortable appearing, AxOx4, speaking in 1-2 word sentences    Noted hypoxia on NRB/ HTN    Audible gurgles     Very diaphoretic,    HENT:      Head: Normocephalic and atraumatic. Comments: Cn intact    Noted L forehead swelling/ protuberance, 'from an accident years ago';      Right Ear: External ear normal.      Left Ear: External ear normal.      Nose: Nose normal.      Mouth/Throat:      Pharynx: No oropharyngeal exudate. Eyes:      General: No scleral icterus. Right eye: No discharge. Left eye: No discharge. Extraocular Movements: Extraocular movements intact. Conjunctiva/sclera: Conjunctivae normal.      Pupils: Pupils are equal, round, and reactive to light. Neck:      Musculoskeletal: Normal range of motion and neck supple. Cardiovascular:      Rate and Rhythm: Regular rhythm. Tachycardia present. Pulses: Normal pulses. Heart sounds: Normal heart sounds. No murmur. No friction rub. No gallop. Pulmonary:      Effort: Pulmonary effort is normal. No respiratory distress. Breath sounds: No stridor. No wheezing, rhonchi or rales. Chest:      Chest wall: No tenderness. Abdominal:      General: Bowel sounds are normal. There is no distension. Palpations: Abdomen is soft. There is no mass. Tenderness: There is no abdominal tenderness. There is no guarding or rebound. Comments: nttp       Musculoskeletal: Normal range of motion. General: No swelling, tenderness, deformity or signs of injury. Right lower leg: No edema. Left lower leg: No edema. Lymphadenopathy:      Cervical: No cervical adenopathy. Skin:     General: Skin is warm and dry. Capillary Refill: Capillary refill takes less than 2 seconds. Coloration: Skin is not jaundiced. Findings: No bruising, erythema, lesion or rash.    Neurological: General: No focal deficit present. Mental Status: He is alert and oriented to person, place, and time. Cranial Nerves: No cranial nerve deficit. Sensory: No sensory deficit. Coordination: Coordination normal.          MDM  Number of Diagnoses or Management Options  Risk of Complications, Morbidity, and/or Mortality  Presenting problems: high  Diagnostic procedures: high  Management options: high  General comments: 65 min    Patient Progress  Patient progress: improved         Intubation    Date/Time: 12/21/2020 5:40 PM  Performed by: Kusum Hansen MD  Authorized by: Kusum Hansen MD     Consent:     Consent obtained:  Verbal    Consent given by:  Patient    Risks discussed:  Aspiration, bleeding, death, brain injury, dental trauma, hypoxia, pneumothorax and laryngeal injury    Alternatives discussed:  No treatment  Pre-procedure details:     Patient status:  Awake    Mallampati score:  II    Pretreatment medications:  None (etomidate)    Paralytics:  Rocuronium  Procedure details:     Preoxygenation:  Nasal cannula    CPR in progress: no      Intubation method:  Oral    Laryngoscope blade: Mac 4    Tube size (mm):  4.5    Tube type:  Cuffed    Number of attempts:  2    Ventilation between attempts: no      Cricoid pressure: yes      Tube visualized through cords: yes    Placement assessment:     ETT to lip:  27    ETT to teeth:  25    Tube secured with:  ETT moore    Breath sounds:  Absent over the epigastrium and equal    Placement verification: chest rise, condensation, CXR verification, direct visualization, esophageal detector, ETCO2 detector, fiberoptic scope and tube exhalation    Post-procedure details:     Patient tolerance of procedure:   Tolerated well, no immediate complications          Chief Complaint   Patient presents with    Shortness of Breath       4:45 PM  The patients presenting problems have been discussed, and they are in agreement with the care plan formulated and outlined with them. I have encouraged them to ask questions as they arise throughout their visit. MEDICATIONS GIVEN:  Medications - No data to display    LABS REVIEWED:  Labs Reviewed   SAMPLES BEING HELD       RADIOLOGY RESULTS:  The following have been ordered and reviewed:  _____________________________________________________________________  _____________________________________________________________________    EKG interpretation:   Rhythm: normal sinus rhythm in an LVH pattern; and regular . Rate (approx.): 94; Axis: normal; P wave: normal; QRS interval: normal ; ST/T wave: normal; Negative acute significant segmental elevations/ compared to study dated 07/11/2020    PROCEDURES:        CONSULTATIONS:       PROGRESS NOTES:      DIAGNOSIS:    1. Acute respiratory failure, unspecified whether with hypoxia or hypercapnia ([address]    2. Troponin I above reference range    3. Smoking        PLAN:  1-      ED COURSE: The patients hospital course has been uncomplicated. 5:20 PM  Pt sats improved; awaiting results; Perfect Serve Consult for Admission  6:10 PM    ED Room Number: ER25/25  Patient Name and age: Jeremy Barakat 50 y.o.  male  Working Diagnosis:   1. Acute respiratory failure, unspecified whether with hypoxia or hypercapnia ([address]    2. Troponin I above reference range    3.  Smoking        COVID-19 Suspicion:  yes  Sepsis present:  no  Reassessment needed: yes  Code Status:  Full Code  Readmission: no  Isolation Requirements:  yes  Recommended Level of Care:  ICU  Department:[facility] Adult ED - 21   Other:  Spoke with ICU attending; 'Please start Bianca/ Jani/ we will see him';          7:09 PM  Spent 15 min updating Fiancee;

## 2020-12-21 NOTE — PROGRESS NOTES
Admission Medication Reconciliation:        Unable to speak with patient face to face at this time due to general isolation precautions in the ED related to COVID-19 pandemic, likewise due to clinical condition. Spoke with girlfriend Poonam Rodríguez) by telephone @ [phone]. She is a reasonably reliable historian. RX query is available at this time. Interview included questions regarding use of:  PTA medications including prescription/OTC, vitamins, supplements, inhaled, topical, injectable, otic and ophthalmic medications  Alcohol, nicotine products, THC and related compounds, illicit drugs, stimulant use (i.e., Red Bull), agents used to assist with sleep and/or pain control issues, and whether patient uses other people's medications of any kin    Notes:  ETOH: Kelechi Estevez states that he has \"at least\" 2 shots of vodka in the morning with his aspirin, ibuprofen and Goody Powders, then 3-4 more shots of vodka at night when he gets home  NSAIDS: takes ASA, ibuprofen and Goody Powders (\"a lot\") due to ongoing neck, back, arm pain per girlfriend  Nicotine: daily smoker (a pack or more daily)  Medication non-compliance: Kelechi Estevez states he takes his antihypertensive meds \"usually only if he feels bad. \" He has nearly full bottles of Carvedilol and Isosorbide at home. Recommendations:    Monitor for ETOH withdrawal, implement CIWA and alcohol withdrawal order set if withdrawal is suspected  Recommend nicotine patch for basal control 21 mg/day and nicotine lozenge on demand for control of nicotine cravings    Medication changes (since last review): Added:  Ibuprofen  Goody Powders    Deleted:  Amlodipine  Pantoprazole      Thank you for allowing me to participate in the care of your patient.     Jennifer Hart PharmD, RN # [phone]       ¹RxQuery pharmacy benefit data reflects medications filled and processed through the patient's insurance, however   this data does NOT capture whether the medication was picked up or is currently being taken by the patient. Allergies:  Patient has no known allergies. Significant PMH/Disease States:   Past Medical History:   Diagnosis Date    Hypertension      Chief Complaint for this Admission:    Chief Complaint   Patient presents with    Respiratory Distress     Prior to Admission Medications:   Prior to Admission Medications   Prescriptions Last Dose Informant Taking?   aspirin 81 mg chewable tablet 12/14/2020 at Unknown time  Yes   Sig: Take 1 Tab by mouth daily. aspirin-acetaminophen-caffeine (Goody's Extra Strength) 520-260-32.5 mg pwpk 12/14/2020 at Unknown time  Yes   Sig: Take 1 Package by mouth two (2) times a day. carvediloL (COREG) 25 mg tablet 12/14/2020 at Unknown time  Yes   Sig: Take 1 Tab by mouth two (2) times daily (with meals). cloNIDine HCL (CATAPRES) 0.1 mg tablet 12/14/2020 at Unknown time  Yes   Sig: Take 1 Tab by mouth two (2) times a day. ibuprofen (MOTRIN) 600 mg tablet   Yes   Sig: Take 600 mg by mouth every six (6) hours as needed for Pain.   isosorbide mononitrate ER (IMDUR) 30 mg tablet 12/14/2020 at Unknown time  Yes   Sig: Take 1 Tab by mouth daily. Facility-Administered Medications: None     Please contact the main inpatient pharmacy with any questions or concerns at [phone] and we will direct you to the clinical pharmacist covering this patient's care while in-house.    Allan Krabbe, [facility]

## 2020-12-22 ENCOUNTER — APPOINTMENT (OUTPATIENT)
Dept: CT IMAGING | Age: 55
DRG: 207 | End: 2020-12-22
Attending: NURSE PRACTITIONER

## 2020-12-22 ENCOUNTER — APPOINTMENT (OUTPATIENT)
Dept: GENERAL RADIOLOGY | Age: 55
DRG: 207 | End: 2020-12-22
Attending: NURSE PRACTITIONER

## 2020-12-22 ENCOUNTER — APPOINTMENT (OUTPATIENT)
Dept: ULTRASOUND IMAGING | Age: 55
DRG: 207 | End: 2020-12-22
Attending: INTERNAL MEDICINE

## 2020-12-22 ENCOUNTER — APPOINTMENT (OUTPATIENT)
Dept: GENERAL RADIOLOGY | Age: 55
DRG: 207 | End: 2020-12-22
Attending: INTERNAL MEDICINE

## 2020-12-22 ENCOUNTER — APPOINTMENT (OUTPATIENT)
Dept: NON INVASIVE DIAGNOSTICS | Age: 55
DRG: 207 | End: 2020-12-22
Attending: NURSE PRACTITIONER

## 2020-12-22 LAB
ALBUMIN SERPL-MCNC: 2.8 G/DL (ref 3.5–5)
ALBUMIN SERPL-MCNC: 2.8 G/DL (ref 3.5–5)
ALBUMIN/GLOB SERPL: 0.6 {RATIO} (ref 1.1–2.2)
ALBUMIN/GLOB SERPL: 0.6 {RATIO} (ref 1.1–2.2)
ALP SERPL-CCNC: 90 U/L (ref 45–117)
ALP SERPL-CCNC: 92 U/L (ref 45–117)
ALT SERPL-CCNC: 49 U/L (ref 12–78)
ALT SERPL-CCNC: 52 U/L (ref 12–78)
ANION GAP SERPL CALC-SCNC: 9 MMOL/L (ref 5–15)
ARTERIAL PATENCY WRIST A: YES
AST SERPL-CCNC: 50 U/L (ref 15–37)
AST SERPL-CCNC: 50 U/L (ref 15–37)
ATRIAL RATE: 94 BPM
B PERT DNA SPEC QL NAA+PROBE: NOT DETECTED
BASE DEFICIT BLD-SCNC: 3 MMOL/L
BASOPHILS # BLD: 0 K/UL (ref 0–0.1)
BASOPHILS NFR BLD: 0 % (ref 0–1)
BDY SITE: ABNORMAL
BILIRUB DIRECT SERPL-MCNC: 0.1 MG/DL (ref 0–0.2)
BILIRUB SERPL-MCNC: 0.3 MG/DL (ref 0.2–1)
BILIRUB SERPL-MCNC: 0.4 MG/DL (ref 0.2–1)
BORDETELLA PARAPERTUSSIS PCR, BORPAR: NOT DETECTED
BUN SERPL-MCNC: 44 MG/DL (ref 6–20)
BUN/CREAT SERPL: 20 (ref 12–20)
C PNEUM DNA SPEC QL NAA+PROBE: NOT DETECTED
CA-I BLD-SCNC: 1.16 MMOL/L (ref 1.12–1.32)
CALCIUM SERPL-MCNC: 8.7 MG/DL (ref 8.5–10.1)
CALCULATED P AXIS, ECG09: 61 DEGREES
CALCULATED R AXIS, ECG10: -18 DEGREES
CALCULATED T AXIS, ECG11: 104 DEGREES
CHLORIDE SERPL-SCNC: 106 MMOL/L (ref 97–108)
CO2 SERPL-SCNC: 21 MMOL/L (ref 21–32)
CREAT SERPL-MCNC: 2.16 MG/DL (ref 0.7–1.3)
DIAGNOSIS, 93000: NORMAL
DIFFERENTIAL METHOD BLD: ABNORMAL
ECHO AV PEAK GRADIENT: 6.44 MMHG
ECHO AV PEAK VELOCITY: 126.85 CM/S
ECHO LA MAJOR AXIS: 2.75 CM
ECHO LA MINOR AXIS: 1.25 CM
ECHO LV INTERNAL DIMENSION DIASTOLIC: 5.85 CM (ref 4.2–5.9)
ECHO LV INTERNAL DIMENSION SYSTOLIC: 5.07 CM
ECHO LV IVSD: 1.41 CM (ref 0.6–1)
ECHO LV MASS 2D: 334.2 G (ref 88–224)
ECHO LV MASS INDEX 2D: 152.3 G/M2 (ref 49–115)
ECHO LV POSTERIOR WALL DIASTOLIC: 1.18 CM (ref 0.6–1)
ECHO LVOT PEAK GRADIENT: 3.48 MMHG
ECHO LVOT PEAK VELOCITY: 93.23 CM/S
ECHO MV A VELOCITY: 60.42 CM/S
ECHO MV E VELOCITY: 50.21 CM/S
ECHO MV E/A RATIO: 0.83
EOSINOPHIL # BLD: 0 K/UL (ref 0–0.4)
EOSINOPHIL NFR BLD: 0 % (ref 0–7)
ERYTHROCYTE [DISTWIDTH] IN BLOOD BY AUTOMATED COUNT: 14.9 % (ref 11.5–14.5)
FLUAV H1 2009 PAND RNA SPEC QL NAA+PROBE: NOT DETECTED
FLUAV H1 RNA SPEC QL NAA+PROBE: NOT DETECTED
FLUAV H3 RNA SPEC QL NAA+PROBE: NOT DETECTED
FLUAV SUBTYP SPEC NAA+PROBE: NOT DETECTED
FLUBV RNA SPEC QL NAA+PROBE: NOT DETECTED
GAS FLOW.O2 O2 DELIVERY SYS: ABNORMAL L/MIN
GAS FLOW.O2 SETTING OXYMISER: 16 BPM
GLOBULIN SER CALC-MCNC: 5 G/DL (ref 2–4)
GLOBULIN SER CALC-MCNC: 5 G/DL (ref 2–4)
GLUCOSE SERPL-MCNC: 133 MG/DL (ref 65–100)
HADV DNA SPEC QL NAA+PROBE: NOT DETECTED
HCO3 BLD-SCNC: 23 MMOL/L (ref 22–26)
HCOV 229E RNA SPEC QL NAA+PROBE: NOT DETECTED
HCOV HKU1 RNA SPEC QL NAA+PROBE: NOT DETECTED
HCOV NL63 RNA SPEC QL NAA+PROBE: NOT DETECTED
HCOV OC43 RNA SPEC QL NAA+PROBE: NOT DETECTED
HCT VFR BLD AUTO: 42 % (ref 36.6–50.3)
HEALTH STATUS, XMCV2T: NORMAL
HGB BLD-MCNC: 13.1 G/DL (ref 12.1–17)
HMPV RNA SPEC QL NAA+PROBE: NOT DETECTED
HPIV1 RNA SPEC QL NAA+PROBE: NOT DETECTED
HPIV2 RNA SPEC QL NAA+PROBE: NOT DETECTED
HPIV3 RNA SPEC QL NAA+PROBE: NOT DETECTED
HPIV4 RNA SPEC QL NAA+PROBE: NOT DETECTED
IMM GRANULOCYTES # BLD AUTO: 0.1 K/UL (ref 0–0.04)
IMM GRANULOCYTES NFR BLD AUTO: 1 % (ref 0–0.5)
LACTATE SERPL-SCNC: 0.8 MMOL/L (ref 0.4–2)
LYMPHOCYTES # BLD: 0.7 K/UL (ref 0.8–3.5)
LYMPHOCYTES NFR BLD: 6 % (ref 12–49)
M PNEUMO DNA SPEC QL NAA+PROBE: NOT DETECTED
MCH RBC QN AUTO: 27.2 PG (ref 26–34)
MCHC RBC AUTO-ENTMCNC: 31.2 G/DL (ref 30–36.5)
MCV RBC AUTO: 87.3 FL (ref 80–99)
MONOCYTES # BLD: 0.1 K/UL (ref 0–1)
MONOCYTES NFR BLD: 1 % (ref 5–13)
NEUTS SEG # BLD: 10 K/UL (ref 1.8–8)
NEUTS SEG NFR BLD: 92 % (ref 32–75)
NRBC # BLD: 0 K/UL (ref 0–0.01)
NRBC BLD-RTO: 0 PER 100 WBC
O2/TOTAL GAS SETTING VFR VENT: 60 %
P-R INTERVAL, ECG05: 194 MS
PCO2 BLD: 44.5 MMHG (ref 35–45)
PEEP RESPIRATORY: 8 CMH2O
PH BLD: 7.32 [PH] (ref 7.35–7.45)
PLATELET # BLD AUTO: 302 K/UL (ref 150–400)
PMV BLD AUTO: 9.9 FL (ref 8.9–12.9)
PO2 BLD: 105 MMHG (ref 80–100)
POTASSIUM SERPL-SCNC: 4.7 MMOL/L (ref 3.5–5.1)
PROT SERPL-MCNC: 7.8 G/DL (ref 6.4–8.2)
PROT SERPL-MCNC: 7.8 G/DL (ref 6.4–8.2)
Q-T INTERVAL, ECG07: 390 MS
QRS DURATION, ECG06: 102 MS
QTC CALCULATION (BEZET), ECG08: 487 MS
RBC # BLD AUTO: 4.81 M/UL (ref 4.1–5.7)
RBC MORPH BLD: ABNORMAL
RBC MORPH BLD: ABNORMAL
RSV RNA SPEC QL NAA+PROBE: NOT DETECTED
RV+EV RNA SPEC QL NAA+PROBE: NOT DETECTED
SAO2 % BLD: 98 % (ref 92–97)
SARS-COV-2, COV2: NOT DETECTED
SODIUM SERPL-SCNC: 136 MMOL/L (ref 136–145)
SOURCE, COVRS: NORMAL
SPECIMEN SOURCE, FCOV2M: NORMAL
SPECIMEN TYPE, XMCV1T: NORMAL
SPECIMEN TYPE: ABNORMAL
TROPONIN I SERPL-MCNC: 0.23 NG/ML
TROPONIN I SERPL-MCNC: 0.31 NG/ML
VENTILATION MODE VENT: ABNORMAL
VENTRICULAR RATE, ECG03: 94 BPM
VT SETTING VENT: 500 ML
WBC # BLD AUTO: 10.9 K/UL (ref 4.1–11.1)

## 2020-12-22 PROCEDURE — 80076 HEPATIC FUNCTION PANEL: CPT

## 2020-12-22 PROCEDURE — 71045 X-RAY EXAM CHEST 1 VIEW: CPT

## 2020-12-22 PROCEDURE — 87077 CULTURE AEROBIC IDENTIFY: CPT

## 2020-12-22 PROCEDURE — 83605 ASSAY OF LACTIC ACID: CPT

## 2020-12-22 PROCEDURE — 74011250636 HC RX REV CODE- 250/636: Performed by: NURSE PRACTITIONER

## 2020-12-22 PROCEDURE — 93306 TTE W/DOPPLER COMPLETE: CPT

## 2020-12-22 PROCEDURE — 94640 AIRWAY INHALATION TREATMENT: CPT

## 2020-12-22 PROCEDURE — 36600 WITHDRAWAL OF ARTERIAL BLOOD: CPT

## 2020-12-22 PROCEDURE — 74011000250 HC RX REV CODE- 250: Performed by: INTERNAL MEDICINE

## 2020-12-22 PROCEDURE — 82803 BLOOD GASES ANY COMBINATION: CPT

## 2020-12-22 PROCEDURE — 05HM33Z INSERTION OF INFUSION DEVICE INTO RIGHT INTERNAL JUGULAR VEIN, PERCUTANEOUS APPROACH: ICD-10-PCS | Performed by: INTERNAL MEDICINE

## 2020-12-22 PROCEDURE — 74011000258 HC RX REV CODE- 258: Performed by: NURSE PRACTITIONER

## 2020-12-22 PROCEDURE — 5A1955Z RESPIRATORY VENTILATION, GREATER THAN 96 CONSECUTIVE HOURS: ICD-10-PCS | Performed by: INTERNAL MEDICINE

## 2020-12-22 PROCEDURE — 74011000250 HC RX REV CODE- 250: Performed by: NURSE PRACTITIONER

## 2020-12-22 PROCEDURE — 65620000000 HC RM CCU GENERAL

## 2020-12-22 PROCEDURE — 36415 COLL VENOUS BLD VENIPUNCTURE: CPT

## 2020-12-22 PROCEDURE — 74011000258 HC RX REV CODE- 258: Performed by: ANESTHESIOLOGY

## 2020-12-22 PROCEDURE — 84484 ASSAY OF TROPONIN QUANT: CPT

## 2020-12-22 PROCEDURE — 87070 CULTURE OTHR SPECIMN AEROBIC: CPT

## 2020-12-22 PROCEDURE — 93306 TTE W/DOPPLER COMPLETE: CPT | Performed by: INTERNAL MEDICINE

## 2020-12-22 PROCEDURE — 76770 US EXAM ABDO BACK WALL COMP: CPT

## 2020-12-22 PROCEDURE — 74011250636 HC RX REV CODE- 250/636: Performed by: ANESTHESIOLOGY

## 2020-12-22 PROCEDURE — 80053 COMPREHEN METABOLIC PANEL: CPT

## 2020-12-22 PROCEDURE — 94003 VENT MGMT INPAT SUBQ DAY: CPT

## 2020-12-22 PROCEDURE — 74011250636 HC RX REV CODE- 250/636: Performed by: EMERGENCY MEDICINE

## 2020-12-22 PROCEDURE — 93005 ELECTROCARDIOGRAM TRACING: CPT

## 2020-12-22 PROCEDURE — 85025 COMPLETE CBC W/AUTO DIFF WBC: CPT

## 2020-12-22 PROCEDURE — 70450 CT HEAD/BRAIN W/O DYE: CPT

## 2020-12-22 PROCEDURE — 99254 IP/OBS CNSLTJ NEW/EST MOD 60: CPT | Performed by: SPECIALIST

## 2020-12-22 RX ORDER — VANCOMYCIN/0.9 % SOD CHLORIDE 1.5G/250ML
1500 PLASTIC BAG, INJECTION (ML) INTRAVENOUS EVERY 24 HOURS
Status: DISCONTINUED | OUTPATIENT
Start: 2020-12-23 | End: 2020-12-23

## 2020-12-22 RX ORDER — BUMETANIDE 0.25 MG/ML
1 INJECTION INTRAMUSCULAR; INTRAVENOUS EVERY 12 HOURS
Status: DISCONTINUED | OUTPATIENT
Start: 2020-12-22 | End: 2020-12-23

## 2020-12-22 RX ORDER — CHLORHEXIDINE GLUCONATE 0.12 MG/ML
15 RINSE ORAL EVERY 12 HOURS
Status: DISCONTINUED | OUTPATIENT
Start: 2020-12-22 | End: 2021-01-03

## 2020-12-22 RX ORDER — SENNOSIDES 8.6 MG/1
2 TABLET ORAL DAILY
Status: DISCONTINUED | OUTPATIENT
Start: 2020-12-23 | End: 2021-01-07 | Stop reason: HOSPADM

## 2020-12-22 RX ORDER — VANCOMYCIN 2 GRAM/500 ML IN 0.9 % SODIUM CHLORIDE INTRAVENOUS
2000 ONCE
Status: COMPLETED | OUTPATIENT
Start: 2020-12-22 | End: 2020-12-23

## 2020-12-22 RX ORDER — NOREPINEPHRINE BITARTRATE/D5W 8 MG/250ML
.5-3 PLASTIC BAG, INJECTION (ML) INTRAVENOUS
Status: DISCONTINUED | OUTPATIENT
Start: 2020-12-22 | End: 2020-12-26

## 2020-12-22 RX ORDER — DOCUSATE SODIUM 100 MG/1
100 CAPSULE, LIQUID FILLED ORAL DAILY
Status: DISCONTINUED | OUTPATIENT
Start: 2020-12-23 | End: 2020-12-29

## 2020-12-22 RX ADMIN — CEFEPIME HYDROCHLORIDE 2 G: 2 INJECTION, POWDER, FOR SOLUTION INTRAVENOUS at 16:39

## 2020-12-22 RX ADMIN — IPRATROPIUM BROMIDE AND ALBUTEROL SULFATE 3 ML: .5; 3 SOLUTION RESPIRATORY (INHALATION) at 20:15

## 2020-12-22 RX ADMIN — BUMETANIDE 1 MG: 0.25 INJECTION, SOLUTION INTRAMUSCULAR; INTRAVENOUS at 10:48

## 2020-12-22 RX ADMIN — IPRATROPIUM BROMIDE AND ALBUTEROL SULFATE 3 ML: .5; 3 SOLUTION RESPIRATORY (INHALATION) at 16:42

## 2020-12-22 RX ADMIN — PROPOFOL 35 MCG/KG/MIN: 10 INJECTION, EMULSION INTRAVENOUS at 18:46

## 2020-12-22 RX ADMIN — BUMETANIDE 1 MG: 0.25 INJECTION, SOLUTION INTRAMUSCULAR; INTRAVENOUS at 22:13

## 2020-12-22 RX ADMIN — PROPOFOL 50 MCG/KG/MIN: 10 INJECTION, EMULSION INTRAVENOUS at 22:13

## 2020-12-22 RX ADMIN — SODIUM CHLORIDE 20 MG: 9 INJECTION INTRAMUSCULAR; INTRAVENOUS; SUBCUTANEOUS at 08:31

## 2020-12-22 RX ADMIN — Medication 10 ML: at 03:21

## 2020-12-22 RX ADMIN — VANCOMYCIN HYDROCHLORIDE 2000 MG: 10 INJECTION, POWDER, LYOPHILIZED, FOR SOLUTION INTRAVENOUS at 16:27

## 2020-12-22 RX ADMIN — Medication 150 MCG/HR: at 00:18

## 2020-12-22 RX ADMIN — Medication 10 ML: at 07:03

## 2020-12-22 RX ADMIN — CHLORHEXIDINE GLUCONATE 15 ML: 0.12 RINSE ORAL at 22:49

## 2020-12-22 RX ADMIN — DEXTROSE MONOHYDRATE 4 MCG/MIN: 50 INJECTION, SOLUTION INTRAVENOUS at 15:53

## 2020-12-22 RX ADMIN — IPRATROPIUM BROMIDE AND ALBUTEROL SULFATE 3 ML: .5; 3 SOLUTION RESPIRATORY (INHALATION) at 11:28

## 2020-12-22 RX ADMIN — PROPOFOL 25 MCG/KG/MIN: 10 INJECTION, EMULSION INTRAVENOUS at 13:06

## 2020-12-22 RX ADMIN — PHENYLEPHRINE HYDROCHLORIDE 30 MCG/MIN: 10 INJECTION INTRAVENOUS at 07:05

## 2020-12-22 RX ADMIN — PROPOFOL 15 MCG/KG/MIN: 10 INJECTION, EMULSION INTRAVENOUS at 02:54

## 2020-12-22 RX ADMIN — ENOXAPARIN SODIUM 40 MG: 40 INJECTION SUBCUTANEOUS at 08:31

## 2020-12-22 RX ADMIN — Medication 10 ML: at 22:14

## 2020-12-22 RX ADMIN — Medication 10 ML: at 14:43

## 2020-12-22 RX ADMIN — PROPOFOL 25 MCG/KG/MIN: 10 INJECTION, EMULSION INTRAVENOUS at 06:43

## 2020-12-22 RX ADMIN — Medication 150 MCG/HR: at 08:00

## 2020-12-22 RX ADMIN — Medication 125 MCG/HR: at 14:39

## 2020-12-22 NOTE — PROCEDURES
Ultrasound-guided right internal jugular vein central line insertion:  Indication: Need for vasoactive medication not suitable for peripheral lines  Consent: Emergent. Could not reach family. Girlfriend over the phone agreed to the procedure  Estimated blood loss: Less than 10 cc  Complication: No immediate complication. Pending chest x-ray    Procedure in detail: Under strict sterile condition the right internal jugular vein was visualized and cannulated from the first attempt under real-time guidance. After that using modified Seldinger technique a Quad-Lumen 16 cm central line was inserted. Guidewire was removed intact in 1 piece. 4 lm flushed and sterile caps applied. Line secured in place using 2 suture size 2-0 silk. Biopatch and sterile dressing applied. Pending chest x-ray.

## 2020-12-22 NOTE — PROGRESS NOTES
RAFIA  Patient presented to the ED from work with hypxia and shortness of breath. RUR 10%  Disposition: TBD  Plan for utilizing home health:  TBD        PCP: First and Last name:  None   Name of Practice:    Are you a current patient: Yes/No:    Approximate date of last visit:    Can you participate in a virtual visit with your PCP:                     Current Advanced Directive/Advance Care Plan: Not on file                         Patient admitted with acute hypoxic respiratory failure. Caurrently intubated on a Cardene gtt. Care manager spoke with Vanesa Lobo who stated she was his spouse [phone]. Federico Paris patient was independent to include driving prior to admission. Patient has no previous HH or equipment needs. Confirmed patient does not have a PCP or insurance. Federico Paris patient will be on her insurance starting Jan. 1st. Care management will follow for transitions of care.  Nereida He RN,Care Management    Care Management Interventions  MyChart Signup: No  Discharge Durable Medical Equipment: No  Physical Therapy Consult: No  Speech Therapy Consult: No  Current Support Network: Vanesa Lobo [phone])

## 2020-12-22 NOTE — ED NOTES
Bedside and Verbal shift change report given to Citlalli Canada RN (oncoming nurse) by Mikaela Cook RN (offgoing nurse).  Report included the following information SBAR, ED Summary, MAR, Recent Results and Cardiac Rhythm SR.

## 2020-12-22 NOTE — PROGRESS NOTES
Day #1 of cefepime  Indication:  CAP  Current regimen:  2 gm q8h  Abx regimen: vancomycin, cefepime  Recent Labs     20  0251 20  1932 20  1704   WBC 10.9  --  7.4   CREA 2.16* 1.35* 1.91*   BUN 44* 27* 28*     Est CrCl: 40-50 ml/min  Temp (24hrs), Av.1 °F (36.2 °C), Min:96.4 °F (35.8 °C), Max:98.1 °F (36.7 °C)      Plan: Change to 2 gm q12hr per renal dosing protocol

## 2020-12-22 NOTE — CONSULTS
Cardiology Consult Note      Patient Name: Reilly Kaur  : 1965 MRN: [id number]  Date: 2020  Time: 1:58 PM    Admit Diagnosis: Hypoxia [R09.02]    Primary Cardiologist: Dr. Antoine Chow MD   Consulting Cardiologist: Bree Patel M.D.    Reason for Consult: elevated troponin    Requesting MD: David Rivera NP    HPI:  Reilly Kaur is a 50 y.o. male admitted on 2020  for Hypoxia [R09.02]. Has PMH of HTN, hypertensive heart disease, nonischemic cardiomyopathy (EF 41-45%),tobacco abuse, CKD, medication noncompliance. He had LHC in 2019 which showed normal coronary arteries. Pt is intubated and sedated. History obtained from chart. Presented to ER yesterday via EMS with chief c/o increased SOB x 2 weeks. He exhibited hypertensive urgency. He required intubation in ER. He was started on low dose cardene gtt and sunil dose of lasix. He then became hypotensive, requiring vasopressor support. He has had 2 negative covid tests. Renal function has worsened. Girlfriend reported hx of noncompliance with meds \"takes them when he feels bad. \" (per pharmacy notes). Cardiology consulted for elevated troponin. Assessment and Plan     1. Elevated troponin:   -Troponin mildly elevated 0.17-0.31   -EKG with LVH, no change from 2020 EKG   -Echo: EF unchanged (40-45%), NWMA   -Troponin elevation of uncertain significance but do not suspect ACS given normal LHC in 2019.    2. Hypertensive urgency on admission, now with hypotension   -History of  noncompliance with BP meds   - now with hypotension on neosynephrine. EF of 40-45% does not explain hypotension    3. Hypertensive heart disease/Cardiomyopathy, mild   -EF 40-45%. Pro BNP is not elevated for age (cutoff 900). -Home Coreg on hold   -on IV bumex    4.  Acute respiratory failure:   -Uncertain etiology- may be  Multifactorial. COPD exacerbation, renal dysfunction,CHF (although Pro BNP low for age). -CXR reviewed:bilateral lower lobe opacities   -COVID test negative x 2.   -On bumex per renal.      -Vent management per primary team    5. YISSEL on CKD: creatinine 2.16.    -nephrology following. Suspects drop in BP contributed to worsening renal function today. 6. Tobacco abuse:      Cardiology Attending:Patient seen and examined. I agree with NP assessment and plans. Normal coronaries a year ago, so minimal troponin elevation should not represent CAD. LV function only mildly reduced, so current clinical picture should not be dur to cardiac factors. Will follow from a distance. Helena Regalado MD 2020 3:47 PM             Cardiac testin20   ECHO ADULT COMPLETE 2020    Narrative · LV: Mildly reduced systolic function. Estimated LVEF is 40 - 45%. Normal   cavity size. Mild concentric hypertrophy. · MV: Mild mitral valve regurgitation is present. Ht 5'11  Wt 219 lbs ( 99.6 kg)  BSA 2.38  BP 97/65       Signed by: Elio Harp MD      echo lvef 40-45%   lexiscan with fixed inf defect   normal cardiac cath    Review of Symptoms:  Unable to obtain due to patient factors. Previous treatment/evaluation includes echocardiogram, cardiac catheterization and nuclear stress test .  Cardiac risk factors: smoking/ tobacco exposure, sedentary life style, male gender, hypertension. Past Medical History:   Diagnosis Date    Hypertension      History reviewed. No pertinent surgical history.   Current Facility-Administered Medications   Medication Dose Route Frequency    bumetanide (BUMEX) injection 1 mg  1 mg IntraVENous Q12H    chlorhexidine (ORAL CARE KIT) 0.12 % mouthwash 15 mL  15 mL Oral Q12H    sodium chloride (NS) flush 5-40 mL  5-40 mL IntraVENous Q8H    sodium chloride (NS) flush 5-40 mL  5-40 mL IntraVENous PRN    acetaminophen (TYLENOL) tablet 650 mg  650 mg Oral Q6H PRN    Or    acetaminophen (TYLENOL) suppository 650 mg  650 mg Rectal Q6H PRN    polyethylene glycol (MIRALAX) packet 17 g  17 g Oral DAILY PRN    promethazine (PHENERGAN) tablet 12.5 mg  12.5 mg Oral Q6H PRN    Or    ondansetron (ZOFRAN) injection 4 mg  4 mg IntraVENous Q6H PRN    enoxaparin (LOVENOX) injection 40 mg  40 mg SubCUTAneous DAILY    famotidine (PF) (PEPCID) 20 mg in 0.9% sodium chloride 10 mL injection  20 mg IntraVENous BID    albuterol-ipratropium (DUO-NEB) 2.5 MG-0.5 MG/3 ML  3 mL Nebulization Q4H RT    propofol (DIPRIVAN) 10 mg/mL infusion  0-50 mcg/kg/min IntraVENous TITRATE    fentaNYL (PF) 1,500 mcg/30 mL (50 mcg/mL) infusion  0-200 mcg/hr IntraVENous TITRATE    PHENYLephrine (MEME-SYNEPHRINE) 30 mg in 0.9% sodium chloride 250 mL infusion   mcg/min IntraVENous TITRATE       No Known Allergies   Family History   Problem Relation Age of Onset    Stroke Father     Cancer Sister       Social History     Socioeconomic History    Marital status: SINGLE     Spouse name: Not on file    Number of children: Not on file    Years of education: Not on file    Highest education level: Not on file   Tobacco Use    Smoking status: Current Every Day Smoker     Packs/day: 1.00    Smokeless tobacco: Never Used   Substance and Sexual Activity    Alcohol use: Yes     Alcohol/week: 5.0 standard drinks     Types: 6 Cans of beer per week     Comment: Occasionally    Drug use: No    Sexual activity: Yes     Partners: Female   Social History Narrative    ** Merged History Encounter **            Objective:    Physical Exam    Vitals:   Vitals:    12/22/20 1045 12/22/20 1100 12/22/20 1115 12/22/20 1253   BP: 106/69 105/74 113/77    Pulse: (!) 52 (!) 49 (!) 50    Resp: 26 26 26 26   Temp:       SpO2: 94% 95% 97%    Weight:       Height:           General:    Sedated intubated. Neck:   Supple, no carotid bruit and no JVD. Back:     Symmetric,    Lungs:     clear to auscultation bilaterally. Diminished bases   Heart[de-identified]    Regular rate and rhythm, S1, S2 normal, no murmur, click, rub or gallop. Abdomen:     Soft, non-tender. nondistended. Bowel sounds present. Extremities:   Extremities normal, atraumatic, no cyanosis or edema. Vascular:   Pulses - 2+   Skin:   Skin color normal. No rashes or lesions   Neurologic:   Sedated on vent        Telemetry: normal sinus rhythm        Data Review:     Radiology:  XR Results (most recent):  Results from Hospital Encounter encounter on 12/21/20   XR CHEST PORT    Narrative EXAM:  XR CHEST PORT    INDICATION:  central line placement    COMPARISON:  12/22/2020    FINDINGS: A portable AP radiograph of the chest was obtained at 1352 hours. Right IJ catheter tip overlies SVC right atrial junction. There is no  pneumothorax. ET tube and NG tube are unchanged. .  Bilateral lower lobe lung  opacities are unchanged. Heart size is slightly enlarged and stable. Bony  structures are unchanged. Impression IMPRESSION: Right IJ catheter tip overlies SVC right atrial junction. There is  no pneumothorax. Bilateral lower lobe parenchymal opacities are unchanged since  earlier today but improved compared to 12/21/2020. [facility] Recent Labs     12/22/20  0250 12/21/20  1932 12/21/20  1704   TROIQ 0.31* 0.24* 0.17*     Recent Labs     12/22/20  0251 12/21/20  1932    143   K 4.7 3.5    113*   CO2 21 23   BUN 44* 27*   CREA 2.16* 1.35*   * 82   CA 8.7 7.3*     Recent Labs     12/22/20  0251 12/21/20  1704   WBC 10.9 7.4   HGB 13.1 14.1   HCT 42.0 47.0    329     Recent Labs     12/22/20  0251 12/21/20  1704   AP 90  92 114     No results for input(s): CHOL, LDLC in the last 72 hours. No lab exists for component: TGL, HDLC,  HBA1C  Recent Labs     12/21/20  1704   CRP 0.56       Thank you very much for this referral. I appreciate the opportunity to participate in this patient's care. I will follow along with above stated plan. Chaparrita Hunter.  JENNIFER Arita         [facility], [address] [place] [address] [address]     [place] [address] [address] [facility]     [phone]    Negro Rodriguez MD

## 2020-12-22 NOTE — ROUTINE PROCESS
TRANSFER - OUT REPORT: 
 
Verbal report given to JAMES Palencia(name) on Sherron Hagan  being transferred to ICU(unit) for routine progression of care Report consisted of patients Situation, Background, Assessment and  
Recommendations(SBAR). Information from the following report(s) SBAR, Kardex, ED Summary, [facility] and Recent Results was reviewed with the receiving nurse. Lines:  
Peripheral IV 12/21/20 Left Antecubital (Active) Site Assessment Clean, dry, & intact 12/21/20 1700 Phlebitis Assessment 0 12/21/20 1700 Infiltration Assessment 0 12/21/20 1700 Dressing Status Clean, dry, & intact 12/21/20 1700 Dressing Type Transparent 12/21/20 1700 Peripheral IV 12/21/20 Right Antecubital (Active) Site Assessment Clean, dry, & intact 12/21/20 1701 Phlebitis Assessment 0 12/21/20 1701 Infiltration Assessment 0 12/21/20 1701 Dressing Status Clean, dry, & intact 12/21/20 1701 Dressing Type Transparent 12/21/20 1701 Peripheral IV 12/21/20 Right Hand (Active) Site Assessment Clean, dry, & intact 12/21/20 1947 Phlebitis Assessment 0 12/21/20 1947 Infiltration Assessment 0 12/21/20 1947 Dressing Status Clean, dry, & intact 12/21/20 1947 Dressing Type Transparent 12/21/20 1947 Opportunity for questions and clarification was provided. Patient transported with: 
 Monitor Registered Nurse RT Ventilator

## 2020-12-22 NOTE — PROGRESS NOTES
TRANSFER - IN REPORT:    Verbal report received from [facility] RN(name) on Fadumo Vega  being received from ED(unit) for routine progression of care      Report consisted of patients Situation, Background, Assessment and   Recommendations(SBAR). Information from the following report(s) SBAR, Kardex, ED Summary, Intake/Output, MAR, Recent Results, Cardiac Rhythm SR and Alarm Parameters  was reviewed with the receiving nurse. Opportunity for questions and clarification was provided. Assessment completed upon patients arrival to unit and care assumed. 0700: Pt BP 76/58. Brody-synephrine gtt started. 0730: Bedside and Verbal shift change report given to Efra Chacon (oncoming nurse) by Ramez Pineda RN (offgoing nurse). Report included the following information SBAR, Kardex, ED Summary, Intake/Output, MAR, Recent Results, Cardiac Rhythm SR and Alarm Parameters .

## 2020-12-22 NOTE — ACP (ADVANCE CARE PLANNING)
Advance Care Planning     Advance Care Planning Activator (Inpatient)  Conversation Note      Date of ACP Conversation: 12/22/20     Conversation Conducted with:  Julia Vang [phone]  ACP Activator: Yuriy Dawn RN      Health Care Decision Maker:      If no Decision Maker listed above or available through scanned documents, then:    If no Authorized Decision Maker has previously been identified, then patient chooses [address]:  \"Who would you like to name as your primary health care decision-maker? \"    Name: Julia Vang  Relationship: states she is his spouse Phone number: [phone]  Cinthia Barksdale this person be reached easily? \" yes  Care Preferences    Ventilation: \"If you were in your present state of health and suddenly became very ill and were unable to breathe on your own, what would your preference be about the use of a ventilator (breathing machine) if it were available to you? \"  Yes    \"If your health worsens and it becomes clear that your chance of recovery is unlikely, what would your preference be about the use of a ventilator (breathing machine) if it were available to you? \" Unsure      Resuscitation  \"CPR works best to restart the heart when there is a sudden event, like a heart attack, in someone who is otherwise healthy. Unfortunately, CPR does not typically restart the heart for people who have serious health conditions or who are very sick. \"    \"In the event your heart stopped as a result of an underlying serious health condition, would you want attempts to be made to restart your heart  Yes      [] Yes  [x] No   Educated Patient / Decision Maker regarding differences between Advance Directives and portable DNR orders.     Length of ACP Conversation in minutes:  10    Conversation Outcomes:  [x] ACP discussion completed  [] Existing advance directive reviewed with patient; no changes to patient's previously recorded wishes     [] New Advance Directive completed   [] Portable Do Not Resuscitate prepared for Provider review and signature  [] POLST/POST/MOLST/MOST prepared for Provider review and signature      Follow-up plan:    [] Schedule follow-up conversation to continue planning  [] Referred individual to Provider for additional questions/concerns   [] Advised patient/agent/surrogate to review completed ACP document and update if needed with changes in condition, patient preferences or care setting     [] This note routed to one or more involved healthcare providers

## 2020-12-22 NOTE — PROGRESS NOTES
[facility]    ICU TEAM Progress Note    Name: Hans Beauchamp   : 1965   MRN: [id number]   Date: 2020      Assessment     ICU Problems:  Assessment:  1. Acute hypoxic respiratory failure  2. Elevated troponin   3. Obesity   4. Nx nonischemic cardiomyopathy   5. Chronic tobacco abuse  6. ru COVID  7. YISSEL/CKD stage 2  8. Left frontal lipoma-Chronic   9. Acute exacerbation of CHF   10. Hx tobacco abuse  11. Likely COPD exacerbation     Imaging:      ICU Comprehensive Plan of Care:       1. Acute hypoxic respiratory failure  -Continue ventilator support, currently on 60% fio2 and 10 peep, will wean as appropriate and tolerate  -Follow serial ABGs  -ECHO pending  -Sp lasix, strict I and O   -Duonebs scheduled   -Pulm hygiene  -HOB >30  -Propofol and fent for sedation   -COVID PCR and rapid negative   -RPP negative   -WBC normal  -send LA  0.8  -Elevated   -Procal pending      2. Elevated troponin   -ECHO pending (LVEF 2019 40-45%)   -Cardiology consulted     3. YISSEL/CKD stage 2  -Nephrology consulted  -Likely rt to hypotension overnight    -On Brody to keep MAP >65, will switch to levo via CVL   -Gentle diuresis   -Strict I and O  -Elevated BNP   -Bumex 1mg BID     4.ru COVID  -PCR and Rapid (-)    5. Left frontal lipoma-Chronic   -Head ct wo-stable lipoma   -Will need to fu as outpatient     6. Hx nonischemic cardiomyopathy   -Cardiology consulted     7. Acute exacerbation of CHF   -Gentle diuresis per nephrology   -Strict I and O     8. COPD exacerbation   -Duonebs  -Cont vent support     9. Obesity   -Nutrition consulted  -Wt loss counseling when appropriate     10 Chronic tobacco abuse  -Cessation counseling when able           Plans for this Shift:     1. Ventilator support, will wean as tolerated  2. Legionella/strep Pna pending   3. UA/UC pending   4. Send sputum   5. SBP Goal of: > 90 mmHg  6. MAP Goal of: > 65 mmHg  7. Phenylephrine - For above SBP/MAP goals  8.  IVFs: KVO  9. Transfusion Trigger (Hgb): <7 g/dL  10. Respiratory Goals:  a. Chlorhexidine   b. Optimize PEEP/Ventilation/Oxygenation  c. Goal Tidal Volume 6 cc/kg based on IBW  d. Aim for lung protective ventilation  e. Head of bed > 30 degrees  f. Aggressive bronchopulmonary hygiene  11. Pulmonary toilet: Duo-Nebs   12. SpO2 Goal: > 92%  13. Keep K>4; Mg>2   14. PT/OT  15. Goals of Care Discussion with family Pending   12. Plan of Care/Code Status: Full Code  17. [facility], nephrology   18. Discussed Care Plan with Bedside RN  19. Documentation of Current Medications  20. Rest of Plan Below:    F - Feeding:  Pending   A - Analgesia: Fentanyl  S - Sedation: Propofol  T - DVT Prophylaxis: SCD's or Sequential Compression Device   H - Head of Bed: > 30 Degrees  U - Ulcer Prophylaxis: Protonix (pantoprazole)   G - Glycemic Control: Insulin  S - Spontaneous Breathing Trial: Pending  B - Bowel Regimen: Docusate (Colace)  I - Indwelling Catheter:   Tubes: ETT  Lines: Peripheral IV  Drains: Granados Catheter  D - De-escalation of Antibiotics: none    Subjective:   Progress Note: 12/22/2020      Reason for ICU Admission: Acute hypoxic respiratory failure      HPI: Rk Peterson is a 49 yo AAM with a PMH significant for nonischemic cardiomyopathy, left frontal lipoma, CKD, obesity, chronic tobacco abuse, who presented to the ER via EMS with co increased SOB X2 weeks. Pt is currently sedated and on the ventilator, unable to provide HPI thereofre obtained via chart and provider. Pt failed bipap while in the ER with continued worsening hypoxia, therefore pt was intubated and placed on the ventilator. On arrival to ED pt was found to be hypertensive with /100.  Patient was then transferred to ICU for further management in care.           Overnight Events:   12/22/2020  -Hypotension noted overnight, was started on vasopressor-Brody, will switch to levo after CVL   -High ventilator settings, will wean as appropriate       POD:  * No surgery found *    S/P:       Active Problem List:     Problem List  Date Reviewed: 9/28/2020          Codes Class    Hypoxia ICD-10-CM: R09.02  ICD-9-CM: 799.02         Cervical radiculopathy ICD-10-CM: M54.12  ICD-9-CM: 723.4         Arm paresthesia, left ICD-10-CM: R20.2  ICD-9-CM: 782.0         Intractable headache ICD-10-CM: R51.9  ICD-9-CM: 784.0         Dilated cardiomyopathy (HCC) ICD-10-CM: I42.0  ICD-9-CM: 425.4         NSTEMI (non-ST elevated myocardial infarction) ([facility] [address] ICD-10-CM: I21.4  ICD-9-CM: 410.70         HTN (hypertension) ICD-10-CM: I10  ICD-9-CM: 401.9               Past Medical History:      has a past medical history of Hypertension. Past Surgical History:      has no past surgical history on file. Home Medications:     Prior to Admission medications    Medication Sig Start Date End Date Taking? Authorizing Provider   aspirin-acetaminophen-caffeine ([employer] Extra Strength) 520-260-32.5 mg pwpk Take 1 Package by mouth two (2) times a day. Yes Provider, Historical   ibuprofen (MOTRIN) 600 mg tablet Take 600 mg by mouth every six (6) hours as needed for Pain. Yes Provider, Historical   carvediloL (COREG) 25 mg tablet Take 1 Tab by mouth two (2) times daily (with meals). 7/16/20  Yes Linda Hunt MD   isosorbide mononitrate ER (IMDUR) 30 mg tablet Take 1 Tab by mouth daily. 7/16/20  Yes Linda Hunt MD   cloNIDine HCL (CATAPRES) 0.1 mg tablet Take 1 Tab by mouth two (2) times a day. 1/16/20  Yes Linda Hunt MD   aspirin 81 mg chewable tablet Take 1 Tab by mouth daily. 12/13/19  Yes Esperanza Varma MD       Allergies/Social/Family History:     No Known Allergies   Social History     Tobacco Use    Smoking status: Current Every Day Smoker     Packs/day: 1.00    Smokeless tobacco: Never Used   Substance Use Topics    Alcohol use:  Yes     Alcohol/week: 5.0 standard drinks     Types: 6 Cans of beer per week     Comment: Occasionally      Family History   Problem Relation Age of Onset    Stroke Father     Cancer Sister        Review of Systems:     Review of systems not obtained due to patient factors. Objective:   Vital Signs:  Visit Vitals  /77   Pulse (!) 50   Temp 97.7 °F (36.5 °C)   Resp 26   Ht 5' 11\" (1.803 m)   Wt 99.6 kg (219 lb 9.3 oz)   SpO2 97%   BMI 30.62 kg/m²    O2 Flow Rate (L/min): 15 l/min O2 Device: Ventilator Temp (24hrs), Av.1 °F (36.2 °C), Min:96.4 °F (35.8 °C), Max:98.1 °F (36.7 °C)           Intake/Output:     Intake/Output Summary (Last 24 hours) at 2020 1302  Last data filed at 2020 0800  Gross per 24 hour   Intake 47.67 ml   Output 1025 ml   Net -977.33 ml       Physical Exam:    General:  Sedated and on the ventilator. No acute distress. Eyes:  Sclera anicteric. Pupils equal, round, reactive to light. Mouth/Throat: Orotracheal tube in place. Neck: Supple. Lungs:   Rhonchi TH scattered, diminished in the BLL   Cardiovascular:  Regular rate and rhythm, no murmur, click, rub, or gallop. Abdomen:   Soft, non-tender, bowel sounds normal, non-distended. Extremities: No cyanosis or edema. Skin: No acute rash or lesions. Lipoma noted over left frontal region    Lymph Nodes: Cervical and supraclavicular normal.   Musculoskeletal:  No swelling or deformity. Lines/Devices:  Intact, no erythema, drainage, or tenderness. Psychiatric: Sedated and appears comfortable on ventilator.          LABS AND  DATA: Personally reviewed  Recent Labs     20  170   WBC 10.9 7.4   HGB 13.1 14.1   HCT 42.0 47.0    329     Recent Labs     20  1932    143   K 4.7 3.5    113*   CO2 21 23   BUN 44* 27*   CREA 2.16* 1.35*   * 82   CA 8.7 7.3*   MG  --  2.1     Recent Labs     20  1704   AP 90  92 114   TP 7.8  7.8 8.7*   ALB 2.8*  2.8* 3.1*   GLOB 5.0*  5.0* 5.6*     No results for input(s): INR, PTP, APTT, INREXT in the last 72 hours. Recent Labs     12/21/20  1930   PHI 7.16*   PCO2I 77.6*   PO2I 93   FIO2I 100     Recent Labs     12/22/20  0250 12/21/20  1932   TROIQ 0.31* 0.24*       Hemodynamics:   PAP:   CO:     Wedge:   CI:     CVP:    SVR:       PVR:       Ventilator Settings:  Mode Rate Tidal Volume Pressure FiO2 PEEP   Pressure control        80 % 8 cm H20     Peak airway pressure: 35 cm H2O    Minute ventilation: 21.7 l/min        MEDS: Reviewed    Chest X-Ray:  CXR Results  (Last 48 hours)               12/22/20 1029  XR CHEST PORT Final result    Impression:  IMPRESSION:       Decreased bilateral lower lung opacities. Narrative:  EXAM:  XR CHEST PORT       INDICATION: Intubated. Bilateral lung opacities. COMPARISON: 12/21/2020 at 1735 hours       TECHNIQUE: Portable AP semiupright chest view at 1020 hours       FINDINGS: The endotracheal tube terminates above the shamir. The enteric tube   terminates in the stomach. The cardiomediastinal contours are stable. Bilateral lower lung opacities are decreased. There is no pleural effusion or   pneumothorax. The bones and upper abdomen are stable. 12/21/20 1741  XR CHEST PORT Final result    Impression:  IMPRESSION:       1. Endotracheal tube in appropriate position. 2. Significant airspace disease in both lung bases, greater on the right. Narrative:  EXAM: XR CHEST PORT       HISTORY: ET tube placement. COMPARISON: None       FINDINGS: Single view(s) of the chest. The endotracheal tube terminates 6 cm   above the shamir. There is significant consolidation in both lung bases, right   greater than left. Air bronchograms are present. The cardiomediastinal   silhouette is unremarkable. The visualized osseous structures are unremarkable. ECHO:  Pending     Multidisciplinary Rounds Completed:   Yes    ABCDEF Bundle/Checklist Completed:  Yes    SPECIAL EQUIPMENT  None    DISPOSITION  Stay in ICU    CRITICAL CARE CONSULTANT NOTE  I had a face to face encounter with the patient, reviewed and interpreted patient data including clinical events, labs, images, vital signs, I/O's, and examined patient. I have discussed the case and the plan and management of the patient's care with the consulting services, the bedside nurses and the respiratory therapist.      NOTE OF PERSONAL INVOLVEMENT IN CARE   This patient has a high probability of imminent, clinically significant deterioration, which requires the highest level of preparedness to intervene urgently. I participated in the decision-making and personally managed or directed the management of the following life and organ supporting interventions that required my frequent assessment to treat or prevent imminent deterioration. I personally spent 80 minutes of critical care time. This is time spent at this critically ill patient's bedside actively involved in patient care as well as the coordination of care and discussions with the patient's family. This does not include any procedural time which has been billed separately.     Sourav Wells NP    [facility]  12/22/2020

## 2020-12-22 NOTE — ED NOTES
Pt had a 9 beat run of [id number] [facility] at 0229. Intenscivist made aware.  Per provider, RN to draw morning labs now

## 2020-12-22 NOTE — CARDIO/PULMONARY
Cardiac Rehab: Per EMR, patient is a current smoker.  Smoking Cessation Program information placed on the AVS.   
Delfino George RN

## 2020-12-22 NOTE — PROGRESS NOTES
Renal Dosing/Monitoring  Medication: Famotidine   Current regimen:  20 mg IV every 12 hr  Recent Labs     12/22/20  0251 12/21/20  1932 12/21/20  1704   CREA 2.16* 1.35* 1.91*   BUN 44* 27* 28*     Estimated CrCl:  ~40-50 ml/min  Plan: Change to 20 mg IV daily per [facility] P&T Committee Protocol with respect to renal function. Pharmacy will continue to monitor patient daily and will make dosage adjustments based upon changing renal function.

## 2020-12-22 NOTE — PROGRESS NOTES
Attempted to get patient for CT scan at 1900, pt on vent. RN will call when on way to floor (DSP 12/21)

## 2020-12-22 NOTE — CONSULTS
[facility]   [address] [facility], [address] [facility]  Phone: [phone]   [phone] NOTE     Patient: Joyce Sanchez MRN: [id number]  PCP: Russ Denis MD   :     1965  Age:   50 y.o. Sex:  male      Referring physician: Yunior Leonard MD  Reason for consultation: 50 y.o. male with Hypoxia [O31.79] complicated by ARF  Admission Date: 2020  4:40 PM  LOS: 1 day      ASSESSMENT and PLAN :   YISSEL on ? CKD  - suspect ATN from rapid lowering of BP since admission   - UA : bland   - Imaging : US pending negative COVID testing   - suggest pressor to keep MAP > 70, SBP > 100  - caution w/ diuresis until Cr plateaus -- hopefully in 1-2 days   - may have underlying CKD from HTN and NSAID use   - may need Inotropes if EF < 20%     Malignant HTN   Hypertensive Urgency   - negative UDS  - Hypotensive now     Acute Hypoxic Resp failure  - acute on chronic systolic and diastolic CHF  - Echo pending   - consult cards     Care Plan discussed with:  ICU team        Thank you for consulting [facility] in the care of your patient. Subjective:   HPI: Joyce Sanchez is a 50 y.o.  male with Non isch CMP, CKD, chronic smoking who has been admitted to the hospital for worsening SOB for 2 weeks. Currently intubated and unable to give hx. Since admission he has developed ARF on CKD for which we were asked to see him . Past Medical Hx:   Past Medical History:   Diagnosis Date    Hypertension         Past Surgical Hx:   History reviewed. No pertinent surgical history. No Known Allergies    Social Hx:  reports that he has been smoking. He has been smoking about 1.00 pack per day. He has never used smokeless tobacco. He reports current alcohol use of about 5.0 standard drinks of alcohol per week. He reports that he does not use drugs.      Family History   Problem Relation Age of Onset    Stroke Father     Cancer Sister        Review of Systems  Unable to perform ROS due to intubated status      Objective:    Vitals:    Vitals:    12/22/20 0800 12/22/20 0815 12/22/20 0830 12/22/20 0831   BP:  104/77 105/75    Pulse:  67 60 60   Resp:  11 26 26   Temp: 97.7 °F (36.5 °C)      SpO2:  90% 91% 91%   Weight:       Height:         I&O's:  12/21 0701 - 12/22 0700  In: -   Out: 1025 [Urine:1025]  Visit Vitals  /75   Pulse 60   Temp 97.7 °F (36.5 °C)   Resp 26   Ht 5' 11\" (1.803 m)   Wt 99.6 kg (219 lb 9.3 oz)   SpO2 91%   BMI 30.62 kg/m²       Physical Exam:  General:  On vent   HEENT: Pin point pupils,  No Pallor , No Icterus, Frontal lipoma   Neck: Supple,no mass palpable  Lungs : diminished   CVS: RRR, S1 S2 normal, No murmur   Abdomen: Soft, NT, BS +  Extremities: trace  Edema  Skin: No rash or lesions. MS: No joint swelling, erythema, warmth  Neurologic: sedated on vent   Psych: Unable to assess    Laboratory Results:    Recent Labs     12/22/20  0251 12/21/20  1932 12/21/20  1704    143 133*   K 4.7 3.5 4.5    113* 103   CO2 21 23 26   * 82 313*   BUN 44* 27* 28*   CREA 2.16* 1.35* 1.91*   CA 8.7 7.3* 9.4   MG  --  2.1  --    ALB 2.8*  2.8*  --  3.1*   ALT 49  52  --  56     Recent Labs     12/22/20  0251 12/21/20  1704   WBC 10.9 7.4   HGB 13.1 14.1   HCT 42.0 47.0    329     No results found for: SJ  Lab Results   Component Value Date/Time    Culture result: PENDING 12/21/2020 09:19 PM    Culture result: NO GROWTH AFTER 11 HOURS 12/21/2020 05:54 PM     Recent Results (from the past 24 hour(s))   SAMPLES BEING HELD    Collection Time: 12/21/20  5:04 PM   Result Value Ref Range    SAMPLES BEING HELD 1 RED, 1 LEE     COMMENT        Add-on orders for these samples will be processed based on acceptable specimen integrity and analyte stability, which may vary by analyte.    CBC WITH AUTOMATED DIFF    Collection Time: 12/21/20  5:04 PM   Result Value Ref Range    WBC 7.4 4.1 - 11.1 K/uL    RBC 5. 25 4. 10 - 5.70 M/uL    HGB 14.1 12.1 - 17.0 g/dL    HCT 47.0 36.6 - 50.3 %    MCV 89.5 80.0 - 99.0 FL    MCH 26.9 26.0 - 34.0 PG    MCHC 30.0 30.0 - 36.5 g/dL    RDW 15.1 (H) 11.5 - 14.5 %    PLATELET 736 624 - 763 K/uL    MPV 9.7 8.9 - 12.9 FL    NRBC 0.0 0  WBC    ABSOLUTE NRBC 0.00 0.00 - 0.01 K/uL    NEUTROPHILS 41 32 - 75 %    LYMPHOCYTES 49 12 - 49 %    MONOCYTES 6 5 - 13 %    EOSINOPHILS 3 0 - 7 %    BASOPHILS 0 0 - 1 %    IMMATURE GRANULOCYTES 1 (H) 0.0 - 0.5 %    ABS. NEUTROPHILS 3.0 1.8 - 8.0 K/UL    ABS. LYMPHOCYTES 3.6 (H) 0.8 - 3.5 K/UL    ABS. MONOCYTES 0.4 0.0 - 1.0 K/UL    ABS. EOSINOPHILS 0.2 0.0 - 0.4 K/UL    ABS. BASOPHILS 0.0 0.0 - 0.1 K/UL    ABS. IMM. GRANS. 0.1 (H) 0.00 - 0.04 K/UL    DF AUTOMATED     METABOLIC PANEL, COMPREHENSIVE    Collection Time: 12/21/20  5:04 PM   Result Value Ref Range    Sodium 133 (L) 136 - 145 mmol/L    Potassium 4.5 3.5 - 5.1 mmol/L    Chloride 103 97 - 108 mmol/L    CO2 26 21 - 32 mmol/L    Anion gap 4 (L) 5 - 15 mmol/L    Glucose 313 (H) 65 - 100 mg/dL    BUN 28 (H) 6 - 20 MG/DL    Creatinine 1.91 (H) 0.70 - 1.30 MG/DL    BUN/Creatinine ratio 15 12 - 20      GFR est AA 45 (L) >60 ml/min/1.73m2    GFR est non-AA 37 (L) >60 ml/min/1.73m2    Calcium 9.4 8.5 - 10.1 MG/DL    Bilirubin, total 0.3 0.2 - 1.0 MG/DL    ALT (SGPT) 56 12 - 78 U/L    AST (SGOT) 60 (H) 15 - 37 U/L    Alk.  phosphatase 114 45 - 117 U/L    Protein, total 8.7 (H) 6.4 - 8.2 g/dL    Albumin 3.1 (L) 3.5 - 5.0 g/dL    Globulin 5.6 (H) 2.0 - 4.0 g/dL    A-G Ratio 0.6 (L) 1.1 - 2.2     TROPONIN I    Collection Time: 12/21/20  5:04 PM   Result Value Ref Range    Troponin-I, Qt. 0.17 (H) <0.05 ng/mL   NT-PRO BNP    Collection Time: 12/21/20  5:04 PM   Result Value Ref Range    NT pro- (H) <125 PG/ML   C REACTIVE PROTEIN, QT    Collection Time: 12/21/20  5:04 PM   Result Value Ref Range    C-Reactive protein 0.56 0.00 - 0.60 mg/dL   D DIMER    Collection Time: 12/21/20  5:04 PM   Result Value Ref Range    D-dimer 1.96 (H) 0.00 - 0.65 mg/L FEU   FIBRINOGEN    Collection Time: 12/21/20  5:04 PM   Result Value Ref Range    Fibrinogen 416 200 - 475 mg/dL   PROCALCITONIN    Collection Time: 12/21/20  5:04 PM   Result Value Ref Range    Procalcitonin <0.05 ng/mL   EKG, 12 LEAD, INITIAL    Collection Time: 12/21/20  5:34 PM   Result Value Ref Range    Ventricular Rate 94 BPM    Atrial Rate 94 BPM    P-R Interval 194 ms    QRS Duration 102 ms    Q-T Interval 390 ms    QTC Calculation (Bezet) 487 ms    Calculated P Axis 61 degrees    Calculated R Axis -18 degrees    Calculated T Axis 104 degrees    Diagnosis       Normal sinus rhythm  Left ventricular hypertrophy with strain pattern  Possible , old Septal infarct (cited on or before  When compared with ECG of 11-JUL-2020 09:19,  No significant change was found  Confirmed by Yocasta Keane M.D., Foye Saliva ([id number]) on 12/22/2020 10:00:31 AM     LACTIC ACID    Collection Time: 12/21/20  5:54 PM   Result Value Ref Range    Lactic acid 1.4 0.4 - 2.0 MMOL/L   CULTURE, BLOOD, PAIRED    Collection Time: 12/21/20  5:54 PM    Specimen: Blood   Result Value Ref Range    Special Requests: NO SPECIAL REQUESTS      Culture result: NO GROWTH AFTER 11 HOURS     SARS-COV-2    Collection Time: 12/21/20  6:21 PM   Result Value Ref Range    Specimen source Nasopharyngeal      Specimen source Nasopharyngeal      COVID-19 rapid test Not detected NOTD      Specimen type NP Swab     POC EG7    Collection Time: 12/21/20  7:30 PM   Result Value Ref Range    Calcium, ionized (POC) 1.29 1.12 - 1.32 mmol/L    FIO2 (POC) 100 %    pH (POC) 7.16 (LL) 7.35 - 7.45      pCO2 (POC) 77.6 (H) 35.0 - 45.0 MMHG    pO2 (POC) 93 80 - 100 MMHG    HCO3 (POC) 27.8 (H) 22 - 26 MMOL/L    Base deficit (POC) 1 mmol/L    sO2 (POC) 94 92 - 97 %    Site RIGHT RADIAL      Device: VENT      Mode ASSIST CONTROL      Set Rate 30 bpm    PEEP/CPAP (POC) 12 cmH2O    Allens test (POC) YES      Inspiratory Time 0.66 sec    Specimen type (POC) ARTERIAL      Pressure control YES     URINALYSIS W/MICROSCOPIC    Collection Time: 12/21/20  7:32 PM   Result Value Ref Range    Color YELLOW/STRAW      Appearance CLEAR CLEAR      Specific gravity 1.010 1.003 - 1.030      pH (UA) 5.5 5.0 - 8.0      Protein 300 (A) NEG mg/dL    Glucose Negative NEG mg/dL    Ketone Negative NEG mg/dL    Bilirubin Negative NEG      Blood SMALL (A) NEG      Urobilinogen 0.2 0.2 - 1.0 EU/dL    Nitrites Negative NEG      Leukocyte Esterase Negative NEG      WBC 0-4 0 - 4 /hpf    RBC 0-5 0 - 5 /hpf    Epithelial cells FEW FEW /lpf    Bacteria Negative NEG /hpf    Granular cast 0-2 (A) NEG /lpf   DRUG SCREEN, URINE    Collection Time: 12/21/20  7:32 PM   Result Value Ref Range    AMPHETAMINES Negative NEG      BARBITURATES Negative NEG      BENZODIAZEPINES Negative NEG      COCAINE Negative NEG      METHADONE Negative NEG      OPIATES Negative NEG      PCP(PHENCYCLIDINE) Negative NEG      THC (TH-CANNABINOL) Negative NEG      Drug screen comment (NOTE)    MAGNESIUM    Collection Time: 12/21/20  7:32 PM   Result Value Ref Range    Magnesium 2.1 1.6 - 2.4 mg/dL   TROPONIN I    Collection Time: 12/21/20  7:32 PM   Result Value Ref Range    Troponin-I, Qt. 0.24 (H) <0.05 ng/mL   NT-PRO BNP    Collection Time: 12/21/20  7:32 PM   Result Value Ref Range    NT pro- (H) <984 PG/ML   METABOLIC PANEL, BASIC    Collection Time: 12/21/20  7:32 PM   Result Value Ref Range    Sodium 143 136 - 145 mmol/L    Potassium 3.5 3.5 - 5.1 mmol/L    Chloride 113 (H) 97 - 108 mmol/L    CO2 23 21 - 32 mmol/L    Anion gap 7 5 - 15 mmol/L    Glucose 82 65 - 100 mg/dL    BUN 27 (H) 6 - 20 MG/DL    Creatinine 1.35 (H) 0.70 - 1.30 MG/DL    BUN/Creatinine ratio 20 12 - 20      GFR est AA >60 >60 ml/min/1.73m2    GFR est non-AA 55 (L) >60 ml/min/1.73m2    Calcium 7.3 (L) 8.5 - 10.1 MG/DL   SARS-COV-2    Collection Time: 12/21/20  7:37 PM   Result Value Ref Range    Specimen source Nasopharyngeal SARS-CoV-2 Not detected NOTD      Specimen source NP SWAB     Specimen type NP Swab      Health status Symptomatic Testing     CULTURE, RESPIRATORY/SPUTUM/BRONCH W GRAM STAIN    Collection Time: 12/21/20  9:19 PM    Specimen: Sputum,ET Suction   Result Value Ref Range    Special Requests: NO SPECIAL REQUESTS      GRAM STAIN RARE WBCS SEEN      GRAM STAIN NO EPITHELIAL CELLS SEEN      GRAM STAIN RARE GRAM POSITIVE COCCI      GRAM STAIN RARE GRAM NEGATIVE RODS      Culture result: PENDING    RESPIRATORY PANEL,PCR,NASOPHARYNGEAL    Collection Time: 12/21/20  9:19 PM    Specimen: Nasopharyngeal   Result Value Ref Range    Adenovirus Not detected NOTD      Coronavirus 229E Not detected NOTD      Coronavirus HKU1 Not detected NOTD      Coronavirus CVNL63 Not detected NOTD      Coronavirus OC43 Not detected NOTD      Metapneumovirus Not detected NOTD      Rhinovirus and Enterovirus Not detected NOTD      Influenza A Not detected NOTD      Influenza A, subtype H1 Not detected NOTD      Influenza A, subtype H3 Not detected NOTD      INFLUENZA A H1N1 PCR Not detected NOTD      Influenza B Not detected NOTD      Parainfluenza 1 Not detected NOTD      Parainfluenza 2 Not detected NOTD      Parainfluenza 3 Not detected NOTD      Parainfluenza virus 4 Not detected NOTD      RSV by PCR Not detected NOTD      B. parapertussis, PCR Not detected NOTD      Bordetella pertussis - PCR Not detected NOTD      Chlamydophila pneumoniae DNA, QL, PCR Not detected NOTD      Mycoplasma pneumoniae DNA, QL, PCR Not detected NOTD     TROPONIN I    Collection Time: 12/22/20  2:50 AM   Result Value Ref Range    Troponin-I, Qt. 0.31 (H) <0.05 ng/mL   LACTIC ACID    Collection Time: 12/22/20  2:50 AM   Result Value Ref Range    Lactic acid 0.8 0.4 - 2.0 MMOL/L   METABOLIC PANEL, COMPREHENSIVE    Collection Time: 12/22/20  2:51 AM   Result Value Ref Range    Sodium 136 136 - 145 mmol/L    Potassium 4.7 3.5 - 5.1 mmol/L    Chloride 106 97 - 108 mmol/L CO2 21 21 - 32 mmol/L    Anion gap 9 5 - 15 mmol/L    Glucose 133 (H) 65 - 100 mg/dL    BUN 44 (H) 6 - 20 MG/DL    Creatinine 2.16 (H) 0.70 - 1.30 MG/DL    BUN/Creatinine ratio 20 12 - 20      GFR est AA 39 (L) >60 ml/min/1.73m2    GFR est non-AA 32 (L) >60 ml/min/1.73m2    Calcium 8.7 8.5 - 10.1 MG/DL    Bilirubin, total 0.4 0.2 - 1.0 MG/DL    ALT (SGPT) 52 12 - 78 U/L    AST (SGOT) 50 (H) 15 - 37 U/L    Alk. phosphatase 92 45 - 117 U/L    Protein, total 7.8 6.4 - 8.2 g/dL    Albumin 2.8 (L) 3.5 - 5.0 g/dL    Globulin 5.0 (H) 2.0 - 4.0 g/dL    A-G Ratio 0.6 (L) 1.1 - 2.2     HEPATIC FUNCTION PANEL    Collection Time: 12/22/20  2:51 AM   Result Value Ref Range    Protein, total 7.8 6.4 - 8.2 g/dL    Albumin 2.8 (L) 3.5 - 5.0 g/dL    Globulin 5.0 (H) 2.0 - 4.0 g/dL    A-G Ratio 0.6 (L) 1.1 - 2.2      Bilirubin, total 0.3 0.2 - 1.0 MG/DL    Bilirubin, direct 0.1 0.0 - 0.2 MG/DL    Alk. phosphatase 90 45 - 117 U/L    AST (SGOT) 50 (H) 15 - 37 U/L    ALT (SGPT) 49 12 - 78 U/L   CBC WITH AUTOMATED DIFF    Collection Time: 12/22/20  2:51 AM   Result Value Ref Range    WBC 10.9 4.1 - 11.1 K/uL    RBC 4.81 4.10 - 5.70 M/uL    HGB 13.1 12.1 - 17.0 g/dL    HCT 42.0 36.6 - 50.3 %    MCV 87.3 80.0 - 99.0 FL    MCH 27.2 26.0 - 34.0 PG    MCHC 31.2 30.0 - 36.5 g/dL    RDW 14.9 (H) 11.5 - 14.5 %    PLATELET 900 846 - 509 K/uL    MPV 9.9 8.9 - 12.9 FL    NRBC 0.0 0  WBC    ABSOLUTE NRBC 0.00 0.00 - 0.01 K/uL    NEUTROPHILS 92 (H) 32 - 75 %    LYMPHOCYTES 6 (L) 12 - 49 %    MONOCYTES 1 (L) 5 - 13 %    EOSINOPHILS 0 0 - 7 %    BASOPHILS 0 0 - 1 %    IMMATURE GRANULOCYTES 1 (H) 0.0 - 0.5 %    ABS. NEUTROPHILS 10.0 (H) 1.8 - 8.0 K/UL    ABS. LYMPHOCYTES 0.7 (L) 0.8 - 3.5 K/UL    ABS. MONOCYTES 0.1 0.0 - 1.0 K/UL    ABS. EOSINOPHILS 0.0 0.0 - 0.4 K/UL    ABS. BASOPHILS 0.0 0.0 - 0.1 K/UL    ABS. IMM.  GRANS. 0.1 (H) 0.00 - 0.04 K/UL    DF SMEAR SCANNED      RBC COMMENTS SERENE CELLS  PRESENT        RBC COMMENTS ANISOCYTOSIS  1+ Urine dipstick:   Lab Results   Component Value Date/Time    Color YELLOW/STRAW 12/21/2020 07:32 PM    Appearance CLEAR 12/21/2020 07:32 PM    Specific gravity 1.010 12/21/2020 07:32 PM    pH (UA) 5.5 12/21/2020 07:32 PM    Protein 300 (A) 12/21/2020 07:32 PM    Glucose Negative 12/21/2020 07:32 PM    Ketone Negative 12/21/2020 07:32 PM    Bilirubin Negative 12/21/2020 07:32 PM    Urobilinogen 0.2 12/21/2020 07:32 PM    Nitrites Negative 12/21/2020 07:32 PM    Leukocyte Esterase Negative 12/21/2020 07:32 PM    Epithelial cells FEW 12/21/2020 07:32 PM    Bacteria Negative 12/21/2020 07:32 PM    WBC 0-4 12/21/2020 07:32 PM    RBC 0-5 12/21/2020 07:32 PM       I have reviewed the following: All pertinent labs, microbiology data, radiology imaging for my assessment     Medications list Personally Reviewed   [x]      Yes     []               No       Medications:  Prior to Admission medications    Medication Sig Start Date End Date Taking? Authorizing Provider   aspirin-acetaminophen-caffeine ([employer]'s Extra Strength) 520-260-32.5 mg pwpk Take 1 Package by mouth two (2) times a day. Yes Provider, Historical   ibuprofen (MOTRIN) 600 mg tablet Take 600 mg by mouth every six (6) hours as needed for Pain. Yes Provider, Historical   carvediloL (COREG) 25 mg tablet Take 1 Tab by mouth two (2) times daily (with meals). 7/16/20  Yes Lawyer Sole MD   isosorbide mononitrate ER (IMDUR) 30 mg tablet Take 1 Tab by mouth daily. 7/16/20  Yes Lawyer Sole MD   cloNIDine HCL (CATAPRES) 0.1 mg tablet Take 1 Tab by mouth two (2) times a day. 1/16/20  Yes Lawyer Sole MD   aspirin 81 mg chewable tablet Take 1 Tab by mouth daily. 12/13/19  Yes Keeley Mcnair MD        Thank you for allowing us to participate in the care of this patient. We will follow patient.  Please dont hesitate to call with any questions    Priscilla Dinero [address] Nephrology [facility] [address]  Phone - [phone]   Fax - [phone]  [email or web address]

## 2020-12-23 ENCOUNTER — APPOINTMENT (OUTPATIENT)
Dept: CT IMAGING | Age: 55
DRG: 207 | End: 2020-12-23
Attending: INTERNAL MEDICINE

## 2020-12-23 LAB
ALBUMIN SERPL-MCNC: 2.5 G/DL (ref 3.5–5)
ALBUMIN/GLOB SERPL: 0.6 {RATIO} (ref 1.1–2.2)
ALP SERPL-CCNC: 70 U/L (ref 45–117)
ALT SERPL-CCNC: 33 U/L (ref 12–78)
ANION GAP SERPL CALC-SCNC: 7 MMOL/L (ref 5–15)
APPEARANCE FLD: ABNORMAL
ARTERIAL PATENCY WRIST A: YES
ARTERIAL PATENCY WRIST A: YES
AST SERPL-CCNC: 22 U/L (ref 15–37)
BASE DEFICIT BLD-SCNC: 4 MMOL/L
BASE DEFICIT BLDA-SCNC: 6.1 MMOL/L
BASE DEFICIT BLDA-SCNC: 6.6 MMOL/L
BDY SITE: ABNORMAL
BILIRUB SERPL-MCNC: 0.3 MG/DL (ref 0.2–1)
BNP SERPL-MCNC: 780 PG/ML
BUN SERPL-MCNC: 69 MG/DL (ref 6–20)
BUN/CREAT SERPL: 16 (ref 12–20)
CA-I BLD-SCNC: 1.14 MMOL/L (ref 1.12–1.32)
CALCIUM SERPL-MCNC: 7.9 MG/DL (ref 8.5–10.1)
CHLORIDE SERPL-SCNC: 106 MMOL/L (ref 97–108)
CO2 SERPL-SCNC: 24 MMOL/L (ref 21–32)
COLOR FLD: ABNORMAL
CREAT SERPL-MCNC: 4.36 MG/DL (ref 0.7–1.3)
CREAT UR-MCNC: 210 MG/DL
EPAP/CPAP/PEEP, PAPEEP: 14
EPAP/CPAP/PEEP, PAPEEP: 8
ERYTHROCYTE [DISTWIDTH] IN BLOOD BY AUTOMATED COUNT: 15.3 % (ref 11.5–14.5)
FIO2 ON VENT: 100 %
FIO2 ON VENT: 60 %
GAS FLOW.O2 O2 DELIVERY SYS: ABNORMAL L/MIN
GAS FLOW.O2 SETTING OXYMISER: 16 L/MIN
GAS FLOW.O2 SETTING OXYMISER: 22 BPM
GAS FLOW.O2 SETTING OXYMISER: 26 L/MIN
GLOBULIN SER CALC-MCNC: 4.4 G/DL (ref 2–4)
GLUCOSE SERPL-MCNC: 118 MG/DL (ref 65–100)
HAV IGM SER QL: NONREACTIVE
HBV CORE IGM SER QL: NONREACTIVE
HBV SURFACE AG SER QL: 0.28 INDEX
HBV SURFACE AG SER QL: NEGATIVE
HCO3 BLD-SCNC: 21.8 MMOL/L (ref 22–26)
HCO3 BLDA-SCNC: 19 MMOL/L (ref 22–26)
HCO3 BLDA-SCNC: 21 MMOL/L (ref 22–26)
HCT VFR BLD AUTO: 37.2 % (ref 36.6–50.3)
HCV AB SERPL QL IA: NONREACTIVE
HCV COMMENT,HCGAC: NORMAL
HGB BLD-MCNC: 11.8 G/DL (ref 12.1–17)
HIV 1+2 AB+HIV1 P24 AG SERPL QL IA: NONREACTIVE
HIV12 RESULT COMMENT, HHIVC: NORMAL
IPAP/PIP, IPAPIP: 20
L PNEUMO1 AG UR QL IA: NEGATIVE
LACTATE SERPL-SCNC: 1.1 MMOL/L (ref 0.4–2)
LYMPHOCYTES NFR FLD: 45 %
MAGNESIUM SERPL-MCNC: 2.3 MG/DL (ref 1.6–2.4)
MCH RBC QN AUTO: 27.4 PG (ref 26–34)
MCHC RBC AUTO-ENTMCNC: 31.7 G/DL (ref 30–36.5)
MCV RBC AUTO: 86.3 FL (ref 80–99)
NEUTROPHILS NFR FLD: 55 %
NRBC # BLD: 0 K/UL (ref 0–0.01)
NRBC BLD-RTO: 0 PER 100 WBC
NUC CELL # FLD: 638 /CU MM
O2/TOTAL GAS SETTING VFR VENT: 50 %
PCO2 BLD: 41.3 MMHG (ref 35–45)
PCO2 BLDA: 40 MMHG (ref 35–45)
PCO2 BLDA: 48 MMHG (ref 35–45)
PEEP RESPIRATORY: 8 CMH2O
PH BLD: 7.33 [PH] (ref 7.35–7.45)
PH BLDA: 7.26 [PH] (ref 7.35–7.45)
PH BLDA: 7.3 [PH] (ref 7.35–7.45)
PHOSPHATE SERPL-MCNC: 7.4 MG/DL (ref 2.6–4.7)
PLATELET # BLD AUTO: 287 K/UL (ref 150–400)
PMV BLD AUTO: 9.5 FL (ref 8.9–12.9)
PO2 BLD: 87 MMHG (ref 80–100)
PO2 BLDA: 73 MMHG (ref 80–100)
POTASSIUM SERPL-SCNC: 4.6 MMOL/L (ref 3.5–5.1)
PROCALCITONIN SERPL-MCNC: 6.52 NG/ML
PROT SERPL-MCNC: 6.9 G/DL (ref 6.4–8.2)
PROT UR-MCNC: 56 MG/DL (ref 0–11.9)
PROT/CREAT UR-RTO: 0.3
RBC # BLD AUTO: 4.31 M/UL (ref 4.1–5.7)
RBC # FLD: >100 /CU MM
SAO2 % BLD: 93 % (ref 92–97)
SAO2 % BLD: 96 % (ref 92–97)
SAO2% DEVICE SAO2% SENSOR NAME: ABNORMAL
SAO2% DEVICE SAO2% SENSOR NAME: ABNORMAL
SERVICE CMNT-IMP: ABNORMAL
SODIUM SERPL-SCNC: 137 MMOL/L (ref 136–145)
SP1: NORMAL
SP2: NORMAL
SP3: NORMAL
SPECIMEN SITE: ABNORMAL
SPECIMEN SITE: ABNORMAL
SPECIMEN SOURCE FLD: ABNORMAL
SPECIMEN SOURCE: NORMAL
SPECIMEN TYPE: ABNORMAL
TOTAL RESP. RATE, ITRR: 22
VANCOMYCIN SERPL-MCNC: 17.1 UG/ML
VENTILATION MODE VENT: ABNORMAL
VT SETTING VENT: 420 ML
VT SETTING VENT: 500 ML
WBC # BLD AUTO: 13.3 K/UL (ref 4.1–11.1)

## 2020-12-23 PROCEDURE — 85027 COMPLETE CBC AUTOMATED: CPT

## 2020-12-23 PROCEDURE — 83605 ASSAY OF LACTIC ACID: CPT

## 2020-12-23 PROCEDURE — 74011250636 HC RX REV CODE- 250/636: Performed by: INTERNAL MEDICINE

## 2020-12-23 PROCEDURE — 94003 VENT MGMT INPAT SUBQ DAY: CPT

## 2020-12-23 PROCEDURE — 84156 ASSAY OF PROTEIN URINE: CPT

## 2020-12-23 PROCEDURE — 83516 IMMUNOASSAY NONANTIBODY: CPT

## 2020-12-23 PROCEDURE — 86160 COMPLEMENT ANTIGEN: CPT

## 2020-12-23 PROCEDURE — 74011250636 HC RX REV CODE- 250/636: Performed by: NURSE PRACTITIONER

## 2020-12-23 PROCEDURE — 71250 CT THORAX DX C-: CPT

## 2020-12-23 PROCEDURE — 36600 WITHDRAWAL OF ARTERIAL BLOOD: CPT

## 2020-12-23 PROCEDURE — 82803 BLOOD GASES ANY COMBINATION: CPT

## 2020-12-23 PROCEDURE — 89050 BODY FLUID CELL COUNT: CPT

## 2020-12-23 PROCEDURE — 83735 ASSAY OF MAGNESIUM: CPT

## 2020-12-23 PROCEDURE — 86038 ANTINUCLEAR ANTIBODIES: CPT

## 2020-12-23 PROCEDURE — 74011250636 HC RX REV CODE- 250/636: Performed by: ANESTHESIOLOGY

## 2020-12-23 PROCEDURE — 94760 N-INVAS EAR/PLS OXIMETRY 1: CPT

## 2020-12-23 PROCEDURE — 87070 CULTURE OTHR SPECIMN AEROBIC: CPT

## 2020-12-23 PROCEDURE — 0B9M8ZX DRAINAGE OF BILATERAL LUNGS, VIA NATURAL OR ARTIFICIAL OPENING ENDOSCOPIC, DIAGNOSTIC: ICD-10-PCS | Performed by: ANESTHESIOLOGY

## 2020-12-23 PROCEDURE — 80053 COMPREHEN METABOLIC PANEL: CPT

## 2020-12-23 PROCEDURE — 36415 COLL VENOUS BLD VENIPUNCTURE: CPT

## 2020-12-23 PROCEDURE — 83880 ASSAY OF NATRIURETIC PEPTIDE: CPT

## 2020-12-23 PROCEDURE — 74011250637 HC RX REV CODE- 250/637: Performed by: ANESTHESIOLOGY

## 2020-12-23 PROCEDURE — 83520 IMMUNOASSAY QUANT NOS NONAB: CPT

## 2020-12-23 PROCEDURE — 94640 AIRWAY INHALATION TREATMENT: CPT

## 2020-12-23 PROCEDURE — 86162 COMPLEMENT TOTAL (CH50): CPT

## 2020-12-23 PROCEDURE — 74011000258 HC RX REV CODE- 258: Performed by: ANESTHESIOLOGY

## 2020-12-23 PROCEDURE — 65620000000 HC RM CCU GENERAL

## 2020-12-23 PROCEDURE — 87389 HIV-1 AG W/HIV-1&-2 AB AG IA: CPT

## 2020-12-23 PROCEDURE — 84100 ASSAY OF PHOSPHORUS: CPT

## 2020-12-23 PROCEDURE — 74011250636 HC RX REV CODE- 250/636: Performed by: EMERGENCY MEDICINE

## 2020-12-23 PROCEDURE — 87116 MYCOBACTERIA CULTURE: CPT

## 2020-12-23 PROCEDURE — 74011000250 HC RX REV CODE- 250: Performed by: NURSE PRACTITIONER

## 2020-12-23 PROCEDURE — 83883 ASSAY NEPHELOMETRY NOT SPEC: CPT

## 2020-12-23 PROCEDURE — 80074 ACUTE HEPATITIS PANEL: CPT

## 2020-12-23 PROCEDURE — 80202 ASSAY OF VANCOMYCIN: CPT

## 2020-12-23 PROCEDURE — 74011000250 HC RX REV CODE- 250: Performed by: ANESTHESIOLOGY

## 2020-12-23 PROCEDURE — 84145 PROCALCITONIN (PCT): CPT

## 2020-12-23 RX ORDER — ENOXAPARIN SODIUM 100 MG/ML
30 INJECTION SUBCUTANEOUS DAILY
Status: DISCONTINUED | OUTPATIENT
Start: 2020-12-24 | End: 2020-12-25

## 2020-12-23 RX ORDER — VANCOMYCIN/0.9 % SOD CHLORIDE 1.5G/250ML
1500 PLASTIC BAG, INJECTION (ML) INTRAVENOUS ONCE
Status: COMPLETED | OUTPATIENT
Start: 2020-12-23 | End: 2020-12-23

## 2020-12-23 RX ORDER — ROCURONIUM BROMIDE 10 MG/ML
50 INJECTION, SOLUTION INTRAVENOUS
Status: COMPLETED | OUTPATIENT
Start: 2020-12-23 | End: 2020-12-23

## 2020-12-23 RX ORDER — SODIUM BICARBONATE IN D5W 150/1000ML
PLASTIC BAG, INJECTION (ML) INTRAVENOUS CONTINUOUS
Status: DISCONTINUED | OUTPATIENT
Start: 2020-12-23 | End: 2020-12-24

## 2020-12-23 RX ADMIN — Medication 10 ML: at 23:00

## 2020-12-23 RX ADMIN — ENOXAPARIN SODIUM 40 MG: 40 INJECTION SUBCUTANEOUS at 08:26

## 2020-12-23 RX ADMIN — VANCOMYCIN HYDROCHLORIDE 1500 MG: 10 INJECTION, POWDER, LYOPHILIZED, FOR SOLUTION INTRAVENOUS at 18:39

## 2020-12-23 RX ADMIN — SODIUM CHLORIDE 20 MG: 9 INJECTION INTRAMUSCULAR; INTRAVENOUS; SUBCUTANEOUS at 08:26

## 2020-12-23 RX ADMIN — Medication 17.2 MG: at 08:26

## 2020-12-23 RX ADMIN — ROCURONIUM BROMIDE 50 MG: 50 INJECTION, SOLUTION INTRAVENOUS at 15:22

## 2020-12-23 RX ADMIN — CHLORHEXIDINE GLUCONATE 15 ML: 0.12 RINSE ORAL at 08:27

## 2020-12-23 RX ADMIN — PROPOFOL 50 MCG/KG/MIN: 10 INJECTION, EMULSION INTRAVENOUS at 04:18

## 2020-12-23 RX ADMIN — PROPOFOL 40 MCG/KG/MIN: 10 INJECTION, EMULSION INTRAVENOUS at 15:05

## 2020-12-23 RX ADMIN — DOCUSATE SODIUM 100 MG: 100 CAPSULE ORAL at 08:27

## 2020-12-23 RX ADMIN — Medication 10 ML: at 15:05

## 2020-12-23 RX ADMIN — CEFEPIME HYDROCHLORIDE 2 G: 2 INJECTION, POWDER, FOR SOLUTION INTRAVENOUS at 03:14

## 2020-12-23 RX ADMIN — PROPOFOL 35 MCG/KG/MIN: 10 INJECTION, EMULSION INTRAVENOUS at 21:42

## 2020-12-23 RX ADMIN — CEFEPIME HYDROCHLORIDE 2 G: 2 INJECTION, POWDER, FOR SOLUTION INTRAVENOUS at 16:23

## 2020-12-23 RX ADMIN — IPRATROPIUM BROMIDE AND ALBUTEROL SULFATE 3 ML: .5; 3 SOLUTION RESPIRATORY (INHALATION) at 03:53

## 2020-12-23 RX ADMIN — Medication 200 MCG/HR: at 17:47

## 2020-12-23 RX ADMIN — IPRATROPIUM BROMIDE AND ALBUTEROL SULFATE 3 ML: .5; 3 SOLUTION RESPIRATORY (INHALATION) at 00:14

## 2020-12-23 RX ADMIN — PROPOFOL 40 MCG/KG/MIN: 10 INJECTION, EMULSION INTRAVENOUS at 18:40

## 2020-12-23 RX ADMIN — Medication 200 MCG/HR: at 08:37

## 2020-12-23 RX ADMIN — SODIUM BICARBONATE 150 MEQ/1,000 ML IN DEXTROSE 5 % INTRAVENOUS: SOLUTION at 21:42

## 2020-12-23 RX ADMIN — PROPOFOL 50 MCG/KG/MIN: 10 INJECTION, EMULSION INTRAVENOUS at 02:00

## 2020-12-23 RX ADMIN — SODIUM CHLORIDE 1000 ML: 9 INJECTION, SOLUTION INTRAVENOUS at 12:23

## 2020-12-23 RX ADMIN — Medication 200 MCG/HR: at 00:38

## 2020-12-23 RX ADMIN — PROPOFOL 40 MCG/KG/MIN: 10 INJECTION, EMULSION INTRAVENOUS at 10:50

## 2020-12-23 RX ADMIN — CHLORHEXIDINE GLUCONATE 15 ML: 0.12 RINSE ORAL at 21:43

## 2020-12-23 RX ADMIN — SODIUM BICARBONATE 150 MEQ/1,000 ML IN DEXTROSE 5 % INTRAVENOUS: SOLUTION at 08:39

## 2020-12-23 NOTE — PROGRESS NOTES
0000: Assumed care of patient from JAMES Huang.   0330: AM labs drawn and sent  0600: Fiance updated on status  0730: Bedside shift change report given to JAMES Leone (oncoming nurse) by Fawad Licea (offgoing nurse). Report included the following information SBAR, Kardex, Intake/Output and MAR.

## 2020-12-23 NOTE — PROGRESS NOTES
1900 TRANSFER - IN REPORT:    Verbal report received from RN(name) on Vidya Urbina  being received from ICU(unit) for routine progression of care      Report consisted of patients Situation, Background, Assessment and   Recommendations(SBAR). Information from the following report(s) SBAR, Kardex, Intake/Output, MAR and Cardiac Rhythm SB/NSR was reviewed with the receiving nurse. Opportunity for questions and clarification was provided. Assessment completed upon patients arrival to unit and care assumed.

## 2020-12-23 NOTE — CONSULTS
Comprehensive Nutrition Assessment    Type and Reason for Visit: Initial, Consult      Nutrition Recommendations/Plan:    1. TF via OG of Nepro @ 20 mL/hr + 2 packs Prosource TID and 1 pack q HS + 30 mL H2O flushes q 4 hours  i. Consider Wellesse MVI with low volume TF    2. Off propofol, Nepro @ 40 mL/hr + 2 packs Prosource BID and 30 mL H2O flushes q 4 hours      Nutrition Assessment:     50 y.o. male who has a PMHx of HTN, non-ischemic cardiomyopathy, chronic L frontal lipoma, CKD, obesity, and chronic tobacco use. Admitted with acute hypoxic respiratory failure (?aspiration vs CAP) and malignant HTN. Started on Cardene drip, now hypotensive and on levo. Elevated troponin, ECHO 40-45%, cards following. YISSEL on CKD, ATN from sustained hypotension and rapid lowering of BP, nephrology following, no emergent needs for dialysis, but consent obtained if necessary. Vented, +OG in place for feeding. Propofol @ 27.2 mL/hr providing 718 kcal.    Ve observed 9.49 l/min. Temp (24hrs), Av.5 °F (36.9 °C), Min:97.9 °F (36.6 °C), Max:98.8 °F (37.1 °C). Pt appears well-developed, well-nourished. No DM hx noted, but some insulin resistance based on HbA1C hx. Noted hyperphosphatemia. Awaiting BM, but on minimal pressors. Will use Nepro for now. Recommend goal of Nepro @ 20 mL/hr + 2 packs Prosource TID + 1 pack q HS with 30 mL/h to provide 480 mL, 1284 kcal, 144 gm pro, 350+630+750=2318 mL free H2O.   With propofol = 2002 kcal.    Off propofol, Nepro @ 40 mL/hr + 2 packs Prosource BID and 30 mL H2O flushes q 4 hours = 960 mL, 138 gm pro, 1968 kcal, 700+360+387=1183 mL free H2O        Malnutrition Assessment:  Malnutrition Status:  No malnutrition        Nutritionally Significant Medications:   Cefepime, Colace, Pepcid, fentanyl, Levophed, propofol, senna, sodium bicarb, vancomycin      Estimated Daily Nutrient Needs:  Energy (kcal): 2000([id number])  Protein (g): 155(2 gm/kg IBW)  Fluid (ml/day): 1 mL/kcal    Nutrition Related Findings:   Edema: none  Last BM: PTA    Wounds:    None       Current Nutrition Therapies:  DIET NPO  EN Order: pending    Anthropometric Measures:  · Height:  5' 11\" (180.3 cm)  · Current Body Wt:  100.7 kg (222 lb 0.1 oz)   · Admission Body Wt:  219 lb 9.3 oz(99.6 kg - bed)    · Usual Body Wt:        · Ideal Body Wt:  172 lbs:  129.1 %   · BMI Category:  Obese class 1 (BMI 30.0-34. 9)       Wt Readings from Last 20 Encounters:   12/23/20 100.7 kg (222 lb 0.1 oz)   12/22/20 99.6 kg (219 lb 9.2 oz) - first wt taken   07/12/20 105.1 kg (231 lb 11.3 oz) - suspect error   07/11/20 96.6 kg (212 lb 15.4 oz)   07/10/20 96.6 kg (212 lb 15.4 oz)   01/16/20 99.6 kg (219 lb 9.6 oz)   12/12/19 98.7 kg (217 lb 9.5 oz)   09/12/17 97.7 kg (215 lb 6.2 oz)   12/30/16 102.2 kg (225 lb 5 oz)   09/01/16 99.3 kg (219 lb)   08/15/12 92.4 kg (203 lb 11.3 oz)   12/04/11 94.8 kg (208 lb 15.9 oz)   09/20/10 96.9 kg (213 lb 10 oz)   06/02/10 92.8 kg (204 lb 9.4 oz)       Nutrition Diagnosis:   · Inadequate oral intake related to impaired respiratory function as evidenced by NPO or clear liquid status due to medical condition, intubation      Nutrition Interventions:   Food and/or Nutrient Delivery: Start tube feeding  Nutrition Education and Counseling: No recommendations at this time  Coordination of Nutrition Care: Continue to monitor while inpatient    Goals:  tolerate TF at goal within 72 hours       Nutrition Monitoring and Evaluation:   Behavioral-Environmental Outcomes: None identified  Food/Nutrient Intake Outcomes: Enteral nutrition intake/tolerance  Physical Signs/Symptoms Outcomes: Biochemical data, GI status, Hemodynamic status, Weight, Fluid status or edema    Discharge Planning:     Too soon to determine     Recent Labs     12/23/20  0332 12/22/20  0251 12/21/20  1932 12/21/20  1704   * 133* 82 313*   BUN 69* 44* 27* 28*   CREA 4.36* 2.16* 1.35* 1.91*    136 143 133*   K 4.6 4.7 3.5 4.5    106 113* 103 CO2 24 21 23 26   CA 7.9* 8.7 7.3* 9.4   PHOS 7.4*  --   --   --    MG 2.3  --  2.1  --        Recent Labs     12/23/20  0332 12/22/20  0251 12/21/20  1704   ALT 33 49  52 56   AP 70 90  92 114   TBILI 0.3 0.3  0.4 0.3   TP 6.9 7.8  7.8 8.7*   ALB 2.5* 2.8*  2.8* 3.1*   GLOB 4.4* 5.0*  5.0* 5.6*       Recent Labs     12/23/20  0332 12/22/20  0250 12/21/20  1754   LAC 1.1 0.8 1.4       Recent Labs     12/23/20  0332 12/22/20 0251   WBC 13.3* 10.9   HGB 11.8* 13.1   HCT 37.2 42.0    302       No results for input(s): PREALB in the last 72 hours. No results for input(s): TRIGL in the last 72 hours. No results for input(s): GLUCPOC in the last 72 hours.     Lab Results   Component Value Date/Time    Hemoglobin A1c 6.1 (H) 12/09/2019 06:13 AM    Hemoglobin A1c 6.5 (H) 08/28/2013 07:30 PM           Jennifer Sahu RD  Available via [employer]  Or Pager# [phone]

## 2020-12-23 NOTE — PROCEDURES
[facility]  Bronchoscopy Procedure Note    Procedure:  Diagnostic bronchoscopy. Indication:  Abnormal chest imaging, Mucus Plugging and Pneumonia    Consent/Treatment:  Informed consent was obtained from the  patient after risks, benefits and alternatives were explained. Timeout verified the correct patient and correct procedure. Anesthesia:   General anesthesia was performed by anesthesiology    The following parameters were monitored: oxygen saturation, heart rate, blood pressure, respiratory rate, EKG, adequacy of pulmonary ventilation and response to care. Total physician intraservice time was 45 min. Procedure Details:   -- The bronchoscope was introduced through an endotracheal tube.    -- The trachea and shamir were completely inspected and were found to be normal.  -- The right-sided endobronchial anatomy was completely inspected and was found to be normal.  -- The left-sided endobronchial anatomy was completely inspected and was found to be normal.     Specimens:   Bronchial washings were sent for  microbiology, cytology and AFB smear and culture    Rapid On-Site Evaluation: A preliminary diagnosis of pneumonia    Complications: none    Estimated Blood Loss: Minimal    Demar Gomez DO   Staff Anesthesiologist/Intensivist  [facility]

## 2020-12-23 NOTE — PROGRESS NOTES
Pharmacist Note - Vancomycin Dosing  Therapy day 2  Indication: bilateral PNA  Current regimen: Dosing by levels, most recent dose 12/22 @ 16:27    A Random Level resulted at 17.1 mcg/mL which was obtained ~24 hrs post-dose. Goal trough: 15 - 20 mcg/mL     Plan: Will order vancomycin 1500 mg IV x 1 dose now. Continue to dose by levels and monitor renal function closely until YISSEL has resolved. Pharmacy will continue to monitor this patient daily for changes in clinical status and renal function.

## 2020-12-23 NOTE — PROGRESS NOTES
1430 Bedside and Verbal shift change report given to Sharla Saucedo (oncoming nurse) by Saurabh Christianson (offgoing nurse). Report included the following information SBAR, Kardex, ED Summary, Procedure Summary, Intake/Output, MAR, Accordion and Recent Results.

## 2020-12-23 NOTE — PROGRESS NOTES
Patient arrived on CCU in room 24 originally resting until patient was getting bathed when he started pulling on lines, pulling at restraints, and trying to get up. Prop and fentanyl was increased per MD. EKG was done - normal. Troponin was taken and currently awaiting results. Veena Bianchi, called and was updated on the above.

## 2020-12-23 NOTE — PROGRESS NOTES
Discussed w/ spouse - [place]   She was updated on cardio, pulm, renal, critical care issues   Consented for dialysis if need arises   She is planning to visit later this afternoon          [facility]  Office :[phone]  Fax: [phone]'

## 2020-12-23 NOTE — PROGRESS NOTES
Renal Dosing/Monitoring  Medication: enoxaparin    Current regimen:  40mg SQ every 24 hr  Recent Labs     12/23/20  0332 12/22/20  0251 12/21/20  1932   CREA 4.36* 2.16* 1.35*   BUN 69* 44* 27*     Estimated CrCl:  23 ml/min  Plan: Change to 30 mg SQ q24h for CrCl < 30 ml/min  as per the Pharmacy Renal dosing protocol

## 2020-12-23 NOTE — PROGRESS NOTES
12/23/2020 -   RAFIA:  - RUR: 13%  - Disposition is TBD dependent on progression     - Cr is trending up  - Labs are being monitored  - ABX Continue  - Granados continues  - Nephro is monitoring closely for potential dialysis needs  - Patient to have Chest CT today, 12/23  - Patient continues to require pressor support  - Patient remains vented and sedated  CRM: Jean Pierre Lim, MPH, [address] [place] [address]; Z: [phone]

## 2020-12-23 NOTE — PROGRESS NOTES
Pharmacy consult note: Vancomycin dosing  Day #2 of Vancomycin  Indication:  bilateral PNA  Current regimen:  1500 mg q24h  Abx regimen:  Vanc, Cefepime  ID Following ?: NO  Concomitant nephrotoxic drugs (requires more frequent monitoring): None  Frequency of BMP?: every day through     Recent Labs     20  0332 20  0251 20  1932 20  1704   WBC 13.3* 10.9  --  7.4   CREA 4.36* 2.16* 1.35* 1.91*   BUN PENDING 44* 27* 28*     Est CrCl: 23 ml/min; UO: 0.5 ml/kg/hr  Temp (24hrs), Av.5 °F (36.9 °C), Min:97.9 °F (36.6 °C), Max:98.8 °F (37.1 °C)    Cultures:    blood: NGTD - pend   sputum: gpc, gnr - pend   resp panel: neg   sputum: NGTD - pend    Goal trough = 15 - 20 mcg/mL    Recent trough history (date/time/level/dose/action taken):  N/a    Plan: Hold Vancomycin for rapid rise in SCr, change to dose per level.  Random Vancomycin level @ 1600 today approximately 24 hours post loading dose

## 2020-12-23 NOTE — PROGRESS NOTES
[facility]    ICU TEAM Progress Note    Name: Katt Monroe   : 1965   MRN: [id number]   Date: 2020      Assessment     ICU Problems:  Assessment:  1. Acute Hypoxic Respiratory Failure  2. CAP/Aspiration  3. Troponin Leak  4. Obesity   5. Nx nonischemic cardiomyopathy   6. Chronic tobacco abuse  7. YISSEL/CKD stage 2  8. Left frontal lipoma-Chronic   9. Hx tobacco abuse    Imagin2020      ICU Comprehensive Plan of Care:   1. Acute hypoxic respiratory failure (GN rods/GM positive cocci in sputum)  -? Aspiration vs CAP  -Continue Vanc/Cefepime  -Cultures pending  -Continue ventilator support - optimize PEEP - Driving pressure is good  -Wean FiO2 for SpO2 > 90%  -HOB >30  -Propofol and fent for sedation   -Trend nt-BNP/Procal/Lact    2. Elevated troponin   -ECHO 40-45%   -Cardiology follwoing     3. YISSEL/CKD stage 2  -From Hypotension  -Nephrology consulted - discussed case with Dr. Monik Estrada  -NaBicarb gtt  -Levophed MAP > 65 mmHg  -Strict I and O      Plans for this Shift:     1. Ventilator support, will wean as tolerated  2. Legionella/strep Pna pending   3. UA/UC pending   4. Send sputum   5. SBP Goal of: > 90 mmHg  6. MAP Goal of: > 65 mmHg  7. Phenylephrine - For above SBP/MAP goals  8. IVFs: KVO  9. Transfusion Trigger (Hgb): <7 g/dL  10. Respiratory Goals:  a. Chlorhexidine   b. Optimize PEEP/Ventilation/Oxygenation  c. Goal Tidal Volume 6 cc/kg based on IBW  d. Aim for lung protective ventilation  e. Head of bed > 30 degrees  f. Aggressive bronchopulmonary hygiene  11. Pulmonary toilet: Duo-Nebs   12. SpO2 Goal: > 92%  13. Keep K>4; Mg>2   14. PT/OT  15. Goals of Care Discussion with family Pending   12. Plan of Care/Code Status: Full Code  17. [facility] Cardiology, nephrology   18. Discussed Care Plan with Bedside RN  19. Documentation of Current Medications  20.  Rest of Plan Below:    F - Feeding:  Pending   A - Analgesia: Fentanyl  S - Sedation: Propofol  T - DVT Prophylaxis: SCD's or Sequential Compression Device   H - Head of Bed: > 30 Degrees  U - Ulcer Prophylaxis: Protonix (pantoprazole)   G - Glycemic Control: Insulin  S - Spontaneous Breathing Trial: Pending  B - Bowel Regimen: Docusate (Colace)  I - Indwelling Catheter:   Tubes: ETT  Lines: Peripheral IV  Drains: Granados Catheter  D - De-escalation of Antibiotics: Vanc & Cefepime    Subjective:   Progress Note: 12/23/2020      Reason for ICU Admission: Acute hypoxic respiratory failure      HPI: Stephanie Diana is a 49 yo AAM with a PMH significant for nonischemic cardiomyopathy, left frontal lipoma, CKD, obesity, chronic tobacco abuse, who presented to the ER via EMS with co increased SOB X2 weeks. Pt is currently sedated and on the ventilator, unable to provide HPI thereofre obtained via chart and provider. Pt failed bipap while in the ER with continued worsening hypoxia, therefore pt was intubated and placed on the ventilator. On arrival to ED pt was found to be hypertensive with /100. Patient was then transferred to ICU for further management in care.           Overnight Events:   12/23/2020  -Weaning FiO2  -Started on Vanc/Cefepime    Active Problem List:     Problem List  Date Reviewed: 9/28/2020          Codes Class    Hypoxia ICD-10-CM: R09.02  ICD-9-CM: 799.02         Cervical radiculopathy ICD-10-CM: M54.12  ICD-9-CM: 723.4         Arm paresthesia, left ICD-10-CM: R20.2  ICD-9-CM: 782.0         Intractable headache ICD-10-CM: R51.9  ICD-9-CM: 784.0         Dilated cardiomyopathy (HCC) ICD-10-CM: I42.0  ICD-9-CM: 425.4         NSTEMI (non-ST elevated myocardial infarction) ([facility] [address] ICD-10-CM: I21.4  ICD-9-CM: 410.70         HTN (hypertension) ICD-10-CM: I10  ICD-9-CM: 401.9               Past Medical History:      has a past medical history of Hypertension. Past Surgical History:      has no past surgical history on file.     Home Medications:     Prior to Admission medications Medication Sig Start Date End Date Taking? Authorizing Provider   aspirin-acetaminophen-caffeine ([employer] Extra Strength) 520-260-32.5 mg pwpk Take 1 Package by mouth two (2) times a day. Yes Provider, Historical   ibuprofen (MOTRIN) 600 mg tablet Take 600 mg by mouth every six (6) hours as needed for Pain. Yes Provider, Historical   carvediloL (COREG) 25 mg tablet Take 1 Tab by mouth two (2) times daily (with meals). 20  Yes Queenie Kothari MD   isosorbide mononitrate ER (IMDUR) 30 mg tablet Take 1 Tab by mouth daily. 20  Yes Queenie Kothari MD   cloNIDine HCL (CATAPRES) 0.1 mg tablet Take 1 Tab by mouth two (2) times a day. 20  Yes Queenie Kothari MD   aspirin 81 mg chewable tablet Take 1 Tab by mouth daily. 19  Yes Alxe Rivero MD       Allergies/Social/Family History:     No Known Allergies   Social History     Tobacco Use    Smoking status: Current Every Day Smoker     Packs/day: 1.00    Smokeless tobacco: Never Used   Substance Use Topics    Alcohol use: Yes     Alcohol/week: 5.0 standard drinks     Types: 6 Cans of beer per week     Comment: Occasionally      Family History   Problem Relation Age of Onset    Stroke Father     Cancer Sister        Review of Systems:     Review of systems not obtained due to patient factors. Objective:   Vital Signs:  Visit Vitals  BP (!) 85/60   Pulse 62   Temp 98.8 °F (37.1 °C)   Resp 22   Ht 5' 11\" (1.803 m)   Wt 100.7 kg (222 lb 0.1 oz)   SpO2 95%   BMI 30.96 kg/m²    O2 Flow Rate (L/min): 15 l/min O2 Device: Ventilator, Endotracheal tube Temp (24hrs), Av.4 °F (36.9 °C), Min:97.9 °F (36.6 °C), Max:98.8 °F (37.1 °C)           Intake/Output:     Intake/Output Summary (Last 24 hours) at 2020 0910  Last data filed at 2020 0700  Gross per 24 hour   Intake 1117.06 ml   Output 1175 ml   Net -57.94 ml       Physical Exam:    General:  Sedated and on the ventilator. No acute distress. Eyes:  Sclera anicteric. Pupils equal, round, reactive to light. Mouth/Throat: Orotracheal tube in place. Neck: Supple. Lungs:   Rhonchi TH scattered, diminished in the BLL   Cardiovascular:  Regular rate and rhythm, no murmur, click, rub, or gallop. Abdomen:   Soft, non-tender, bowel sounds normal, non-distended. Extremities: No cyanosis or edema. Skin: No acute rash or lesions. Lipoma noted over left frontal region    Lymph Nodes: Cervical and supraclavicular normal.   Musculoskeletal:  No swelling or deformity. Lines/Devices:  Intact, no erythema, drainage, or tenderness. Psychiatric: Sedated and appears comfortable on ventilator. LABS AND  DATA: Personally reviewed  Recent Labs     12/23/20  0332 12/22/20 0251   WBC 13.3* 10.9   HGB 11.8* 13.1   HCT 37.2 42.0    302     Recent Labs     12/23/20  0332 12/22/20 0251 12/21/20  1932    136 143   K 4.6 4.7 3.5    106 113*   CO2 24 21 23   BUN PENDING 44* 27*   CREA 4.36* 2.16* 1.35*   * 133* 82   CA 7.9* 8.7 7.3*   MG 2.3  --  2.1   PHOS 7.4*  --   --      Recent Labs     12/23/20 0332 12/22/20 0251   AP 70 90  92   TP 6.9 7.8  7.8   ALB 2.5* 2.8*  2.8*   GLOB 4.4* 5.0*  5.0*     No results for input(s): INR, PTP, APTT, INREXT, INREXT in the last 72 hours. Recent Labs     12/23/20  0821 12/22/20  1546   PHI 7.33* 7.32*   PCO2I 41.3 44.5   PO2I 87 105*   FIO2I 50 60     Recent Labs     12/22/20  2132 12/22/20 0250   TROIQ 0.23* 0.31*       Hemodynamics:   PAP:   CO:     Wedge:   CI:     CVP:    SVR:       PVR:       Ventilator Settings:  Mode Rate Tidal Volume Pressure FiO2 PEEP   Assist control   420 ml    50 % 8 cm H20     Peak airway pressure: 25 cm H2O    Minute ventilation: 9.49 l/min        MEDS: Reviewed    Chest X-Ray:  CXR Results  (Last 48 hours)               12/22/20 1359  XR CHEST PORT Final result    Impression:  IMPRESSION: Right IJ catheter tip overlies SVC right atrial junction. There is   no pneumothorax.  Bilateral lower lobe parenchymal opacities are unchanged since   earlier today but improved compared to 12/21/2020. Blayne Jarrell Narrative:  EXAM:  XR CHEST PORT       INDICATION:  central line placement       COMPARISON:  12/22/2020       FINDINGS: A portable AP radiograph of the chest was obtained at 1352 hours. Right IJ catheter tip overlies SVC right atrial junction. There is no   pneumothorax. ET tube and NG tube are unchanged. .  Bilateral lower lobe lung   opacities are unchanged. Heart size is slightly enlarged and stable. Bony   structures are unchanged. 12/22/20 1029  XR CHEST PORT Final result    Impression:  IMPRESSION:       Decreased bilateral lower lung opacities. Narrative:  EXAM:  XR CHEST PORT       INDICATION: Intubated. Bilateral lung opacities. COMPARISON: 12/21/2020 at 1735 hours       TECHNIQUE: Portable AP semiupright chest view at 1020 hours       FINDINGS: The endotracheal tube terminates above the shamir. The enteric tube   terminates in the stomach. The cardiomediastinal contours are stable. Bilateral lower lung opacities are decreased. There is no pleural effusion or   pneumothorax. The bones and upper abdomen are stable. 12/21/20 1741  XR CHEST PORT Final result    Impression:  IMPRESSION:       1. Endotracheal tube in appropriate position. 2. Significant airspace disease in both lung bases, greater on the right. Narrative:  EXAM: XR CHEST PORT       HISTORY: ET tube placement. COMPARISON: None       FINDINGS: Single view(s) of the chest. The endotracheal tube terminates 6 cm   above the shamir. There is significant consolidation in both lung bases, right   greater than left. Air bronchograms are present. The cardiomediastinal   silhouette is unremarkable. The visualized osseous structures are unremarkable. ECHO:  Pending     Multidisciplinary Rounds Completed:   Yes    ABCDEF Bundle/Checklist Completed:  Yes    SPECIAL EQUIPMENT  None    DISPOSITION  Stay in ICU    CRITICAL CARE CONSULTANT NOTE  I had a face to face encounter with the patient, reviewed and interpreted patient data including clinical events, labs, images, vital signs, I/O's, and examined patient. I have discussed the case and the plan and management of the patient's care with the consulting services, the bedside nurses and the respiratory therapist.      NOTE OF PERSONAL INVOLVEMENT IN CARE   This patient has a high probability of imminent, clinically significant deterioration, which requires the highest level of preparedness to intervene urgently. I participated in the decision-making and personally managed or directed the management of the following life and organ supporting interventions that required my frequent assessment to treat or prevent imminent deterioration. I personally spent 95 minutes of critical care time. This is time spent at this critically ill patient's bedside actively involved in patient care as well as the coordination of care and discussions with the patient's family. This does not include any procedural time which has been billed separately.     [address]  12/23/2020

## 2020-12-23 NOTE — PROGRESS NOTES
[facility]   [address] [facility], [facility] [address], [facility]  Phone: [phone]   [phone]       Nephrology Progress Note  Zainab Madera     1965     [id number]  Date of Admission : 12/21/2020 12/23/20    CC: Follow up for ARF      Assessment and Plan   YISSEL on ? CKD  - ATN from Sustained Hypotension and rapid lowering of BP   - UA : trace blood and 3+ protein --> ordered serologies including HIV and hepatitis screen   - renal US : unremarkable except for benign cyst   - continued Hypotension probably causing rise in Creatinine   - Non oliguric   - ordered NS bolus and ok w/ Bicarb gtt for now   - No emergent need for HD but will reach out to girl friend and update her  - labs again in am   - keep angel catheter    B/L PNA  Acute Hypoxic Resp failure  - ordered Chest CT   - r/o Pulm renal syndrome   - echo w/ LVEF 45%, mild MR     Malignant HTN   Hypertensive Urgency   - negative UDS  - Hypotensive now requiring pressor support      Anemia        Care Plan discussed with:  ICU team      Interval History:  CR WORSE   UOP 1.2L   Echo and cards inpurt noted   Sustained hypotension despite levophed   Noted addition of Bicarb gtt this morning   Hb down some     Review of Systems: Review of systems not obtained due to patient factors.     Current Medications:   Current Facility-Administered Medications   Medication Dose Route Frequency    sodium bicarbonate 150 mEq/1000 mL D5W (premix)   IntraVENous CONTINUOUS    sodium chloride 0.9 % bolus infusion 1,000 mL  1,000 mL IntraVENous ONCE    chlorhexidine (ORAL CARE KIT) 0.12 % mouthwash 15 mL  15 mL Oral Q12H    famotidine (PF) (PEPCID) 20 mg in 0.9% sodium chloride 10 mL injection  20 mg IntraVENous DAILY    cefepime (MAXIPIME) 2 g in 0.9% sodium chloride (MBP/ADV) 100 mL MBP  2 g IntraVENous Q12H    Vancomycin pharmacy to dose   Other Rx Dosing/Monitoring    docusate sodium (COLACE) capsule 100 mg  100 mg Oral DAILY    senna (SENOKOT) tablet 17.2 mg  2 Tab Oral DAILY    NOREPINephrine (LEVOPHED) 8 mg in 5% dextrose 250mL (32 mcg/mL) infusion  0.5-30 mcg/min IntraVENous TITRATE    vancomycin (VANCOCIN) 1500 mg in  ml infusion  1,500 mg IntraVENous Q24H    sodium chloride (NS) flush 5-40 mL  5-40 mL IntraVENous Q8H    sodium chloride (NS) flush 5-40 mL  5-40 mL IntraVENous PRN    acetaminophen (TYLENOL) tablet 650 mg  650 mg Oral Q6H PRN    Or    acetaminophen (TYLENOL) suppository 650 mg  650 mg Rectal Q6H PRN    polyethylene glycol (MIRALAX) packet 17 g  17 g Oral DAILY PRN    promethazine (PHENERGAN) tablet 12.5 mg  12.5 mg Oral Q6H PRN    Or    ondansetron (ZOFRAN) injection 4 mg  4 mg IntraVENous Q6H PRN    enoxaparin (LOVENOX) injection 40 mg  40 mg SubCUTAneous DAILY    propofol (DIPRIVAN) 10 mg/mL infusion  0-50 mcg/kg/min IntraVENous TITRATE    fentaNYL (PF) 1,500 mcg/30 mL (50 mcg/mL) infusion  0-200 mcg/hr IntraVENous TITRATE      No Known Allergies    Objective:  Vitals:    Vitals:    12/23/20 0600 12/23/20 0630 12/23/20 0700 12/23/20 0749   BP: 95/67 (!) 86/56 (!) 85/60    Pulse: (!) 57 63 60 62   Resp: 16 22 22 22   Temp:       SpO2: 96% 95% 95% 95%   Weight:       Height:         Intake and Output:  No intake/output data recorded. 12/21 1901 - 12/23 0700  In: 1229.2 [I.V.:1229.2]  Out: 2200 [Urine:2150]    Physical Examination:  General:  On vent   HEENT: PERRL   No Pallor , No Icterus, Frontal lipoma   Neck: Supple,no mass palpable  Lungs : diminished   CVS: RRR, S1 S2 normal, No murmur   Abdomen: Soft, NT, BS +  Extremities: NO Edema  Skin: No rash or lesions.   MS: No joint swelling, erythema, warmth  Neurologic: sedated on vent   Psych: Unable to assess       []    High complexity decision making was performed  []    Patient is at high-risk of decompensation with multiple organ involvement    Lab Data Personally Reviewed: I have reviewed all the pertinent labs, microbiology data and radiology studies during assessment.     Recent Labs     12/23/20 0332 12/22/20 0251 12/21/20  1932 12/21/20  1704    136 143 133*   K 4.6 4.7 3.5 4.5    106 113* 103   CO2 24 21 23 26   * 133* 82 313*   BUN PENDING 44* 27* 28*   CREA 4.36* 2.16* 1.35* 1.91*   CA 7.9* 8.7 7.3* 9.4   MG 2.3  --  2.1  --    PHOS 7.4*  --   --   --    ALB 2.5* 2.8*  2.8*  --  3.1*   ALT 33 49  52  --  56     Recent Labs     12/23/20 0332 12/22/20 0251 12/21/20  1704   WBC 13.3* 10.9 7.4   HGB 11.8* 13.1 14.1   HCT 37.2 42.0 47.0    302 329     No results found for: SDES  Lab Results   Component Value Date/Time    Culture result: PENDING 12/22/2020 08:45 AM    Culture result: LIGHT NORMAL RESPIRATORY ALDO SO FAR 12/21/2020 09:19 PM    Culture result: NO GROWTH 2 DAYS 12/21/2020 05:54 PM     Recent Results (from the past 24 hour(s))   ECHO ADULT COMPLETE    Collection Time: 12/22/20  9:40 AM   Result Value Ref Range    IVSd 1.41 (A) 0.6 - 1.0 cm    LVIDd 5.85 4.2 - 5.9 cm    LVIDs 5.07 cm    LVPWd 1.18 (A) 0.6 - 1.0 cm    LVOT Peak Gradient 3.48 mmHg    LVOT Peak Velocity 93.23 cm/s    Left Atrium Major Axis 2.75 cm    AoV PG 6.44 mmHg    Aortic Valve Systolic Peak Velocity 635.77 cm/s    MV A Buster 60.42 cm/s    MV E Buster 50.21 cm/s    MV E/A 0.83     LV Mass .2 88 - 224 g    LV Mass AL Index 152.3 49 - 115 g/m2    Left Atrium Minor Axis 1.25 cm   POC EG7    Collection Time: 12/22/20  3:46 PM   Result Value Ref Range    Calcium, ionized (POC) 1.16 1.12 - 1.32 mmol/L    FIO2 (POC) 60 %    pH (POC) 7.32 (L) 7.35 - 7.45      pCO2 (POC) 44.5 35.0 - 45.0 MMHG    pO2 (POC) 105 (H) 80 - 100 MMHG    HCO3 (POC) 23.0 22 - 26 MMOL/L    Base deficit (POC) 3 mmol/L    sO2 (POC) 98 (H) 92 - 97 %    Site RIGHT RADIAL      Device: VENT      Mode ASSIST CONTROL      Tidal volume 500 ml    Set Rate 16 bpm    PEEP/CPAP (POC) 8 cmH2O    Allens test (POC) YES      Specimen type (POC) ARTERIAL     EKG, 12 LEAD, INITIAL    Collection Time: 12/22/20  9:25 PM   Result Value Ref Range    Ventricular Rate 73 BPM    Atrial Rate 73 BPM    P-R Interval 202 ms    QRS Duration 102 ms    Q-T Interval 448 ms    QTC Calculation (Bezet) 493 ms    Calculated P Axis 34 degrees    Calculated R Axis -4 degrees    Calculated T Axis 70 degrees    Diagnosis       Normal sinus rhythm  Moderate voltage criteria for LVH, may be normal variant  Prolonged QT  When compared with ECG of 21-DEC-2020 17:34,  Nonspecific T wave abnormality has replaced inverted T waves in Lateral leads     TROPONIN I    Collection Time: 12/22/20  9:32 PM   Result Value Ref Range    Troponin-I, Qt. 0.23 (H) <0.05 ng/mL   LACTIC ACID    Collection Time: 12/23/20  3:32 AM   Result Value Ref Range    Lactic acid 1.1 0.4 - 2.0 MMOL/L   PROCALCITONIN    Collection Time: 12/23/20  3:32 AM   Result Value Ref Range    Procalcitonin 6.52 ng/mL   NT-PRO BNP    Collection Time: 12/23/20  3:32 AM   Result Value Ref Range    NT pro- (H) <125 PG/ML   METABOLIC PANEL, COMPREHENSIVE    Collection Time: 12/23/20  3:32 AM   Result Value Ref Range    Sodium 137 136 - 145 mmol/L    Potassium 4.6 3.5 - 5.1 mmol/L    Chloride 106 97 - 108 mmol/L    CO2 24 21 - 32 mmol/L    Anion gap 7 5 - 15 mmol/L    Glucose 118 (H) 65 - 100 mg/dL    BUN PENDING MG/DL    Creatinine 4.36 (H) 0.70 - 1.30 MG/DL    BUN/Creatinine ratio PENDING     GFR est AA 17 (L) >60 ml/min/1.73m2    GFR est non-AA 14 (L) >60 ml/min/1.73m2    Calcium 7.9 (L) 8.5 - 10.1 MG/DL    Bilirubin, total 0.3 0.2 - 1.0 MG/DL    ALT (SGPT) 33 12 - 78 U/L    AST (SGOT) 22 15 - 37 U/L    Alk. phosphatase 70 45 - 117 U/L    Protein, total 6.9 6.4 - 8.2 g/dL    Albumin 2.5 (L) 3.5 - 5.0 g/dL    Globulin 4.4 (H) 2.0 - 4.0 g/dL    A-G Ratio 0.6 (L) 1.1 - 2.2     MAGNESIUM    Collection Time: 12/23/20  3:32 AM   Result Value Ref Range    Magnesium 2.3 1.6 - 2.4 mg/dL   PHOSPHORUS    Collection Time: 12/23/20  3:32 AM   Result Value Ref Range    Phosphorus 7.4 (H)  2.6 - 4.7 MG/DL   CBC W/O DIFF    Collection Time: 12/23/20  3:32 AM   Result Value Ref Range    WBC 13.3 (H) 4.1 - 11.1 K/uL    RBC 4.31 4.10 - 5.70 M/uL    HGB 11.8 (L) 12.1 - 17.0 g/dL    HCT 37.2 36.6 - 50.3 %    MCV 86.3 80.0 - 99.0 FL    MCH 27.4 26.0 - 34.0 PG    MCHC 31.7 30.0 - 36.5 g/dL    RDW 15.3 (H) 11.5 - 14.5 %    PLATELET 853 289 - 744 K/uL    MPV 9.5 8.9 - 12.9 FL    NRBC 0.0 0  WBC    ABSOLUTE NRBC 0.00 0.00 - 0.01 K/uL   POC EG7    Collection Time: 12/23/20  8:21 AM   Result Value Ref Range    Calcium, ionized (POC) 1.14 1.12 - 1.32 mmol/L    FIO2 (POC) 50 %    pH (POC) 7.33 (L) 7.35 - 7.45      pCO2 (POC) 41.3 35.0 - 45.0 MMHG    pO2 (POC) 87 80 - 100 MMHG    HCO3 (POC) 21.8 (L) 22 - 26 MMOL/L    Base deficit (POC) 4 mmol/L    sO2 (POC) 96 92 - 97 %    Site RIGHT RADIAL      Device: VENT      Mode ASSIST CONTROL      Tidal volume 420 ml    Set Rate 22 bpm    PEEP/CPAP (POC) 8 cmH2O    Allens test (POC) YES      Specimen type (POC) ARTERIAL      Total resp. rate 22             Total time spent with patient:  xxx   min. Care Plan discussed with:  Patient     Family      RN      Consulting Physician [facility]        I have reviewed the flowsheets. Chart and Pertinent Notes have been reviewed. No change in PMH ,family and social history from Consult note.       Manan Price MD

## 2020-12-23 NOTE — PROGRESS NOTES
Transported pt to CT on the vent with RN and PT Tech. Pt back in room on vent, no issues or concerns.

## 2020-12-24 LAB
ALBUMIN SERPL-MCNC: 2.2 G/DL (ref 3.5–5)
ALBUMIN/GLOB SERPL: 0.5 {RATIO} (ref 1.1–2.2)
ALP SERPL-CCNC: 62 U/L (ref 45–117)
ALT SERPL-CCNC: 33 U/L (ref 12–78)
ANA SER QL: NEGATIVE
ANION GAP SERPL CALC-SCNC: 5 MMOL/L (ref 5–15)
ARTERIAL PATENCY WRIST A: YES
AST SERPL-CCNC: 30 U/L (ref 15–37)
ATRIAL RATE: 73 BPM
BACTERIA SPEC CULT: ABNORMAL
BASE EXCESS BLD CALC-SCNC: 3 MMOL/L
BDY SITE: ABNORMAL
BILIRUB SERPL-MCNC: 0.3 MG/DL (ref 0.2–1)
BNP SERPL-MCNC: 147 PG/ML
BUN SERPL-MCNC: 62 MG/DL (ref 6–20)
BUN/CREAT SERPL: 19 (ref 12–20)
C3 SERPL-MCNC: 132 MG/DL (ref 82–167)
C4 SERPL-MCNC: 28 MG/DL (ref 12–38)
CA-I BLD-SCNC: 1.2 MMOL/L (ref 1.12–1.32)
CALCIUM SERPL-MCNC: 8 MG/DL (ref 8.5–10.1)
CALCULATED P AXIS, ECG09: 34 DEGREES
CALCULATED R AXIS, ECG10: -4 DEGREES
CALCULATED T AXIS, ECG11: 70 DEGREES
CHLORIDE SERPL-SCNC: 108 MMOL/L (ref 97–108)
CO2 SERPL-SCNC: 25 MMOL/L (ref 21–32)
CREAT SERPL-MCNC: 3.22 MG/DL (ref 0.7–1.3)
DIAGNOSIS, 93000: NORMAL
ERYTHROCYTE [DISTWIDTH] IN BLOOD BY AUTOMATED COUNT: 15.2 % (ref 11.5–14.5)
FLUID CULTURE, SPNG2: NORMAL
GAS FLOW.O2 O2 DELIVERY SYS: ABNORMAL L/MIN
GAS FLOW.O2 SETTING OXYMISER: 22 BPM
GBM IGG SER-ACNC: 3 UNITS (ref 0–20)
GLOBULIN SER CALC-MCNC: 4.4 G/DL (ref 2–4)
GLUCOSE SERPL-MCNC: 102 MG/DL (ref 65–100)
GRAM STN SPEC: ABNORMAL
HCO3 BLD-SCNC: 28.8 MMOL/L (ref 22–26)
HCT VFR BLD AUTO: 35.6 % (ref 36.6–50.3)
HGB BLD-MCNC: 11.2 G/DL (ref 12.1–17)
LACTATE SERPL-SCNC: 0.8 MMOL/L (ref 0.4–2)
MAGNESIUM SERPL-MCNC: 2.5 MG/DL (ref 1.6–2.4)
MCH RBC QN AUTO: 27.3 PG (ref 26–34)
MCHC RBC AUTO-ENTMCNC: 31.5 G/DL (ref 30–36.5)
MCV RBC AUTO: 86.6 FL (ref 80–99)
NRBC # BLD: 0 K/UL (ref 0–0.01)
NRBC BLD-RTO: 0 PER 100 WBC
O2/TOTAL GAS SETTING VFR VENT: 50 %
ORGANISM ID, SPNG3: NORMAL
P-R INTERVAL, ECG05: 202 MS
PCO2 BLD: 56.4 MMHG (ref 35–45)
PEEP RESPIRATORY: 8 CMH2O
PH BLD: 7.32 [PH] (ref 7.35–7.45)
PHOSPHATE SERPL-MCNC: 4.1 MG/DL (ref 2.6–4.7)
PLATELET # BLD AUTO: 287 K/UL (ref 150–400)
PLEASE NOTE, SPNG4: NORMAL
PMV BLD AUTO: 9.8 FL (ref 8.9–12.9)
PO2 BLD: 67 MMHG (ref 80–100)
POTASSIUM SERPL-SCNC: 3.9 MMOL/L (ref 3.5–5.1)
PROCALCITONIN SERPL-MCNC: 2.44 NG/ML
PROT SERPL-MCNC: 6.6 G/DL (ref 6.4–8.2)
Q-T INTERVAL, ECG07: 448 MS
QRS DURATION, ECG06: 102 MS
QTC CALCULATION (BEZET), ECG08: 493 MS
RBC # BLD AUTO: 4.11 M/UL (ref 4.1–5.7)
S PNEUM AG SPEC QL LA: NEGATIVE
SAO2 % BLD: 91 % (ref 92–97)
SERVICE CMNT-IMP: ABNORMAL
SERVICE CMNT-IMP: ABNORMAL
SODIUM SERPL-SCNC: 138 MMOL/L (ref 136–145)
SPECIMEN SOURCE: NORMAL
SPECIMEN TYPE: ABNORMAL
SPECIMEN, SPNG1: NORMAL
TOTAL RESP. RATE, ITRR: 22
VENTILATION MODE VENT: ABNORMAL
VENTRICULAR RATE, ECG03: 73 BPM
VT SETTING VENT: 420 ML
WBC # BLD AUTO: 9.6 K/UL (ref 4.1–11.1)

## 2020-12-24 PROCEDURE — 74011250636 HC RX REV CODE- 250/636: Performed by: INTERNAL MEDICINE

## 2020-12-24 PROCEDURE — 77030008477 HC STYL SATN SLP COVD -A

## 2020-12-24 PROCEDURE — 74011000250 HC RX REV CODE- 250: Performed by: INTERNAL MEDICINE

## 2020-12-24 PROCEDURE — 36415 COLL VENOUS BLD VENIPUNCTURE: CPT

## 2020-12-24 PROCEDURE — 77030034537

## 2020-12-24 PROCEDURE — 83735 ASSAY OF MAGNESIUM: CPT

## 2020-12-24 PROCEDURE — 94761 N-INVAS EAR/PLS OXIMETRY MLT: CPT

## 2020-12-24 PROCEDURE — 36600 WITHDRAWAL OF ARTERIAL BLOOD: CPT

## 2020-12-24 PROCEDURE — 74011250636 HC RX REV CODE- 250/636: Performed by: NURSE PRACTITIONER

## 2020-12-24 PROCEDURE — 80053 COMPREHEN METABOLIC PANEL: CPT

## 2020-12-24 PROCEDURE — 85027 COMPLETE CBC AUTOMATED: CPT

## 2020-12-24 PROCEDURE — 74011250636 HC RX REV CODE- 250/636: Performed by: ANESTHESIOLOGY

## 2020-12-24 PROCEDURE — 74011250637 HC RX REV CODE- 250/637: Performed by: ANESTHESIOLOGY

## 2020-12-24 PROCEDURE — 84145 PROCALCITONIN (PCT): CPT

## 2020-12-24 PROCEDURE — 83605 ASSAY OF LACTIC ACID: CPT

## 2020-12-24 PROCEDURE — 0B21XEZ CHANGE ENDOTRACHEAL AIRWAY IN TRACHEA, EXTERNAL APPROACH: ICD-10-PCS | Performed by: STUDENT IN AN ORGANIZED HEALTH CARE EDUCATION/TRAINING PROGRAM

## 2020-12-24 PROCEDURE — 74011000250 HC RX REV CODE- 250

## 2020-12-24 PROCEDURE — 84100 ASSAY OF PHOSPHORUS: CPT

## 2020-12-24 PROCEDURE — 65620000000 HC RM CCU GENERAL

## 2020-12-24 PROCEDURE — 74011000250 HC RX REV CODE- 250: Performed by: NURSE PRACTITIONER

## 2020-12-24 PROCEDURE — 94003 VENT MGMT INPAT SUBQ DAY: CPT

## 2020-12-24 PROCEDURE — 83880 ASSAY OF NATRIURETIC PEPTIDE: CPT

## 2020-12-24 PROCEDURE — 82803 BLOOD GASES ANY COMBINATION: CPT

## 2020-12-24 PROCEDURE — 77030008683 HC TU ET CUF COVD -A

## 2020-12-24 PROCEDURE — 74011000258 HC RX REV CODE- 258: Performed by: ANESTHESIOLOGY

## 2020-12-24 RX ORDER — SODIUM CHLORIDE, SODIUM LACTATE, POTASSIUM CHLORIDE, CALCIUM CHLORIDE 600; 310; 30; 20 MG/100ML; MG/100ML; MG/100ML; MG/100ML
30 INJECTION, SOLUTION INTRAVENOUS CONTINUOUS
Status: DISCONTINUED | OUTPATIENT
Start: 2020-12-24 | End: 2020-12-31

## 2020-12-24 RX ORDER — BUMETANIDE 0.25 MG/ML
1 INJECTION INTRAMUSCULAR; INTRAVENOUS ONCE
Status: COMPLETED | OUTPATIENT
Start: 2020-12-24 | End: 2020-12-24

## 2020-12-24 RX ORDER — ROCURONIUM BROMIDE 10 MG/ML
INJECTION, SOLUTION INTRAVENOUS
Status: COMPLETED
Start: 2020-12-24 | End: 2020-12-24

## 2020-12-24 RX ADMIN — PROPOFOL 45 MCG/KG/MIN: 10 INJECTION, EMULSION INTRAVENOUS at 06:01

## 2020-12-24 RX ADMIN — Medication 10 ML: at 13:48

## 2020-12-24 RX ADMIN — Medication 10 ML: at 21:19

## 2020-12-24 RX ADMIN — SODIUM CHLORIDE, SODIUM LACTATE, POTASSIUM CHLORIDE, AND CALCIUM CHLORIDE 75 ML/HR: 600; 310; 30; 20 INJECTION, SOLUTION INTRAVENOUS at 05:48

## 2020-12-24 RX ADMIN — PROPOFOL 40 MCG/KG/MIN: 10 INJECTION, EMULSION INTRAVENOUS at 08:42

## 2020-12-24 RX ADMIN — Medication 200 MCG/HR: at 09:43

## 2020-12-24 RX ADMIN — ENOXAPARIN SODIUM 30 MG: 30 INJECTION SUBCUTANEOUS at 08:42

## 2020-12-24 RX ADMIN — BUMETANIDE 1 MG: 0.25 INJECTION, SOLUTION INTRAMUSCULAR; INTRAVENOUS at 05:48

## 2020-12-24 RX ADMIN — PROPOFOL 50 MCG/KG/MIN: 10 INJECTION, EMULSION INTRAVENOUS at 22:30

## 2020-12-24 RX ADMIN — PROPOFOL 40 MCG/KG/MIN: 10 INJECTION, EMULSION INTRAVENOUS at 05:07

## 2020-12-24 RX ADMIN — ROCURONIUM BROMIDE 50 MG: 50 INJECTION, SOLUTION INTRAVENOUS at 01:32

## 2020-12-24 RX ADMIN — Medication 17.2 MG: at 08:42

## 2020-12-24 RX ADMIN — PROPOFOL 40 MCG/KG/MIN: 10 INJECTION, EMULSION INTRAVENOUS at 19:02

## 2020-12-24 RX ADMIN — CEFEPIME HYDROCHLORIDE 2 G: 2 INJECTION, POWDER, FOR SOLUTION INTRAVENOUS at 03:07

## 2020-12-24 RX ADMIN — PROPOFOL 40 MCG/KG/MIN: 10 INJECTION, EMULSION INTRAVENOUS at 06:54

## 2020-12-24 RX ADMIN — Medication 10 ML: at 05:08

## 2020-12-24 RX ADMIN — SODIUM CHLORIDE 20 MG: 9 INJECTION INTRAMUSCULAR; INTRAVENOUS; SUBCUTANEOUS at 08:42

## 2020-12-24 RX ADMIN — Medication 200 MCG/HR: at 17:38

## 2020-12-24 RX ADMIN — PROPOFOL 40 MCG/KG/MIN: 10 INJECTION, EMULSION INTRAVENOUS at 15:50

## 2020-12-24 RX ADMIN — CHLORHEXIDINE GLUCONATE 15 ML: 0.12 RINSE ORAL at 08:42

## 2020-12-24 RX ADMIN — SODIUM CHLORIDE, SODIUM LACTATE, POTASSIUM CHLORIDE, AND CALCIUM CHLORIDE 75 ML/HR: 600; 310; 30; 20 INJECTION, SOLUTION INTRAVENOUS at 19:02

## 2020-12-24 RX ADMIN — CHLORHEXIDINE GLUCONATE 15 ML: 0.12 RINSE ORAL at 21:19

## 2020-12-24 RX ADMIN — CEFEPIME HYDROCHLORIDE 2 G: 2 INJECTION, POWDER, FOR SOLUTION INTRAVENOUS at 15:28

## 2020-12-24 RX ADMIN — Medication 200 MCG/HR: at 01:49

## 2020-12-24 RX ADMIN — PROPOFOL 40 MCG/KG/MIN: 10 INJECTION, EMULSION INTRAVENOUS at 11:53

## 2020-12-24 RX ADMIN — PROPOFOL 35 MCG/KG/MIN: 10 INJECTION, EMULSION INTRAVENOUS at 01:33

## 2020-12-24 NOTE — PROGRESS NOTES
12/24/20 0105   Airway - Endotracheal Tube 12/21/20 Oral   Placement Date/Time: 12/21/20 1722   Inserted By: tramaine AVILA, ETT changed by Dr Anthony  Present on Admission/Arrival: No  Location: Oral  Placement Verified: Auscultation;BBS;Chest x-ray  Airway Types: Endotracheal, cuffed  Airway Tube Size: 8 mm  An...   Insertion Depth (cm) 25 cm   Line Chris Teeth   Side Secured Right;Device   Cuff Pressure   (MOV per policy)   Site Assessment Clean, dry, & intact  (changed)     RT assisted Dr Anthony with ETT change. Pt bit thru  balloon. ETT changed without difficulty using a Bougie. Placement confirmed.

## 2020-12-24 NOTE — PROGRESS NOTES
0730 Bedside and Verbal shift change report given to JAMES Leone (oncoming nurse) by Govind Kerr (offgoing nurse). Report included the following information SBAR, Kardex, Intake/Output, MAR and Cardiac Rhythm SB. Uneventful shift     1930 Bedside and Verbal shift change report given to [address] (oncoming nurse) by Andres Wei (offgoing nurse). Report included the following information SBAR, Kardex, Intake/Output, MAR and Cardiac Rhythm NSR.

## 2020-12-24 NOTE — PROGRESS NOTES
Shift change report given to Vivek (oncoming nurse) Report included the following information SBAR, Kardex, Medications and Vent settings. Patient's ett tube changed due to patient biting and damaging tube. Patient requires frequent suctioning throughout night. Covering intensivist notified and intermittently at bedside.

## 2020-12-24 NOTE — PROGRESS NOTES
[facility]    ICU TEAM Progress Note    Name: Gloria Truong   : 1965   MRN: [id number]   Date: 2020      Assessment     ICU Problems:    1. Acute Hypoxic Respiratory Failure  2. CAP/Aspiration (Haemophilus & Beta Strep)  3. Troponin Leak  4. Obesity   5. Nx nonischemic cardiomyopathy   6. Chronic tobacco abuse  7. YISSEL/CKD stage 2  8. Left frontal lipoma-Chronic   9. Hx tobacco abuse    Imagin2020      ICU Comprehensive Plan of Care:   1. Acute hypoxic respiratory failure (GN rods/GM positive cocci in sputum)  -? Aspiration vs CAP  -Continue Vanc/Cefepime  -Cultures pending  -Continue ventilator support - optimize PEEP - Driving pressure is good  -Wean FiO2 for SpO2 > 90%  -HOB >30  -Propofol and Fent for sedation   -Trend nt-BNP/Procal/Lact    2. Elevated troponin (resolving)  -ECHO 40-45%   -Cardiology follwoing     3. YISSEL/CKD stage 2  -From Hypotension  -Nephrology consulted - discussed case with Dr. Alex Altman  -NaBicarb gtt  -Levophed MAP > 65 mmHg  -Strict I and O      Plans for this Shift:     1. Ventilator support, will wean as tolerated  2. Legionella/Strep PNA pending   3. UA/UC pending   4. SBP Goal of: > 90 mmHg  5. MAP Goal of: > 65 mmHg  6. Phenylephrine - For above SBP/MAP goals  7. IVFs: KVO  8. Transfusion Trigger (Hgb): <7 g/dL  9. Respiratory Goals:  a. Chlorhexidine   b. Optimize PEEP/Ventilation/Oxygenation  c. Goal Tidal Volume 6 cc/kg based on IBW  d. Aim for lung protective ventilation  e. Head of bed > 30 degrees  f. Aggressive bronchopulmonary hygiene  10. Pulmonary toilet: Duo-Nebs   11. SpO2 Goal: > 92%  12. Keep K>4; Mg>2   13. PT/OT  14. Goals of Care Discussion with family Pending   13. Plan of Care/Code Status: Full Code  16. Appreciate Consultants Input Cardiology, nephrology   17. Discussed Care Plan with Bedside RN  18. Documentation of Current Medications  19.  Rest of Plan Below:    F - Feeding:  Pending   A - Analgesia: Fentanyl  S - Sedation: Propofol  T - DVT Prophylaxis: SCD's or Sequential Compression Device   H - Head of Bed: > 30 Degrees  U - Ulcer Prophylaxis: Protonix (pantoprazole)   G - Glycemic Control: Insulin  S - Spontaneous Breathing Trial: Pending  B - Bowel Regimen: Docusate (Colace)  I - Indwelling Catheter:   Tubes: ETT  Lines: Peripheral IV  Drains: Granados Catheter  D - De-escalation of Antibiotics: Vanc & Cefepime    Subjective:   Progress Note: 12/24/2020      Reason for ICU Admission: Acute hypoxic respiratory failure      HPI: Peter Sanchez is a 51 yo AAM with a PMH significant for nonischemic cardiomyopathy, left frontal lipoma, CKD, obesity, chronic tobacco abuse, who presented to the ER via EMS with co increased SOB X2 weeks. Pt is currently sedated and on the ventilator, unable to provide HPI thereofre obtained via chart and provider. Pt failed bipap while in the ER with continued worsening hypoxia, therefore pt was intubated and placed on the ventilator. On arrival to ED pt was found to be hypertensive with /100. Patient was then transferred to ICU for further management in care.           Overnight Events:   12/24/2020  -Weaning FiO2  -Started on Vanc/Cefepime    Active Problem List:     Problem List  Date Reviewed: 9/28/2020          Codes Class    Hypoxia ICD-10-CM: R09.02  ICD-9-CM: 799.02         Cervical radiculopathy ICD-10-CM: M54.12  ICD-9-CM: 723.4         Arm paresthesia, left ICD-10-CM: R20.2  ICD-9-CM: 782.0         Intractable headache ICD-10-CM: R51.9  ICD-9-CM: 784.0         Dilated cardiomyopathy (HCC) ICD-10-CM: I42.0  ICD-9-CM: 425.4         NSTEMI (non-ST elevated myocardial infarction) ([facility] [address] ICD-10-CM: I21.4  ICD-9-CM: 410.70         HTN (hypertension) ICD-10-CM: I10  ICD-9-CM: 401.9               Past Medical History:      has a past medical history of Hypertension. Past Surgical History:      has no past surgical history on file.     Home Medications:     Prior to Admission medications    Medication Sig Start Date End Date Taking? Authorizing Provider   aspirin-acetaminophen-caffeine ([employer] Extra Strength) 520-260-32.5 mg pwpk Take 1 Package by mouth two (2) times a day. Yes Provider, Historical   ibuprofen (MOTRIN) 600 mg tablet Take 600 mg by mouth every six (6) hours as needed for Pain. Yes Provider, Historical   carvediloL (COREG) 25 mg tablet Take 1 Tab by mouth two (2) times daily (with meals). 20  Yes Kim Yin MD   isosorbide mononitrate ER (IMDUR) 30 mg tablet Take 1 Tab by mouth daily. 20  Yes Kim Yin MD   cloNIDine HCL (CATAPRES) 0.1 mg tablet Take 1 Tab by mouth two (2) times a day. 20  Yes Kim Yin MD   aspirin 81 mg chewable tablet Take 1 Tab by mouth daily. 19  Yes Adeline Martin MD       Allergies/Social/Family History:     No Known Allergies   Social History     Tobacco Use    Smoking status: Current Every Day Smoker     Packs/day: 1.00    Smokeless tobacco: Never Used   Substance Use Topics    Alcohol use: Yes     Alcohol/week: 5.0 standard drinks     Types: 6 Cans of beer per week     Comment: Occasionally      Family History   Problem Relation Age of Onset    Stroke Father     Cancer Sister        Review of Systems:     Review of systems not obtained due to patient factors. Objective:   Vital Signs:  Visit Vitals  /64   Pulse (!) 58   Temp 97.7 °F (36.5 °C)   Resp 25   Ht 5' 11\" (1.803 m)   Wt 104.7 kg (230 lb 13.2 oz)   SpO2 90%   BMI 32.19 kg/m²    O2 Flow Rate (L/min): 15 l/min O2 Device: Ventilator, Endotracheal tube Temp (24hrs), Av.5 °F (36.4 °C), Min:97.2 °F (36.2 °C), Max:97.7 °F (36.5 °C)           Intake/Output:     Intake/Output Summary (Last 24 hours) at 2020 8351  Last data filed at 2020 0500  Gross per 24 hour   Intake    Output 1605 ml   Net -1605 ml       Physical Exam:    General:  Sedated and on the ventilator. No acute distress.    Eyes:  Sclera anicteric. Pupils equal, round, reactive to light. Mouth/Throat: Orotracheal tube in place. Neck: Supple. Lungs:   Rhonchi TH scattered, diminished in the BLL   Cardiovascular:  Regular rate and rhythm, no murmur, click, rub, or gallop. Abdomen:   Soft, non-tender, bowel sounds normal, non-distended. Extremities: No cyanosis or edema. Skin: No acute rash or lesions. Lipoma noted over left frontal region    Lymph Nodes: Cervical and supraclavicular normal.   Musculoskeletal:  No swelling or deformity. Lines/Devices:  Intact, no erythema, drainage, or tenderness. Psychiatric: Sedated and appears comfortable on ventilator. LABS AND  DATA: Personally reviewed  Recent Labs     12/24/20  0305 12/23/20  0332   WBC 9.6 13.3*   HGB 11.2* 11.8*   HCT 35.6* 37.2    287     Recent Labs     12/24/20  0305 12/23/20  0332    137   K 3.9 4.6    106   CO2 25 24   BUN 62* 69*   CREA 3.22* 4.36*   * 118*   CA 8.0* 7.9*   MG 2.5* 2.3   PHOS 4.1 7.4*     Recent Labs     12/24/20  0305 12/23/20  0332   AP 62 70   TP 6.6 6.9   ALB 2.2* 2.5*   GLOB 4.4* 4.4*     No results for input(s): INR, PTP, APTT, INREXT, INREXT in the last 72 hours. Recent Labs     12/24/20  0615 12/23/20  0821   PHI 7.32* 7.33*   PCO2I 56.4* 41.3   PO2I 67* 87   FIO2I 50 50     Recent Labs     12/22/20  2132 12/22/20  0250   TROIQ 0.23* 0.31*       Hemodynamics:   PAP:   CO:     Wedge:   CI:     CVP:    SVR:       PVR:       Ventilator Settings:  Mode Rate Tidal Volume Pressure FiO2 PEEP   Assist control, Volume control   420 ml    50 % 8 cm H20     Peak airway pressure: 35 cm H2O    Minute ventilation: 9.63 l/min        MEDS: Reviewed    Chest X-Ray:  CXR Results  (Last 48 hours)               12/22/20 1359  XR CHEST PORT Final result    Impression:  IMPRESSION: Right IJ catheter tip overlies SVC right atrial junction. There is   no pneumothorax.  Bilateral lower lobe parenchymal opacities are unchanged since earlier today but improved compared to 12/21/2020. Zamzammolly Silva Narrative:  EXAM:  XR CHEST PORT       INDICATION:  central line placement       COMPARISON:  12/22/2020       FINDINGS: A portable AP radiograph of the chest was obtained at 1352 hours. Right IJ catheter tip overlies SVC right atrial junction. There is no   pneumothorax. ET tube and NG tube are unchanged. .  Bilateral lower lobe lung   opacities are unchanged. Heart size is slightly enlarged and stable. Bony   structures are unchanged. 12/22/20 1029  XR CHEST PORT Final result    Impression:  IMPRESSION:       Decreased bilateral lower lung opacities. Narrative:  EXAM:  XR CHEST PORT       INDICATION: Intubated. Bilateral lung opacities. COMPARISON: 12/21/2020 at 1735 hours       TECHNIQUE: Portable AP semiupright chest view at 1020 hours       FINDINGS: The endotracheal tube terminates above the shamir. The enteric tube   terminates in the stomach. The cardiomediastinal contours are stable. Bilateral lower lung opacities are decreased. There is no pleural effusion or   pneumothorax. The bones and upper abdomen are stable. ECHO:  Pending     Multidisciplinary Rounds Completed: Yes    ABCDEF Bundle/Checklist Completed:  Yes    SPECIAL EQUIPMENT  None    DISPOSITION  Stay in ICU    CRITICAL CARE CONSULTANT NOTE  I had a face to face encounter with the patient, reviewed and interpreted patient data including clinical events, labs, images, vital signs, I/O's, and examined patient. I have discussed the case and the plan and management of the patient's care with the consulting services, the bedside nurses and the respiratory therapist.      NOTE OF PERSONAL INVOLVEMENT IN CARE   This patient has a high probability of imminent, clinically significant deterioration, which requires the highest level of preparedness to intervene urgently.  I participated in the decision-making and personally managed or directed the management of the following life and organ supporting interventions that required my frequent assessment to treat or prevent imminent deterioration. I personally spent 85 minutes of critical care time. This is time spent at this critically ill patient's bedside actively involved in patient care as well as the coordination of care and discussions with the patient's family. This does not include any procedural time which has been billed separately.     [address]  12/24/2020

## 2020-12-24 NOTE — PROCEDURES
[facility]      Procedure Note - Endotracheal Tube Exchange:   Performed by Janene Guzman MD .     Procedure status: Emergent    Immediately prior to the procedure, the patient was reevaluated and found suitable for the planned procedure. A time out was called to verify the correct patient, procedure, equipment, staff, and marking as appropriate. Preoxygenation applied via prior ETT  Medications given were propofol, rocuronium (Zemuron) and fentanyl. Prior ETT removed over bougie  A number 8.0 cuffed ETT was placed to 25 cm at the teeth over bougie. Placement was evaluated by noting bilateral, symmetric breath sounds and good end-tidal CO2 detector color change . Attempts required: 1. Complications: none. The procedure was tolerated well.     Janene Guzman MD  [facility] Critical Care  Intensivist/Anesthesiologist  12/24/2020  1:35 AM

## 2020-12-24 NOTE — PROGRESS NOTES
[facility]   [id number] [facility], [address] [place] [address] [address], [facility]  Phone: [phone]   [phone]       Nephrology Progress Note  Diony Lawson     1965     [id number]  Date of Admission : 12/21/2020 12/24/20    CC: Follow up for ARF      Assessment and Plan   YISSEL on ? CKD  - ATN from Sustained Hypotension and rapid lowering of BP   - UA : trace blood and 3+ protein --> f/u serologies   - renal US : unremarkable except for benign cyst   - Cr trending down and UOP better with improvement in BP   - changed IVF to LR   - one dose of IV bumex   - No emergent need for dialysis and expect continued improvement in renal function   - labs daily     Bibasal and RUL PNA  Acute Hypoxic Resp failure  - echo w/ LVEF 45%, mild MR  - On vent      Malignant HTN   Hypertensive Urgency   - negative UDS  - Hypotensive resolved      Anemia     Care Plan discussed with:  CCU RN  Dr Donato Lazo covering me for next 3 days      Interval History:  Off levophed   UOP improved  Cr trending down   CT chest reviewed     Review of Systems: Review of systems not obtained due to patient factors.     Current Medications:   Current Facility-Administered Medications   Medication Dose Route Frequency    lactated Ringers infusion  75 mL/hr IntraVENous CONTINUOUS    bumetanide (BUMEX) injection 1 mg  1 mg IntraVENous ONCE    enoxaparin (LOVENOX) injection 30 mg  30 mg SubCUTAneous DAILY    chlorhexidine (ORAL CARE KIT) 0.12 % mouthwash 15 mL  15 mL Oral Q12H    famotidine (PF) (PEPCID) 20 mg in 0.9% sodium chloride 10 mL injection  20 mg IntraVENous DAILY    cefepime (MAXIPIME) 2 g in 0.9% sodium chloride (MBP/ADV) 100 mL MBP  2 g IntraVENous Q12H    Vancomycin pharmacy to dose   Other Rx Dosing/Monitoring    docusate sodium (COLACE) capsule 100 mg  100 mg Oral DAILY    senna (SENOKOT) tablet 17.2 mg  2 Tab Oral DAILY    NOREPINephrine (LEVOPHED) 8 mg in 5% dextrose 250mL (32 mcg/mL) infusion  0.5-30 mcg/min IntraVENous TITRATE    sodium chloride (NS) flush 5-40 mL  5-40 mL IntraVENous Q8H    sodium chloride (NS) flush 5-40 mL  5-40 mL IntraVENous PRN    acetaminophen (TYLENOL) tablet 650 mg  650 mg Oral Q6H PRN    Or    acetaminophen (TYLENOL) suppository 650 mg  650 mg Rectal Q6H PRN    polyethylene glycol (MIRALAX) packet 17 g  17 g Oral DAILY PRN    promethazine (PHENERGAN) tablet 12.5 mg  12.5 mg Oral Q6H PRN    Or    ondansetron (ZOFRAN) injection 4 mg  4 mg IntraVENous Q6H PRN    propofol (DIPRIVAN) 10 mg/mL infusion  0-50 mcg/kg/min IntraVENous TITRATE    fentaNYL (PF) 1,500 mcg/30 mL (50 mcg/mL) infusion  0-200 mcg/hr IntraVENous TITRATE      No Known Allergies    Objective:  Vitals:    Vitals:    12/24/20 0138 12/24/20 0139 12/24/20 0140 12/24/20 0141   BP:   (!) 94/59    Pulse: (!) 54 (!) 58 (!) 51 (!) 52   Resp: 22 22 22 22   Temp:       SpO2: 94% 94% 94% 93%   Weight:       Height:         Intake and Output:  12/23 1901 - 12/24 0700  In: -   Out: 8247 [FLRSP:9597]  12/22 0701 - 12/23 1900  In: 1260.4 [I.V.:1260.4]  Out: 6299 [Urine:1220]    Physical Examination:  General:  On vent   HEENT: PERRL   No Pallor , No Icterus, Frontal lipoma   Neck: Supple,no mass palpable  Lungs : diminished   CVS: RRR, S1 S2 normal, No murmur   Abdomen: Soft, NT, BS +  Extremities: NO Edema  Skin: No rash or lesions. MS: No joint swelling, erythema, warmth  Neurologic: sedated on vent   Psych: Unable to assess       []    High complexity decision making was performed  []    Patient is at high-risk of decompensation with multiple organ involvement    Lab Data Personally Reviewed: I have reviewed all the pertinent labs, microbiology data and radiology studies during assessment.     Recent Labs     12/24/20  0305 12/23/20  0332 12/22/20  0251 12/21/20  1932 12/21/20  1704    137 136 143 133*   K 3.9 4.6 4.7 3.5 4.5    106 106 113* 103   CO2 25 24 21 23 26   * 118* 133* 82 313*   BUN 62* 69* 44* 27* 28* CREA 3.22* 4.36* 2.16* 1.35* 1.91*   CA 8.0* 7.9* 8.7 7.3* 9.4   MG 2.5* 2.3  --  2.1  --    PHOS 4.1 7.4*  --   --   --    ALB 2.2* 2.5* 2.8*  2.8*  --  3.1*   ALT 33 33 49  52  --  56     Recent Labs     12/24/20  0305 12/23/20  0332 12/22/20  0251 12/21/20  1704   WBC 9.6 13.3* 10.9 7.4   HGB 11.2* 11.8* 13.1 14.1   HCT 35.6* 37.2 42.0 47.0    287 302 329     No results found for: SDES  Lab Results   Component Value Date/Time    Culture result: PENDING 12/23/2020 04:16 PM    Culture result: RARE NORMAL RESPIRATORY ALDO 12/22/2020 08:45 AM    Culture result: LIGHT NORMAL RESPIRATORY ALDO 12/21/2020 09:19 PM    Culture result: CHECKING FOR POSSIBLE  PATHOGEN   12/21/2020 09:19 PM    Culture result: NO GROWTH 2 DAYS 12/21/2020 05:54 PM     Recent Results (from the past 24 hour(s))   POC EG7    Collection Time: 12/23/20  8:21 AM   Result Value Ref Range    Calcium, ionized (POC) 1.14 1.12 - 1.32 mmol/L    FIO2 (POC) 50 %    pH (POC) 7.33 (L) 7.35 - 7.45      pCO2 (POC) 41.3 35.0 - 45.0 MMHG    pO2 (POC) 87 80 - 100 MMHG    HCO3 (POC) 21.8 (L) 22 - 26 MMOL/L    Base deficit (POC) 4 mmol/L    sO2 (POC) 96 92 - 97 %    Site RIGHT RADIAL      Device: VENT      Mode ASSIST CONTROL      Tidal volume 420 ml    Set Rate 22 bpm    PEEP/CPAP (POC) 8 cmH2O    Allens test (POC) YES      Specimen type (POC) ARTERIAL      Total resp. rate 22     PROTEIN/CREATININE RATIO, URINE    Collection Time: 12/23/20 10:15 AM   Result Value Ref Range    Protein, urine random 56 (H) 0.0 - 11.9 mg/dL    Creatinine, urine 210.00 mg/dL    Protein/Creat.  urine Ratio 0.3     HIV 1/2 AG/AB, 4TH GENERATION,W RFLX CONFIRM    Collection Time: 12/23/20 10:15 AM   Result Value Ref Range    HIV 1/2 Interpretation NONREACTIVE NR      HIV 1/2 result comment SEE NOTE     HEPATITIS PANEL, ACUTE    Collection Time: 12/23/20 10:15 AM   Result Value Ref Range    Hepatitis A, IgM NONREACTIVE NR      __          Hepatitis B surface Ag 0.28 Index Hep B surface Ag Interp. Negative NEG      __          Hepatitis B core, IgM NONREACTIVE NR      __          Hep C virus Ab Interp. NONREACTIVE NR      Hep C  virus Ab comment Method used is [facility]     CULTURE, RESPIRATORY/SPUTUM/BRONCH W GRAM STAIN    Collection Time: 12/23/20  4:16 PM    Specimen: Bronch lavage right upper lobe; Sputum   Result Value Ref Range    Special Requests: NO SPECIAL REQUESTS      GRAM STAIN FEW WBCS SEEN      GRAM STAIN RARE EPITHELIAL CELLS SEEN      GRAM STAIN NO ORGANISMS SEEN      Culture result: PENDING    VANCOMYCIN, RANDOM    Collection Time: 12/23/20  4:18 PM   Result Value Ref Range    Vancomycin, random 17.1 UG/ML   CELL COUNT, BODY FLUID    Collection Time: 12/23/20  4:18 PM   Result Value Ref Range    BODY FLUID TYPE BRONCH LAVAGE RT UPPER LOBE      FLUID COLOR WHITE      FLUID APPEARANCE CLOUDY      FLUID RBC CT. >100 (H) 0 /cu mm    FLUID NUCLEATED CELLS 638 /cu mm    FLD NEUTROPHILS 55 (A) NRRE %    FLD LYMPHS 45 (A) NRRE %   LACTIC ACID    Collection Time: 12/24/20  3:05 AM   Result Value Ref Range    Lactic acid 0.8 0.4 - 2.0 MMOL/L   PROCALCITONIN    Collection Time: 12/24/20  3:05 AM   Result Value Ref Range    Procalcitonin 2.44 ng/mL   NT-PRO BNP    Collection Time: 12/24/20  3:05 AM   Result Value Ref Range    NT pro- (H) <946 PG/ML   METABOLIC PANEL, COMPREHENSIVE    Collection Time: 12/24/20  3:05 AM   Result Value Ref Range    Sodium 138 136 - 145 mmol/L    Potassium 3.9 3.5 - 5.1 mmol/L    Chloride 108 97 - 108 mmol/L    CO2 25 21 - 32 mmol/L    Anion gap 5 5 - 15 mmol/L    Glucose 102 (H) 65 - 100 mg/dL    BUN 62 (H) 6 - 20 MG/DL    Creatinine 3.22 (H) 0.70 - 1.30 MG/DL    BUN/Creatinine ratio 19 12 - 20      GFR est AA 25 (L) >60 ml/min/1.73m2    GFR est non-AA 20 (L) >60 ml/min/1.73m2    Calcium 8.0 (L) 8.5 - 10.1 MG/DL    Bilirubin, total 0.3 0.2 - 1.0 MG/DL    ALT (SGPT) 33 12 - 78 U/L    AST (SGOT) 30 15 - 37 U/L    Alk.  phosphatase 62 45 - 117 U/L    Protein, total 6.6 6.4 - 8.2 g/dL    Albumin 2.2 (L) 3.5 - 5.0 g/dL    Globulin 4.4 (H) 2.0 - 4.0 g/dL    A-G Ratio 0.5 (L) 1.1 - 2.2     MAGNESIUM    Collection Time: 12/24/20  3:05 AM   Result Value Ref Range    Magnesium 2.5 (H) 1.6 - 2.4 mg/dL   PHOSPHORUS    Collection Time: 12/24/20  3:05 AM   Result Value Ref Range    Phosphorus 4.1 2.6 - 4.7 MG/DL   CBC W/O DIFF    Collection Time: 12/24/20  3:05 AM   Result Value Ref Range    WBC 9.6 4.1 - 11.1 K/uL    RBC 4.11 4.10 - 5.70 M/uL    HGB 11.2 (L) 12.1 - 17.0 g/dL    HCT 35.6 (L) 36.6 - 50.3 %    MCV 86.6 80.0 - 99.0 FL    MCH 27.3 26.0 - 34.0 PG    MCHC 31.5 30.0 - 36.5 g/dL    RDW 15.2 (H) 11.5 - 14.5 %    PLATELET 409 204 - 584 K/uL    MPV 9.8 8.9 - 12.9 FL    NRBC 0.0 0  WBC    ABSOLUTE NRBC 0.00 0.00 - 0.01 K/uL           Total time spent with patient:  xxx   min. Care Plan discussed with:  Patient     Family      RN      Consulting Physician [facility],         I have reviewed the flowsheets. Chart and Pertinent Notes have been reviewed. No change in PMH ,family and social history from Consult note.       Juan Park MD

## 2020-12-24 NOTE — PROGRESS NOTES
Problem: Ventilator Management  Goal: *Adequate oxygenation and ventilation  Outcome: Progressing Towards Goal  Goal: *Patient maintains clear airway/free of aspiration  Outcome: Progressing Towards Goal  Goal: *Absence of infection signs and symptoms  Outcome: Progressing Towards Goal  Goal: *Normal spontaneous ventilation  Outcome: Progressing Towards Goal

## 2020-12-25 LAB
ALBUMIN SERPL-MCNC: 2.2 G/DL (ref 3.5–5)
ALBUMIN/GLOB SERPL: 0.5 {RATIO} (ref 1.1–2.2)
ALP SERPL-CCNC: 73 U/L (ref 45–117)
ALT SERPL-CCNC: 72 U/L (ref 12–78)
ANION GAP SERPL CALC-SCNC: 4 MMOL/L (ref 5–15)
AST SERPL-CCNC: 78 U/L (ref 15–37)
BACTERIA SPEC CULT: NORMAL
BILIRUB SERPL-MCNC: 0.4 MG/DL (ref 0.2–1)
BNP SERPL-MCNC: 94 PG/ML
BUN SERPL-MCNC: 51 MG/DL (ref 6–20)
BUN/CREAT SERPL: 24 (ref 12–20)
CALCIUM SERPL-MCNC: 8.5 MG/DL (ref 8.5–10.1)
CH50 SERPL-ACNC: >60 U/ML
CHLORIDE SERPL-SCNC: 111 MMOL/L (ref 97–108)
CO2 SERPL-SCNC: 26 MMOL/L (ref 21–32)
CREAT SERPL-MCNC: 2.14 MG/DL (ref 0.7–1.3)
ERYTHROCYTE [DISTWIDTH] IN BLOOD BY AUTOMATED COUNT: 15.4 % (ref 11.5–14.5)
GLOBULIN SER CALC-MCNC: 4.5 G/DL (ref 2–4)
GLUCOSE SERPL-MCNC: 100 MG/DL (ref 65–100)
GRAM STN SPEC: NORMAL
HCT VFR BLD AUTO: 35.7 % (ref 36.6–50.3)
HGB BLD-MCNC: 11.4 G/DL (ref 12.1–17)
LACTATE SERPL-SCNC: 0.7 MMOL/L (ref 0.4–2)
MAGNESIUM SERPL-MCNC: 2.8 MG/DL (ref 1.6–2.4)
MCH RBC QN AUTO: 27.6 PG (ref 26–34)
MCHC RBC AUTO-ENTMCNC: 31.9 G/DL (ref 30–36.5)
MCV RBC AUTO: 86.4 FL (ref 80–99)
NRBC # BLD: 0 K/UL (ref 0–0.01)
NRBC BLD-RTO: 0 PER 100 WBC
PHOSPHATE SERPL-MCNC: 3.1 MG/DL (ref 2.6–4.7)
PLATELET # BLD AUTO: 270 K/UL (ref 150–400)
PMV BLD AUTO: 9.6 FL (ref 8.9–12.9)
POTASSIUM SERPL-SCNC: 4 MMOL/L (ref 3.5–5.1)
PROCALCITONIN SERPL-MCNC: 0.92 NG/ML
PROT SERPL-MCNC: 6.7 G/DL (ref 6.4–8.2)
RBC # BLD AUTO: 4.13 M/UL (ref 4.1–5.7)
SERVICE CMNT-IMP: NORMAL
SERVICE CMNT-IMP: NORMAL
SODIUM SERPL-SCNC: 141 MMOL/L (ref 136–145)
VANCOMYCIN SERPL-MCNC: 12.8 UG/ML
WBC # BLD AUTO: 7.9 K/UL (ref 4.1–11.1)

## 2020-12-25 PROCEDURE — 80202 ASSAY OF VANCOMYCIN: CPT

## 2020-12-25 PROCEDURE — 80053 COMPREHEN METABOLIC PANEL: CPT

## 2020-12-25 PROCEDURE — 84145 PROCALCITONIN (PCT): CPT

## 2020-12-25 PROCEDURE — 65620000000 HC RM CCU GENERAL

## 2020-12-25 PROCEDURE — 74011250637 HC RX REV CODE- 250/637: Performed by: ANESTHESIOLOGY

## 2020-12-25 PROCEDURE — 74011000258 HC RX REV CODE- 258: Performed by: ANESTHESIOLOGY

## 2020-12-25 PROCEDURE — 83735 ASSAY OF MAGNESIUM: CPT

## 2020-12-25 PROCEDURE — 83880 ASSAY OF NATRIURETIC PEPTIDE: CPT

## 2020-12-25 PROCEDURE — 74011000250 HC RX REV CODE- 250: Performed by: NURSE PRACTITIONER

## 2020-12-25 PROCEDURE — 83605 ASSAY OF LACTIC ACID: CPT

## 2020-12-25 PROCEDURE — 36415 COLL VENOUS BLD VENIPUNCTURE: CPT

## 2020-12-25 PROCEDURE — 94003 VENT MGMT INPAT SUBQ DAY: CPT

## 2020-12-25 PROCEDURE — 74011250636 HC RX REV CODE- 250/636: Performed by: INTERNAL MEDICINE

## 2020-12-25 PROCEDURE — 74011000250 HC RX REV CODE- 250: Performed by: ANESTHESIOLOGY

## 2020-12-25 PROCEDURE — 74011250636 HC RX REV CODE- 250/636: Performed by: ANESTHESIOLOGY

## 2020-12-25 PROCEDURE — 74011250636 HC RX REV CODE- 250/636: Performed by: NURSE PRACTITIONER

## 2020-12-25 PROCEDURE — 85027 COMPLETE CBC AUTOMATED: CPT

## 2020-12-25 PROCEDURE — 84100 ASSAY OF PHOSPHORUS: CPT

## 2020-12-25 RX ORDER — DEXMEDETOMIDINE HYDROCHLORIDE 4 UG/ML
.1-1.4 INJECTION, SOLUTION INTRAVENOUS
Status: DISCONTINUED | OUTPATIENT
Start: 2020-12-25 | End: 2021-01-02

## 2020-12-25 RX ORDER — VANCOMYCIN 1.75 GRAM/500 ML IN 0.9 % SODIUM CHLORIDE INTRAVENOUS
1750 ONCE
Status: DISCONTINUED | OUTPATIENT
Start: 2020-12-25 | End: 2020-12-25

## 2020-12-25 RX ORDER — ENOXAPARIN SODIUM 100 MG/ML
40 INJECTION SUBCUTANEOUS DAILY
Status: DISCONTINUED | OUTPATIENT
Start: 2020-12-26 | End: 2021-01-07 | Stop reason: HOSPADM

## 2020-12-25 RX ADMIN — ENOXAPARIN SODIUM 30 MG: 30 INJECTION SUBCUTANEOUS at 08:15

## 2020-12-25 RX ADMIN — Medication 200 MCG/HR: at 01:36

## 2020-12-25 RX ADMIN — Medication 200 MCG/HR: at 09:28

## 2020-12-25 RX ADMIN — PROPOFOL 45 MCG/KG/MIN: 10 INJECTION, EMULSION INTRAVENOUS at 13:37

## 2020-12-25 RX ADMIN — PROPOFOL 50 MCG/KG/MIN: 10 INJECTION, EMULSION INTRAVENOUS at 10:00

## 2020-12-25 RX ADMIN — DEXMEDETOMIDINE HYDROCHLORIDE 0.6 MCG/KG/HR: 400 INJECTION INTRAVENOUS at 21:33

## 2020-12-25 RX ADMIN — CEFEPIME HYDROCHLORIDE 2 G: 2 INJECTION, POWDER, FOR SOLUTION INTRAVENOUS at 15:47

## 2020-12-25 RX ADMIN — DEXMEDETOMIDINE HYDROCHLORIDE 0.4 MCG/KG/HR: 400 INJECTION INTRAVENOUS at 09:44

## 2020-12-25 RX ADMIN — Medication 10 ML: at 21:33

## 2020-12-25 RX ADMIN — PROPOFOL 50 MCG/KG/MIN: 10 INJECTION, EMULSION INTRAVENOUS at 07:17

## 2020-12-25 RX ADMIN — Medication 10 ML: at 14:22

## 2020-12-25 RX ADMIN — Medication 17.2 MG: at 08:15

## 2020-12-25 RX ADMIN — CHLORHEXIDINE GLUCONATE 15 ML: 0.12 RINSE ORAL at 08:16

## 2020-12-25 RX ADMIN — DEXMEDETOMIDINE HYDROCHLORIDE 0.6 MCG/KG/HR: 400 INJECTION INTRAVENOUS at 14:38

## 2020-12-25 RX ADMIN — CEFEPIME HYDROCHLORIDE 2 G: 2 INJECTION, POWDER, FOR SOLUTION INTRAVENOUS at 03:30

## 2020-12-25 RX ADMIN — SODIUM CHLORIDE, SODIUM LACTATE, POTASSIUM CHLORIDE, AND CALCIUM CHLORIDE 75 ML/HR: 600; 310; 30; 20 INJECTION, SOLUTION INTRAVENOUS at 07:13

## 2020-12-25 RX ADMIN — PROPOFOL 40 MCG/KG/MIN: 10 INJECTION, EMULSION INTRAVENOUS at 21:33

## 2020-12-25 RX ADMIN — PROPOFOL 50 MCG/KG/MIN: 10 INJECTION, EMULSION INTRAVENOUS at 04:13

## 2020-12-25 RX ADMIN — SODIUM CHLORIDE, SODIUM LACTATE, POTASSIUM CHLORIDE, AND CALCIUM CHLORIDE 75 ML/HR: 600; 310; 30; 20 INJECTION, SOLUTION INTRAVENOUS at 21:33

## 2020-12-25 RX ADMIN — PROPOFOL 50 MCG/KG/MIN: 10 INJECTION, EMULSION INTRAVENOUS at 01:36

## 2020-12-25 RX ADMIN — CHLORHEXIDINE GLUCONATE 15 ML: 0.12 RINSE ORAL at 21:33

## 2020-12-25 RX ADMIN — SODIUM CHLORIDE 20 MG: 9 INJECTION INTRAMUSCULAR; INTRAVENOUS; SUBCUTANEOUS at 08:15

## 2020-12-25 RX ADMIN — PROPOFOL 40 MCG/KG/MIN: 10 INJECTION, EMULSION INTRAVENOUS at 17:40

## 2020-12-25 NOTE — PROGRESS NOTES
0730 Bedside and Verbal shift change report given to JAMES Leone (oncoming nurse) by Bharath Medina (offgoing nurse). Report included the following information SBAR, Kardex, Intake/Output, MAR and Cardiac Rhythm NSR.   0930 Dr. Jocelyn Kinney at bedside, vent settings changed 40% FiO2  1930 Bedside and Verbal shift change report given to Bharath Medina (oncoming nurse) by Jerri Bonilla (offgoing nurse). Report included the following information SBAR, Kardex, Intake/Output, MAR and Cardiac Rhythm NSR.

## 2020-12-25 NOTE — PROGRESS NOTES
[facility]    ICU TEAM Progress Note    Name: Vidya Urbina   : 1965   MRN: [id number]   Date: 2020      Assessment     ICU Problems:    1. Acute Hypoxic Respiratory Failure  2. CAP/Aspiration (Haemophilus & Beta Strep)  3. Troponin Leak  4. Obesity   5. Nx nonischemic cardiomyopathy   6. Chronic tobacco abuse  7. YISSEL/CKD stage 2  8. Left frontal lipoma-Chronic   9. Hx tobacco abuse    Imagin2020      ICU Comprehensive Plan of Care:   1. Acute hypoxic respiratory failure (Haemophilus influenza PNA)  -SAT today  -SBT today  -? Aspiration vs CAP  -Continue Cefepime  -Stop Vancomycin  -Continue ventilator support - optimize PEEP - Driving pressure is good  -Wean FiO2 for SpO2 > 90%  -HOB >30  -Propofol and Fent for sedation - SAT today  -Trend nt-BNP/Procal/Lact    2. Elevated troponin (resolving)  -ECHO 40-45%   -Cardiology following    3. YISSEL/CKD stage 2  -From Hypotension  -Nephrology following  -Levophed MAP > 65 mmHg  -Strict I and O    Plans for this Shift:     1. Ventilator support, will wean as tolerated  2. SBP Goal of: > 90 mmHg  3. MAP Goal of: > 65 mmHg  4. Phenylephrine - For above SBP/MAP goals  5. IVFs: LR  6. Transfusion Trigger (Hgb): <7 g/dL  7. Respiratory Goals:  a. Chlorhexidine   b. Optimize PEEP/Ventilation/Oxygenation  c. Goal Tidal Volume 6 cc/kg based on IBW  d. Aim for lung protective ventilation  e. Head of bed > 30 degrees  f. Aggressive bronchopulmonary hygiene  8. Pulmonary toilet: Duo-Nebs   9. SpO2 Goal: > 92%  10. Keep K>4; Mg>2   11. PT/OT  12. Goals of Care Discussion with family Pending   13. Plan of Care/Code Status: Full Code  14. [facility] Cardiology, nephrology   15. Discussed Care Plan with Bedside RN  16. Documentation of Current Medications  17.  Rest of Plan Below:    F - Feeding:  Pending   A - Analgesia: Fentanyl  S - Sedation: Propofol  T - DVT Prophylaxis: SCD's or Sequential Compression Device H - Head of Bed: > 30 Degrees  U - Ulcer Prophylaxis: Protonix (pantoprazole)   G - Glycemic Control: Insulin  S - Spontaneous Breathing Trial: Pending  B - Bowel Regimen: Docusate (Colace)  I - Indwelling Catheter:   Tubes: ETT  Lines: Peripheral IV  Drains: Granados Catheter  D - De-escalation of Antibiotics: Vanc & Cefepime    Subjective:   Progress Note: 12/25/2020      Reason for ICU Admission: Acute hypoxic respiratory failure      HPI: Kita Argueta is a 49 yo AAM with a PMH significant for nonischemic cardiomyopathy, left frontal lipoma, CKD, obesity, chronic tobacco abuse, who presented to the ER via EMS with co increased SOB X2 weeks. Pt is currently sedated and on the ventilator, unable to provide HPI thereofre obtained via chart and provider. Pt failed bipap while in the ER with continued worsening hypoxia, therefore pt was intubated and placed on the ventilator. On arrival to ED pt was found to be hypertensive with /100. Patient was then transferred to ICU for further management in care.       Overnight Events:   12/25/2020  Weaning FiO2      Active Problem List:     Problem List  Date Reviewed: 9/28/2020          Codes Class    Hypoxia ICD-10-CM: R09.02  ICD-9-CM: 799.02         Cervical radiculopathy ICD-10-CM: M54.12  ICD-9-CM: 723.4         Arm paresthesia, left ICD-10-CM: R20.2  ICD-9-CM: 782.0         Intractable headache ICD-10-CM: R51.9  ICD-9-CM: 784.0         Dilated cardiomyopathy (HCC) ICD-10-CM: I42.0  ICD-9-CM: 425.4         NSTEMI (non-ST elevated myocardial infarction) ([facility] [address] ICD-10-CM: I21.4  ICD-9-CM: 410.70         HTN (hypertension) ICD-10-CM: I10  ICD-9-CM: 401.9               Past Medical History:      has a past medical history of Hypertension. Past Surgical History:      has no past surgical history on file. Home Medications:     Prior to Admission medications    Medication Sig Start Date End Date Taking?  Authorizing Provider   aspirin-acetaminophen-caffeine (Naty's Extra Strength) 520-260-32.5 mg pwpk Take 1 Package by mouth two (2) times a day. Yes Provider, Historical   ibuprofen (MOTRIN) 600 mg tablet Take 600 mg by mouth every six (6) hours as needed for Pain. Yes Provider, Historical   carvediloL (COREG) 25 mg tablet Take 1 Tab by mouth two (2) times daily (with meals). 20  Yes Ann Connors MD   isosorbide mononitrate ER (IMDUR) 30 mg tablet Take 1 Tab by mouth daily. 20  Yes Ann Connors MD   cloNIDine HCL (CATAPRES) 0.1 mg tablet Take 1 Tab by mouth two (2) times a day. 20  Yes Ann Connors MD   aspirin 81 mg chewable tablet Take 1 Tab by mouth daily. 19  Yes Royer Bailey MD       Allergies/Social/Family History:     No Known Allergies   Social History     Tobacco Use    Smoking status: Current Every Day Smoker     Packs/day: 1.00    Smokeless tobacco: Never Used   Substance Use Topics    Alcohol use: Yes     Alcohol/week: 5.0 standard drinks     Types: 6 Cans of beer per week     Comment: Occasionally      Family History   Problem Relation Age of Onset    Stroke Father     Cancer Sister        Review of Systems:     Review of systems not obtained due to patient factors. Objective:   Vital Signs:  Visit Vitals  /88 (BP 1 Location: Right arm, BP Patient Position: At rest)   Pulse (!) 56   Temp 98.4 °F (36.9 °C)   Resp 25   Ht 5' 11\" (1.803 m)   Wt 105.3 kg (232 lb 2.3 oz)   SpO2 96%   BMI 32.38 kg/m²    O2 Flow Rate (L/min): 15 l/min O2 Device: Ventilator, Endotracheal tube Temp (24hrs), Av.4 °F (37.4 °C), Min:98.4 °F (36.9 °C), Max:100.2 °F (37.9 °C)           Intake/Output:     Intake/Output Summary (Last 24 hours) at 2020 0842  Last data filed at 2020 0800  Gross per 24 hour   Intake 3084.7 ml   Output 2365 ml   Net 719.7 ml       Physical Exam:    General:  Sedated and on the ventilator. No acute distress. Eyes:  Sclera anicteric. Pupils equal, round, reactive to light. Mouth/Throat: Orotracheal tube in place. Neck: Supple. Lungs:   Rhonchi TH scattered, diminished in the BLL   Cardiovascular:  Regular rate and rhythm, no murmur, click, rub, or gallop. Abdomen:   Soft, non-tender, bowel sounds normal, non-distended. Extremities: No cyanosis or edema. Skin: No acute rash or lesions. Lipoma noted over left frontal region    Lymph Nodes: Cervical and supraclavicular normal.   Musculoskeletal:  No swelling or deformity. Lines/Devices:  Intact, no erythema, drainage, or tenderness. Psychiatric: Sedated and appears comfortable on ventilator. LABS AND  DATA: Personally reviewed  Recent Labs     12/25/20  0414 12/24/20  0305   WBC 7.9 9.6   HGB 11.4* 11.2*   HCT 35.7* 35.6*    287     Recent Labs     12/25/20  0414 12/24/20  0305    138   K 4.0 3.9   * 108   CO2 26 25   BUN 51* 62*   CREA 2.14* 3.22*    102*   CA 8.5 8.0*   MG 2.8* 2.5*   PHOS 3.1 4.1     Recent Labs     12/25/20  0414 12/24/20  0305   AP 73 62   TP 6.7 6.6   ALB 2.2* 2.2*   GLOB 4.5* 4.4*     No results for input(s): INR, PTP, APTT, INREXT, INREXT in the last 72 hours. Recent Labs     12/24/20  0615 12/23/20  0821   PHI 7.32* 7.33*   PCO2I 56.4* 41.3   PO2I 67* 87   FIO2I 50 50     Recent Labs     12/22/20  2132   TROIQ 0.23*       Hemodynamics:   PAP:   CO:     Wedge:   CI:     CVP:    SVR:       PVR:       Ventilator Settings:  Mode Rate Tidal Volume Pressure FiO2 PEEP   Assist control, Volume control   420 ml    50 % 8 cm H20     Peak airway pressure: 34 cm H2O    Minute ventilation: 10.9 l/min        MEDS: Reviewed    Chest X-Ray:  CXR Results  (Last 48 hours)    None            ECHO:  Pending     Multidisciplinary Rounds Completed:   Yes    ABCDEF Bundle/Checklist Completed:  Yes    SPECIAL EQUIPMENT  None    DISPOSITION  Stay in ICU    CRITICAL CARE CONSULTANT NOTE  I had a face to face encounter with the patient, reviewed and interpreted patient data including clinical events, labs, images, vital signs, I/O's, and examined patient. I have discussed the case and the plan and management of the patient's care with the consulting services, the bedside nurses and the respiratory therapist.      NOTE OF PERSONAL INVOLVEMENT IN CARE   This patient has a high probability of imminent, clinically significant deterioration, which requires the highest level of preparedness to intervene urgently. I participated in the decision-making and personally managed or directed the management of the following life and organ supporting interventions that required my frequent assessment to treat or prevent imminent deterioration. I personally spent 85 minutes of critical care time. This is time spent at this critically ill patient's bedside actively involved in patient care as well as the coordination of care and discussions with the patient's family. This does not include any procedural time which has been billed separately.     [address]  12/25/2020

## 2020-12-25 NOTE — PROGRESS NOTES
[facility]   [address] [facility], [address] [place] [address] [address], [facility]  Phone: [phone]   [phone]       Nephrology Progress Note  Reilly Kaur     1965     [id number]  Date of Admission : 12/21/2020 12/25/20    CC: Follow up for YISSEL    Assessment and Plan   YISSEL  - ATN from Sustained Hypotension and rapid lowering of BP   - UA : trace blood and 3+ protein --> f/u serologies   - renal US : unremarkable except for benign cyst   - CONTINUE IVF  - No emergent need for dialysis and expect continued improvement in renal function   - labs daily     Bibasal and RUL PNA  Acute Hypoxic Resp failure  - echo w/ LVEF 45%, mild MR  - On vent      Malignant HTN   Hypertensive Urgency   - negative UDS  - Hypotensive resolved      Anemia          Interval History: On vent  Urine out put improved  k stable    Review of Systems: Review of systems not obtained due to patient factors.     Current Medications:   Current Facility-Administered Medications   Medication Dose Route Frequency    dexmedeTOMidine in 0.9 % NaCl (PRECEDEX) 400 mcg/100 mL (4 mcg/mL) infusion soln  0.1-1.4 mcg/kg/hr IntraVENous TITRATE    [START ON 12/26/2020] enoxaparin (LOVENOX) injection 40 mg  40 mg SubCUTAneous DAILY    lactated Ringers infusion  75 mL/hr IntraVENous CONTINUOUS    chlorhexidine (ORAL CARE KIT) 0.12 % mouthwash 15 mL  15 mL Oral Q12H    famotidine (PF) (PEPCID) 20 mg in 0.9% sodium chloride 10 mL injection  20 mg IntraVENous DAILY    cefepime (MAXIPIME) 2 g in 0.9% sodium chloride (MBP/ADV) 100 mL MBP  2 g IntraVENous Q12H    docusate sodium (COLACE) capsule 100 mg  100 mg Oral DAILY    senna (SENOKOT) tablet 17.2 mg  2 Tab Oral DAILY    NOREPINephrine (LEVOPHED) 8 mg in 5% dextrose 250mL (32 mcg/mL) infusion  0.5-30 mcg/min IntraVENous TITRATE    sodium chloride (NS) flush 5-40 mL  5-40 mL IntraVENous Q8H    sodium chloride (NS) flush 5-40 mL  5-40 mL IntraVENous PRN    acetaminophen (TYLENOL) tablet 650 mg  650 mg Oral Q6H PRN    Or    acetaminophen (TYLENOL) suppository 650 mg  650 mg Rectal Q6H PRN    polyethylene glycol (MIRALAX) packet 17 g  17 g Oral DAILY PRN    promethazine (PHENERGAN) tablet 12.5 mg  12.5 mg Oral Q6H PRN    Or    ondansetron (ZOFRAN) injection 4 mg  4 mg IntraVENous Q6H PRN    propofol (DIPRIVAN) 10 mg/mL infusion  0-50 mcg/kg/min IntraVENous TITRATE    fentaNYL (PF) 1,500 mcg/30 mL (50 mcg/mL) infusion  0-200 mcg/hr IntraVENous TITRATE      No Known Allergies    Objective:  Vitals:    Vitals:    12/25/20 0900 12/25/20 1000 12/25/20 1100 12/25/20 1200   BP: 112/70 112/74 125/88 (!) 139/97   Pulse: 63 (!) 58 (!) 58 (!) 59   Resp: 25 25 25 25   Temp:    98.4 °F (36.9 °C)   SpO2: 96% 91% 95% 95%   Weight:       Height:         Intake and Output:  12/25 0701 - 12/25 1900  In: 890.8 [I.V.:700.8]  Out: 750 [Urine:750]  12/23 1901 - 12/25 0700  In: 3114.9 [I.V.:2644.9]  Out: 2427 [Urine:3895]    Physical Examination:  General:  On vent   HEENT: Frontal lipoma   Lungs : diminished   CVS: RRR,s1,s2, normal, No murmur   Extremities: NO Edema  Neurologic: sedated on vent   Psych: Unable to assess       []    High complexity decision making was performed  []    Patient is at high-risk of decompensation with multiple organ involvement    Lab Data Personally Reviewed: I have reviewed all the pertinent labs, microbiology data and radiology studies during assessment.     Recent Labs     12/25/20  0414 12/24/20  0305 12/23/20  0332    138 137   K 4.0 3.9 4.6   * 108 106   CO2 26 25 24    102* 118*   BUN 51* 62* 69*   CREA 2.14* 3.22* 4.36*   CA 8.5 8.0* 7.9*   MG 2.8* 2.5* 2.3   PHOS 3.1 4.1 7.4*   ALB 2.2* 2.2* 2.5*   ALT 72 33 33     Recent Labs     12/25/20  0414 12/24/20  0305 12/23/20  0332   WBC 7.9 9.6 13.3*   HGB 11.4* 11.2* 11.8*   HCT 35.7* 35.6* 37.2    287 287     No results found for: SDES  Lab Results   Component Value Date/Time    Culture result: MRSA NOT PRESENT AT 14 HOURS 12/24/2020 09:20 AM    Culture result: NO GROWTH 2 DAYS 12/23/2020 04:16 PM    Culture result: (A) 12/22/2020 08:45 AM     RARE HAEMOPHILUS INFLUENZAE BETA LACTAMASE NEGATIVE    Culture result: RARE NORMAL RESPIRATORY ALDO 12/22/2020 08:45 AM    Culture result: LIGHT NORMAL RESPIRATORY ALDO 12/21/2020 09:19 PM    Culture result: SCANT BETA STREP,NOT GROUP A,B,C,F OR G (A) 12/21/2020 09:19 PM     Recent Results (from the past 24 hour(s))   LACTIC ACID    Collection Time: 12/25/20  4:14 AM   Result Value Ref Range    Lactic acid 0.7 0.4 - 2.0 MMOL/L   PROCALCITONIN    Collection Time: 12/25/20  4:14 AM   Result Value Ref Range    Procalcitonin 0.92 ng/mL   NT-PRO BNP    Collection Time: 12/25/20  4:14 AM   Result Value Ref Range    NT pro-BNP 94 <125 PG/ML   MAGNESIUM    Collection Time: 12/25/20  4:14 AM   Result Value Ref Range    Magnesium 2.8 (H) 1.6 - 2.4 mg/dL   PHOSPHORUS    Collection Time: 12/25/20  4:14 AM   Result Value Ref Range    Phosphorus 3.1 2.6 - 4.7 MG/DL   CBC W/O DIFF    Collection Time: 12/25/20  4:14 AM   Result Value Ref Range    WBC 7.9 4.1 - 11.1 K/uL    RBC 4.13 4.10 - 5.70 M/uL    HGB 11.4 (L) 12.1 - 17.0 g/dL    HCT 35.7 (L) 36.6 - 50.3 %    MCV 86.4 80.0 - 99.0 FL    MCH 27.6 26.0 - 34.0 PG    MCHC 31.9 30.0 - 36.5 g/dL    RDW 15.4 (H) 11.5 - 14.5 %    PLATELET 403 598 - 592 K/uL    MPV 9.6 8.9 - 12.9 FL    NRBC 0.0 0  WBC    ABSOLUTE NRBC 0.00 0.00 - 9.45 K/uL   METABOLIC PANEL, COMPREHENSIVE    Collection Time: 12/25/20  4:14 AM   Result Value Ref Range    Sodium 141 136 - 145 mmol/L    Potassium 4.0 3.5 - 5.1 mmol/L    Chloride 111 (H) 97 - 108 mmol/L    CO2 26 21 - 32 mmol/L    Anion gap 4 (L) 5 - 15 mmol/L    Glucose 100 65 - 100 mg/dL    BUN 51 (H) 6 - 20 MG/DL    Creatinine 2.14 (H) 0.70 - 1.30 MG/DL    BUN/Creatinine ratio 24 (H) 12 - 20      GFR est AA 39 (L) >60 ml/min/1.73m2    GFR est non-AA 32 (L) >60 ml/min/1.73m2    Calcium 8.5 8.5 - 10.1 MG/DL Bilirubin, total 0.4 0.2 - 1.0 MG/DL    ALT (SGPT) 72 12 - 78 U/L    AST (SGOT) 78 (H) 15 - 37 U/L    Alk. phosphatase 73 45 - 117 U/L    Protein, total 6.7 6.4 - 8.2 g/dL    Albumin 2.2 (L) 3.5 - 5.0 g/dL    Globulin 4.5 (H) 2.0 - 4.0 g/dL    A-G Ratio 0.5 (L) 1.1 - 2.2     VANCOMYCIN, RANDOM    Collection Time: 12/25/20  4:14 AM   Result Value Ref Range    Vancomycin, random 12.8 UG/ML           Total time spent with patient:  xxx   min. Care Plan discussed with:  Patient     Family      RN      Consulting Physician [facility],         I have reviewed the flowsheets. Chart and Pertinent Notes have been reviewed. No change in PMH ,family and social history from Consult note.       Yanira Marinelli MD

## 2020-12-25 NOTE — PROGRESS NOTES
Lovenox Monitoring  Indication: DVT Prophylaxis  Recent Labs     12/25/20  0414 12/24/20  0305 12/23/20  0332   HGB 11.4* 11.2* 11.8*    287 287   CREA 2.14* 3.22* 4.36*     Current Weight: 105.3 kg  Est. CrCl = 45-50 ml/min  Current Dose: 30 mg subcutaneously every 24 hours. Plan: Change to enoxaparin 40 mg sc q24h given improvement in renal function.       Bunny Carrera

## 2020-12-26 LAB
ALBUMIN SERPL-MCNC: 2.2 G/DL (ref 3.5–5)
ALBUMIN/GLOB SERPL: 0.4 {RATIO} (ref 1.1–2.2)
ALP SERPL-CCNC: 82 U/L (ref 45–117)
ALT SERPL-CCNC: 71 U/L (ref 12–78)
ANION GAP SERPL CALC-SCNC: 3 MMOL/L (ref 5–15)
AST SERPL-CCNC: 57 U/L (ref 15–37)
BACTERIA SPEC CULT: NORMAL
BILIRUB SERPL-MCNC: 0.4 MG/DL (ref 0.2–1)
BUN SERPL-MCNC: 34 MG/DL (ref 6–20)
BUN/CREAT SERPL: 20 (ref 12–20)
CALCIUM SERPL-MCNC: 9.1 MG/DL (ref 8.5–10.1)
CHLORIDE SERPL-SCNC: 109 MMOL/L (ref 97–108)
CO2 SERPL-SCNC: 28 MMOL/L (ref 21–32)
CREAT SERPL-MCNC: 1.7 MG/DL (ref 0.7–1.3)
ERYTHROCYTE [DISTWIDTH] IN BLOOD BY AUTOMATED COUNT: 14.9 % (ref 11.5–14.5)
GLOBULIN SER CALC-MCNC: 5.2 G/DL (ref 2–4)
GLUCOSE SERPL-MCNC: 133 MG/DL (ref 65–100)
HCT VFR BLD AUTO: 39.8 % (ref 36.6–50.3)
HGB BLD-MCNC: 12.5 G/DL (ref 12.1–17)
LACTATE SERPL-SCNC: 0.6 MMOL/L (ref 0.4–2)
MAGNESIUM SERPL-MCNC: 2.6 MG/DL (ref 1.6–2.4)
MCH RBC QN AUTO: 27.3 PG (ref 26–34)
MCHC RBC AUTO-ENTMCNC: 31.4 G/DL (ref 30–36.5)
MCV RBC AUTO: 86.9 FL (ref 80–99)
NRBC # BLD: 0 K/UL (ref 0–0.01)
NRBC BLD-RTO: 0 PER 100 WBC
PHOSPHATE SERPL-MCNC: 3.3 MG/DL (ref 2.6–4.7)
PLATELET # BLD AUTO: 294 K/UL (ref 150–400)
PMV BLD AUTO: 9.8 FL (ref 8.9–12.9)
POTASSIUM SERPL-SCNC: 4.3 MMOL/L (ref 3.5–5.1)
PROCALCITONIN SERPL-MCNC: 0.46 NG/ML
PROT SERPL-MCNC: 7.4 G/DL (ref 6.4–8.2)
RBC # BLD AUTO: 4.58 M/UL (ref 4.1–5.7)
SERVICE CMNT-IMP: NORMAL
SODIUM SERPL-SCNC: 140 MMOL/L (ref 136–145)
WBC # BLD AUTO: 9.6 K/UL (ref 4.1–11.1)

## 2020-12-26 PROCEDURE — 74011250637 HC RX REV CODE- 250/637: Performed by: NURSE PRACTITIONER

## 2020-12-26 PROCEDURE — 74011000250 HC RX REV CODE- 250

## 2020-12-26 PROCEDURE — 2709999900 HC NON-CHARGEABLE SUPPLY

## 2020-12-26 PROCEDURE — 83735 ASSAY OF MAGNESIUM: CPT

## 2020-12-26 PROCEDURE — 94003 VENT MGMT INPAT SUBQ DAY: CPT

## 2020-12-26 PROCEDURE — 36415 COLL VENOUS BLD VENIPUNCTURE: CPT

## 2020-12-26 PROCEDURE — 74011250636 HC RX REV CODE- 250/636: Performed by: NURSE PRACTITIONER

## 2020-12-26 PROCEDURE — 74011000250 HC RX REV CODE- 250: Performed by: NURSE PRACTITIONER

## 2020-12-26 PROCEDURE — 74011250636 HC RX REV CODE- 250/636: Performed by: ANESTHESIOLOGY

## 2020-12-26 PROCEDURE — 74011000250 HC RX REV CODE- 250: Performed by: ANESTHESIOLOGY

## 2020-12-26 PROCEDURE — 85027 COMPLETE CBC AUTOMATED: CPT

## 2020-12-26 PROCEDURE — 83605 ASSAY OF LACTIC ACID: CPT

## 2020-12-26 PROCEDURE — 80053 COMPREHEN METABOLIC PANEL: CPT

## 2020-12-26 PROCEDURE — 84145 PROCALCITONIN (PCT): CPT

## 2020-12-26 PROCEDURE — 74011250636 HC RX REV CODE- 250/636: Performed by: STUDENT IN AN ORGANIZED HEALTH CARE EDUCATION/TRAINING PROGRAM

## 2020-12-26 PROCEDURE — 76010000379 HC BRONCHOSCOPY DIAG/THERAPEUTIC

## 2020-12-26 PROCEDURE — 74011250636 HC RX REV CODE- 250/636: Performed by: INTERNAL MEDICINE

## 2020-12-26 PROCEDURE — 74011250637 HC RX REV CODE- 250/637: Performed by: ANESTHESIOLOGY

## 2020-12-26 PROCEDURE — 0B9D8ZZ DRAINAGE OF RIGHT MIDDLE LUNG LOBE, VIA NATURAL OR ARTIFICIAL OPENING ENDOSCOPIC: ICD-10-PCS | Performed by: ANESTHESIOLOGY

## 2020-12-26 PROCEDURE — 65620000000 HC RM CCU GENERAL

## 2020-12-26 PROCEDURE — 74011000258 HC RX REV CODE- 258: Performed by: ANESTHESIOLOGY

## 2020-12-26 PROCEDURE — 74011000250 HC RX REV CODE- 250: Performed by: STUDENT IN AN ORGANIZED HEALTH CARE EDUCATION/TRAINING PROGRAM

## 2020-12-26 PROCEDURE — 84100 ASSAY OF PHOSPHORUS: CPT

## 2020-12-26 RX ORDER — AMLODIPINE BESYLATE 5 MG/1
5 TABLET ORAL DAILY
Status: DISCONTINUED | OUTPATIENT
Start: 2020-12-26 | End: 2021-01-04

## 2020-12-26 RX ORDER — HYDRALAZINE HYDROCHLORIDE 20 MG/ML
20 INJECTION INTRAMUSCULAR; INTRAVENOUS ONCE
Status: COMPLETED | OUTPATIENT
Start: 2020-12-26 | End: 2020-12-26

## 2020-12-26 RX ORDER — QUETIAPINE FUMARATE 25 MG/1
50 TABLET, FILM COATED ORAL 3 TIMES DAILY
Status: DISCONTINUED | OUTPATIENT
Start: 2020-12-26 | End: 2020-12-27

## 2020-12-26 RX ORDER — HYDRALAZINE HYDROCHLORIDE 20 MG/ML
20 INJECTION INTRAMUSCULAR; INTRAVENOUS
Status: DISCONTINUED | OUTPATIENT
Start: 2020-12-26 | End: 2021-01-07 | Stop reason: HOSPADM

## 2020-12-26 RX ORDER — CLONIDINE HYDROCHLORIDE 0.1 MG/1
0.1 TABLET ORAL 2 TIMES DAILY
Status: DISCONTINUED | OUTPATIENT
Start: 2020-12-26 | End: 2021-01-04

## 2020-12-26 RX ORDER — LABETALOL HYDROCHLORIDE 5 MG/ML
20 INJECTION, SOLUTION INTRAVENOUS
Status: DISCONTINUED | OUTPATIENT
Start: 2020-12-26 | End: 2021-01-03

## 2020-12-26 RX ORDER — ROCURONIUM BROMIDE 10 MG/ML
INJECTION, SOLUTION INTRAVENOUS
Status: COMPLETED
Start: 2020-12-26 | End: 2020-12-26

## 2020-12-26 RX ORDER — SCOLOPAMINE TRANSDERMAL SYSTEM 1 MG/1
1 PATCH, EXTENDED RELEASE TRANSDERMAL
Status: DISCONTINUED | OUTPATIENT
Start: 2020-12-26 | End: 2021-01-02

## 2020-12-26 RX ORDER — ROCURONIUM BROMIDE 10 MG/ML
50 INJECTION, SOLUTION INTRAVENOUS
Status: DISCONTINUED | OUTPATIENT
Start: 2020-12-26 | End: 2020-12-26

## 2020-12-26 RX ADMIN — CLONIDINE HYDROCHLORIDE 0.1 MG: 0.1 TABLET ORAL at 17:17

## 2020-12-26 RX ADMIN — CHLORHEXIDINE GLUCONATE 15 ML: 0.12 RINSE ORAL at 08:08

## 2020-12-26 RX ADMIN — HYDRALAZINE HYDROCHLORIDE 20 MG: 20 INJECTION INTRAMUSCULAR; INTRAVENOUS at 18:26

## 2020-12-26 RX ADMIN — DEXMEDETOMIDINE HYDROCHLORIDE 0.8 MCG/KG/HR: 400 INJECTION INTRAVENOUS at 12:36

## 2020-12-26 RX ADMIN — DEXMEDETOMIDINE HYDROCHLORIDE 0.8 MCG/KG/HR: 400 INJECTION INTRAVENOUS at 21:40

## 2020-12-26 RX ADMIN — CHLORHEXIDINE GLUCONATE 15 ML: 0.12 RINSE ORAL at 21:00

## 2020-12-26 RX ADMIN — ROCURONIUM BROMIDE 50 MG: 10 INJECTION, SOLUTION INTRAVENOUS at 15:40

## 2020-12-26 RX ADMIN — CEFEPIME HYDROCHLORIDE 2 G: 2 INJECTION, POWDER, FOR SOLUTION INTRAVENOUS at 03:04

## 2020-12-26 RX ADMIN — PROPOFOL 50 MCG/KG/MIN: 10 INJECTION, EMULSION INTRAVENOUS at 21:30

## 2020-12-26 RX ADMIN — HYDRALAZINE HYDROCHLORIDE 20 MG: 20 INJECTION INTRAMUSCULAR; INTRAVENOUS at 12:59

## 2020-12-26 RX ADMIN — PROPOFOL 50 MCG/KG/MIN: 10 INJECTION, EMULSION INTRAVENOUS at 12:36

## 2020-12-26 RX ADMIN — ACETAMINOPHEN 650 MG: 325 TABLET ORAL at 03:03

## 2020-12-26 RX ADMIN — QUETIAPINE FUMARATE 50 MG: 25 TABLET ORAL at 17:17

## 2020-12-26 RX ADMIN — Medication 10 ML: at 21:41

## 2020-12-26 RX ADMIN — HYDRALAZINE HYDROCHLORIDE 20 MG: 20 INJECTION INTRAMUSCULAR; INTRAVENOUS at 05:25

## 2020-12-26 RX ADMIN — METHYLPREDNISOLONE SODIUM SUCCINATE 40 MG: 40 INJECTION, POWDER, FOR SOLUTION INTRAMUSCULAR; INTRAVENOUS at 08:05

## 2020-12-26 RX ADMIN — PROPOFOL 50 MCG/KG/MIN: 10 INJECTION, EMULSION INTRAVENOUS at 18:23

## 2020-12-26 RX ADMIN — ENOXAPARIN SODIUM 40 MG: 40 INJECTION SUBCUTANEOUS at 08:04

## 2020-12-26 RX ADMIN — DEXMEDETOMIDINE HYDROCHLORIDE 0.8 MCG/KG/HR: 400 INJECTION INTRAVENOUS at 03:04

## 2020-12-26 RX ADMIN — PROPOFOL 50 MCG/KG/MIN: 10 INJECTION, EMULSION INTRAVENOUS at 07:44

## 2020-12-26 RX ADMIN — QUETIAPINE FUMARATE 50 MG: 25 TABLET ORAL at 21:44

## 2020-12-26 RX ADMIN — SODIUM CHLORIDE, SODIUM LACTATE, POTASSIUM CHLORIDE, AND CALCIUM CHLORIDE 75 ML/HR: 600; 310; 30; 20 INJECTION, SOLUTION INTRAVENOUS at 12:37

## 2020-12-26 RX ADMIN — LABETALOL HYDROCHLORIDE 20 MG: 5 INJECTION INTRAVENOUS at 04:53

## 2020-12-26 RX ADMIN — Medication 17.2 MG: at 08:04

## 2020-12-26 RX ADMIN — LABETALOL HYDROCHLORIDE 20 MG: 5 INJECTION INTRAVENOUS at 12:04

## 2020-12-26 RX ADMIN — PROPOFOL 50 MCG/KG/MIN: 10 INJECTION, EMULSION INTRAVENOUS at 15:20

## 2020-12-26 RX ADMIN — AMLODIPINE BESYLATE 5 MG: 5 TABLET ORAL at 12:59

## 2020-12-26 RX ADMIN — DEXMEDETOMIDINE HYDROCHLORIDE 0.8 MCG/KG/HR: 400 INJECTION INTRAVENOUS at 08:05

## 2020-12-26 RX ADMIN — CEFEPIME HYDROCHLORIDE 2 G: 2 INJECTION, POWDER, FOR SOLUTION INTRAVENOUS at 15:22

## 2020-12-26 RX ADMIN — SODIUM CHLORIDE 20 MG: 9 INJECTION INTRAMUSCULAR; INTRAVENOUS; SUBCUTANEOUS at 08:05

## 2020-12-26 RX ADMIN — Medication 10 ML: at 15:20

## 2020-12-26 RX ADMIN — Medication 10 ML: at 06:00

## 2020-12-26 RX ADMIN — PROPOFOL 50 MCG/KG/MIN: 10 INJECTION, EMULSION INTRAVENOUS at 10:05

## 2020-12-26 RX ADMIN — DEXMEDETOMIDINE HYDROCHLORIDE 0.8 MCG/KG/HR: 400 INJECTION INTRAVENOUS at 17:03

## 2020-12-26 RX ADMIN — ROCURONIUM BROMIDE 50 MG: 50 INJECTION, SOLUTION INTRAVENOUS at 15:40

## 2020-12-26 RX ADMIN — QUETIAPINE FUMARATE 50 MG: 25 TABLET ORAL at 08:04

## 2020-12-26 NOTE — PROCEDURES
[facility]  Bronchoscopy Procedure Note    Procedure:  Diagnostic bronchoscopy. Indication:  Pneumonia    Consent/Treatment:  Informed consent was obtained from the  patient after risks, benefits and alternatives were explained. Timeout verified the correct patient and correct procedure. Anesthesia:   Patient on ventilator and receiving  Propofol drip and Fentanyl. Rocuronium 50 mg x 1. The following parameters were monitored: oxygen saturation, heart rate, blood pressure, respiratory rate, EKG, adequacy of pulmonary ventilation and response to care. Total physician intraservice time was 45 min. Procedure Details:   -- The bronchoscope was introduced through an endotracheal tube. -- The trachea and shamir were completely inspected and were found to be normal.  -- The right-sided endobronchial anatomy was completely inspected and found to have erythema in the RML, along with copious amounts of  thin, clear secretions.   -- The left-sided endobronchial anatomy was completely inspected and were found to be normal.     Specimens:   None    Rapid On-Site Evaluation: A preliminary diagnosis of Pneumonia    Complications: none    Estimated Blood Loss: Minimal    Claudia Check, DO   Staff Anesthesiologist/Intensivist  [facility] [place] [facility]

## 2020-12-26 NOTE — PROGRESS NOTES
[address] [facility] [place] [facility] [place]         Brenda Menchaca  UOO:[id number]   :1965     Cr improving  Continue current care  Follow bmp                        Stephanie Cason MD  [facility]  [facility] [address], [address]iselda Poles [place], [address] [facility]  Phone - [phone]         Fax - [phone] [facility] Office  [address] [place] [address]  Phone - [phone]        Fax - [phone]     www. [facility] [employer]

## 2020-12-26 NOTE — PROGRESS NOTES
1930: bedside shift report received from [employer]. 0255: Decreasing propofol gtt for SAT.  0303: prn tylenol given for fever 101.4.  0310: Pt peak pressures high, sitting up in bed, diaphoretic, HR 130s, O2 sats 78%. Tiffanie Maciel MD at bedside. Increased propofol gtt.  0451Esuyapa Campbell MD updated on patient BP of 173/103. PRN labetalol ordered and given. 0515: /116. Tiffanie Maciel MD paged. Orders received for hydralazine 20mg. 0543: /85.  0600: pt vent alarming - high peak pressure, copious amount of thick secretions suctioned from ETT. 0730: Bedside and Verbal shift change report given to [address] [place] [address] (oncoming nurse) by Sanjay Rae (offgoing nurse). Report included the following information SBAR, Kardex, Intake/Output, MAR, Accordion, Recent Results, Med Rec Status and Cardiac Rhythm nsr.

## 2020-12-26 NOTE — PROGRESS NOTES
Spiritual Care Assessment/Progress Note  [facility]      NAME: Katt Monroe      MRN: [id number]  AGE: 50 y.o.  SEX: male  Church Affiliation: No Amish   Language: English     12/26/2020     Total Time (in minutes): 9     Spiritual Assessment begun in [facility] COR[facility] C[facility] through conversation with:         []Patient        [] Family    [] Friend(s)        Reason for Consult: Initial/Spiritual assessment, critical care     Spiritual beliefs: (Please include comment if needed)     [] Identifies with a adryan tradition:         [] Supported by a adryan community:            [] Claims no spiritual orientation:           [] Seeking spiritual identity:                [] Adheres to an individual form of spirituality:           [x] Not able to assess:                           Identified resources for coping:      [] Prayer                               [] Music                  [] Guided Imagery     [] Family/friends                 [] Pet visits     [] Devotional reading                         [] Unknown     [] Other:                                               Interventions offered during this visit: (See comments for more details)    Patient Interventions: Initial visit, Other (comment)(Left note at bedside)     Family/Friend(s): Other (comment)(Left note at bedside)     Plan of Care:     [] Support spiritual and/or cultural needs    [] Support AMD and/or advance care planning process      [] Support grieving process   [] Coordinate Rites and/or Rituals    [] Coordination with community clergy   [] No spiritual needs identified at this time   [] Detailed Plan of Care below (See Comments)  [] Make referral to Music Therapy  [] Make referral to Pet Therapy     [] Make referral to Addiction services  [] Make referral to [facility]  [] Make referral to Spiritual Care Partner  [] No future visits requested        [] Follow up upon further referrals     Comments: Visited Mr Joie Olivier in [facility] for initial spiritual assessment. Mr Jose Juan Toledo was lying quietly in bed with eyes closed and on vent support; no family was present. Left note at bedside assuring patient and family of ongoing  availability for support. : Rev. Jes Garcia.  Josh Mueller; [facility], to contact [phone] [address] call: [phone]

## 2020-12-26 NOTE — PROGRESS NOTES
[facility]    ICU TEAM Progress Note    Name: Diony Lawson   : 1965   MRN: [id number]   Date: 2020      Assessment     ICU Problems:    1. Acute Hypoxic Respiratory Failure  2. CAP/Aspiration (Haemophilus & Beta Strep)  3. Metabolic Encephalopathy  4. Obesity   5. Nx nonischemic cardiomyopathy   6. Chronic tobacco abuse  7. YISSEL/CKD stage 2  8. Left frontal lipoma-Chronic   9. Hx tobacco abuse    Imagin2020      ICU Comprehensive Plan of Care:   1. Acute hypoxic respiratory failure (Haemophilus influenza PNA; Tobacco Abuse Hx)  - SAT today --> Failed  - Add Seroquel 50 mg q8  - Monitor QTC  - Continue Cefepime  - Methylpred 40 mg IV daily  - Scopolamine patch  - Continue ventilator support - optimize PEEP - Driving pressure is good  - Wean FiO2 for SpO2 > 90%  - HOB >30  - Propofol/Precedex  - Trend nt-BNP/Procal/Lact    2. Elevated troponin (resolving)  -ECHO 40-45%   -Cardiology following    3. YISSEL/CKD stage 2  -From Hypotension  -Nephrology following  -Levophed MAP > 65 mmHg  -Strict I and O    4. HTN  -- PRN Labetalol  -- Add Norvasc    Plans for this Shift:     1. Ventilator support, will wean as tolerated  2. SBP Goal of: > 90 mmHg  3. MAP Goal of: > 65 mmHg  4. Phenylephrine - For above SBP/MAP goals  5. IVFs: LR  6. Transfusion Trigger (Hgb): <7 g/dL  7. Respiratory Goals:  a. Chlorhexidine   b. Optimize PEEP/Ventilation/Oxygenation  c. Goal Tidal Volume 6 cc/kg based on IBW  d. Aim for lung protective ventilation  e. Head of bed > 30 degrees  f. Aggressive bronchopulmonary hygiene  8. Pulmonary toilet: Duo-Nebs   9. SpO2 Goal: > 92%  10. Keep K>4; Mg>2   11. PT/OT  12. Goals of Care Discussion with family Pending   13. Plan of Care/Code Status: Full Code  14. [facility] Cardiology, nephrology   15. Discussed Care Plan with Bedside RN  16. Documentation of Current Medications  17.  Rest of Plan Below:    F - Feeding:  Pending   A - Analgesia: Fentanyl  S - Sedation: Propofol  T - DVT Prophylaxis: SCD's or Sequential Compression Device   H - Head of Bed: > 30 Degrees  U - Ulcer Prophylaxis: Protonix (pantoprazole)   G - Glycemic Control: Insulin  S - Spontaneous Breathing Trial: Pending  B - Bowel Regimen: Docusate (Colace)  I - Indwelling Catheter:   Tubes: ETT  Lines: Peripheral IV  Drains: Granados Catheter  D - De-escalation of Antibiotics: Vanc & Cefepime    Subjective:   Progress Note: 12/26/2020      Reason for ICU Admission: Acute hypoxic respiratory failure      HPI: Kaykay Narvaez is a 49 yo AAM with a PMH significant for nonischemic cardiomyopathy, left frontal lipoma, CKD, obesity, chronic tobacco abuse, who presented to the ER via EMS with co increased SOB X2 weeks. Pt is currently sedated and on the ventilator, unable to provide HPI thereofre obtained via chart and provider. Pt failed bipap while in the ER with continued worsening hypoxia, therefore pt was intubated and placed on the ventilator. On arrival to ED pt was found to be hypertensive with /100. Patient was then transferred to ICU for further management in care.       Overnight Events:   12/26/2020  Severe agitation on morning SAT      Active Problem List:     Problem List  Date Reviewed: 9/28/2020          Codes Class    Hypoxia ICD-10-CM: R09.02  ICD-9-CM: 799.02         Cervical radiculopathy ICD-10-CM: M54.12  ICD-9-CM: 723.4         Arm paresthesia, left ICD-10-CM: R20.2  ICD-9-CM: 782.0         Intractable headache ICD-10-CM: R51.9  ICD-9-CM: 784.0         Dilated cardiomyopathy (HCC) ICD-10-CM: I42.0  ICD-9-CM: 425.4         NSTEMI (non-ST elevated myocardial infarction) ([facility] [address] ICD-10-CM: I21.4  ICD-9-CM: 410.70         HTN (hypertension) ICD-10-CM: I10  ICD-9-CM: 401.9               Past Medical History:      has a past medical history of Hypertension. Past Surgical History:      has no past surgical history on file.     Home Medications:     Prior to Admission medications    Medication Sig Start Date End Date Taking? Authorizing Provider   aspirin-acetaminophen-caffeine (Goody's Extra Strength) 520-260-32.5 mg pwpk Take 1 Package by mouth two (2) times a day. Yes Provider, Historical   ibuprofen (MOTRIN) 600 mg tablet Take 600 mg by mouth every six (6) hours as needed for Pain. Yes Provider, Historical   carvediloL (COREG) 25 mg tablet Take 1 Tab by mouth two (2) times daily (with meals). 20  Yes Monik Capellan MD   isosorbide mononitrate ER (IMDUR) 30 mg tablet Take 1 Tab by mouth daily. 20  Yes Monik Capellan MD   cloNIDine HCL (CATAPRES) 0.1 mg tablet Take 1 Tab by mouth two (2) times a day. 20  Yes Monik Capellan MD   aspirin 81 mg chewable tablet Take 1 Tab by mouth daily. 19  Yes Catherine Howard MD       Allergies/Social/Family History:     No Known Allergies   Social History     Tobacco Use    Smoking status: Current Every Day Smoker     Packs/day: 1.00    Smokeless tobacco: Never Used   Substance Use Topics    Alcohol use: Yes     Alcohol/week: 5.0 standard drinks     Types: 6 Cans of beer per week     Comment: Occasionally      Family History   Problem Relation Age of Onset    Stroke Father     Cancer Sister        Review of Systems:     Review of systems not obtained due to patient factors. Objective:   Vital Signs:  Visit Vitals  BP (!) 154/101   Pulse 76   Temp 99 °F (37.2 °C)   Resp 25   Ht 5' 11\" (1.803 m)   Wt 103.8 kg (228 lb 13.4 oz)   SpO2 93%   BMI 31.92 kg/m²    O2 Flow Rate (L/min): 15 l/min O2 Device: Ventilator, Endotracheal tube Temp (24hrs), Av.9 °F (37.7 °C), Min:98.4 °F (36.9 °C), Max:101.1 °F (38.4 °C)           Intake/Output:     Intake/Output Summary (Last 24 hours) at 2020 1051  Last data filed at 2020 1000  Gross per 24 hour   Intake 3306.61 ml   Output 3325 ml   Net -18.39 ml       Physical Exam:    General:  Sedated and on the ventilator. No acute distress.    Eyes: Sclera anicteric. Pupils equal, round, reactive to light. Mouth/Throat: Orotracheal tube in place. Neck: Supple. Lungs:   Rhonchi TH scattered, diminished in the BLL   Cardiovascular:  Regular rate and rhythm, no murmur, click, rub, or gallop. Abdomen:   Soft, non-tender, bowel sounds normal, non-distended. Extremities: No cyanosis or edema. Skin: No acute rash or lesions. Lipoma noted over left frontal region    Lymph Nodes: Cervical and supraclavicular normal.   Musculoskeletal:  No swelling or deformity. Lines/Devices:  Intact, no erythema, drainage, or tenderness. Psychiatric: Sedated and appears comfortable on ventilator. LABS AND  DATA: Personally reviewed  Recent Labs     12/26/20 0426 12/25/20 0414   WBC 9.6 7.9   HGB 12.5 11.4*   HCT 39.8 35.7*    270     Recent Labs     12/26/20 0426 12/25/20  0414    141   K 4.3 4.0   * 111*   CO2 28 26   BUN 34* 51*   CREA 1.70* 2.14*   * 100   CA 9.1 8.5   MG 2.6* 2.8*   PHOS 3.3 3.1     Recent Labs     12/26/20 0426 12/25/20  0414   AP 82 73   TP 7.4 6.7   ALB 2.2* 2.2*   GLOB 5.2* 4.5*     No results for input(s): INR, PTP, APTT, INREXT, INREXT in the last 72 hours. Recent Labs     12/24/20  0615   PHI 7.32*   PCO2I 56.4*   PO2I 67*   FIO2I 50     No results for input(s): CPK, CKMB, TROIQ, BNPP in the last 72 hours. Hemodynamics:   PAP:   CO:     Wedge:   CI:     CVP:    SVR:       PVR:       Ventilator Settings:  Mode Rate Tidal Volume Pressure FiO2 PEEP   Assist control   420 ml    40 % 8 cm H20     Peak airway pressure: 31 cm H2O    Minute ventilation: 10.8 l/min        MEDS: Reviewed    Chest X-Ray:  CXR Results  (Last 48 hours)    None            ECHO:  Pending     Multidisciplinary Rounds Completed:   Yes    ABCDEF Bundle/Checklist Completed:  Yes    SPECIAL EQUIPMENT  None    DISPOSITION  Stay in ICU    CRITICAL CARE CONSULTANT NOTE  I had a face to face encounter with the patient, reviewed and interpreted patient data including clinical events, labs, images, vital signs, I/O's, and examined patient. I have discussed the case and the plan and management of the patient's care with the consulting services, the bedside nurses and the respiratory therapist.      NOTE OF PERSONAL INVOLVEMENT IN CARE   This patient has a high probability of imminent, clinically significant deterioration, which requires the highest level of preparedness to intervene urgently. I participated in the decision-making and personally managed or directed the management of the following life and organ supporting interventions that required my frequent assessment to treat or prevent imminent deterioration. I personally spent 95 minutes of critical care time. This is time spent at this critically ill patient's bedside actively involved in patient care as well as the coordination of care and discussions with the patient's family. This does not include any procedural time which has been billed separately.     [address]  12/26/2020

## 2020-12-26 NOTE — PROGRESS NOTES
0730 Bedside shift change report given to Sandy Gaitan, RN and Nadine Akins, RN (oncoming nurse) by Brianda Arias RN (offgoing nurse). Report included the following information SBAR, Kardex, Intake/Output, MAR and Recent Results. 1300 /110. 20mg Hydralazine administered. 1 Dr. Farhana Marquis and RT at bedside for bronchoscopy. 1600 RT and bedside. Vent switched to heated circuit. 1930 Bedside shift change report given to Brianda Arias RN (oncoming nurse) by Sandy Gaitan RN and Nadine Akins RN (offgoing nurse). Report included the following information SBAR, Kardex, Intake/Output, MAR and Recent Results.

## 2020-12-27 ENCOUNTER — APPOINTMENT (OUTPATIENT)
Dept: GENERAL RADIOLOGY | Age: 55
DRG: 207 | End: 2020-12-27
Attending: ANESTHESIOLOGY

## 2020-12-27 LAB
ANION GAP SERPL CALC-SCNC: 3 MMOL/L (ref 5–15)
BUN SERPL-MCNC: 27 MG/DL (ref 6–20)
BUN/CREAT SERPL: 20 (ref 12–20)
C-ANCA TITR SER IF: NORMAL TITER
CALCIUM SERPL-MCNC: 9.5 MG/DL (ref 8.5–10.1)
CHLORIDE SERPL-SCNC: 108 MMOL/L (ref 97–108)
CO2 SERPL-SCNC: 27 MMOL/L (ref 21–32)
CREAT SERPL-MCNC: 1.38 MG/DL (ref 0.7–1.3)
ERYTHROCYTE [DISTWIDTH] IN BLOOD BY AUTOMATED COUNT: 14.6 % (ref 11.5–14.5)
GLUCOSE SERPL-MCNC: 123 MG/DL (ref 65–100)
HCT VFR BLD AUTO: 39.8 % (ref 36.6–50.3)
HGB BLD-MCNC: 12.7 G/DL (ref 12.1–17)
MAGNESIUM SERPL-MCNC: 2.7 MG/DL (ref 1.6–2.4)
MCH RBC QN AUTO: 27 PG (ref 26–34)
MCHC RBC AUTO-ENTMCNC: 31.9 G/DL (ref 30–36.5)
MCV RBC AUTO: 84.7 FL (ref 80–99)
MYELOPEROXIDASE AB SER IA-ACNC: <9 U/ML (ref 0–9)
NRBC # BLD: 0 K/UL (ref 0–0.01)
NRBC BLD-RTO: 0 PER 100 WBC
P-ANCA ATYPICAL TITR SER IF: NORMAL TITER
P-ANCA TITR SER IF: NORMAL TITER
PHOSPHATE SERPL-MCNC: 3.6 MG/DL (ref 2.6–4.7)
PLATELET # BLD AUTO: 320 K/UL (ref 150–400)
PMV BLD AUTO: 9.6 FL (ref 8.9–12.9)
POTASSIUM SERPL-SCNC: 4.1 MMOL/L (ref 3.5–5.1)
PROCALCITONIN SERPL-MCNC: 0.38 NG/ML
PROTEINASE3 AB SER IA-ACNC: <3.5 U/ML (ref 0–3.5)
RBC # BLD AUTO: 4.7 M/UL (ref 4.1–5.7)
SODIUM SERPL-SCNC: 138 MMOL/L (ref 136–145)
WBC # BLD AUTO: 9 K/UL (ref 4.1–11.1)

## 2020-12-27 PROCEDURE — 65620000000 HC RM CCU GENERAL

## 2020-12-27 PROCEDURE — 36600 WITHDRAWAL OF ARTERIAL BLOOD: CPT

## 2020-12-27 PROCEDURE — 74011000250 HC RX REV CODE- 250: Performed by: ANESTHESIOLOGY

## 2020-12-27 PROCEDURE — 73610000026 HC INO THERAPY EACH HOUR

## 2020-12-27 PROCEDURE — 74011250636 HC RX REV CODE- 250/636: Performed by: ANESTHESIOLOGY

## 2020-12-27 PROCEDURE — 85027 COMPLETE CBC AUTOMATED: CPT

## 2020-12-27 PROCEDURE — 83735 ASSAY OF MAGNESIUM: CPT

## 2020-12-27 PROCEDURE — 94003 VENT MGMT INPAT SUBQ DAY: CPT

## 2020-12-27 PROCEDURE — 77030018798 HC PMP KT ENTRL FED COVD -A

## 2020-12-27 PROCEDURE — 73610000005 HC INO THERAPY INITIAL

## 2020-12-27 PROCEDURE — 82803 BLOOD GASES ANY COMBINATION: CPT

## 2020-12-27 PROCEDURE — 71045 X-RAY EXAM CHEST 1 VIEW: CPT

## 2020-12-27 PROCEDURE — 80048 BASIC METABOLIC PNL TOTAL CA: CPT

## 2020-12-27 PROCEDURE — 36415 COLL VENOUS BLD VENIPUNCTURE: CPT

## 2020-12-27 PROCEDURE — 84145 PROCALCITONIN (PCT): CPT

## 2020-12-27 PROCEDURE — 84100 ASSAY OF PHOSPHORUS: CPT

## 2020-12-27 PROCEDURE — 74011250636 HC RX REV CODE- 250/636: Performed by: INTERNAL MEDICINE

## 2020-12-27 PROCEDURE — 74011250637 HC RX REV CODE- 250/637: Performed by: ANESTHESIOLOGY

## 2020-12-27 PROCEDURE — 74011000250 HC RX REV CODE- 250: Performed by: NURSE PRACTITIONER

## 2020-12-27 PROCEDURE — 74011250636 HC RX REV CODE- 250/636: Performed by: NURSE PRACTITIONER

## 2020-12-27 PROCEDURE — 74011000258 HC RX REV CODE- 258: Performed by: ANESTHESIOLOGY

## 2020-12-27 RX ORDER — IBUPROFEN 200 MG
1 TABLET ORAL DAILY
Status: DISCONTINUED | OUTPATIENT
Start: 2020-12-27 | End: 2021-01-07 | Stop reason: HOSPADM

## 2020-12-27 RX ORDER — QUETIAPINE FUMARATE 25 MG/1
25 TABLET, FILM COATED ORAL ONCE
Status: COMPLETED | OUTPATIENT
Start: 2020-12-27 | End: 2020-12-27

## 2020-12-27 RX ORDER — QUETIAPINE FUMARATE 25 MG/1
75 TABLET, FILM COATED ORAL 3 TIMES DAILY
Status: DISCONTINUED | OUTPATIENT
Start: 2020-12-27 | End: 2021-01-01

## 2020-12-27 RX ORDER — VANCOMYCIN 2 GRAM/500 ML IN 0.9 % SODIUM CHLORIDE INTRAVENOUS
2000 ONCE
Status: COMPLETED | OUTPATIENT
Start: 2020-12-27 | End: 2020-12-27

## 2020-12-27 RX ORDER — FENTANYL CITRATE 50 UG/ML
50-100 INJECTION, SOLUTION INTRAMUSCULAR; INTRAVENOUS
Status: DISCONTINUED | OUTPATIENT
Start: 2020-12-27 | End: 2021-01-02

## 2020-12-27 RX ORDER — LEVOFLOXACIN 5 MG/ML
750 INJECTION, SOLUTION INTRAVENOUS EVERY 24 HOURS
Status: DISCONTINUED | OUTPATIENT
Start: 2020-12-27 | End: 2020-12-29

## 2020-12-27 RX ORDER — VANCOMYCIN/0.9 % SOD CHLORIDE 1.5G/250ML
1500 PLASTIC BAG, INJECTION (ML) INTRAVENOUS
Status: DISCONTINUED | OUTPATIENT
Start: 2020-12-28 | End: 2020-12-29

## 2020-12-27 RX ORDER — IBUPROFEN 200 MG
1 TABLET ORAL DAILY
Status: DISCONTINUED | OUTPATIENT
Start: 2020-12-27 | End: 2020-12-27

## 2020-12-27 RX ADMIN — METHYLPREDNISOLONE SODIUM SUCCINATE 40 MG: 40 INJECTION, POWDER, FOR SOLUTION INTRAMUSCULAR; INTRAVENOUS at 17:07

## 2020-12-27 RX ADMIN — QUETIAPINE FUMARATE 50 MG: 25 TABLET ORAL at 09:58

## 2020-12-27 RX ADMIN — DEXMEDETOMIDINE HYDROCHLORIDE 0.8 MCG/KG/HR: 400 INJECTION INTRAVENOUS at 03:00

## 2020-12-27 RX ADMIN — PROPOFOL 50 MCG/KG/MIN: 10 INJECTION, EMULSION INTRAVENOUS at 00:02

## 2020-12-27 RX ADMIN — VANCOMYCIN HYDROCHLORIDE 2000 MG: 10 INJECTION, POWDER, LYOPHILIZED, FOR SOLUTION INTRAVENOUS at 11:09

## 2020-12-27 RX ADMIN — QUETIAPINE FUMARATE 75 MG: 25 TABLET ORAL at 17:06

## 2020-12-27 RX ADMIN — FENTANYL CITRATE 100 MCG: 50 INJECTION, SOLUTION INTRAMUSCULAR; INTRAVENOUS at 14:36

## 2020-12-27 RX ADMIN — CHLORHEXIDINE GLUCONATE 15 ML: 0.12 RINSE ORAL at 09:58

## 2020-12-27 RX ADMIN — FENTANYL CITRATE 100 MCG: 50 INJECTION, SOLUTION INTRAMUSCULAR; INTRAVENOUS at 12:35

## 2020-12-27 RX ADMIN — Medication 17.2 MG: at 09:47

## 2020-12-27 RX ADMIN — DEXMEDETOMIDINE HYDROCHLORIDE 1.4 MCG/KG/HR: 400 INJECTION INTRAVENOUS at 17:01

## 2020-12-27 RX ADMIN — ACETAMINOPHEN ORAL SOLUTION 650 MG: 650 SOLUTION ORAL at 13:11

## 2020-12-27 RX ADMIN — PROPOFOL 50 MCG/KG/MIN: 10 INJECTION, EMULSION INTRAVENOUS at 03:00

## 2020-12-27 RX ADMIN — CHLORHEXIDINE GLUCONATE 15 ML: 0.12 RINSE ORAL at 20:03

## 2020-12-27 RX ADMIN — PROPOFOL 50 MCG/KG/MIN: 10 INJECTION, EMULSION INTRAVENOUS at 19:51

## 2020-12-27 RX ADMIN — QUETIAPINE FUMARATE 75 MG: 25 TABLET ORAL at 20:03

## 2020-12-27 RX ADMIN — Medication 10 ML: at 20:03

## 2020-12-27 RX ADMIN — ENOXAPARIN SODIUM 40 MG: 40 INJECTION SUBCUTANEOUS at 09:47

## 2020-12-27 RX ADMIN — LEVOFLOXACIN 750 MG: 5 INJECTION, SOLUTION INTRAVENOUS at 11:09

## 2020-12-27 RX ADMIN — QUETIAPINE FUMARATE 25 MG: 25 TABLET ORAL at 09:48

## 2020-12-27 RX ADMIN — DEXMEDETOMIDINE HYDROCHLORIDE 0.8 MCG/KG/HR: 400 INJECTION INTRAVENOUS at 08:00

## 2020-12-27 RX ADMIN — CLONIDINE HYDROCHLORIDE 0.1 MG: 0.1 TABLET ORAL at 09:48

## 2020-12-27 RX ADMIN — AMLODIPINE BESYLATE 5 MG: 5 TABLET ORAL at 09:48

## 2020-12-27 RX ADMIN — PROPOFOL 50 MCG/KG/MIN: 10 INJECTION, EMULSION INTRAVENOUS at 11:15

## 2020-12-27 RX ADMIN — CEFEPIME HYDROCHLORIDE 2 G: 2 INJECTION, POWDER, FOR SOLUTION INTRAVENOUS at 04:18

## 2020-12-27 RX ADMIN — MEROPENEM 500 MG: 500 INJECTION, POWDER, FOR SOLUTION INTRAVENOUS at 11:08

## 2020-12-27 RX ADMIN — DEXMEDETOMIDINE HYDROCHLORIDE 1.4 MCG/KG/HR: 400 INJECTION INTRAVENOUS at 19:51

## 2020-12-27 RX ADMIN — PROPOFOL 50 MCG/KG/MIN: 10 INJECTION, EMULSION INTRAVENOUS at 18:13

## 2020-12-27 RX ADMIN — Medication 10 ML: at 15:35

## 2020-12-27 RX ADMIN — PROPOFOL 50 MCG/KG/MIN: 10 INJECTION, EMULSION INTRAVENOUS at 06:00

## 2020-12-27 RX ADMIN — HYDRALAZINE HYDROCHLORIDE 20 MG: 20 INJECTION INTRAMUSCULAR; INTRAVENOUS at 20:03

## 2020-12-27 RX ADMIN — CLONIDINE HYDROCHLORIDE 0.1 MG: 0.1 TABLET ORAL at 17:07

## 2020-12-27 RX ADMIN — PROPOFOL 50 MCG/KG/MIN: 10 INJECTION, EMULSION INTRAVENOUS at 15:16

## 2020-12-27 RX ADMIN — SODIUM CHLORIDE 20 MG: 9 INJECTION INTRAMUSCULAR; INTRAVENOUS; SUBCUTANEOUS at 09:47

## 2020-12-27 RX ADMIN — DEXMEDETOMIDINE HYDROCHLORIDE 0.8 MCG/KG/HR: 400 INJECTION INTRAVENOUS at 13:11

## 2020-12-27 RX ADMIN — METHYLPREDNISOLONE SODIUM SUCCINATE 40 MG: 40 INJECTION, POWDER, FOR SOLUTION INTRAMUSCULAR; INTRAVENOUS at 09:47

## 2020-12-27 RX ADMIN — MEROPENEM 500 MG: 500 INJECTION, POWDER, FOR SOLUTION INTRAVENOUS at 23:00

## 2020-12-27 RX ADMIN — MEROPENEM 500 MG: 500 INJECTION, POWDER, FOR SOLUTION INTRAVENOUS at 17:06

## 2020-12-27 NOTE — PROGRESS NOTES
[address]  REGINA:[id number]   :1965     Cr improving    Follow bmp                  Continue current care  Labs:  Recent Labs     20   WBC 9.0 9.6 7.9   HGB 12.7 12.5 11.4*   HCT 39.8 39.8 35.7*    294 270     Recent Labs     20    140 141   K 4.1 4.3 4.0    109* 111*   CO2 27 28 26   BUN 27* 34* 51*   CREA 1.38* 1.70* 2.14*   * 133* 100   CA 9.5 9.1 8.5   MG 2.7* 2.6* 2.8*   PHOS 3.6 3.3 3.1     Recent Labs     20   AP 82 73   TP 7.4 6.7   ALB 2.2* 2.2*   GLOB 5.2* 4.5*   LAC 0.6 0.7     No results for input(s): INR, PTP, APTT, INREXT in the last 72 hours. No results for input(s): CPK, CKMB in the last 72 hours. No lab exists for component: TROPONINI, B-NP  No results for input(s): PHI, PCO2I, PO2I, FIO2I in the last 72 hours. Oxygen Therapy  O2 Sat (%): 93 % (20 0700)  Pulse via Oximetry: 60 beats per minute (20 0553)  O2 Device: Ventilator;Endotracheal tube (20 1600)  O2 Flow Rate (L/min): 15 l/min (20 1659)  O2 Temperature: (HME) (20 0553)  FIO2 (%): 70 % (20 0743)  Ventilator:  Ventilator Volumes  Vt Set (ml): 420 ml (20 0743)  Vt Exhaled (Machine Breath) (ml): 454 ml (20 0743)  Ve Observed (l/min): 11.4 l/min (20)    Microbiology:  No results found for: SDES  Lab Results   Component Value Date/Time    Culture result: MRSA NOT PRESENT 2020 09:20 AM    Culture result:  2020 09:20 AM         Screening of patient nares for MRSA is for surveillance purposes and, if positive, to facilitate isolation considerations in high risk settings. It is not intended for automatic decolonization interventions per se as regimens are not sufficiently effective to warrant routine use.     Culture result: NO GROWTH 2 DAYS 2020 04:16 PM         Long Guerrero, MD [facility]  [facility] [address], [address]  [facility], [address] [place]  Phone - [phone]         Fax - [phone] [facility] Office  [address] [address]  Phone - [phone]        Fax - [phone]     [email or web address]

## 2020-12-27 NOTE — PROGRESS NOTES
1930: Bedside shift report received from [facility].

## 2020-12-27 NOTE — PROGRESS NOTES
0730 Bedside and Verbal shift change report given to Tamika RN (oncoming nurse) by Lazarus Frederickson, RN (offgoing nurse).  Report included the following information SBAR, Kardex, Procedure Summary, Intake/Output, MAR, Recent Results and Cardiac Rhythm NSR.     0945 Episode of vomiting - TF held & OGT hooked to LIS  1100 Dr. Chey Steward at bedside updating pts fiance - PEEP increased to 10 & orders received for prn fentanyl 50-100mcg q1h for agitation or pain   1200 Temp 100.4 - prn tylenol given  1230 Pt agitated, coughing & biting ETT - prn fentanyl given  1430 Pt agitated, coughing & biting ETT - prn fentanyl given  1530 SpO2 88-89% despite ETT suction, oral suction & repositioning - RT at bedside FiO2 100% & Dr. Chey Steward updated - orders received to start iNO3, increase precedex & re-start fentanyl gtt if needed  1545 RT at bedside to start iNO3 & FiO2 weaned back to 70%

## 2020-12-27 NOTE — PROGRESS NOTES
Pharmacist Note - Vancomycin Dosing    Consult provided for this 50 y.o. male for indication of aspiration PNA [HAP], COPD exacerbation. Antibiotic regimen(s): Levaquin + Meropenem + Vancomycin  Patient on vancomycin PTA? NO     Recent Labs     20  04220  0426 20  0414   WBC 9.0 9.6 7.9   CREA 1.38* 1.70* 2.14*   BUN 27* 34* 51*     Frequency of BMP: Daily through   Height: 180.3 cm  Weight: 102.4 kg  Est CrCl: ~70-80 ml/min; UO: >1 ml/kg/hr  Temp (24hrs), Av.7 °F (37.1 °C), Min:98.1 °F (36.7 °C), Max:100.4 °F (38 °C)    Cultures:   Blood: NG, final   Sputum: scant beta Strep, final   Resp viral panel: (-)   COVID-19 [rapid + PCR]: (-)   Legionella Ag [Ur]: (-)   Sputum: rare H.influenzae (BL-), final   BAL: NG, final   MRSA screen: (-)    Goal trough = 15 - 20 mcg/mL    Therapy will be initiated with a loading dose of 2000 mg IV x 1 to be followed by a maintenance dose of 1500 mg IV every 16 hours. Pharmacy to follow patient daily and order levels / make dose adjustments as appropriate.

## 2020-12-27 NOTE — PROGRESS NOTES
[facility]    ICU TEAM Progress Note    Name: Hans Beauchamp   : 1965   MRN: [id number]   Date: 2020      Assessment     ICU Problems:    1. Acute Hypoxic Respiratory Failure  2. CAP/Aspiration (Haemophilus & Beta Strep)  3. Metabolic Encephalopathy  4. Obesity   5. Nx nonischemic cardiomyopathy   6. Chronic tobacco abuse  7. YISSEL/CKD stage 2  8. Left frontal lipoma-Chronic   9. Hx tobacco abuse    Imagin2020      ICU Comprehensive Plan of Care:   1. Acute hypoxic respiratory failure (Haemophilus influenza PNA; Tobacco Abuse Hx)  - SAT today --> Failed  - Seroquel 75 mg q8  - Monitor QTC  - Stop Cefepime  - Start Meropenem, Levaquin, Vanc  - Resend sputum culture  - Methylpred 40 mg IV daily  - Scopolamine patch  - Continue ventilator support - optimize PEEP - Driving pressure is good  - Wean FiO2 for SpO2 > 90%  - HOB >30  - Propofol/Precedex  - Trend nt-BNP/Procal/Lact    2. Elevated troponin (resolving)  -ECHO 40-45%   -Cardiology following    3. YISSEL/CKD stage 2  -From Hypotension  -Nephrology following  -Levophed MAP > 65 mmHg  -Strict I and O    4. HTN  -- PRN Labetalol  -- Add Norvasc    Plans for this Shift:     1. Ventilator support, will wean as tolerated  2. SBP Goal of: > 90 mmHg  3. MAP Goal of: > 65 mmHg  4. Phenylephrine - For above SBP/MAP goals  5. IVFs: LR  6. Transfusion Trigger (Hgb): <7 g/dL  7. Respiratory Goals:  a. Chlorhexidine   b. Optimize PEEP/Ventilation/Oxygenation  c. Goal Tidal Volume 6 cc/kg based on IBW  d. Aim for lung protective ventilation  e. Head of bed > 30 degrees  f. Aggressive bronchopulmonary hygiene  8. Pulmonary toilet: Duo-Nebs   9. SpO2 Goal: > 92%  10. Keep K>4; Mg>2   11. PT/OT  12. Goals of Care Discussion with family Pending   13. Plan of Care/Code Status: Full Code  14. [facility] Cardiology, nephrology   15. Discussed Care Plan with Bedside RN  16.  Documentation of Current Medications  17. Rest of Plan Below:    F - Feeding:  Pending   A - Analgesia: Fentanyl  S - Sedation: Propofol  T - DVT Prophylaxis: SCD's or Sequential Compression Device   H - Head of Bed: > 30 Degrees  U - Ulcer Prophylaxis: Protonix (pantoprazole)   G - Glycemic Control: Insulin  S - Spontaneous Breathing Trial: Pending  B - Bowel Regimen: Docusate (Colace)  I - Indwelling Catheter:   Tubes: ETT  Lines: Peripheral IV  Drains: Granados Catheter  D - De-escalation of Antibiotics: Vanc & Cefepime    Subjective:   Progress Note: 12/27/2020      Reason for ICU Admission: Acute hypoxic respiratory failure      HPI: Claudine Pat is a 51 yo AAM with a PMH significant for nonischemic cardiomyopathy, left frontal lipoma, CKD, obesity, chronic tobacco abuse, who presented to the ER via EMS with co increased SOB X2 weeks. Pt is currently sedated and on the ventilator, unable to provide HPI thereofre obtained via chart and provider. Pt failed bipap while in the ER with continued worsening hypoxia, therefore pt was intubated and placed on the ventilator. On arrival to ED pt was found to be hypertensive with /100. Patient was then transferred to ICU for further management in care.       Overnight Events:   12/27/2020  Severe agitation on morning SAT      Active Problem List:     Problem List  Date Reviewed: 9/28/2020          Codes Class    Hypoxia ICD-10-CM: R09.02  ICD-9-CM: 799.02         Cervical radiculopathy ICD-10-CM: M54.12  ICD-9-CM: 723.4         Arm paresthesia, left ICD-10-CM: R20.2  ICD-9-CM: 782.0         Intractable headache ICD-10-CM: R51.9  ICD-9-CM: 784.0         Dilated cardiomyopathy (HCC) ICD-10-CM: I42.0  ICD-9-CM: 425.4         NSTEMI (non-ST elevated myocardial infarction) ([facility] [address] ICD-10-CM: I21.4  ICD-9-CM: 410.70         HTN (hypertension) ICD-10-CM: I10  ICD-9-CM: 401.9               Past Medical History:      has a past medical history of Hypertension.     Past Surgical History:      has no past surgical history on file. Home Medications:     Prior to Admission medications    Medication Sig Start Date End Date Taking? Authorizing Provider   aspirin-acetaminophen-caffeine (Goody's Extra Strength) 520-260-32.5 mg pwpk Take 1 Package by mouth two (2) times a day. Yes Provider, Historical   ibuprofen (MOTRIN) 600 mg tablet Take 600 mg by mouth every six (6) hours as needed for Pain. Yes Provider, Historical   carvediloL (COREG) 25 mg tablet Take 1 Tab by mouth two (2) times daily (with meals). 20  Yes Keegan Nichoals MD   isosorbide mononitrate ER (IMDUR) 30 mg tablet Take 1 Tab by mouth daily. 20  Yes Keegan Nicholas MD   cloNIDine HCL (CATAPRES) 0.1 mg tablet Take 1 Tab by mouth two (2) times a day. 20  Yes Keegan Nicholas MD   aspirin 81 mg chewable tablet Take 1 Tab by mouth daily. 19  Yes Jeronimo Medrano MD       Allergies/Social/Family History:     No Known Allergies   Social History     Tobacco Use    Smoking status: Current Every Day Smoker     Packs/day: 1.00    Smokeless tobacco: Never Used   Substance Use Topics    Alcohol use: Yes     Alcohol/week: 5.0 standard drinks     Types: 6 Cans of beer per week     Comment: Occasionally      Family History   Problem Relation Age of Onset    Stroke Father     Cancer Sister        Review of Systems:     Review of systems not obtained due to patient factors. Objective:   Vital Signs:  Visit Vitals  BP (!) 142/98   Pulse 72   Temp 98.2 °F (36.8 °C)   Resp 25   Ht 5' 11\" (1.803 m)   Wt 102.4 kg (225 lb 12 oz)   SpO2 93%   BMI 31.49 kg/m²    O2 Flow Rate (L/min): 15 l/min O2 Device: Ventilator, Endotracheal tube Temp (24hrs), Av.4 °F (36.9 °C), Min:98.1 °F (36.7 °C), Max:99 °F (37.2 °C)           Intake/Output:     Intake/Output Summary (Last 24 hours) at 2020 0947  Last data filed at 2020 0700  Gross per 24 hour   Intake 2542. 85 ml   Output 3425 ml   Net -882.15 ml       Physical Exam:    General:  Sedated and on the ventilator. No acute distress. Eyes:  Sclera anicteric. Pupils equal, round, reactive to light. Mouth/Throat: Orotracheal tube in place. Neck: Supple. Lungs:   Rhonchi TH scattered, diminished in the BLL   Cardiovascular:  Regular rate and rhythm, no murmur, click, rub, or gallop. Abdomen:   Soft, non-tender, bowel sounds normal, non-distended. Extremities: No cyanosis or edema. Skin: No acute rash or lesions. Lipoma noted over left frontal region    Lymph Nodes: Cervical and supraclavicular normal.   Musculoskeletal:  No swelling or deformity. Lines/Devices:  Intact, no erythema, drainage, or tenderness. Psychiatric: Sedated and appears comfortable on ventilator. LABS AND  DATA: Personally reviewed  Recent Labs     12/27/20 0421 12/26/20 0426   WBC 9.0 9.6   HGB 12.7 12.5   HCT 39.8 39.8    294     Recent Labs     12/27/20 0421 12/26/20 0426    140   K 4.1 4.3    109*   CO2 27 28   BUN 27* 34*   CREA 1.38* 1.70*   * 133*   CA 9.5 9.1   MG 2.7* 2.6*   PHOS 3.6 3.3     Recent Labs     12/26/20 0426 12/25/20 0414   AP 82 73   TP 7.4 6.7   ALB 2.2* 2.2*   GLOB 5.2* 4.5*     No results for input(s): INR, PTP, APTT, INREXT, INREXT in the last 72 hours. No results for input(s): PHI, PCO2I, PO2I, FIO2I in the last 72 hours. No results for input(s): CPK, CKMB, TROIQ, BNPP in the last 72 hours. Hemodynamics:   PAP:   CO:     Wedge:   CI:     CVP:    SVR:       PVR:       Ventilator Settings:  Mode Rate Tidal Volume Pressure FiO2 PEEP   Assist control   420 ml    70 % 5 cm H20     Peak airway pressure: 23 cm H2O    Minute ventilation: 11.4 l/min        MEDS: Reviewed    Chest X-Ray:  CXR Results  (Last 48 hours)               12/27/20 0835  XR CHEST PORT Final result    Impression:  IMPRESSION: New small bilateral pleural effusions.  Right midlung subsegmental   atelectasis versus infiltrate       Narrative:  EXAM: XR CHEST PORT       INDICATION: Respiratory failure       COMPARISON: 12/22/2020       FINDINGS: A portable AP radiograph of the chest was obtained at 0829 hours. The   patient is on a cardiac monitor. The endotracheal tube terminates above the   shamir. There are bilateral pleural effusions with right. Perihilar linear   airspace disease NG tube extends below the hemidiaphragm. A right IJ line is in   place. ECHO:  Pending     Multidisciplinary Rounds Completed: Yes    ABCDEF Bundle/Checklist Completed:  Yes    SPECIAL EQUIPMENT  None    DISPOSITION  Stay in ICU    CRITICAL CARE CONSULTANT NOTE  I had a face to face encounter with the patient, reviewed and interpreted patient data including clinical events, labs, images, vital signs, I/O's, and examined patient. I have discussed the case and the plan and management of the patient's care with the consulting services, the bedside nurses and the respiratory therapist.      NOTE OF PERSONAL INVOLVEMENT IN CARE   This patient has a high probability of imminent, clinically significant deterioration, which requires the highest level of preparedness to intervene urgently. I participated in the decision-making and personally managed or directed the management of the following life and organ supporting interventions that required my frequent assessment to treat or prevent imminent deterioration. I personally spent 85 minutes of critical care time. This is time spent at this critically ill patient's bedside actively involved in patient care as well as the coordination of care and discussions with the patient's family. This does not include any procedural time which has been billed separately.     [address]  12/27/2020

## 2020-12-28 ENCOUNTER — APPOINTMENT (OUTPATIENT)
Dept: GENERAL RADIOLOGY | Age: 55
DRG: 207 | End: 2020-12-28
Attending: ANESTHESIOLOGY

## 2020-12-28 LAB
ALBUMIN SERPL ELPH-MCNC: 3 G/DL (ref 2.9–4.4)
ALBUMIN/GLOB SERPL: 0.9 {RATIO} (ref 0.7–1.7)
ALPHA1 GLOB SERPL ELPH-MCNC: 0.2 G/DL (ref 0–0.4)
ALPHA2 GLOB SERPL ELPH-MCNC: 0.8 G/DL (ref 0.4–1)
ANION GAP SERPL CALC-SCNC: 2 MMOL/L (ref 5–15)
ARTERIAL PATENCY WRIST A: YES
B-GLOBULIN SERPL ELPH-MCNC: 0.9 G/DL (ref 0.7–1.3)
BASE EXCESS BLD CALC-SCNC: 5 MMOL/L
BDY SITE: ABNORMAL
BUN SERPL-MCNC: 31 MG/DL (ref 6–20)
BUN/CREAT SERPL: 20 (ref 12–20)
CA-I BLD-SCNC: 1.25 MMOL/L (ref 1.12–1.32)
CALCIUM SERPL-MCNC: 9.4 MG/DL (ref 8.5–10.1)
CHLORIDE SERPL-SCNC: 106 MMOL/L (ref 97–108)
CO2 SERPL-SCNC: 27 MMOL/L (ref 21–32)
CREAT SERPL-MCNC: 1.53 MG/DL (ref 0.7–1.3)
ERYTHROCYTE [DISTWIDTH] IN BLOOD BY AUTOMATED COUNT: 14.7 % (ref 11.5–14.5)
GAMMA GLOB SERPL ELPH-MCNC: 1.7 G/DL (ref 0.4–1.8)
GAS FLOW.O2 O2 DELIVERY SYS: ABNORMAL L/MIN
GAS FLOW.O2 SETTING OXYMISER: 25 BPM
GLOBULIN SER-MCNC: 3.5 G/DL (ref 2.2–3.9)
GLUCOSE SERPL-MCNC: 161 MG/DL (ref 65–100)
HCO3 BLD-SCNC: 27.7 MMOL/L (ref 22–26)
HCT VFR BLD AUTO: 39.5 % (ref 36.6–50.3)
HGB BLD-MCNC: 12.7 G/DL (ref 12.1–17)
IGA SERPL-MCNC: 178 MG/DL (ref 90–386)
IGG SERPL-MCNC: 672 MG/DL (ref 603–1613)
IGM SERPL-MCNC: 2697 MG/DL (ref 20–172)
INTERPRETATION SERPL IEP-IMP: ABNORMAL
KAPPA LC FREE SER-MCNC: 30.1 MG/L (ref 3.3–19.4)
KAPPA LC FREE/LAMBDA FREE SER: 1.47 {RATIO} (ref 0.26–1.65)
LACTATE SERPL-SCNC: 1 MMOL/L (ref 0.4–2)
LAMBDA LC FREE SERPL-MCNC: 20.5 MG/L (ref 5.7–26.3)
M PROTEIN SERPL ELPH-MCNC: 1.1 G/DL
MAGNESIUM SERPL-MCNC: 2.5 MG/DL (ref 1.6–2.4)
MCH RBC QN AUTO: 27.1 PG (ref 26–34)
MCHC RBC AUTO-ENTMCNC: 32.2 G/DL (ref 30–36.5)
MCV RBC AUTO: 84.2 FL (ref 80–99)
NRBC # BLD: 0 K/UL (ref 0–0.01)
NRBC BLD-RTO: 0 PER 100 WBC
O2/TOTAL GAS SETTING VFR VENT: 60 %
PCO2 BLD: 34.2 MMHG (ref 35–45)
PEEP RESPIRATORY: 5 CMH2O
PH BLD: 7.52 [PH] (ref 7.35–7.45)
PHOSPHATE SERPL-MCNC: 3.6 MG/DL (ref 2.6–4.7)
PLATELET # BLD AUTO: 357 K/UL (ref 150–400)
PMV BLD AUTO: 9.5 FL (ref 8.9–12.9)
PO2 BLD: 68 MMHG (ref 80–100)
POTASSIUM SERPL-SCNC: 5 MMOL/L (ref 3.5–5.1)
PROCALCITONIN SERPL-MCNC: 0.33 NG/ML
PROT SERPL-MCNC: 6.5 G/DL (ref 6–8.5)
RBC # BLD AUTO: 4.69 M/UL (ref 4.1–5.7)
SAO2 % BLD: 95 % (ref 92–97)
SODIUM SERPL-SCNC: 135 MMOL/L (ref 136–145)
SPECIMEN TYPE: ABNORMAL
VENTILATION MODE VENT: ABNORMAL
VT SETTING VENT: 420 ML
WBC # BLD AUTO: 10.9 K/UL (ref 4.1–11.1)

## 2020-12-28 PROCEDURE — 77030005402 HC CATH RAD ART LN KT TELE -B

## 2020-12-28 PROCEDURE — 74018 RADEX ABDOMEN 1 VIEW: CPT

## 2020-12-28 PROCEDURE — 74011000258 HC RX REV CODE- 258: Performed by: ANESTHESIOLOGY

## 2020-12-28 PROCEDURE — 74011250636 HC RX REV CODE- 250/636: Performed by: ANESTHESIOLOGY

## 2020-12-28 PROCEDURE — 73610000026 HC INO THERAPY EACH HOUR

## 2020-12-28 PROCEDURE — 71045 X-RAY EXAM CHEST 1 VIEW: CPT

## 2020-12-28 PROCEDURE — 74011250637 HC RX REV CODE- 250/637: Performed by: ANESTHESIOLOGY

## 2020-12-28 PROCEDURE — 87070 CULTURE OTHR SPECIMN AEROBIC: CPT

## 2020-12-28 PROCEDURE — 74011250636 HC RX REV CODE- 250/636: Performed by: INTERNAL MEDICINE

## 2020-12-28 PROCEDURE — 74011250636 HC RX REV CODE- 250/636: Performed by: NURSE PRACTITIONER

## 2020-12-28 PROCEDURE — 84145 PROCALCITONIN (PCT): CPT

## 2020-12-28 PROCEDURE — 84100 ASSAY OF PHOSPHORUS: CPT

## 2020-12-28 PROCEDURE — 36415 COLL VENOUS BLD VENIPUNCTURE: CPT

## 2020-12-28 PROCEDURE — 77030013797 HC KT TRNSDUC PRSSR EDWD -A

## 2020-12-28 PROCEDURE — 77030018798 HC PMP KT ENTRL FED COVD -A

## 2020-12-28 PROCEDURE — 83605 ASSAY OF LACTIC ACID: CPT

## 2020-12-28 PROCEDURE — 65620000000 HC RM CCU GENERAL

## 2020-12-28 PROCEDURE — 83735 ASSAY OF MAGNESIUM: CPT

## 2020-12-28 PROCEDURE — 85027 COMPLETE CBC AUTOMATED: CPT

## 2020-12-28 PROCEDURE — 94762 N-INVAS EAR/PLS OXIMTRY CONT: CPT

## 2020-12-28 PROCEDURE — 74011000250 HC RX REV CODE- 250: Performed by: ANESTHESIOLOGY

## 2020-12-28 PROCEDURE — 74011000250 HC RX REV CODE- 250: Performed by: NURSE PRACTITIONER

## 2020-12-28 PROCEDURE — 80048 BASIC METABOLIC PNL TOTAL CA: CPT

## 2020-12-28 RX ADMIN — MEROPENEM 500 MG: 500 INJECTION, POWDER, FOR SOLUTION INTRAVENOUS at 23:00

## 2020-12-28 RX ADMIN — SODIUM CHLORIDE 20 MG: 9 INJECTION INTRAMUSCULAR; INTRAVENOUS; SUBCUTANEOUS at 08:38

## 2020-12-28 RX ADMIN — QUETIAPINE FUMARATE 75 MG: 25 TABLET ORAL at 08:37

## 2020-12-28 RX ADMIN — LEVOFLOXACIN 750 MG: 5 INJECTION, SOLUTION INTRAVENOUS at 10:16

## 2020-12-28 RX ADMIN — DEXMEDETOMIDINE HYDROCHLORIDE 1.4 MCG/KG/HR: 400 INJECTION INTRAVENOUS at 10:16

## 2020-12-28 RX ADMIN — QUETIAPINE FUMARATE 75 MG: 25 TABLET ORAL at 21:37

## 2020-12-28 RX ADMIN — DEXMEDETOMIDINE HYDROCHLORIDE 1.4 MCG/KG/HR: 400 INJECTION INTRAVENOUS at 15:50

## 2020-12-28 RX ADMIN — METHYLPREDNISOLONE SODIUM SUCCINATE 40 MG: 40 INJECTION, POWDER, FOR SOLUTION INTRAMUSCULAR; INTRAVENOUS at 17:24

## 2020-12-28 RX ADMIN — ENOXAPARIN SODIUM 40 MG: 40 INJECTION SUBCUTANEOUS at 08:38

## 2020-12-28 RX ADMIN — METHYLPREDNISOLONE SODIUM SUCCINATE 40 MG: 40 INJECTION, POWDER, FOR SOLUTION INTRAMUSCULAR; INTRAVENOUS at 00:07

## 2020-12-28 RX ADMIN — PROPOFOL 50 MCG/KG/MIN: 10 INJECTION, EMULSION INTRAVENOUS at 00:07

## 2020-12-28 RX ADMIN — VANCOMYCIN HYDROCHLORIDE 1500 MG: 10 INJECTION, POWDER, LYOPHILIZED, FOR SOLUTION INTRAVENOUS at 18:28

## 2020-12-28 RX ADMIN — PROPOFOL 50 MCG/KG/MIN: 10 INJECTION, EMULSION INTRAVENOUS at 12:44

## 2020-12-28 RX ADMIN — DEXMEDETOMIDINE HYDROCHLORIDE 1.4 MCG/KG/HR: 400 INJECTION INTRAVENOUS at 00:07

## 2020-12-28 RX ADMIN — DEXMEDETOMIDINE HYDROCHLORIDE 1.4 MCG/KG/HR: 400 INJECTION INTRAVENOUS at 21:37

## 2020-12-28 RX ADMIN — Medication 17.2 MG: at 08:37

## 2020-12-28 RX ADMIN — CLONIDINE HYDROCHLORIDE 0.1 MG: 0.1 TABLET ORAL at 08:37

## 2020-12-28 RX ADMIN — PROPOFOL 50 MCG/KG/MIN: 10 INJECTION, EMULSION INTRAVENOUS at 15:58

## 2020-12-28 RX ADMIN — MEROPENEM 500 MG: 500 INJECTION, POWDER, FOR SOLUTION INTRAVENOUS at 04:27

## 2020-12-28 RX ADMIN — PROPOFOL 50 MCG/KG/MIN: 10 INJECTION, EMULSION INTRAVENOUS at 06:05

## 2020-12-28 RX ADMIN — AMLODIPINE BESYLATE 5 MG: 5 TABLET ORAL at 08:37

## 2020-12-28 RX ADMIN — CHLORHEXIDINE GLUCONATE 15 ML: 0.12 RINSE ORAL at 08:39

## 2020-12-28 RX ADMIN — MEROPENEM 500 MG: 500 INJECTION, POWDER, FOR SOLUTION INTRAVENOUS at 11:28

## 2020-12-28 RX ADMIN — PROPOFOL 50 MCG/KG/MIN: 10 INJECTION, EMULSION INTRAVENOUS at 10:16

## 2020-12-28 RX ADMIN — DEXMEDETOMIDINE HYDROCHLORIDE 1.4 MCG/KG/HR: 400 INJECTION INTRAVENOUS at 18:28

## 2020-12-28 RX ADMIN — DEXMEDETOMIDINE HYDROCHLORIDE 1.4 MCG/KG/HR: 400 INJECTION INTRAVENOUS at 04:27

## 2020-12-28 RX ADMIN — Medication 10 ML: at 08:37

## 2020-12-28 RX ADMIN — QUETIAPINE FUMARATE 75 MG: 25 TABLET ORAL at 17:23

## 2020-12-28 RX ADMIN — PROPOFOL 50 MCG/KG/MIN: 10 INJECTION, EMULSION INTRAVENOUS at 20:00

## 2020-12-28 RX ADMIN — DEXMEDETOMIDINE HYDROCHLORIDE 1.4 MCG/KG/HR: 400 INJECTION INTRAVENOUS at 07:45

## 2020-12-28 RX ADMIN — MEROPENEM 500 MG: 500 INJECTION, POWDER, FOR SOLUTION INTRAVENOUS at 17:23

## 2020-12-28 RX ADMIN — Medication 20 ML: at 21:00

## 2020-12-28 RX ADMIN — PROPOFOL 50 MCG/KG/MIN: 10 INJECTION, EMULSION INTRAVENOUS at 04:27

## 2020-12-28 RX ADMIN — CLONIDINE HYDROCHLORIDE 0.1 MG: 0.1 TABLET ORAL at 17:23

## 2020-12-28 RX ADMIN — METHYLPREDNISOLONE SODIUM SUCCINATE 40 MG: 40 INJECTION, POWDER, FOR SOLUTION INTRAMUSCULAR; INTRAVENOUS at 11:28

## 2020-12-28 RX ADMIN — DEXMEDETOMIDINE HYDROCHLORIDE 1.4 MCG/KG/HR: 400 INJECTION INTRAVENOUS at 13:15

## 2020-12-28 RX ADMIN — VANCOMYCIN HYDROCHLORIDE 1500 MG: 10 INJECTION, POWDER, LYOPHILIZED, FOR SOLUTION INTRAVENOUS at 03:00

## 2020-12-28 RX ADMIN — METHYLPREDNISOLONE SODIUM SUCCINATE 40 MG: 40 INJECTION, POWDER, FOR SOLUTION INTRAMUSCULAR; INTRAVENOUS at 06:05

## 2020-12-28 RX ADMIN — DEXMEDETOMIDINE HYDROCHLORIDE 1.4 MCG/KG/HR: 400 INJECTION INTRAVENOUS at 23:51

## 2020-12-28 NOTE — PROGRESS NOTES
12/28/2020 -   RAFIA:  - RUR: 17%    - Over the weekend, patient had issues with vomiting and abdominal distention   - Tube Feeds currently on hold for repeat KUB confirmation of Dobhoff placement  - ABX continue  - Cultures pending  - Patient remains vented and sedated  - Patient may need a trach  CRM: Breonna Rosas, MPH, [facility]; Z: [phone]    11:35 -   CM met with patient's ficeceliae Nakia Cervantes) to discuss potential short term/long term disability coverage. Per Account Notes, [employer] completed patient's financial screening on 12/23 and deemed patient to be over income for Medicaid. [address]  application has been sent to patient's family. CM discussed that patient's employer's HR department would be able to identify if patient has disability coverage through work and that [address] [place] [address] requires for a qualifying dx of at least 6 months. CM also discussed that family can apply for Medicaid directly via the LTAC, located within [facility] Medicaid portal.  Yenny expressed understanding of the above.   CRM: Breonna Rosas, MPH, [address] [place] [address]; Z: [phone]

## 2020-12-28 NOTE — PROGRESS NOTES
[facility]    ICU TEAM Progress Note    Name: Dai Nolen   : 1965   MRN: [id number]   Date: 2020      Assessment     ICU Problems:    1. Acute Hypoxic Respiratory Failure  2. CAP/Aspiration (Haemophilus influenzae & Beta Strep)  3. Metabolic Encephalopathy  4. Obesity   5. Nx nonischemic cardiomyopathy   6. Chronic tobacco abuse  7. YISSEL/CKD stage 2  8. Left frontal lipoma-Chronic   9. Hx tobacco abuse    Imagin2020      ICU Comprehensive Plan of Care:   1. Acute hypoxic respiratory failure (Haemophilus influenza PNA; Tobacco Abuse Hx)  - SAT today --> Failed  - Seroquel 75 mg q8  - Monitor QTC  - Meropenem, Levaquin, Vanc  - Resend sputum culture  - Methylpred 40 mg IV daily  - Scopolamine patch  - Continue ventilator support - optimize PEEP - Driving pressure is good  - Wean FiO2 for SpO2 > 90%  - HOB > 30  - Propofol/Precedex  - Trend nt-BNP/Procal/Lact    2. Elevated troponin (resolving)  -ECHO 40-45%   -Cardiology following    3. YISSEL/CKD stage 2  -From Hypotension  -Nephrology following  -Levophed MAP > 65 mmHg  -Strict I and O    4. HTN  -- PRN Labetalol  -- Norvasc    Plans for this Shift:     1. Ventilator support, will wean as tolerated  2. SBP Goal of: > 90 mmHg  3. MAP Goal of: > 65 mmHg  4. Phenylephrine - For above SBP/MAP goals  5. IVFs: LR  6. Transfusion Trigger (Hgb): <7 g/dL  7. Respiratory Goals:  a. Chlorhexidine   b. Optimize PEEP/Ventilation/Oxygenation  c. Goal Tidal Volume 6 cc/kg based on IBW  d. Aim for lung protective ventilation  e. Head of bed > 30 degrees  f. Aggressive bronchopulmonary hygiene  8. Pulmonary toilet: Duo-Nebs   9. SpO2 Goal: > 92%  10. Keep K>4; Mg>2   11. PT/OT  12. Goals of Care Discussion with family Pending   13. Plan of Care/Code Status: Full Code  14. [facility] Cardiology, nephrology   15. Discussed Care Plan with Bedside RN  16.  Documentation of Current Medications  17. Rest of Plan Below:    F - Feeding:  Pending   A - Analgesia: Fentanyl  S - Sedation: Propofol  T - DVT Prophylaxis: SCD's or Sequential Compression Device   H - Head of Bed: > 30 Degrees  U - Ulcer Prophylaxis: Protonix (pantoprazole)   G - Glycemic Control: Insulin  S - Spontaneous Breathing Trial: Pending  B - Bowel Regimen: Docusate (Colace)  I - Indwelling Catheter:   Tubes: ETT  Lines: Peripheral IV  Drains: Granados Catheter  D - De-escalation of Antibiotics: Vanc & Cefepime    Subjective:   Progress Note: 12/28/2020      Reason for ICU Admission: Acute hypoxic respiratory failure      HPI: Nandini Fong is a 49 yo AAM with a PMH significant for nonischemic cardiomyopathy, left frontal lipoma, CKD, obesity, chronic tobacco abuse, who presented to the ER via EMS with co increased SOB X2 weeks. Pt is currently sedated and on the ventilator, unable to provide HPI thereofre obtained via chart and provider. Pt failed bipap while in the ER with continued worsening hypoxia, therefore pt was intubated and placed on the ventilator. On arrival to ED pt was found to be hypertensive with /100. Patient was then transferred to ICU for further management in care.       Overnight Events:   12/28/2020  Severe agitation on morning SAT      Active Problem List:     Problem List  Date Reviewed: 9/28/2020          Codes Class    Hypoxia ICD-10-CM: R09.02  ICD-9-CM: 799.02         Cervical radiculopathy ICD-10-CM: M54.12  ICD-9-CM: 723.4         Arm paresthesia, left ICD-10-CM: R20.2  ICD-9-CM: 782.0         Intractable headache ICD-10-CM: R51.9  ICD-9-CM: 784.0         Dilated cardiomyopathy (HCC) ICD-10-CM: I42.0  ICD-9-CM: 425.4         NSTEMI (non-ST elevated myocardial infarction) ([address] ICD-10-CM: I21.4  ICD-9-CM: 410.70         HTN (hypertension) ICD-10-CM: I10  ICD-9-CM: 401.9               Past Medical History:      has a past medical history of Hypertension.     Past Surgical History:      has no past surgical history on file. Home Medications:     Prior to Admission medications    Medication Sig Start Date End Date Taking? Authorizing Provider   aspirin-acetaminophen-caffeine ([employer] Extra Strength) 520-260-32.5 mg pwpk Take 1 Package by mouth two (2) times a day. Yes Provider, Historical   ibuprofen (MOTRIN) 600 mg tablet Take 600 mg by mouth every six (6) hours as needed for Pain. Yes Provider, Historical   carvediloL (COREG) 25 mg tablet Take 1 Tab by mouth two (2) times daily (with meals). 20  Yes Wei Enriquez MD   isosorbide mononitrate ER (IMDUR) 30 mg tablet Take 1 Tab by mouth daily. 20  Yes Wei Enriquez MD   cloNIDine HCL (CATAPRES) 0.1 mg tablet Take 1 Tab by mouth two (2) times a day. 20  Yes Wei Enriquez MD   aspirin 81 mg chewable tablet Take 1 Tab by mouth daily. 19  Yes Zachary Marin MD       Allergies/Social/Family History:     No Known Allergies   Social History     Tobacco Use    Smoking status: Current Every Day Smoker     Packs/day: 1.00    Smokeless tobacco: Never Used   Substance Use Topics    Alcohol use: Yes     Alcohol/week: 5.0 standard drinks     Types: 6 Cans of beer per week     Comment: Occasionally      Family History   Problem Relation Age of Onset    Stroke Father     Cancer Sister        Review of Systems:     Review of systems not obtained due to patient factors.     Objective:   Vital Signs:  Visit Vitals  BP (!) 150/105   Pulse 74   Temp 99.3 °F (37.4 °C)   Resp 20   Ht 5' 11\" (1.803 m)   Wt 101.9 kg (224 lb 10.4 oz)   SpO2 98%   BMI 31.33 kg/m²    O2 Flow Rate (L/min): 15 l/min O2 Device: Endotracheal tube Temp (24hrs), Av.4 °F (37.4 °C), Min:98.6 °F (37 °C), Max:100.4 °F (38 °C)           Intake/Output:     Intake/Output Summary (Last 24 hours) at 2020 0722  Last data filed at 2020 0500  Gross per 24 hour   Intake 3536.4 ml   Output 2990 ml   Net 546.4 ml       Physical Exam:    General:  Sedated and on the ventilator. No acute distress. Eyes:  Sclera anicteric. Pupils equal, round, reactive to light. Mouth/Throat: Orotracheal tube in place. Neck: Supple. Lungs:   Rhonchi TH scattered, diminished in the BLL   Cardiovascular:  Regular rate and rhythm, no murmur, click, rub, or gallop. Abdomen:   Soft, non-tender, bowel sounds normal, non-distended. Extremities: No cyanosis or edema. Skin: No acute rash or lesions. Lipoma noted over left frontal region    Lymph Nodes: Cervical and supraclavicular normal.   Musculoskeletal:  No swelling or deformity. Lines/Devices:  Intact, no erythema, drainage, or tenderness. Psychiatric: Sedated and appears comfortable on ventilator. LABS AND  DATA: Personally reviewed  Recent Labs     12/28/20 0311 12/27/20 0421   WBC 10.9 9.0   HGB 12.7 12.7   HCT 39.5 39.8    320     Recent Labs     12/28/20 0311 12/27/20 0421   * 138   K 5.0 4.1    108   CO2 27 27   BUN 31* 27*   CREA 1.53* 1.38*   * 123*   CA 9.4 9.5   MG 2.5* 2.7*   PHOS 3.6 3.6     Recent Labs     12/26/20 0426   AP 82   TP 7.4   ALB 2.2*   GLOB 5.2*     No results for input(s): INR, PTP, APTT, INREXT, INREXT in the last 72 hours. No results for input(s): PHI, PCO2I, PO2I, FIO2I in the last 72 hours. No results for input(s): CPK, CKMB, TROIQ, BNPP in the last 72 hours. Hemodynamics:   PAP:   CO:     Wedge:   CI:     CVP:    SVR:       PVR:       Ventilator Settings:  Mode Rate Tidal Volume Pressure FiO2 PEEP   Assist control, Volume control   420 ml    70 % 10 cm H20     Peak airway pressure: 30 cm H2O    Minute ventilation: 11.5 l/min        MEDS: Reviewed    Chest X-Ray:  CXR Results  (Last 48 hours)               12/27/20 0835  XR CHEST PORT Final result    Impression:  IMPRESSION: New small bilateral pleural effusions.  Right midlung subsegmental   atelectasis versus infiltrate       Narrative:  EXAM: XR CHEST PORT       INDICATION: Respiratory failure COMPARISON: 12/22/2020       FINDINGS: A portable AP radiograph of the chest was obtained at 0829 hours. The   patient is on a cardiac monitor. The endotracheal tube terminates above the   shamir. There are bilateral pleural effusions with right. Perihilar linear   airspace disease NG tube extends below the hemidiaphragm. A right IJ line is in   place. ECHO:  Pending     Multidisciplinary Rounds Completed: Yes    ABCDEF Bundle/Checklist Completed:  Yes    SPECIAL EQUIPMENT  None    DISPOSITION  Stay in ICU    CRITICAL CARE CONSULTANT NOTE  I had a face to face encounter with the patient, reviewed and interpreted patient data including clinical events, labs, images, vital signs, I/O's, and examined patient. I have discussed the case and the plan and management of the patient's care with the consulting services, the bedside nurses and the respiratory therapist.      NOTE OF PERSONAL INVOLVEMENT IN CARE   This patient has a high probability of imminent, clinically significant deterioration, which requires the highest level of preparedness to intervene urgently. I participated in the decision-making and personally managed or directed the management of the following life and organ supporting interventions that required my frequent assessment to treat or prevent imminent deterioration. I personally spent 110 minutes of critical care time. This is time spent at this critically ill patient's bedside actively involved in patient care as well as the coordination of care and discussions with the patient's family. This does not include any procedural time which has been billed separately.     [address]  12/28/2020

## 2020-12-28 NOTE — PROGRESS NOTES
[facility]   [address] [facility], [address] [place] [address] [address], [facility]  Phone: [phone]   [phone]       Nephrology Progress Note  Ranjit Celis     1965     [id number]  Date of Admission : 12/21/2020 12/28/20    CC: Follow up for YISSEL    Assessment and Plan   YISSEL  - ATN from Sustained Hypotension and rapid lowering of BP   - UA : trace blood and 3+ protein --> serologic w/u neg  - renal US : unremarkable except for benign cyst   - continue present care  - daily labs and I/Os    Bibasal and RUL PNA  Acute Hypoxic Resp failure  - echo w/ LVEF 45%, mild MR  - On vent      Malignant HTN   Hypertensive Urgency   - negative UDS  - resolved     Anemia          Interval History:  Seen and examined. Remains on the vent, sedated. Cr stable. UOP stable. No other issues overnight per RN. Review of Systems: Review of systems not obtained due to patient factors.     Current Medications:   Current Facility-Administered Medications   Medication Dose Route Frequency    QUEtiapine (SEROquel) tablet 75 mg  75 mg Oral TID    levoFLOXacin (LEVAQUIN) 750 mg in D5W IVPB  750 mg IntraVENous Q24H    meropenem (MERREM) 500 mg in 0.9% sodium chloride (MBP/ADV) 50 mL MBP  0.5 g IntraVENous Q6H    vancomycin dosing by pharmacy   Other Rx Dosing/Monitoring    fentaNYL citrate (PF) injection  mcg   mcg IntraVENous Q1H PRN    acetaminophen (TYLENOL) solution 650 mg  650 mg Per NG tube Q6H PRN    vancomycin (VANCOCIN) 1500 mg in  ml infusion  1,500 mg IntraVENous Q16H    methylPREDNISolone (PF) (SOLU-MEDROL) injection 40 mg  40 mg IntraVENous Q6H    nicotine (NICODERM CQ) 14 mg/24 hr patch 1 Patch  1 Patch TransDERmal DAILY    labetaloL (NORMODYNE;TRANDATE) injection 20 mg  20 mg IntraVENous Q6H PRN    hydrALAZINE (APRESOLINE) 20 mg/mL injection 20 mg  20 mg IntraVENous Q6H PRN    amLODIPine (NORVASC) tablet 5 mg  5 mg Oral DAILY    scopolamine (TRANSDERM-SCOP) 1 mg over 3 days 1 Patch  1 Patch TransDERmal Q72H    cloNIDine HCL (CATAPRES) tablet 0.1 mg  0.1 mg Oral BID    dexmedeTOMidine in 0.9 % NaCl (PRECEDEX) 400 mcg/100 mL (4 mcg/mL) infusion soln  0.1-1.4 mcg/kg/hr IntraVENous TITRATE    enoxaparin (LOVENOX) injection 40 mg  40 mg SubCUTAneous DAILY    lactated Ringers infusion  30 mL/hr IntraVENous CONTINUOUS    chlorhexidine (ORAL CARE KIT) 0.12 % mouthwash 15 mL  15 mL Oral Q12H    famotidine (PF) (PEPCID) 20 mg in 0.9% sodium chloride 10 mL injection  20 mg IntraVENous DAILY    docusate sodium (COLACE) capsule 100 mg  100 mg Oral DAILY    senna (SENOKOT) tablet 17.2 mg  2 Tab Oral DAILY    sodium chloride (NS) flush 5-40 mL  5-40 mL IntraVENous Q8H    sodium chloride (NS) flush 5-40 mL  5-40 mL IntraVENous PRN    acetaminophen (TYLENOL) tablet 650 mg  650 mg Oral Q6H PRN    Or    acetaminophen (TYLENOL) suppository 650 mg  650 mg Rectal Q6H PRN    polyethylene glycol (MIRALAX) packet 17 g  17 g Oral DAILY PRN    promethazine (PHENERGAN) tablet 12.5 mg  12.5 mg Oral Q6H PRN    Or    ondansetron (ZOFRAN) injection 4 mg  4 mg IntraVENous Q6H PRN    propofol (DIPRIVAN) 10 mg/mL infusion  0-50 mcg/kg/min IntraVENous TITRATE    fentaNYL (PF) 1,500 mcg/30 mL (50 mcg/mL) infusion  0-200 mcg/hr IntraVENous TITRATE      No Known Allergies    Objective:  Vitals:    Vitals:    12/28/20 0600 12/28/20 0700 12/28/20 0800 12/28/20 0900   BP: (!) 150/105 (!) 141/98 (!) 135/98 (!) 133/99   Pulse: 74 73 72 70   Resp: 20 20 20 21   Temp:   99.3 °F (37.4 °C)    SpO2: 98% 98% 98% 95%   Weight: 101.9 kg (224 lb 10.4 oz)      Height:         Intake and Output:  12/28 0701 - 12/28 1900  In: -   Out: 500 [Urine:500]  12/26 1901 - 12/28 0700  In: 7870 [I.V.:4473]  Out: 8054 [Urine:4765]    Physical Examination:  General:  On vent   HEENT: Frontal lipoma   Lungs : diminished   CVS: RRR,s1,s2, normal, No murmur   Extremities: NO Edema  Neurologic: sedated on vent   Psych: Unable to assess       []    High complexity decision making was performed  []    Patient is at high-risk of decompensation with multiple organ involvement    Lab Data Personally Reviewed: I have reviewed all the pertinent labs, microbiology data and radiology studies during assessment. Recent Labs     12/28/20 0311 12/27/20 0421 12/26/20 0426   * 138 140   K 5.0 4.1 4.3    108 109*   CO2 27 27 28   * 123* 133*   BUN 31* 27* 34*   CREA 1.53* 1.38* 1.70*   CA 9.4 9.5 9.1   MG 2.5* 2.7* 2.6*   PHOS 3.6 3.6 3.3   ALB  --   --  2.2*   ALT  --   --  71     Recent Labs     12/28/20 0311 12/27/20 0421 12/26/20 0426   WBC 10.9 9.0 9.6   HGB 12.7 12.7 12.5   HCT 39.5 39.8 39.8    320 294     No results found for: [facility]  Lab Results   Component Value Date/Time    Culture result: MRSA NOT PRESENT 12/24/2020 09:20 AM    Culture result:  12/24/2020 09:20 AM         Screening of patient nares for MRSA is for surveillance purposes and, if positive, to facilitate isolation considerations in high risk settings. It is not intended for automatic decolonization interventions per se as regimens are not sufficiently effective to warrant routine use.     Culture result: NO GROWTH 2 DAYS 12/23/2020 04:16 PM    Culture result: (A) 12/22/2020 08:45 AM     RARE HAEMOPHILUS INFLUENZAE BETA LACTAMASE NEGATIVE    Culture result: RARE NORMAL RESPIRATORY ALDO 12/22/2020 08:45 AM     Recent Results (from the past 24 hour(s))   PROCALCITONIN    Collection Time: 12/28/20  3:11 AM   Result Value Ref Range    Procalcitonin 0.33 ng/mL   MAGNESIUM    Collection Time: 12/28/20  3:11 AM   Result Value Ref Range    Magnesium 2.5 (H) 1.6 - 2.4 mg/dL   PHOSPHORUS    Collection Time: 12/28/20  3:11 AM   Result Value Ref Range    Phosphorus 3.6 2.6 - 4.7 MG/DL   CBC W/O DIFF    Collection Time: 12/28/20  3:11 AM   Result Value Ref Range    WBC 10.9 4.1 - 11.1 K/uL    RBC 4.69 4.10 - 5.70 M/uL    HGB 12.7 12.1 - 17.0 g/dL    HCT 39.5 36.6 - 50.3 %    MCV 84.2 80.0 - 99.0 FL    MCH 27.1 26.0 - 34.0 PG    MCHC 32.2 30.0 - 36.5 g/dL    RDW 14.7 (H) 11.5 - 14.5 %    PLATELET 654 940 - 502 K/uL    MPV 9.5 8.9 - 12.9 FL    NRBC 0.0 0  WBC    ABSOLUTE NRBC 0.00 0.00 - 1.25 K/uL   METABOLIC PANEL, BASIC    Collection Time: 12/28/20  3:11 AM   Result Value Ref Range    Sodium 135 (L) 136 - 145 mmol/L    Potassium 5.0 3.5 - 5.1 mmol/L    Chloride 106 97 - 108 mmol/L    CO2 27 21 - 32 mmol/L    Anion gap 2 (L) 5 - 15 mmol/L    Glucose 161 (H) 65 - 100 mg/dL    BUN 31 (H) 6 - 20 MG/DL    Creatinine 1.53 (H) 0.70 - 1.30 MG/DL    BUN/Creatinine ratio 20 12 - 20      GFR est AA 58 (L) >60 ml/min/1.73m2    GFR est non-AA 48 (L) >60 ml/min/1.73m2    Calcium 9.4 8.5 - 10.1 MG/DL   LACTIC ACID    Collection Time: 12/28/20  3:11 AM   Result Value Ref Range    Lactic acid 1.0 0.4 - 2.0 MMOL/L           Total time spent with patient:  xxx   min. Care Plan discussed with:  Patient     Family      RN      Consulting Physician [facility]        I have reviewed the flowsheets. Chart and Pertinent Notes have been reviewed. No change in PMH ,family and social history from Consult note.       Luz Lentz MD

## 2020-12-28 NOTE — PROCEDURES
[facility]      Procedure Note - Arterial Access:   Performed by Geryr Hernandez DO. Immediately prior to the procedure, the patient was reevaluated and found suitable for the planned procedure and any planned medications. Immediately prior to the procedure a time out was called to verify the correct patient, procedure, equipment, staff, and marking as appropriate. Line Bundle:  Full sterile barrier precautions used. Insertion Date: 12/28/2020  Time:1430   Procedure Location:  ICU. Condition: Elective. Consent:  YES. Method: Seldinger technique. Site Prep: ChloraPrep. Procedure: Arterial Catheter Insertion in Right, Radial Artery   Catheter inserted into a new site. Number of Attempts:  1 Indication: Monitoring. There was bright red, pulsatile blood return. Femoral Site? no. If Yes, reason femoral site was chosen: NA  Catheter secured. Biopatch in place? yes. Sterile Bio-occlusive dressing placed. Complication None. The procedure was tolerated well.     Gerry Hernandez DO   Staff Anesthesiologist/Intensivist  Critical Care Medicine  [facility]

## 2020-12-28 NOTE — PROGRESS NOTES
0730 Bedside and Verbal shift change report given to Tamika RN (oncoming nurse) by Wilma May RN (offgoing nurse).  Report included the following information SBAR, Kardex, Procedure Summary, Intake/Output, MAR, Recent Results and Cardiac Rhythm NSR.   0850 KUB recommends advancement of dobhoff 20cm for postpyloric placement - advanced to 85 & repeat KUB ordered per protocol  1230 Dobhoff retrograde in the gastric fundus - pulled back and repositioned at 75 - KUB ordered per protocol  1400 Dobhoff still retrograde in the gastric fundus - Dr. Justina Haines updated - orders received for reglan to help with motility   1430 Dr. Justina Haines at bedside - right radial arterial line placed

## 2020-12-29 ENCOUNTER — APPOINTMENT (OUTPATIENT)
Dept: GENERAL RADIOLOGY | Age: 55
DRG: 207 | End: 2020-12-29
Attending: ANESTHESIOLOGY

## 2020-12-29 LAB
ANION GAP SERPL CALC-SCNC: 6 MMOL/L (ref 5–15)
ARTERIAL PATENCY WRIST A: ABNORMAL
BASE EXCESS BLD CALC-SCNC: 0 MMOL/L
BDY SITE: ABNORMAL
BUN SERPL-MCNC: 40 MG/DL (ref 6–20)
BUN/CREAT SERPL: 27 (ref 12–20)
CA-I BLD-SCNC: 1.29 MMOL/L (ref 1.12–1.32)
CALCIUM SERPL-MCNC: 9.2 MG/DL (ref 8.5–10.1)
CHLORIDE SERPL-SCNC: 105 MMOL/L (ref 97–108)
CO2 SERPL-SCNC: 25 MMOL/L (ref 21–32)
CREAT SERPL-MCNC: 1.46 MG/DL (ref 0.7–1.3)
ERYTHROCYTE [DISTWIDTH] IN BLOOD BY AUTOMATED COUNT: 15 % (ref 11.5–14.5)
GAS FLOW.O2 O2 DELIVERY SYS: ABNORMAL L/MIN
GAS FLOW.O2 SETTING OXYMISER: 28 BPM
GLUCOSE SERPL-MCNC: 145 MG/DL (ref 65–100)
HCO3 BLD-SCNC: 24.6 MMOL/L (ref 22–26)
HCT VFR BLD AUTO: 39.3 % (ref 36.6–50.3)
HGB BLD-MCNC: 12.4 G/DL (ref 12.1–17)
LACTATE SERPL-SCNC: 0.9 MMOL/L (ref 0.4–2)
MAGNESIUM SERPL-MCNC: 2.5 MG/DL (ref 1.6–2.4)
MCH RBC QN AUTO: 27.2 PG (ref 26–34)
MCHC RBC AUTO-ENTMCNC: 31.6 G/DL (ref 30–36.5)
MCV RBC AUTO: 86.2 FL (ref 80–99)
NRBC # BLD: 0 K/UL (ref 0–0.01)
NRBC BLD-RTO: 0 PER 100 WBC
O2/TOTAL GAS SETTING VFR VENT: 0.4 %
PCO2 BLD: 40.5 MMHG (ref 35–45)
PEEP RESPIRATORY: 10 CMH2O
PH BLD: 7.39 [PH] (ref 7.35–7.45)
PHOSPHATE SERPL-MCNC: 4.2 MG/DL (ref 2.6–4.7)
PLATELET # BLD AUTO: 361 K/UL (ref 150–400)
PMV BLD AUTO: 9.2 FL (ref 8.9–12.9)
PO2 BLD: 56 MMHG (ref 80–100)
POTASSIUM SERPL-SCNC: 4.9 MMOL/L (ref 3.5–5.1)
PROCALCITONIN SERPL-MCNC: 0.2 NG/ML
RBC # BLD AUTO: 4.56 M/UL (ref 4.1–5.7)
SAO2 % BLD: 89 % (ref 92–97)
SODIUM SERPL-SCNC: 136 MMOL/L (ref 136–145)
SPECIMEN TYPE: ABNORMAL
TOTAL RESP. RATE, ITRR: 20
VENTILATION MODE VENT: ABNORMAL
VT SETTING VENT: 420 ML
WBC # BLD AUTO: 9.6 K/UL (ref 4.1–11.1)

## 2020-12-29 PROCEDURE — 84100 ASSAY OF PHOSPHORUS: CPT

## 2020-12-29 PROCEDURE — 71045 X-RAY EXAM CHEST 1 VIEW: CPT

## 2020-12-29 PROCEDURE — 74011250636 HC RX REV CODE- 250/636: Performed by: NURSE PRACTITIONER

## 2020-12-29 PROCEDURE — 74011250636 HC RX REV CODE- 250/636: Performed by: INTERNAL MEDICINE

## 2020-12-29 PROCEDURE — 65620000000 HC RM CCU GENERAL

## 2020-12-29 PROCEDURE — 85027 COMPLETE CBC AUTOMATED: CPT

## 2020-12-29 PROCEDURE — 74011250636 HC RX REV CODE- 250/636: Performed by: ANESTHESIOLOGY

## 2020-12-29 PROCEDURE — 94003 VENT MGMT INPAT SUBQ DAY: CPT

## 2020-12-29 PROCEDURE — 83735 ASSAY OF MAGNESIUM: CPT

## 2020-12-29 PROCEDURE — 83605 ASSAY OF LACTIC ACID: CPT

## 2020-12-29 PROCEDURE — 73610000026 HC INO THERAPY EACH HOUR

## 2020-12-29 PROCEDURE — 82803 BLOOD GASES ANY COMBINATION: CPT

## 2020-12-29 PROCEDURE — 84145 PROCALCITONIN (PCT): CPT

## 2020-12-29 PROCEDURE — 80048 BASIC METABOLIC PNL TOTAL CA: CPT

## 2020-12-29 PROCEDURE — 74018 RADEX ABDOMEN 1 VIEW: CPT

## 2020-12-29 PROCEDURE — 74011000250 HC RX REV CODE- 250: Performed by: ANESTHESIOLOGY

## 2020-12-29 PROCEDURE — 2709999900 HC NON-CHARGEABLE SUPPLY

## 2020-12-29 PROCEDURE — 36415 COLL VENOUS BLD VENIPUNCTURE: CPT

## 2020-12-29 PROCEDURE — 74011250637 HC RX REV CODE- 250/637: Performed by: ANESTHESIOLOGY

## 2020-12-29 PROCEDURE — 74011000258 HC RX REV CODE- 258: Performed by: INTERNAL MEDICINE

## 2020-12-29 PROCEDURE — 74011000258 HC RX REV CODE- 258: Performed by: ANESTHESIOLOGY

## 2020-12-29 RX ORDER — DOCUSATE SODIUM 50 MG/5ML
100 LIQUID ORAL DAILY
Status: DISCONTINUED | OUTPATIENT
Start: 2020-12-29 | End: 2021-01-07 | Stop reason: HOSPADM

## 2020-12-29 RX ADMIN — AMLODIPINE BESYLATE 5 MG: 5 TABLET ORAL at 08:53

## 2020-12-29 RX ADMIN — PROPOFOL 50 MCG/KG/MIN: 10 INJECTION, EMULSION INTRAVENOUS at 11:52

## 2020-12-29 RX ADMIN — DEXMEDETOMIDINE HYDROCHLORIDE 1.4 MCG/KG/HR: 400 INJECTION INTRAVENOUS at 22:47

## 2020-12-29 RX ADMIN — PROPOFOL 50 MCG/KG/MIN: 10 INJECTION, EMULSION INTRAVENOUS at 16:38

## 2020-12-29 RX ADMIN — METHYLPREDNISOLONE SODIUM SUCCINATE 40 MG: 40 INJECTION, POWDER, FOR SOLUTION INTRAMUSCULAR; INTRAVENOUS at 00:00

## 2020-12-29 RX ADMIN — SODIUM CHLORIDE: 9 INJECTION INTRAMUSCULAR; INTRAVENOUS; SUBCUTANEOUS at 08:53

## 2020-12-29 RX ADMIN — MEROPENEM 500 MG: 500 INJECTION, POWDER, FOR SOLUTION INTRAVENOUS at 04:57

## 2020-12-29 RX ADMIN — QUETIAPINE FUMARATE 75 MG: 25 TABLET ORAL at 16:08

## 2020-12-29 RX ADMIN — CHLORHEXIDINE GLUCONATE 15 ML: 0.12 RINSE ORAL at 20:13

## 2020-12-29 RX ADMIN — METHYLPREDNISOLONE SODIUM SUCCINATE 40 MG: 40 INJECTION, POWDER, FOR SOLUTION INTRAMUSCULAR; INTRAVENOUS at 17:25

## 2020-12-29 RX ADMIN — DOCUSATE SODIUM 100 MG: 50 LIQUID ORAL at 16:08

## 2020-12-29 RX ADMIN — METHYLPREDNISOLONE SODIUM SUCCINATE 40 MG: 40 INJECTION, POWDER, FOR SOLUTION INTRAMUSCULAR; INTRAVENOUS at 05:00

## 2020-12-29 RX ADMIN — DEXMEDETOMIDINE HYDROCHLORIDE 1.4 MCG/KG/HR: 400 INJECTION INTRAVENOUS at 16:11

## 2020-12-29 RX ADMIN — PROPOFOL 50 MCG/KG/MIN: 10 INJECTION, EMULSION INTRAVENOUS at 13:55

## 2020-12-29 RX ADMIN — AMPICILLIN SODIUM AND SULBACTAM SODIUM 3 G: 2; 1 INJECTION, POWDER, FOR SOLUTION INTRAMUSCULAR; INTRAVENOUS at 18:30

## 2020-12-29 RX ADMIN — Medication 10 ML: at 06:23

## 2020-12-29 RX ADMIN — DEXMEDETOMIDINE HYDROCHLORIDE 1.4 MCG/KG/HR: 400 INJECTION INTRAVENOUS at 08:42

## 2020-12-29 RX ADMIN — METHYLPREDNISOLONE SODIUM SUCCINATE 40 MG: 40 INJECTION, POWDER, FOR SOLUTION INTRAMUSCULAR; INTRAVENOUS at 23:51

## 2020-12-29 RX ADMIN — PROPOFOL 50 MCG/KG/MIN: 10 INJECTION, EMULSION INTRAVENOUS at 23:50

## 2020-12-29 RX ADMIN — LEVOFLOXACIN 750 MG: 5 INJECTION, SOLUTION INTRAVENOUS at 11:03

## 2020-12-29 RX ADMIN — DEXMEDETOMIDINE HYDROCHLORIDE 1.4 MCG/KG/HR: 400 INJECTION INTRAVENOUS at 19:25

## 2020-12-29 RX ADMIN — DEXMEDETOMIDINE HYDROCHLORIDE 1.4 MCG/KG/HR: 400 INJECTION INTRAVENOUS at 13:55

## 2020-12-29 RX ADMIN — QUETIAPINE FUMARATE 75 MG: 25 TABLET ORAL at 22:47

## 2020-12-29 RX ADMIN — PROPOFOL 50 MCG/KG/MIN: 10 INJECTION, EMULSION INTRAVENOUS at 20:26

## 2020-12-29 RX ADMIN — Medication 17.2 MG: at 08:53

## 2020-12-29 RX ADMIN — CLONIDINE HYDROCHLORIDE 0.1 MG: 0.1 TABLET ORAL at 17:25

## 2020-12-29 RX ADMIN — METHYLPREDNISOLONE SODIUM SUCCINATE 40 MG: 40 INJECTION, POWDER, FOR SOLUTION INTRAMUSCULAR; INTRAVENOUS at 12:00

## 2020-12-29 RX ADMIN — DEXMEDETOMIDINE HYDROCHLORIDE 1.4 MCG/KG/HR: 400 INJECTION INTRAVENOUS at 02:38

## 2020-12-29 RX ADMIN — SODIUM CHLORIDE, SODIUM LACTATE, POTASSIUM CHLORIDE, AND CALCIUM CHLORIDE 30 ML/HR: 600; 310; 30; 20 INJECTION, SOLUTION INTRAVENOUS at 09:06

## 2020-12-29 RX ADMIN — CLONIDINE HYDROCHLORIDE 0.1 MG: 0.1 TABLET ORAL at 08:53

## 2020-12-29 RX ADMIN — DEXMEDETOMIDINE HYDROCHLORIDE 1.4 MCG/KG/HR: 400 INJECTION INTRAVENOUS at 05:30

## 2020-12-29 RX ADMIN — Medication 10 ML: at 22:48

## 2020-12-29 RX ADMIN — Medication 10 ML: at 13:59

## 2020-12-29 RX ADMIN — QUETIAPINE FUMARATE 75 MG: 25 TABLET ORAL at 08:53

## 2020-12-29 RX ADMIN — DEXMEDETOMIDINE HYDROCHLORIDE 1.4 MCG/KG/HR: 400 INJECTION INTRAVENOUS at 11:51

## 2020-12-29 RX ADMIN — PROPOFOL 50 MCG/KG/MIN: 10 INJECTION, EMULSION INTRAVENOUS at 07:26

## 2020-12-29 RX ADMIN — AMPICILLIN SODIUM AND SULBACTAM SODIUM 3 G: 2; 1 INJECTION, POWDER, FOR SOLUTION INTRAMUSCULAR; INTRAVENOUS at 23:50

## 2020-12-29 RX ADMIN — PROPOFOL 50 MCG/KG/MIN: 10 INJECTION, EMULSION INTRAVENOUS at 04:57

## 2020-12-29 RX ADMIN — ENOXAPARIN SODIUM 40 MG: 40 INJECTION SUBCUTANEOUS at 08:53

## 2020-12-29 RX ADMIN — HYDRALAZINE HYDROCHLORIDE 20 MG: 20 INJECTION INTRAMUSCULAR; INTRAVENOUS at 01:26

## 2020-12-29 RX ADMIN — CHLORHEXIDINE GLUCONATE 15 ML: 0.12 RINSE ORAL at 09:04

## 2020-12-29 RX ADMIN — PROPOFOL 50 MCG/KG/MIN: 10 INJECTION, EMULSION INTRAVENOUS at 01:30

## 2020-12-29 NOTE — PROGRESS NOTES
12/29/2020 -   RAFIA:  - RUR: 17%  - Disposition is TBD dependent on progression    - Patient continues to have issues with vomiting  - Tube Feeds are on hold  - ABX continue  - Patient remains vented and sedated  CRM: Peterson Aaron, MPH, [address] [place] [address]; Z: [phone]

## 2020-12-29 NOTE — PROGRESS NOTES
[facility]    ICU TEAM Progress Note    Name: Cayden Bai   : 1965   MRN: [id number]   Date: 2020      Assessment     ICU Problems:    1. Acute Hypoxic Respiratory Failure  2. CAP/Aspiration (Haemophilus influenzae & Beta Strep)  3. Metabolic Encephalopathy  4. Obesity   5. Nx nonischemic cardiomyopathy   6. Chronic tobacco abuse  7. YISSEL/CKD stage 2  8. Left frontal lipoma-Chronic   9. Hx tobacco abuse/? COPD    Imagin2020      ICU Comprehensive Plan of Care:   1. Acute hypoxic respiratory failure (Haemophilus influenza PNA; Tobacco Abuse Hx)  - SAT today --> Failed  - Seroquel 75 mg q8  - Monitor QTC  - Stop Meropenem and Vanc  - Continue Levaquin  - Resend sputum culture  - Methylpred 40 mg IV daily  - Scopolamine patch  - Continue ventilator support - optimize PEEP - Driving pressure is good  - Wean FiO2 for SpO2 > 90%  - HOB > 30  - Propofol/Precedex  - Trend nt-BNP/Procal/Lact    2. Elevated troponin (resolving)  -ECHO 40-45%   -Cardiology following    3. YISSEL/CKD stage 2  -From Hypotension  -Nephrology following  -Levophed MAP > 65 mmHg  -Strict I and O    4. HTN  -- PRN Labetalol  -- Norvasc    Plans for this Shift:     1. Ventilator support, will wean as tolerated  2. SBP Goal of: > 90 mmHg  3. MAP Goal of: > 65 mmHg  4. Phenylephrine - For above SBP/MAP goals  5. IVFs: LR  6. Transfusion Trigger (Hgb): <7 g/dL  7. Respiratory Goals:  a. Chlorhexidine   b. Optimize PEEP/Ventilation/Oxygenation  c. Goal Tidal Volume 6 cc/kg based on IBW  d. Aim for lung protective ventilation  e. Head of bed > 30 degrees  f. Aggressive bronchopulmonary hygiene  8. Pulmonary toilet: Duo-Nebs   9. SpO2 Goal: > 92%  10. Keep K>4; Mg>2   11. PT/OT  12. Goals of Care Discussion with family Pending   13. Plan of Care/Code Status: Full Code  14. [facility], nephrology   15.  Discussed Care Plan with Bedside RN  16. Documentation of Current Medications  17. Rest of Plan Below:    F - Feeding:  Pending   A - Analgesia: Fentanyl  S - Sedation: Propofol  T - DVT Prophylaxis: SCD's or Sequential Compression Device   H - Head of Bed: > 30 Degrees  U - Ulcer Prophylaxis: Protonix (pantoprazole)   G - Glycemic Control: Insulin  S - Spontaneous Breathing Trial: Pending  B - Bowel Regimen: Docusate (Colace)  I - Indwelling Catheter:   Tubes: ETT  Lines: Peripheral IV  Drains: Granados Catheter  D - De-escalation of Antibiotics: Vanc & Cefepime    Subjective:   Progress Note: 12/29/2020      Reason for ICU Admission: Acute hypoxic respiratory failure      HPI: Chintan Sequeira is a 49 yo AAM with a PMH significant for nonischemic cardiomyopathy, left frontal lipoma, CKD, obesity, chronic tobacco abuse, who presented to the ER via EMS with co increased SOB X2 weeks. Pt is currently sedated and on the ventilator, unable to provide HPI thereofre obtained via chart and provider. Pt failed bipap while in the ER with continued worsening hypoxia, therefore pt was intubated and placed on the ventilator. On arrival to ED pt was found to be hypertensive with /100.  Patient was then transferred to ICU for further management in care.       Overnight Events:   12/29/2020  N/V; less oxygenation needs - down to 50%      Active Problem List:     Problem List  Date Reviewed: 9/28/2020          Codes Class    Hypoxia ICD-10-CM: R09.02  ICD-9-CM: 799.02         Cervical radiculopathy ICD-10-CM: M54.12  ICD-9-CM: 723.4         Arm paresthesia, left ICD-10-CM: R20.2  ICD-9-CM: 782.0         Intractable headache ICD-10-CM: R51.9  ICD-9-CM: 784.0         Dilated cardiomyopathy (HCC) ICD-10-CM: I42.0  ICD-9-CM: 425.4         NSTEMI (non-ST elevated myocardial infarction) ([facility] [address] ICD-10-CM: I21.4  ICD-9-CM: 410.70         HTN (hypertension) ICD-10-CM: I10  ICD-9-CM: 401.9               Past Medical History:      has a past medical history of Hypertension. Past Surgical History:      has no past surgical history on file. Home Medications:     Prior to Admission medications    Medication Sig Start Date End Date Taking? Authorizing Provider   aspirin-acetaminophen-caffeine (Goody's Extra Strength) 520-260-32.5 mg pwpk Take 1 Package by mouth two (2) times a day. Yes Provider, Historical   ibuprofen (MOTRIN) 600 mg tablet Take 600 mg by mouth every six (6) hours as needed for Pain. Yes Provider, Historical   carvediloL (COREG) 25 mg tablet Take 1 Tab by mouth two (2) times daily (with meals). 20  Yes Enmanuel Rosas MD   isosorbide mononitrate ER (IMDUR) 30 mg tablet Take 1 Tab by mouth daily. 20  Yes Enmanuel Rosas MD   cloNIDine HCL (CATAPRES) 0.1 mg tablet Take 1 Tab by mouth two (2) times a day. 20  Yes Enmanuel Rosas MD   aspirin 81 mg chewable tablet Take 1 Tab by mouth daily. 19  Yes Lyn Aleman MD       Allergies/Social/Family History:     No Known Allergies   Social History     Tobacco Use    Smoking status: Current Every Day Smoker     Packs/day: 1.00    Smokeless tobacco: Never Used   Substance Use Topics    Alcohol use: Yes     Alcohol/week: 5.0 standard drinks     Types: 6 Cans of beer per week     Comment: Occasionally      Family History   Problem Relation Age of Onset    Stroke Father     Cancer Sister        Review of Systems:     Review of systems not obtained due to patient factors.     Objective:   Vital Signs:  Visit Vitals  /74 (BP 1 Location: Right arm, BP Patient Position: At rest)   Pulse 71   Temp 98.6 °F (37 °C)   Resp 21   Ht 5' 11\" (1.803 m)   Wt 101.9 kg (224 lb 10.4 oz)   SpO2 93%   BMI 31.33 kg/m²    O2 Flow Rate (L/min): 15 l/min O2 Device: Endotracheal tube, Ventilator Temp (24hrs), Av.4 °F (37.4 °C), Min:98.6 °F (37 °C), Max:99.9 °F (37.7 °C)           Intake/Output:     Intake/Output Summary (Last 24 hours) at 2020 0730  Last data filed at 12/29/2020 0700  Gross per 24 hour   Intake 3571.6 ml   Output 3830 ml   Net -258.4 ml       Physical Exam:    General:  Sedated and on the ventilator. No acute distress. Eyes:  Sclera anicteric. Pupils equal, round, reactive to light. Mouth/Throat: Orotracheal tube in place. Neck: Supple. Lungs:   Rhonchi TH scattered, diminished in the BLL   Cardiovascular:  Regular rate and rhythm, no murmur, click, rub, or gallop. Abdomen:   Soft, non-tender, bowel sounds normal, non-distended. Extremities: No cyanosis or edema. Skin: No acute rash or lesions. Lipoma noted over left frontal region    Lymph Nodes: Cervical and supraclavicular normal.   Musculoskeletal:  No swelling or deformity. Lines/Devices:  Intact, no erythema, drainage, or tenderness. Psychiatric: Sedated and appears comfortable on ventilator. LABS AND  DATA: Personally reviewed  Recent Labs     12/29/20 0427 12/28/20 0311   WBC 9.6 10.9   HGB 12.4 12.7   HCT 39.3 39.5    357     Recent Labs     12/29/20 0427 12/28/20 0311    135*   K 4.9 5.0    106   CO2 25 27   BUN 40* 31*   CREA 1.46* 1.53*   * 161*   CA 9.2 9.4   MG 2.5* 2.5*   PHOS 4.2 3.6     No results for input(s): AP, TBIL, TP, ALB, GLOB, AML, LPSE in the last 72 hours. No lab exists for component: SGOT, GPT, AMYP  No results for input(s): INR, PTP, APTT, INREXT, INREXT in the last 72 hours. Recent Labs     12/27/20  0352   PHI 7.52*   PCO2I 34.2*   PO2I 68*   FIO2I 60     No results for input(s): CPK, CKMB, TROIQ, BNPP in the last 72 hours.     Hemodynamics:   PAP:   CO:     Wedge:   CI:     CVP:    SVR:       PVR:       Ventilator Settings:  Mode Rate Tidal Volume Pressure FiO2 PEEP   Assist control, Volume control   420 ml    50 % 10 cm H20     Peak airway pressure: 31 cm H2O    Minute ventilation: 9.22 l/min        MEDS: Reviewed    Chest X-Ray:  CXR Results  (Last 48 hours)               12/28/20 0385  XR CHEST PORT Final result Impression:  IMPRESSION:       Stable small pleural effusions with stable bilateral interstitial and patchy   airspace opacities. Narrative:  EXAM:  XR CHEST PORT       INDICATION: Bilateral lung opacities and small pleural effusions. COMPARISON: 12/27/2020 at 0829 hours       TECHNIQUE: Portable AP upright chest view at 0730 hours       FINDINGS: The endotracheal tube terminates above the shamir. 2 enteric tubes   terminate in the stomach. The right IJ catheter is stable. The cardiomediastinal   contours are stable. There are stable small pleural effusions with stable bilateral interstitial and   patchy airspace opacities. There is no pneumothorax. The bones and upper abdomen   are stable. 12/27/20 0835  XR CHEST PORT Final result    Impression:  IMPRESSION: New small bilateral pleural effusions. Right midlung subsegmental   atelectasis versus infiltrate       Narrative:  EXAM: XR CHEST PORT       INDICATION: Respiratory failure       COMPARISON: 12/22/2020       FINDINGS: A portable AP radiograph of the chest was obtained at 0829 hours. The   patient is on a cardiac monitor. The endotracheal tube terminates above the   shamir. There are bilateral pleural effusions with right. Perihilar linear   airspace disease NG tube extends below the hemidiaphragm. A right IJ line is in   place. ECHO:  Pending     Multidisciplinary Rounds Completed: Yes    ABCDEF Bundle/Checklist Completed:  Yes    SPECIAL EQUIPMENT  None    DISPOSITION  Stay in ICU    CRITICAL CARE CONSULTANT NOTE  I had a face to face encounter with the patient, reviewed and interpreted patient data including clinical events, labs, images, vital signs, I/O's, and examined patient.   I have discussed the case and the plan and management of the patient's care with the consulting services, the bedside nurses and the respiratory therapist.      NOTE OF PERSONAL INVOLVEMENT IN CARE   This patient has a high probability of imminent, clinically significant deterioration, which requires the highest level of preparedness to intervene urgently. I participated in the decision-making and personally managed or directed the management of the following life and organ supporting interventions that required my frequent assessment to treat or prevent imminent deterioration. I personally spent 75 minutes of critical care time. This is time spent at this critically ill patient's bedside actively involved in patient care as well as the coordination of care and discussions with the patient's family. This does not include any procedural time which has been billed separately.     [address]  12/29/2020

## 2020-12-29 NOTE — PROGRESS NOTES
0000: Bedside and Verbal shift change report given to Cande Eddy RN (oncoming nurse) by Yaron Olson RN (offgoing nurse). Report included the following information SBAR, Kardex, Intake/Output, MAR, Recent Results, Med Rec Status, Cardiac Rhythm NSR and Alarm Parameters . 0630: This RN observed vomit on bed sheet. Linen changed and TF placed on hold. 0730: Bedside and Verbal shift change report given to Michael Copeland RN (oncoming nurse) by Cande Eddy RN (offgoing nurse). Report included the following information SBAR, Kardex, Intake/Output, MAR, Recent Results, Cardiac Rhythm NSR and Alarm Parameters .

## 2020-12-29 NOTE — CONSULTS
ID Consult Note  NAME:  Joyce Sanchez   :   1965   MRN:   [id number]   Date/Time:  2020 4:47 PM  Subjective:   REASON FOR CONSULT: Respiratory failure      Yung Coleman is a 50 y.o. with a history of hypertension, CKD, nonischemic cardiomyopathy and chronic tobacco abuse. He is currently intubated and I cannot get any history. The patient was already intubated when he was seen by the admitting team.  According to the admitting note, the patient had been complaining of shortness of breath for the last 2 weeks. He came to the emergency room where he was placed on BiPAP, but he did not tolerated and was intubated. Primary team says that there is a possible that he had aspirated multiple times. His admitting white blood cell count was normal.  Chest x-ray shows significant bilateral lower lobe airspace disease. His Covid test came back negative. He was started on vancomycin and cefepime. A right internal jugular catheter was placed on . On  he was bronched. Sputum culture on  grew out Streptococcus while sputum culture on  grew out Haemophilus influenzae. On  the ET tube was exchanged. On [place] day, vancomycin was stopped. He deteriorated on  so Merrem Levaquin and vancomycin were started. He is currently now on Levaquin solely and we are being asked to see him in consult. Past Medical History:   Diagnosis Date    Hypertension    Nonischemic cardiomyopathy, left frontal lipoma, CKD, chronic tobacco abuse    Past Surgical History   Unobtainable  Social History     Tobacco Use    Smoking status: Current Every Day Smoker     Packs/day: 1.00    Smokeless tobacco: Never Used   Substance Use Topics    Alcohol use:  Yes     Alcohol/week: 5.0 standard drinks     Types: 6 Cans of beer per week     Comment: Occasionally      Family History   Problem Relation Age of Onset    Stroke Father     Cancer Sister       No Known Allergies   Home Medications:  Prior to Admission Medications   Prescriptions Last Dose Informant Patient Reported? Taking?   aspirin 81 mg chewable tablet 12/21/2020 at Unknown time  No Yes   Sig: Take 1 Tab by mouth daily. aspirin-acetaminophen-caffeine (Goody's Extra Strength) 520-260-32.5 mg pwpk 12/21/2020 at Unknown time  Yes Yes   Sig: Take 1 Package by mouth two (2) times a day. carvediloL (COREG) 25 mg tablet 12/14/2020 at Unknown time  No Yes   Sig: Take 1 Tab by mouth two (2) times daily (with meals). cloNIDine HCL (CATAPRES) 0.1 mg tablet 12/14/2020 at Unknown time  No Yes   Sig: Take 1 Tab by mouth two (2) times a day. ibuprofen (MOTRIN) 600 mg tablet 12/21/2020 at Unknown time  Yes Yes   Sig: Take 600 mg by mouth every six (6) hours as needed for Pain.   isosorbide mononitrate ER (IMDUR) 30 mg tablet 12/14/2020 at Unknown time  No Yes   Sig: Take 1 Tab by mouth daily.       Facility-Administered Medications: None     Hospital medications:  Current Facility-Administered Medications   Medication Dose Route Frequency    docusate (COLACE) 50 mg/5 mL oral liquid 100 mg  100 mg Oral DAILY    QUEtiapine (SEROquel) tablet 75 mg  75 mg Oral TID    levoFLOXacin (LEVAQUIN) 750 mg in D5W IVPB  750 mg IntraVENous Q24H    fentaNYL citrate (PF) injection  mcg   mcg IntraVENous Q1H PRN    acetaminophen (TYLENOL) solution 650 mg  650 mg Per NG tube Q6H PRN    methylPREDNISolone (PF) (SOLU-MEDROL) injection 40 mg  40 mg IntraVENous Q6H    nicotine (NICODERM CQ) 14 mg/24 hr patch 1 Patch  1 Patch TransDERmal DAILY    labetaloL (NORMODYNE;TRANDATE) injection 20 mg  20 mg IntraVENous Q6H PRN    hydrALAZINE (APRESOLINE) 20 mg/mL injection 20 mg  20 mg IntraVENous Q6H PRN    amLODIPine (NORVASC) tablet 5 mg  5 mg Oral DAILY    scopolamine (TRANSDERM-SCOP) 1 mg over 3 days 1 Patch  1 Patch TransDERmal Q72H    cloNIDine HCL (CATAPRES) tablet 0.1 mg  0.1 mg Oral BID    dexmedeTOMidine in 0.9 % NaCl (PRECEDEX) 400 mcg/100 mL (4 mcg/mL) infusion soln  0.1-1.4 mcg/kg/hr IntraVENous TITRATE    enoxaparin (LOVENOX) injection 40 mg  40 mg SubCUTAneous DAILY    lactated Ringers infusion  30 mL/hr IntraVENous CONTINUOUS    chlorhexidine (ORAL CARE KIT) 0.12 % mouthwash 15 mL  15 mL Oral Q12H    famotidine (PF) (PEPCID) 20 mg in 0.9% sodium chloride 10 mL injection  20 mg IntraVENous DAILY    senna (SENOKOT) tablet 17.2 mg  2 Tab Oral DAILY    sodium chloride (NS) flush 5-40 mL  5-40 mL IntraVENous Q8H    sodium chloride (NS) flush 5-40 mL  5-40 mL IntraVENous PRN    acetaminophen (TYLENOL) tablet 650 mg  650 mg Oral Q6H PRN    Or    acetaminophen (TYLENOL) suppository 650 mg  650 mg Rectal Q6H PRN    polyethylene glycol (MIRALAX) packet 17 g  17 g Oral DAILY PRN    promethazine (PHENERGAN) tablet 12.5 mg  12.5 mg Oral Q6H PRN    Or    ondansetron (ZOFRAN) injection 4 mg  4 mg IntraVENous Q6H PRN    propofol (DIPRIVAN) 10 mg/mL infusion  0-50 mcg/kg/min IntraVENous TITRATE    fentaNYL (PF) 1,500 mcg/30 mL (50 mcg/mL) infusion  0-200 mcg/hr IntraVENous TITRATE     REVIEW OF SYSTEMS:      Unobtainable          Objective:   VITALS:    Visit Vitals  BP (!) 151/86 (BP 1 Location: Right arm, BP Patient Position: At rest)   Pulse 73   Temp 99.5 °F (37.5 °C)   Resp 20   Ht 5' 11\" (1.803 m)   Wt 101.9 kg (224 lb 10.4 oz)   SpO2 96%   BMI 31.33 kg/m²     Temp (24hrs), Av.9 °F (37.2 °C), Min:97.8 °F (36.6 °C), Max:99.7 °F (37.6 °C)    PHYSICAL EXAM:   General:    On the ventilator  Head:   He has a lipoma on his forehead  Eyes:   Conjunctivae clear, anicteric sclerae. Nose:  Nares normal.   Throat:    Lips and tongue normal.  Neck:  symmetrical    Right central line is nontender  :    He has a Granados catheter  Lungs:   Clear to auscultation bilaterally. No Wheezing or Rhonchi. No rales. Heart:   Regular rate and rhythm,  no murmur, rub or gallop. Abdomen:   Soft, non-tender,not distended.   No guarding or rebound  Extremities: Knees, ankles, wrists, elbows are not warm and not tender. Skin:     No rashes or lesions.   Not Jaundiced  Lymph: Cervical normal  Neurologic: Does not obey commands  Psychiatric; does not speak    LAB DATA REVIEWED:    Recent Results (from the past 48 hour(s))   PROCALCITONIN    Collection Time: 12/28/20  3:11 AM   Result Value Ref Range    Procalcitonin 0.33 ng/mL   MAGNESIUM    Collection Time: 12/28/20  3:11 AM   Result Value Ref Range    Magnesium 2.5 (H) 1.6 - 2.4 mg/dL   PHOSPHORUS    Collection Time: 12/28/20  3:11 AM   Result Value Ref Range    Phosphorus 3.6 2.6 - 4.7 MG/DL   CBC W/O DIFF    Collection Time: 12/28/20  3:11 AM   Result Value Ref Range    WBC 10.9 4.1 - 11.1 K/uL    RBC 4.69 4.10 - 5.70 M/uL    HGB 12.7 12.1 - 17.0 g/dL    HCT 39.5 36.6 - 50.3 %    MCV 84.2 80.0 - 99.0 FL    MCH 27.1 26.0 - 34.0 PG    MCHC 32.2 30.0 - 36.5 g/dL    RDW 14.7 (H) 11.5 - 14.5 %    PLATELET 916 498 - 235 K/uL    MPV 9.5 8.9 - 12.9 FL    NRBC 0.0 0  WBC    ABSOLUTE NRBC 0.00 0.00 - 9.10 K/uL   METABOLIC PANEL, BASIC    Collection Time: 12/28/20  3:11 AM   Result Value Ref Range    Sodium 135 (L) 136 - 145 mmol/L    Potassium 5.0 3.5 - 5.1 mmol/L    Chloride 106 97 - 108 mmol/L    CO2 27 21 - 32 mmol/L    Anion gap 2 (L) 5 - 15 mmol/L    Glucose 161 (H) 65 - 100 mg/dL    BUN 31 (H) 6 - 20 MG/DL    Creatinine 1.53 (H) 0.70 - 1.30 MG/DL    BUN/Creatinine ratio 20 12 - 20      GFR est AA 58 (L) >60 ml/min/1.73m2    GFR est non-AA 48 (L) >60 ml/min/1.73m2    Calcium 9.4 8.5 - 10.1 MG/DL   LACTIC ACID    Collection Time: 12/28/20  3:11 AM   Result Value Ref Range    Lactic acid 1.0 0.4 - 2.0 MMOL/L   CULTURE, RESPIRATORY/SPUTUM/BRONCH W GRAM STAIN    Collection Time: 12/28/20  4:56 PM    Specimen: Sputum   Result Value Ref Range    Special Requests: NO SPECIAL REQUESTS      GRAM STAIN OCCASIONAL WBCS SEEN      GRAM STAIN RARE EPITHELIAL CELLS SEEN      GRAM STAIN NO ORGANISMS SEEN      Culture result: SCANT NORMAL RESPIRATORY ALDO     PROCALCITONIN    Collection Time: 12/29/20  4:27 AM   Result Value Ref Range    Procalcitonin 0.20 ng/mL   MAGNESIUM    Collection Time: 12/29/20  4:27 AM   Result Value Ref Range    Magnesium 2.5 (H) 1.6 - 2.4 mg/dL   PHOSPHORUS    Collection Time: 12/29/20  4:27 AM   Result Value Ref Range    Phosphorus 4.2 2.6 - 4.7 MG/DL   LACTIC ACID    Collection Time: 12/29/20  4:27 AM   Result Value Ref Range    Lactic acid 0.9 0.4 - 2.0 MMOL/L   CBC W/O DIFF    Collection Time: 12/29/20  4:27 AM   Result Value Ref Range    WBC 9.6 4.1 - 11.1 K/uL    RBC 4.56 4.10 - 5.70 M/uL    HGB 12.4 12.1 - 17.0 g/dL    HCT 39.3 36.6 - 50.3 %    MCV 86.2 80.0 - 99.0 FL    MCH 27.2 26.0 - 34.0 PG    MCHC 31.6 30.0 - 36.5 g/dL    RDW 15.0 (H) 11.5 - 14.5 %    PLATELET 365 757 - 865 K/uL    MPV 9.2 8.9 - 12.9 FL    NRBC 0.0 0  WBC    ABSOLUTE NRBC 0.00 0.00 - 8.79 K/uL   METABOLIC PANEL, BASIC    Collection Time: 12/29/20  4:27 AM   Result Value Ref Range    Sodium 136 136 - 145 mmol/L    Potassium 4.9 3.5 - 5.1 mmol/L    Chloride 105 97 - 108 mmol/L    CO2 25 21 - 32 mmol/L    Anion gap 6 5 - 15 mmol/L    Glucose 145 (H) 65 - 100 mg/dL    BUN 40 (H) 6 - 20 MG/DL    Creatinine 1.46 (H) 0.70 - 1.30 MG/DL    BUN/Creatinine ratio 27 (H) 12 - 20      GFR est AA >60 >60 ml/min/1.73m2    GFR est non-AA 51 (L) >60 ml/min/1.73m2    Calcium 9.2 8.5 - 10.1 MG/DL   POC EG7    Collection Time: 12/29/20  9:49 AM   Result Value Ref Range    Calcium, ionized (POC) 1.29 1.12 - 1.32 mmol/L    FIO2 (POC) 0.40 %    pH (POC) 7.39 7.35 - 7.45      pCO2 (POC) 40.5 35.0 - 45.0 MMHG    pO2 (POC) 56 (L) 80 - 100 MMHG    HCO3 (POC) 24.6 22 - 26 MMOL/L    Base excess (POC) 0 mmol/L    sO2 (POC) 89 (L) 92 - 97 %    Site DRAWN FROM ARTERIAL LINE      Device: VENT      Mode ASSIST CONTROL      Tidal volume 420 ml    Set Rate 28 bpm    PEEP/CPAP (POC) 10 cmH2O    Allens test (POC) N/A      Specimen type (POC) ARTERIAL      Total resp. rate 20           IMPRESSION    #1 respiratory failure with Haemophilus/Streptococcus pneumonia    #2 nonischemic cardiomyopathy    #3 chronic kidney disease    #4 chronic tobacco abuse    #5 hypertension      PLAN     The patient has received adequate Haemophilus treatment, but he may have not have gotten sufficient aspiration treatment yet. I will discontinue Levaquin and start him on Unasyn.                    ___________________________________________________  ID: Eduardo Nazario MD

## 2020-12-29 NOTE — PROGRESS NOTES
[facility]   [address] [facility], [facility] [address], [facility]  Phone: [phone]   [phone]       Nephrology Progress Note  Zainab Madera     1965     [id number]  Date of Admission : 12/21/2020 12/29/20    CC: Follow up for YISSEL    Assessment and Plan   YISSEL  - ATN from Sustained Hypotension and rapid lowering of BP   - UA : trace blood and 3+ protein --> serologic w/u neg except + gammopathy screen  - renal US : unremarkable except for benign cyst   - Cr stable, stable UOP  - continue present care  - daily labs and I/Os    Bibasal and RUL PNA  Acute Hypoxic Resp failure  - echo w/ LVEF 45%, mild MR  - On vent      Malignant HTN   Hypertensive Urgency   - negative UDS  - resolved     Anemia:  - hgb stable  - + M spike along with elevated K light chains and IgM levels  - would have heme eval while here     Interval History:  Seen and examined. Remains sedated on the vent. BP stable. Cr improving. UOP stable. No other issues overnight per RN. Review of Systems: Review of systems not obtained due to patient factors.     Current Medications:   Current Facility-Administered Medications   Medication Dose Route Frequency    QUEtiapine (SEROquel) tablet 75 mg  75 mg Oral TID    levoFLOXacin (LEVAQUIN) 750 mg in D5W IVPB  750 mg IntraVENous Q24H    fentaNYL citrate (PF) injection  mcg   mcg IntraVENous Q1H PRN    acetaminophen (TYLENOL) solution 650 mg  650 mg Per NG tube Q6H PRN    methylPREDNISolone (PF) (SOLU-MEDROL) injection 40 mg  40 mg IntraVENous Q6H    nicotine (NICODERM CQ) 14 mg/24 hr patch 1 Patch  1 Patch TransDERmal DAILY    labetaloL (NORMODYNE;TRANDATE) injection 20 mg  20 mg IntraVENous Q6H PRN    hydrALAZINE (APRESOLINE) 20 mg/mL injection 20 mg  20 mg IntraVENous Q6H PRN    amLODIPine (NORVASC) tablet 5 mg  5 mg Oral DAILY    scopolamine (TRANSDERM-SCOP) 1 mg over 3 days 1 Patch  1 Patch TransDERmal Q72H    cloNIDine HCL (CATAPRES) tablet 0.1 mg  0.1 mg Oral BID    dexmedeTOMidine in 0.9 % NaCl (PRECEDEX) 400 mcg/100 mL (4 mcg/mL) infusion soln  0.1-1.4 mcg/kg/hr IntraVENous TITRATE    enoxaparin (LOVENOX) injection 40 mg  40 mg SubCUTAneous DAILY    lactated Ringers infusion  30 mL/hr IntraVENous CONTINUOUS    chlorhexidine (ORAL CARE KIT) 0.12 % mouthwash 15 mL  15 mL Oral Q12H    famotidine (PF) (PEPCID) 20 mg in 0.9% sodium chloride 10 mL injection  20 mg IntraVENous DAILY    docusate sodium (COLACE) capsule 100 mg  100 mg Oral DAILY    senna (SENOKOT) tablet 17.2 mg  2 Tab Oral DAILY    sodium chloride (NS) flush 5-40 mL  5-40 mL IntraVENous Q8H    sodium chloride (NS) flush 5-40 mL  5-40 mL IntraVENous PRN    acetaminophen (TYLENOL) tablet 650 mg  650 mg Oral Q6H PRN    Or    acetaminophen (TYLENOL) suppository 650 mg  650 mg Rectal Q6H PRN    polyethylene glycol (MIRALAX) packet 17 g  17 g Oral DAILY PRN    promethazine (PHENERGAN) tablet 12.5 mg  12.5 mg Oral Q6H PRN    Or    ondansetron (ZOFRAN) injection 4 mg  4 mg IntraVENous Q6H PRN    propofol (DIPRIVAN) 10 mg/mL infusion  0-50 mcg/kg/min IntraVENous TITRATE    fentaNYL (PF) 1,500 mcg/30 mL (50 mcg/mL) infusion  0-200 mcg/hr IntraVENous TITRATE      No Known Allergies    Objective:  Vitals:    Vitals:    12/29/20 0800 12/29/20 0845 12/29/20 0900 12/29/20 1000   BP:       Pulse: 72 70 69 69   Resp: 21 20 20 (!) 0   Temp:       SpO2: 95% 91% 96% 93%   Weight:       Height:         Intake and Output:  No intake/output data recorded. 12/27 1901 - 12/29 0700  In: 5402.4 [I.V.:5102.4]  Out: 5530 [Urine:5530]    Physical Examination:  General:  On vent   HEENT: Frontal lipoma   Lungs : diminished   CVS: RRR,s1,s2, normal, No murmur   Extremities: NO Edema  Neurologic: sedated on vent   Psych: Unable to assess       []    High complexity decision making was performed  []    Patient is at high-risk of decompensation with multiple organ involvement    Lab Data Personally Reviewed:  I have reviewed all the pertinent labs, microbiology data and radiology studies during assessment. Recent Labs     12/29/20 0427 12/28/20 0311 12/27/20 0421    135* 138   K 4.9 5.0 4.1    106 108   CO2 25 27 27   * 161* 123*   BUN 40* 31* 27*   CREA 1.46* 1.53* 1.38*   CA 9.2 9.4 9.5   MG 2.5* 2.5* 2.7*   PHOS 4.2 3.6 3.6     Recent Labs     12/29/20 0427 12/28/20 0311 12/27/20 0421   WBC 9.6 10.9 9.0   HGB 12.4 12.7 12.7   HCT 39.3 39.5 39.8    357 320     No results found for: SDES  Lab Results   Component Value Date/Time    Culture result: PENDING 12/28/2020 04:56 PM    Culture result: MRSA NOT PRESENT 12/24/2020 09:20 AM    Culture result:  12/24/2020 09:20 AM         Screening of patient nares for MRSA is for surveillance purposes and, if positive, to facilitate isolation considerations in high risk settings. It is not intended for automatic decolonization interventions per se as regimens are not sufficiently effective to warrant routine use.     Culture result: NO GROWTH 2 DAYS 12/23/2020 04:16 PM    Culture result: (A) 12/22/2020 08:45 AM     RARE HAEMOPHILUS INFLUENZAE BETA LACTAMASE NEGATIVE    Culture result: RARE NORMAL RESPIRATORY ALDO 12/22/2020 08:45 AM     Recent Results (from the past 24 hour(s))   CULTURE, RESPIRATORY/SPUTUM/BRONCH W GRAM STAIN    Collection Time: 12/28/20  4:56 PM    Specimen: Sputum   Result Value Ref Range    Special Requests: NO SPECIAL REQUESTS      GRAM STAIN OCCASIONAL WBCS SEEN      GRAM STAIN RARE EPITHELIAL CELLS SEEN      GRAM STAIN NO ORGANISMS SEEN      Culture result: PENDING    PROCALCITONIN    Collection Time: 12/29/20  4:27 AM   Result Value Ref Range    Procalcitonin 0.20 ng/mL   MAGNESIUM    Collection Time: 12/29/20  4:27 AM   Result Value Ref Range    Magnesium 2.5 (H) 1.6 - 2.4 mg/dL   PHOSPHORUS    Collection Time: 12/29/20  4:27 AM   Result Value Ref Range    Phosphorus 4.2 2.6 - 4.7 MG/DL   LACTIC ACID    Collection Time: 12/29/20  4:27 AM   Result Value Ref Range    Lactic acid 0.9 0.4 - 2.0 MMOL/L   CBC W/O DIFF    Collection Time: 12/29/20  4:27 AM   Result Value Ref Range    WBC 9.6 4.1 - 11.1 K/uL    RBC 4.56 4.10 - 5.70 M/uL    HGB 12.4 12.1 - 17.0 g/dL    HCT 39.3 36.6 - 50.3 %    MCV 86.2 80.0 - 99.0 FL    MCH 27.2 26.0 - 34.0 PG    MCHC 31.6 30.0 - 36.5 g/dL    RDW 15.0 (H) 11.5 - 14.5 %    PLATELET 327 632 - 118 K/uL    MPV 9.2 8.9 - 12.9 FL    NRBC 0.0 0  WBC    ABSOLUTE NRBC 0.00 0.00 - 1.25 K/uL   METABOLIC PANEL, BASIC    Collection Time: 12/29/20  4:27 AM   Result Value Ref Range    Sodium 136 136 - 145 mmol/L    Potassium 4.9 3.5 - 5.1 mmol/L    Chloride 105 97 - 108 mmol/L    CO2 25 21 - 32 mmol/L    Anion gap 6 5 - 15 mmol/L    Glucose 145 (H) 65 - 100 mg/dL    BUN 40 (H) 6 - 20 MG/DL    Creatinine 1.46 (H) 0.70 - 1.30 MG/DL    BUN/Creatinine ratio 27 (H) 12 - 20      GFR est AA >60 >60 ml/min/1.73m2    GFR est non-AA 51 (L) >60 ml/min/1.73m2    Calcium 9.2 8.5 - 10.1 MG/DL           Total time spent with patient:  xxx   min. Care Plan discussed with:  Patient     Family      RN      Consulting Physician 13[facility],         I have reviewed the flowsheets. Chart and Pertinent Notes have been reviewed. No change in PMH ,family and social history from Consult note.       Eric Curry MD

## 2020-12-29 NOTE — PROGRESS NOTES
1600 Bedside shift change report given to Ailin Diaz (oncoming nurse) by Hamilton Oliver (offgoing nurse).  Report included the following information SBAR, Kardex, ED Summary, OR Summary, Procedure Summary, Intake/Output, MAR, Accordion, Recent Results, Med Rec Status and Cardiac Rhythm NSR.    1630 ETT moore replaced with Jessica

## 2020-12-30 LAB
ANION GAP SERPL CALC-SCNC: 5 MMOL/L (ref 5–15)
BACTERIA SPEC CULT: NORMAL
BUN SERPL-MCNC: 48 MG/DL (ref 6–20)
BUN/CREAT SERPL: 32 (ref 12–20)
CALCIUM SERPL-MCNC: 9 MG/DL (ref 8.5–10.1)
CHLORIDE SERPL-SCNC: 110 MMOL/L (ref 97–108)
CO2 SERPL-SCNC: 23 MMOL/L (ref 21–32)
CREAT SERPL-MCNC: 1.48 MG/DL (ref 0.7–1.3)
ERYTHROCYTE [DISTWIDTH] IN BLOOD BY AUTOMATED COUNT: 14.9 % (ref 11.5–14.5)
GLUCOSE SERPL-MCNC: 168 MG/DL (ref 65–100)
GRAM STN SPEC: NORMAL
HCT VFR BLD AUTO: 40.3 % (ref 36.6–50.3)
HGB BLD-MCNC: 12.6 G/DL (ref 12.1–17)
LACTATE SERPL-SCNC: 1.2 MMOL/L (ref 0.4–2)
MAGNESIUM SERPL-MCNC: 2.4 MG/DL (ref 1.6–2.4)
MCH RBC QN AUTO: 26.8 PG (ref 26–34)
MCHC RBC AUTO-ENTMCNC: 31.3 G/DL (ref 30–36.5)
MCV RBC AUTO: 85.6 FL (ref 80–99)
NRBC # BLD: 0 K/UL (ref 0–0.01)
NRBC BLD-RTO: 0 PER 100 WBC
PHOSPHATE SERPL-MCNC: 3.4 MG/DL (ref 2.6–4.7)
PLATELET # BLD AUTO: 394 K/UL (ref 150–400)
PMV BLD AUTO: 9.5 FL (ref 8.9–12.9)
POTASSIUM SERPL-SCNC: 4.6 MMOL/L (ref 3.5–5.1)
PROCALCITONIN SERPL-MCNC: 0.15 NG/ML
RBC # BLD AUTO: 4.71 M/UL (ref 4.1–5.7)
SERVICE CMNT-IMP: NORMAL
SODIUM SERPL-SCNC: 138 MMOL/L (ref 136–145)
WBC # BLD AUTO: 9.4 K/UL (ref 4.1–11.1)

## 2020-12-30 PROCEDURE — 65620000000 HC RM CCU GENERAL

## 2020-12-30 PROCEDURE — 74011250636 HC RX REV CODE- 250/636: Performed by: INTERNAL MEDICINE

## 2020-12-30 PROCEDURE — 74011000258 HC RX REV CODE- 258: Performed by: INTERNAL MEDICINE

## 2020-12-30 PROCEDURE — 74011250636 HC RX REV CODE- 250/636: Performed by: ANESTHESIOLOGY

## 2020-12-30 PROCEDURE — 77030013797 HC KT TRNSDUC PRSSR EDWD -A

## 2020-12-30 PROCEDURE — 74011000250 HC RX REV CODE- 250: Performed by: NURSE PRACTITIONER

## 2020-12-30 PROCEDURE — 36415 COLL VENOUS BLD VENIPUNCTURE: CPT

## 2020-12-30 PROCEDURE — 85027 COMPLETE CBC AUTOMATED: CPT

## 2020-12-30 PROCEDURE — 80048 BASIC METABOLIC PNL TOTAL CA: CPT

## 2020-12-30 PROCEDURE — 74011000250 HC RX REV CODE- 250: Performed by: ANESTHESIOLOGY

## 2020-12-30 PROCEDURE — 83735 ASSAY OF MAGNESIUM: CPT

## 2020-12-30 PROCEDURE — 74011250636 HC RX REV CODE- 250/636: Performed by: NURSE PRACTITIONER

## 2020-12-30 PROCEDURE — 83605 ASSAY OF LACTIC ACID: CPT

## 2020-12-30 PROCEDURE — 84145 PROCALCITONIN (PCT): CPT

## 2020-12-30 PROCEDURE — 74011250637 HC RX REV CODE- 250/637: Performed by: ANESTHESIOLOGY

## 2020-12-30 PROCEDURE — 84100 ASSAY OF PHOSPHORUS: CPT

## 2020-12-30 PROCEDURE — 94003 VENT MGMT INPAT SUBQ DAY: CPT

## 2020-12-30 RX ORDER — SODIUM CHLORIDE 9 MG/ML
5 INJECTION, SOLUTION INTRAVENOUS CONTINUOUS
Status: DISCONTINUED | OUTPATIENT
Start: 2020-12-30 | End: 2021-01-07 | Stop reason: HOSPADM

## 2020-12-30 RX ORDER — SODIUM CHLORIDE 9 MG/ML
3 INJECTION, SOLUTION INTRAVENOUS CONTINUOUS
Status: DISCONTINUED | OUTPATIENT
Start: 2020-12-30 | End: 2021-01-07 | Stop reason: HOSPADM

## 2020-12-30 RX ADMIN — SODIUM CHLORIDE, SODIUM LACTATE, POTASSIUM CHLORIDE, AND CALCIUM CHLORIDE 30 ML/HR: 600; 310; 30; 20 INJECTION, SOLUTION INTRAVENOUS at 08:00

## 2020-12-30 RX ADMIN — SODIUM CHLORIDE 5 ML/HR: 9 INJECTION, SOLUTION INTRAVENOUS at 21:29

## 2020-12-30 RX ADMIN — PROPOFOL 50 MCG/KG/MIN: 10 INJECTION, EMULSION INTRAVENOUS at 11:30

## 2020-12-30 RX ADMIN — AMPICILLIN SODIUM AND SULBACTAM SODIUM 3 G: 2; 1 INJECTION, POWDER, FOR SOLUTION INTRAMUSCULAR; INTRAVENOUS at 17:32

## 2020-12-30 RX ADMIN — HYDRALAZINE HYDROCHLORIDE 20 MG: 20 INJECTION INTRAMUSCULAR; INTRAVENOUS at 01:19

## 2020-12-30 RX ADMIN — PROPOFOL 50 MCG/KG/MIN: 10 INJECTION, EMULSION INTRAVENOUS at 08:20

## 2020-12-30 RX ADMIN — FAMOTIDINE 20 MG: 10 INJECTION, SOLUTION INTRAVENOUS at 21:07

## 2020-12-30 RX ADMIN — PROPOFOL 50 MCG/KG/MIN: 10 INJECTION, EMULSION INTRAVENOUS at 20:16

## 2020-12-30 RX ADMIN — PROPOFOL 50 MCG/KG/MIN: 10 INJECTION, EMULSION INTRAVENOUS at 18:00

## 2020-12-30 RX ADMIN — CLONIDINE HYDROCHLORIDE 0.1 MG: 0.1 TABLET ORAL at 17:32

## 2020-12-30 RX ADMIN — DEXMEDETOMIDINE HYDROCHLORIDE 1.4 MCG/KG/HR: 400 INJECTION INTRAVENOUS at 12:00

## 2020-12-30 RX ADMIN — AMLODIPINE BESYLATE 5 MG: 5 TABLET ORAL at 08:00

## 2020-12-30 RX ADMIN — DEXMEDETOMIDINE HYDROCHLORIDE 1.4 MCG/KG/HR: 400 INJECTION INTRAVENOUS at 03:50

## 2020-12-30 RX ADMIN — ENOXAPARIN SODIUM 40 MG: 40 INJECTION SUBCUTANEOUS at 08:04

## 2020-12-30 RX ADMIN — AMPICILLIN SODIUM AND SULBACTAM SODIUM 3 G: 2; 1 INJECTION, POWDER, FOR SOLUTION INTRAMUSCULAR; INTRAVENOUS at 12:00

## 2020-12-30 RX ADMIN — DEXMEDETOMIDINE HYDROCHLORIDE 1.4 MCG/KG/HR: 400 INJECTION INTRAVENOUS at 08:40

## 2020-12-30 RX ADMIN — QUETIAPINE FUMARATE 75 MG: 25 TABLET ORAL at 21:08

## 2020-12-30 RX ADMIN — DEXMEDETOMIDINE HYDROCHLORIDE 1.4 MCG/KG/HR: 400 INJECTION INTRAVENOUS at 23:04

## 2020-12-30 RX ADMIN — CHLORHEXIDINE GLUCONATE 15 ML: 0.12 RINSE ORAL at 08:05

## 2020-12-30 RX ADMIN — PROPOFOL 50 MCG/KG/MIN: 10 INJECTION, EMULSION INTRAVENOUS at 02:00

## 2020-12-30 RX ADMIN — DEXMEDETOMIDINE HYDROCHLORIDE 1.4 MCG/KG/HR: 400 INJECTION INTRAVENOUS at 18:00

## 2020-12-30 RX ADMIN — ACETAMINOPHEN ORAL SOLUTION 650 MG: 650 SOLUTION ORAL at 08:00

## 2020-12-30 RX ADMIN — AMPICILLIN SODIUM AND SULBACTAM SODIUM 3 G: 2; 1 INJECTION, POWDER, FOR SOLUTION INTRAMUSCULAR; INTRAVENOUS at 05:10

## 2020-12-30 RX ADMIN — DOCUSATE SODIUM 100 MG: 50 LIQUID ORAL at 08:00

## 2020-12-30 RX ADMIN — QUETIAPINE FUMARATE 75 MG: 25 TABLET ORAL at 17:32

## 2020-12-30 RX ADMIN — Medication 17.2 MG: at 08:00

## 2020-12-30 RX ADMIN — DEXMEDETOMIDINE HYDROCHLORIDE 1.4 MCG/KG/HR: 400 INJECTION INTRAVENOUS at 06:10

## 2020-12-30 RX ADMIN — METHYLPREDNISOLONE SODIUM SUCCINATE 40 MG: 40 INJECTION, POWDER, FOR SOLUTION INTRAMUSCULAR; INTRAVENOUS at 21:07

## 2020-12-30 RX ADMIN — SODIUM CHLORIDE 3 ML/HR: 9 INJECTION, SOLUTION INTRAVENOUS at 21:29

## 2020-12-30 RX ADMIN — Medication 10 ML: at 21:08

## 2020-12-30 RX ADMIN — DEXMEDETOMIDINE HYDROCHLORIDE 1.4 MCG/KG/HR: 400 INJECTION INTRAVENOUS at 20:16

## 2020-12-30 RX ADMIN — PROPOFOL 50 MCG/KG/MIN: 10 INJECTION, EMULSION INTRAVENOUS at 05:09

## 2020-12-30 RX ADMIN — METHYLPREDNISOLONE SODIUM SUCCINATE 40 MG: 40 INJECTION, POWDER, FOR SOLUTION INTRAMUSCULAR; INTRAVENOUS at 14:43

## 2020-12-30 RX ADMIN — CLONIDINE HYDROCHLORIDE 0.1 MG: 0.1 TABLET ORAL at 08:00

## 2020-12-30 RX ADMIN — CHLORHEXIDINE GLUCONATE 15 ML: 0.12 RINSE ORAL at 20:16

## 2020-12-30 RX ADMIN — DEXMEDETOMIDINE HYDROCHLORIDE 1.4 MCG/KG/HR: 400 INJECTION INTRAVENOUS at 00:15

## 2020-12-30 RX ADMIN — QUETIAPINE FUMARATE 75 MG: 25 TABLET ORAL at 08:00

## 2020-12-30 RX ADMIN — METHYLPREDNISOLONE SODIUM SUCCINATE 40 MG: 40 INJECTION, POWDER, FOR SOLUTION INTRAMUSCULAR; INTRAVENOUS at 05:09

## 2020-12-30 RX ADMIN — Medication 10 ML: at 17:32

## 2020-12-30 RX ADMIN — Medication 20 ML: at 06:00

## 2020-12-30 RX ADMIN — SODIUM CHLORIDE 20 MG: 9 INJECTION INTRAMUSCULAR; INTRAVENOUS; SUBCUTANEOUS at 08:00

## 2020-12-30 RX ADMIN — AMPICILLIN SODIUM AND SULBACTAM SODIUM 3 G: 2; 1 INJECTION, POWDER, FOR SOLUTION INTRAMUSCULAR; INTRAVENOUS at 23:36

## 2020-12-30 RX ADMIN — PROPOFOL 50 MCG/KG/MIN: 10 INJECTION, EMULSION INTRAVENOUS at 14:43

## 2020-12-30 RX ADMIN — HYDRALAZINE HYDROCHLORIDE 20 MG: 20 INJECTION INTRAMUSCULAR; INTRAVENOUS at 22:46

## 2020-12-30 RX ADMIN — PROPOFOL 50 MCG/KG/MIN: 10 INJECTION, EMULSION INTRAVENOUS at 23:36

## 2020-12-30 NOTE — PROGRESS NOTES
ID Progress Note  2020    Subjective:     Had fever. Remains on the vent. Fio2 of 45%     Objective:     Vitals:   Visit Vitals  BP (!) 139/96   Pulse 73   Temp 100.4 °F (38 °C)   Resp 20   Ht 5' 11\" (1.803 m)   Wt 100.8 kg (222 lb 3.6 oz)   SpO2 99%   BMI 30.99 kg/m²        Tmax:  Temp (24hrs), Av °F (37.8 °C), Min:98.6 °F (37 °C), Max:100.9 °F (38.3 °C)      Exam:    Intubated   (+) lipoma on forehead   No cervical lymphdenopathy   Lung clear, no rales, wheezes or rhonchi   Heart: s1, s2, RRR, no murmurs rubs or clicks  Abdomen: soft nontender, no guarding or rebound  Knees not warm or tender  (+) angel     Labs:   Lab Results   Component Value Date/Time    WBC 9.4 2020 03:53 AM    HGB 12.6 2020 03:53 AM    HCT 40.3 2020 03:53 AM    PLATELET 361  03:53 AM    MCV 85.6 2020 03:53 AM     Lab Results   Component Value Date/Time    Sodium 138 2020 03:53 AM    Potassium 4.6 2020 03:53 AM    Chloride 110 (H) 2020 03:53 AM    CO2 23 2020 03:53 AM    Anion gap 5 2020 03:53 AM    Glucose 168 (H) 2020 03:53 AM    BUN 48 (H) 2020 03:53 AM    Creatinine 1.48 (H) 2020 03:53 AM    BUN/Creatinine ratio 32 (H) 2020 03:53 AM    GFR est AA >60 2020 03:53 AM    GFR est non-AA 50 (L) 2020 03:53 AM    Calcium 9.0 2020 03:53 AM    Bilirubin, total 0.4 2020 04:26 AM    Alk.  phosphatase 82 2020 04:26 AM    Protein, total 7.4 2020 04:26 AM    Albumin 2.2 (L) 2020 04:26 AM    Globulin 5.2 (H) 2020 04:26 AM    A-G Ratio 0.4 (L) 2020 04:26 AM    ALT (SGPT) 71 2020 04:26 AM           Assessment:     #1 respiratory failure with Haemophilus/Streptococcus pneumonia     #2 nonischemic cardiomyopathy     #3 chronic kidney disease     #4 chronic tobacco abuse     #5 hypertension               Recommendations:     Continue unasyn which should cover both haemophilus and aspiration     Ranjith Nadine Farmer MD

## 2020-12-30 NOTE — PROGRESS NOTES
Bedside shift change report given to Opal (oncoming nurse) by Reggie Pena (offgoing nurse).  Report included the following information SBAR, Kardex, Recent Results and Cardiac Rhythm NSR.     2000 Completed assessment  2200 Patient bathed - CHG bath - repositioned    2300 Changed lines - propofol and precedex per protocol, oral care completd    2330 Provided report and updates to Vicky Olmos

## 2020-12-30 NOTE — PROGRESS NOTES
Comprehensive Nutrition Assessment    Type and Reason for Visit: Reassess    Nutrition Recommendations/Plan:    1. Continue tube feeds as ordered  2. Once off propofol:    - Nepro @ 40mL/hr + 2 pkts Prosource 2x/day  + 1 pkt Prosource in evening + 30 mL/hr q4hr    Nutrition Assessment:     Pt admitted for respiratory failure. PMHx of HTN, cardiomyopathy, CKD. CAP and aspiration PNA with abx rx, ID following. Intubated on admit, remains on vent with sedation. Malignant HTN resolved. Elevated troponin, ECHO 40-45%, cards following. YISSEL on CKD, ATN from sustained hypotension and rapid lowering of BP, nephrology following, no emergent needs for dialysis, but consent obtained if necessary. DHT in place in the distal stomach or proximal duodenum per KUB yesterday. in place for feeding. Propofol @ 34mL/hr providing 897kcal.  Vomiting noted yesterday morning. TF held temporarily but running at goal today with no issues per discussion with RN. Continue feeds as ordered to provide: 480mL, 1284kcal, 144 gm protein, 350 +630 +076=7624dE free H2O. With propofol provides: 2181 kcal. Meets 100% energy and 92% protein needs. Once off propofol, increase Nepro @ 40mL/hr + 2 pkts Prosource 2x/day + 1 pkt Prosource in evening + 30 mL/hr q4hr. Provides: 960mL, 153 gm pro, 2028 kcal, 700+360+462=4700 mL fluid.      Malnutrition Assessment:  Malnutrition Status:  No malnutrition      Nutritionally Significant Medications:  Norvasc, unasyn, colace, pepcid, solu-medrol, seroquel, scopolamine, senna; DRIPS: precedex, diprivan (34ml/hr)    Estimated Daily Nutrient Needs:  Energy (kcal): 2266([employer] )  Protein (g): 155(2 gm/kg IBW)  Fluid (ml/day): 1 mL/kcal  9.23 l/min    Temp (24hrs), Av.9 °F (37.7 °C), Min:98.6 °F (37 °C), Max:100.9 °F (38.3 °C)    Nutrition Related Findings:   Edema: none  Last BM:  - loose  Wounds:  None        Current Nutrition Therapies:  Diet: NPO  Tube feeds: Nepro @ 20 mL/hr + Prosource 2 packs TID + 1 pack q HS, 30 mL h2O flushes q 4 hours    Anthropometric Measures:   · Height:  5' 11\" (180.3 cm)  · Current Body Wt:  100.8 kg (222 lb 3.6 oz)   · Admission Body Wt:  219 lb 9.3 oz(99.6 kg - bed)    · Usual Body Wt:        · Ideal Body Wt:  172 lbs:  129.2 %   · BMI Category:  Obese class 1 (BMI 30.0-34. 9)     Wt Readings:   12/23/20 100.7 kg (222 lb 0.1 oz)   12/22/20 99.6 kg (219 lb 9.2 oz) - first wt taken   07/12/20 105.1 kg (231 lb 11.3 oz) - suspect error   07/11/20 96.6 kg (212 lb 15.4 oz)   01/16/20 99.6 kg (219 lb 9.6 oz)   12/12/19 98.7 kg (217 lb 9.5 oz)     Nutrition Diagnosis:   · Inadequate oral intake related to impaired respiratory function as evidenced by NPO or clear liquid status due to medical condition, intubation    Nutrition Interventions:   Food and/or Nutrient Delivery: Continue tube feeding  Nutrition Education and Counseling: No recommendations at this time  Coordination of Nutrition Care: Continue to monitor while inpatient    Goals:  Continue tolerate tube feeds at goal       Nutrition Monitoring and Evaluation:   Behavioral-Environmental Outcomes: None identified  Food/Nutrient Intake Outcomes: Enteral nutrition intake/tolerance  Physical Signs/Symptoms Outcomes: Biochemical data, GI status, Hemodynamic status, Weight, Fluid status or edema    Discharge Planning:     Too soon to determine     Recent Labs     12/30/20  0353 12/29/20  0427 12/28/20  0311   * 145* 161*   BUN 48* 40* 31*   CREA 1.48* 1.46* 1.53*    136 135*   K 4.6 4.9 5.0   * 105 106   CO2 23 25 27   CA 9.0 9.2 9.4   PHOS 3.4 4.2 3.6   MG 2.4 2.5* 2.5*       Eliza Acosta RD [facility], Pager #[phone] or via OrderBorderve

## 2020-12-30 NOTE — PROGRESS NOTES
[facility]   [address] [facility], [facility] [address], [facility]  Phone: [phone]   [phone]       Nephrology Progress Note  Zainab Madera     1965     [id number]  Date of Admission : 12/21/2020 12/30/20    CC: Follow up for YISSEL    Assessment and Plan   YISSEL  - ATN from Sustained Hypotension and rapid lowering of BP   - UA : trace blood and 3+ protein --> serologic w/u neg except + gammopathy screen  - renal US : unremarkable except for benign cyst   - Cr stable, stable UOP  - [place]F to [place]  - daily labs and I/Os    Haemophilus/Streptococcus pneumonia:  - abx per ID    Resp failure:  - on the vent     Malignant HTN   Hypertensive Urgency   - negative UDS  - resolved     Anemia:  - hgb stable  - + M spike along with elevated K light chains and IgM levels  - would have heme eval while here     Interval History:  Seen and examined. Remains sedated on the vent. BP stable. Cr improving. UOP stable. No other issues overnight per RN. Review of Systems: Review of systems not obtained due to patient factors.     Current Medications:   Current Facility-Administered Medications   Medication Dose Route Frequency    methylPREDNISolone (PF) (SOLU-MEDROL) injection 40 mg  40 mg IntraVENous Q8H    docusate (COLACE) 50 mg/5 mL oral liquid 100 mg  100 mg Oral DAILY    ampicillin-sulbactam (UNASYN) 3 g in 0.9% sodium chloride (MBP/ADV) 100 mL MBP  3 g IntraVENous Q6H    QUEtiapine (SEROquel) tablet 75 mg  75 mg Oral TID    fentaNYL citrate (PF) injection  mcg   mcg IntraVENous Q1H PRN    acetaminophen (TYLENOL) solution 650 mg  650 mg Per NG tube Q6H PRN    nicotine (NICODERM CQ) 14 mg/24 hr patch 1 Patch  1 Patch TransDERmal DAILY    labetaloL (NORMODYNE;TRANDATE) injection 20 mg  20 mg IntraVENous Q6H PRN    hydrALAZINE (APRESOLINE) 20 mg/mL injection 20 mg  20 mg IntraVENous Q6H PRN    amLODIPine (NORVASC) tablet 5 mg  5 mg Oral DAILY    scopolamine (TRANSDERM-SCOP) 1 mg over 3 days 1 Patch  1 Patch TransDERmal Q72H    cloNIDine HCL (CATAPRES) tablet 0.1 mg  0.1 mg Oral BID    dexmedeTOMidine in 0.9 % NaCl (PRECEDEX) 400 mcg/100 mL (4 mcg/mL) infusion soln  0.1-1.4 mcg/kg/hr IntraVENous TITRATE    enoxaparin (LOVENOX) injection 40 mg  40 mg SubCUTAneous DAILY    lactated Ringers infusion  30 mL/hr IntraVENous CONTINUOUS    chlorhexidine (ORAL CARE KIT) 0.12 % mouthwash 15 mL  15 mL Oral Q12H    famotidine (PF) (PEPCID) 20 mg in 0.9% sodium chloride 10 mL injection  20 mg IntraVENous DAILY    senna (SENOKOT) tablet 17.2 mg  2 Tab Oral DAILY    sodium chloride (NS) flush 5-40 mL  5-40 mL IntraVENous Q8H    sodium chloride (NS) flush 5-40 mL  5-40 mL IntraVENous PRN    acetaminophen (TYLENOL) tablet 650 mg  650 mg Oral Q6H PRN    Or    acetaminophen (TYLENOL) suppository 650 mg  650 mg Rectal Q6H PRN    polyethylene glycol (MIRALAX) packet 17 g  17 g Oral DAILY PRN    promethazine (PHENERGAN) tablet 12.5 mg  12.5 mg Oral Q6H PRN    Or    ondansetron (ZOFRAN) injection 4 mg  4 mg IntraVENous Q6H PRN    propofol (DIPRIVAN) 10 mg/mL infusion  0-50 mcg/kg/min IntraVENous TITRATE    fentaNYL (PF) 1,500 mcg/30 mL (50 mcg/mL) infusion  0-200 mcg/hr IntraVENous TITRATE      No Known Allergies    Objective:  Vitals:    Vitals:    12/30/20 0401 12/30/20 0500 12/30/20 0600 12/30/20 0700   BP:  135/89 (!) 142/94 (!) 143/98   Pulse: 80 79 81 78   Resp: 21 20 20 20   Temp:       SpO2:    95%   Weight:       Height:         Intake and Output:  No intake/output data recorded.   12/28 1901 - 12/30 0700  In: 4109.7 [I.V.:3579.7]  Out: 4690 [Urine:4690]    Physical Examination:  General:  On vent   HEENT: Frontal lipoma   Lungs : diminished   CVS: RRR,s1,s2, normal, No murmur   Extremities: NO Edema  Neurologic: sedated on vent   Psych: Unable to assess       []    High complexity decision making was performed  []    Patient is at high-risk of decompensation with multiple organ involvement    Lab Data Personally Reviewed: I have reviewed all the pertinent labs, microbiology data and radiology studies during assessment. Recent Labs     12/30/20  0353 12/29/20 0427 12/28/20 0311    136 135*   K 4.6 4.9 5.0   * 105 106   CO2 23 25 27   * 145* 161*   BUN 48* 40* 31*   CREA 1.48* 1.46* 1.53*   CA 9.0 9.2 9.4   MG 2.4 2.5* 2.5*   PHOS 3.4 4.2 3.6     Recent Labs     12/30/20  0353 12/29/20 0427 12/28/20 0311   WBC 9.4 9.6 10.9   HGB 12.6 12.4 12.7   HCT 40.3 39.3 39.5    361 357     No results found for: SDES  Lab Results   Component Value Date/Time    Culture result: SCANT NORMAL RESPIRATORY ALDO 12/28/2020 04:56 PM    Culture result: MRSA NOT PRESENT 12/24/2020 09:20 AM    Culture result:  12/24/2020 09:20 AM         Screening of patient nares for MRSA is for surveillance purposes and, if positive, to facilitate isolation considerations in high risk settings. It is not intended for automatic decolonization interventions per se as regimens are not sufficiently effective to warrant routine use.     Culture result: NO GROWTH 2 DAYS 12/23/2020 04:16 PM    Culture result: (A) 12/22/2020 08:45 AM     RARE HAEMOPHILUS INFLUENZAE BETA LACTAMASE NEGATIVE    Culture result: RARE NORMAL RESPIRATORY ALDO 12/22/2020 08:45 AM     Recent Results (from the past 24 hour(s))   PROCALCITONIN    Collection Time: 12/30/20  3:53 AM   Result Value Ref Range    Procalcitonin 0.15 ng/mL   MAGNESIUM    Collection Time: 12/30/20  3:53 AM   Result Value Ref Range    Magnesium 2.4 1.6 - 2.4 mg/dL   PHOSPHORUS    Collection Time: 12/30/20  3:53 AM   Result Value Ref Range    Phosphorus 3.4 2.6 - 4.7 MG/DL   CBC W/O DIFF    Collection Time: 12/30/20  3:53 AM   Result Value Ref Range    WBC 9.4 4.1 - 11.1 K/uL    RBC 4.71 4.10 - 5.70 M/uL    HGB 12.6 12.1 - 17.0 g/dL    HCT 40.3 36.6 - 50.3 %    MCV 85.6 80.0 - 99.0 FL    MCH 26.8 26.0 - 34.0 PG    MCHC 31.3 30.0 - 36.5 g/dL    RDW 14.9 (H) 11.5 - 14.5 %    PLATELET 447 790 - 336 K/uL    MPV 9.5 8.9 - 12.9 FL    NRBC 0.0 0  WBC    ABSOLUTE NRBC 0.00 0.00 - 5.24 K/uL   METABOLIC PANEL, BASIC    Collection Time: 12/30/20  3:53 AM   Result Value Ref Range    Sodium 138 136 - 145 mmol/L    Potassium 4.6 3.5 - 5.1 mmol/L    Chloride 110 (H) 97 - 108 mmol/L    CO2 23 21 - 32 mmol/L    Anion gap 5 5 - 15 mmol/L    Glucose 168 (H) 65 - 100 mg/dL    BUN 48 (H) 6 - 20 MG/DL    Creatinine 1.48 (H) 0.70 - 1.30 MG/DL    BUN/Creatinine ratio 32 (H) 12 - 20      GFR est AA >60 >60 ml/min/1.73m2    GFR est non-AA 50 (L) >60 ml/min/1.73m2    Calcium 9.0 8.5 - 10.1 MG/DL   LACTIC ACID    Collection Time: 12/30/20  4:03 AM   Result Value Ref Range    Lactic acid 1.2 0.4 - 2.0 MMOL/L           Total time spent with patient:  xxx   min. Care Plan discussed with:  Patient     Family      RN      Consulting Physician 13[facility],         I have reviewed the flowsheets. Chart and Pertinent Notes have been reviewed. No change in PMH ,family and social history from Consult note.       Carlos A Horta MD

## 2020-12-30 NOTE — PROGRESS NOTES
Received request for a [employer] search. The search confirmed that patient jointly owns the property [address] [place] [id number] with Lolita Campbell. None of [employer] contact numbers were working numbers. Telephoned Ms. Sage Ye who provided contact information for brothers Jeanna Fuller [phone] and Broderick Lee [phone]. Ms. Sage Ye explained that patient was  from his brothers when they were kids so the brothers are not close. Ms. Sage Ye stated she was able to reach Broderick Lee via [employer] and that both brothers are willing to assist. The above information has been provided to Pato Kincaid, Care Manager on the unit.      Harriet Reynoso LCSW, [facility]-  Complex Care Coordinator/Rafy  C: [phone]

## 2020-12-30 NOTE — PROGRESS NOTES
[facility]    ICU TEAM Progress Note    Name: Reilly Kaur   : 1965   MRN: [id number]   Date: 2020      Assessment     ICU Problems:    1. Acute Hypoxic Respiratory Failure  2. CAP (Haemophilus influenzae & Streptococcus)  3. Aspiration PNA  4. Metabolic Encephalopathy  5. Obesity   6. Nx nonischemic cardiomyopathy   7. Chronic tobacco abuse  8. YISSEL/CKD stage 2  9. Left frontal lipoma-Chronic   10. Hx tobacco abuse/? COPD    Imagin2020      ICU Comprehensive Plan of Care:   1. Acute Hypoxic Respiratory Failure (Haemophilus influenza PNA; Tobacco Abuse Hx)  - SAT today   - Seroquel 75 mg q8  - Monitor QTC  - Unasyn   - Wean Methylpred   - Scopolamine patch  - Continue ventilator support - optimize PEEP - Driving pressure is good  - Wean FiO2 for SpO2 > 90%  - HOB > 30  - Propofol/Precedex    2. Elevated troponin (resolving)  -ECHO 40-45%   -Cardiology following    3. YISSEL/CKD stage 2  -From Hypotension  -Nephrology following  -Levophed MAP > 65 mmHg  -Strict I and O    4. HTN  -- PRN Labetalol  -- Norvasc    Plans for this Shift:     1. Ventilator support, will wean as tolerated  2. SBP Goal of: > 90 mmHg  3. MAP Goal of: > 65 mmHg  4. Phenylephrine - For above SBP/MAP goals  5. IVFs: LR  6. Transfusion Trigger (Hgb): <7 g/dL  7. Respiratory Goals:  a. Chlorhexidine   b. Optimize PEEP/Ventilation/Oxygenation  c. Goal Tidal Volume 6 cc/kg based on IBW  d. Aim for lung protective ventilation  e. Head of bed > 30 degrees  f. Aggressive bronchopulmonary hygiene  8. Pulmonary toilet: Duo-Nebs   9. SpO2 Goal: > 92%  10. Keep K>4; Mg>2   11. PT/OT  12. Goals of Care Discussion with family Pending   13. Plan of Care/Code Status: Full Code  14. [facility], nephrology   15. Discussed Care Plan with Bedside RN  16. Documentation of Current Medications  17.  Rest of Plan Below:    F - Feeding: Pending   A - Analgesia: Fentanyl  S - Sedation: Propofol  T - DVT Prophylaxis: SCD's or Sequential Compression Device   H - Head of Bed: > 30 Degrees  U - Ulcer Prophylaxis: Protonix (pantoprazole)   G - Glycemic Control: Insulin  S - Spontaneous Breathing Trial: Pending  B - Bowel Regimen: Docusate (Colace)  I - Indwelling Catheter:   Tubes: ETT  Lines: Peripheral IV  Drains: Granados Catheter  D - De-escalation of Antibiotics:      Subjective:   Progress Note: 12/30/2020      Reason for ICU Admission: Acute hypoxic respiratory failure      HPI: Kita Argueta is a 49 yo AAM with a PMH significant for nonischemic cardiomyopathy, left frontal lipoma, CKD, obesity, chronic tobacco abuse, who presented to the ER via EMS with co increased SOB X2 weeks. Pt is currently sedated and on the ventilator, unable to provide HPI thereofre obtained via chart and provider. Pt failed bipap while in the ER with continued worsening hypoxia, therefore pt was intubated and placed on the ventilator. On arrival to ED pt was found to be hypertensive with /100. Patient was then transferred to ICU for further management in care.       Overnight Events:   12/30/2020  N/V; less oxygenation needs - down to 50%      Active Problem List:     Problem List  Date Reviewed: 9/28/2020          Codes Class    Hypoxia ICD-10-CM: R09.02  ICD-9-CM: 799.02         Cervical radiculopathy ICD-10-CM: M54.12  ICD-9-CM: 723.4         Arm paresthesia, left ICD-10-CM: R20.2  ICD-9-CM: 782.0         Intractable headache ICD-10-CM: R51.9  ICD-9-CM: 784.0         Dilated cardiomyopathy (HCC) ICD-10-CM: I42.0  ICD-9-CM: 425.4         NSTEMI (non-ST elevated myocardial infarction) ([facility] [address] ICD-10-CM: I21.4  ICD-9-CM: 410.70         HTN (hypertension) ICD-10-CM: I10  ICD-9-CM: 401.9               Past Medical History:      has a past medical history of Hypertension. Past Surgical History:      has no past surgical history on file.     Home Medications:     Prior to Admission medications    Medication Sig Start Date End Date Taking? Authorizing Provider   aspirin-acetaminophen-caffeine ([employer] Extra Strength) 520-260-32.5 mg pwpk Take 1 Package by mouth two (2) times a day. Yes Provider, Historical   ibuprofen (MOTRIN) 600 mg tablet Take 600 mg by mouth every six (6) hours as needed for Pain. Yes Provider, Historical   carvediloL (COREG) 25 mg tablet Take 1 Tab by mouth two (2) times daily (with meals). 20  Yes Wei Enriquez MD   isosorbide mononitrate ER (IMDUR) 30 mg tablet Take 1 Tab by mouth daily. 20  Yes Wei Enriquez MD   cloNIDine HCL (CATAPRES) 0.1 mg tablet Take 1 Tab by mouth two (2) times a day. 20  Yes Wei Enriquez MD   aspirin 81 mg chewable tablet Take 1 Tab by mouth daily. 19  Yes Zachary Marin MD       Allergies/Social/Family History:     No Known Allergies   Social History     Tobacco Use    Smoking status: Current Every Day Smoker     Packs/day: 1.00    Smokeless tobacco: Never Used   Substance Use Topics    Alcohol use: Yes     Alcohol/week: 5.0 standard drinks     Types: 6 Cans of beer per week     Comment: Occasionally      Family History   Problem Relation Age of Onset    Stroke Father     Cancer Sister        Review of Systems:     Review of systems not obtained due to patient factors. Objective:   Vital Signs:  Visit Vitals  BP (!) 142/94   Pulse 81   Temp 99.5 °F (37.5 °C)   Resp 20   Ht 5' 11\" (1.803 m)   Wt 100.8 kg (222 lb 3.6 oz)   SpO2 97%   BMI 30.99 kg/m²    O2 Flow Rate (L/min): 15 l/min O2 Device: Endotracheal tube Temp (24hrs), Av °F (37.2 °C), Min:97.8 °F (36.6 °C), Max:100.4 °F (38 °C)           Intake/Output:     Intake/Output Summary (Last 24 hours) at 2020 0713  Last data filed at 2020 0400  Gross per 24 hour   Intake 2618.9 ml   Output 3585 ml   Net -966.1 ml       Physical Exam:    General:  Sedated and on the ventilator. No acute distress.    Eyes:  Sclera anicteric. Pupils equal, round, reactive to light. Mouth/Throat: Orotracheal tube in place. Neck: Supple. Lungs:   Rhonchi TH scattered, diminished in the BLL   Cardiovascular:  Regular rate and rhythm, no murmur, click, rub, or gallop. Abdomen:   Soft, non-tender, bowel sounds normal, non-distended. Extremities: No cyanosis or edema. Skin: No acute rash or lesions. Lipoma noted over left frontal region    Lymph Nodes: Cervical and supraclavicular normal.   Musculoskeletal:  No swelling or deformity. Lines/Devices:  Intact, no erythema, drainage, or tenderness. Psychiatric: Sedated and appears comfortable on ventilator. LABS AND  DATA: Personally reviewed  Recent Labs     12/30/20  0353 12/29/20  0427   WBC 9.4 9.6   HGB 12.6 12.4   HCT 40.3 39.3    361     Recent Labs     12/30/20  0353 12/29/20  0427    136   K 4.6 4.9   * 105   CO2 23 25   BUN 48* 40*   CREA 1.48* 1.46*   * 145*   CA 9.0 9.2   MG 2.4 2.5*   PHOS 3.4 4.2     No results for input(s): AP, TBIL, TP, ALB, GLOB, AML, LPSE in the last 72 hours. No lab exists for component: SGOT, GPT, AMYP  No results for input(s): INR, PTP, APTT, INREXT, INREXT in the last 72 hours. Recent Labs     12/29/20  0949   PHI 7.39   PCO2I 40.5   PO2I 56*   FIO2I 0.40     No results for input(s): CPK, CKMB, TROIQ, BNPP in the last 72 hours. Hemodynamics:   PAP:   CO:     Wedge:   CI:     CVP:    SVR:       PVR:       Ventilator Settings:  Mode Rate Tidal Volume Pressure FiO2 PEEP   Assist control, Volume control   420 ml    50 % 10 cm H20     Peak airway pressure: 27 cm H2O    Minute ventilation: 9.07 l/min        MEDS: Reviewed    Chest X-Ray:  CXR Results  (Last 48 hours)               12/29/20 1111  XR CHEST PORT Final result    Impression:  IMPRESSION:       Decreased diffuse interstitial and bibasilar opacities. Stable small pleural   effusions.            Narrative:  EXAM:  XR CHEST PORT       INDICATION: Bibasilar opacities and pleural effusions. COMPARISON: 12/29/2020 at 0822 hours       TECHNIQUE: Portable AP semiupright chest view at 1104 hours       FINDINGS: The endotracheal tube, enteric tube, and right IJ catheter are stable. The cardiomediastinal contours are stable. Diffuse interstitial and bibasilar opacities are decreased. There are stable   small pleural effusions. There is no pneumothorax. The bones and upper abdomen   are stable. 12/29/20 0829  XR CHEST PORT Final result    Impression:  IMPRESSION: Possible improvement in bibasilar airspace disease. Narrative:  INDICATION:  Resp Fail       COMPARISON:  12/28/20       FINDINGS:    A portable AP radiograph of the chest was obtained at 0822 hours. Endotracheal tube remains adequately positioned. Dobbhoff type feeding tube is   again noted traversing the thorax. Right jugular central venous catheter remains   adequately positioned and is unchanged. External cardiac monitor leads remain in   place. Cardiomediastinal silhouette is stable. Pulmonary vascularity does not appear to   be congested. Hazy opacity at the bases again may represent small bilateral pleural effusions. Again noted is mild interstitial and ill-defined alveolar infiltrates at the   bases with possible mild improved aeration at the bases. No pneumothorax   demonstrated. 12/28/20 0736  XR CHEST PORT Final result    Impression:  IMPRESSION:       Stable small pleural effusions with stable bilateral interstitial and patchy   airspace opacities. Narrative:  EXAM:  XR CHEST PORT       INDICATION: Bilateral lung opacities and small pleural effusions. COMPARISON: 12/27/2020 at 0829 hours       TECHNIQUE: Portable AP upright chest view at 0730 hours       FINDINGS: The endotracheal tube terminates above the shamir. 2 enteric tubes   terminate in the stomach. The right IJ catheter is stable. The cardiomediastinal   contours are stable. There are stable small pleural effusions with stable bilateral interstitial and   patchy airspace opacities. There is no pneumothorax. The bones and upper abdomen   are stable. ECHO:  Pending     Multidisciplinary Rounds Completed: Yes    ABCDEF Bundle/Checklist Completed:  Yes    SPECIAL EQUIPMENT  None    DISPOSITION  Stay in ICU    CRITICAL CARE CONSULTANT NOTE  I had a face to face encounter with the patient, reviewed and interpreted patient data including clinical events, labs, images, vital signs, I/O's, and examined patient. I have discussed the case and the plan and management of the patient's care with the consulting services, the bedside nurses and the respiratory therapist.      NOTE OF PERSONAL INVOLVEMENT IN CARE   This patient has a high probability of imminent, clinically significant deterioration, which requires the highest level of preparedness to intervene urgently. I participated in the decision-making and personally managed or directed the management of the following life and organ supporting interventions that required my frequent assessment to treat or prevent imminent deterioration. I personally spent 75 minutes of critical care time. This is time spent at this critically ill patient's bedside actively involved in patient care as well as the coordination of care and discussions with the patient's family. This does not include any procedural time which has been billed separately.     [address]  12/30/2020

## 2020-12-30 NOTE — PROGRESS NOTES
2020 -   RAFIA:  - RUR: 17%  - Disposition is TBD    - ABX Continue  - Tube Feeds continu    10:15 -   At the request of Attending, CM contacted patient's significant other [facility] Notice: [phone]) to determine LNOK status. Patient's girlfriend identified that they have been together for 19 years but are not legally . Patient does not have an AMD.  Patient does not have children, and parents are . Per the girlfriend, patient has two known brothers:  Lora Will and Isabelle Mehta. CM submitted request to Complex Case CM to request a LNOK Search to confirm status of patient's Brenda Monsivais. CRM: Manny Marr, MPH, CHES; Z: [phone]      11:10 -   CM received a call from an individual identifying himself as patient's brother Hans Berger ([phone]), but the individual was not able to identify any verification information on the patient, including , phone number, nor address. CM discussed that a [place] is currently under way. CM to await confirmation of LNOK information to make contact with family. CRM: Manny Marr, MPH, CHES; Z: [phone]    11:40 -   CM received a call from an individual identifying himself as patient's brother Olivier Cyr ([phone]), who was able to identify patient's . CM discussed that a [place] is currently underway. CM to await confirmation of LNOK information to make contact with family. CRM: Manny Marr MPH, CHES; Z: [phone]    15:55 -   CM received call from Complex Case CM indicating that patient's brothers are confirmed as Lora Will and Lucero Noble. CM contacted Hans Berger ([phone]) who verbally deferred health care decision making to girlfriend, Lauren Moreland. Brother, Hans Berger, will come to bedside . CM contacted Olivier Cyr ([phone]) who verbally deferred health care decision making to girlfriend, Lauren Aniceto. HCDM is girlfriend, James Mable ([phone]). Chart updated, and nursing and attending notified of the above.   CRM: Manny Marr, MPH, [address]; Z: [phone]

## 2020-12-30 NOTE — PROGRESS NOTES
Renal Dosing/Monitoring  Medication: Famotidine  Indication:  SUP  Current regimen:  20 mg every 24 hr  Recent Labs     12/30/20  0353 12/29/20  0427 12/28/20  0311   WBC 9.4 9.6 10.9   CREA 1.48* 1.46* 1.53*   BUN 48* 40* 31*       Estimated CrCl:  70 ml/min  Plan: Will change to 20 mg Q 12 hrs for crcl > 50 per renal adjustment protocol. Pharmacy to monitor patient daily for dosage adjustments as needed.

## 2020-12-31 ENCOUNTER — APPOINTMENT (OUTPATIENT)
Dept: VASCULAR SURGERY | Age: 55
DRG: 207 | End: 2020-12-31
Attending: ANESTHESIOLOGY

## 2020-12-31 ENCOUNTER — APPOINTMENT (OUTPATIENT)
Dept: GENERAL RADIOLOGY | Age: 55
DRG: 207 | End: 2020-12-31
Attending: ANESTHESIOLOGY

## 2020-12-31 LAB
ANION GAP SERPL CALC-SCNC: 5 MMOL/L (ref 5–15)
BUN SERPL-MCNC: 46 MG/DL (ref 6–20)
BUN/CREAT SERPL: 32 (ref 12–20)
CALCIUM SERPL-MCNC: 9.1 MG/DL (ref 8.5–10.1)
CHLORIDE SERPL-SCNC: 113 MMOL/L (ref 97–108)
CO2 SERPL-SCNC: 23 MMOL/L (ref 21–32)
CREAT SERPL-MCNC: 1.43 MG/DL (ref 0.7–1.3)
D DIMER PPP FEU-MCNC: 4.39 MG/L FEU (ref 0–0.65)
ERYTHROCYTE [DISTWIDTH] IN BLOOD BY AUTOMATED COUNT: 15.2 % (ref 11.5–14.5)
GLUCOSE SERPL-MCNC: 160 MG/DL (ref 65–100)
HCT VFR BLD AUTO: 41.4 % (ref 36.6–50.3)
HGB BLD-MCNC: 12.8 G/DL (ref 12.1–17)
MAGNESIUM SERPL-MCNC: 2.5 MG/DL (ref 1.6–2.4)
MCH RBC QN AUTO: 26.7 PG (ref 26–34)
MCHC RBC AUTO-ENTMCNC: 30.9 G/DL (ref 30–36.5)
MCV RBC AUTO: 86.3 FL (ref 80–99)
NRBC # BLD: 0 K/UL (ref 0–0.01)
NRBC BLD-RTO: 0 PER 100 WBC
PHOSPHATE SERPL-MCNC: 3.4 MG/DL (ref 2.6–4.7)
PLATELET # BLD AUTO: 403 K/UL (ref 150–400)
PMV BLD AUTO: 9.5 FL (ref 8.9–12.9)
POTASSIUM SERPL-SCNC: 4.2 MMOL/L (ref 3.5–5.1)
RBC # BLD AUTO: 4.8 M/UL (ref 4.1–5.7)
SODIUM SERPL-SCNC: 141 MMOL/L (ref 136–145)
WBC # BLD AUTO: 9.9 K/UL (ref 4.1–11.1)

## 2020-12-31 PROCEDURE — 36415 COLL VENOUS BLD VENIPUNCTURE: CPT

## 2020-12-31 PROCEDURE — 74011250636 HC RX REV CODE- 250/636: Performed by: NURSE PRACTITIONER

## 2020-12-31 PROCEDURE — 74011000258 HC RX REV CODE- 258: Performed by: INTERNAL MEDICINE

## 2020-12-31 PROCEDURE — 83735 ASSAY OF MAGNESIUM: CPT

## 2020-12-31 PROCEDURE — 65620000000 HC RM CCU GENERAL

## 2020-12-31 PROCEDURE — 74011250636 HC RX REV CODE- 250/636: Performed by: ANESTHESIOLOGY

## 2020-12-31 PROCEDURE — 74011000250 HC RX REV CODE- 250: Performed by: ANESTHESIOLOGY

## 2020-12-31 PROCEDURE — 74011250636 HC RX REV CODE- 250/636: Performed by: INTERNAL MEDICINE

## 2020-12-31 PROCEDURE — 85379 FIBRIN DEGRADATION QUANT: CPT

## 2020-12-31 PROCEDURE — 80048 BASIC METABOLIC PNL TOTAL CA: CPT

## 2020-12-31 PROCEDURE — 85027 COMPLETE CBC AUTOMATED: CPT

## 2020-12-31 PROCEDURE — 74011000250 HC RX REV CODE- 250: Performed by: NURSE PRACTITIONER

## 2020-12-31 PROCEDURE — 71045 X-RAY EXAM CHEST 1 VIEW: CPT

## 2020-12-31 PROCEDURE — 74011250637 HC RX REV CODE- 250/637: Performed by: ANESTHESIOLOGY

## 2020-12-31 PROCEDURE — 94003 VENT MGMT INPAT SUBQ DAY: CPT

## 2020-12-31 PROCEDURE — 93970 EXTREMITY STUDY: CPT

## 2020-12-31 PROCEDURE — 84100 ASSAY OF PHOSPHORUS: CPT

## 2020-12-31 RX ORDER — SODIUM CHLORIDE 9 MG/ML
100 INJECTION, SOLUTION INTRAVENOUS CONTINUOUS
Status: DISCONTINUED | OUTPATIENT
Start: 2020-12-31 | End: 2021-01-01

## 2020-12-31 RX ADMIN — PROPOFOL 40 MCG/KG/MIN: 10 INJECTION, EMULSION INTRAVENOUS at 09:26

## 2020-12-31 RX ADMIN — AMPICILLIN SODIUM AND SULBACTAM SODIUM 3 G: 2; 1 INJECTION, POWDER, FOR SOLUTION INTRAMUSCULAR; INTRAVENOUS at 23:24

## 2020-12-31 RX ADMIN — QUETIAPINE FUMARATE 75 MG: 25 TABLET ORAL at 08:57

## 2020-12-31 RX ADMIN — FAMOTIDINE 20 MG: 10 INJECTION, SOLUTION INTRAVENOUS at 21:35

## 2020-12-31 RX ADMIN — DEXMEDETOMIDINE HYDROCHLORIDE 1.4 MCG/KG/HR: 400 INJECTION INTRAVENOUS at 19:00

## 2020-12-31 RX ADMIN — CLONIDINE HYDROCHLORIDE 0.1 MG: 0.1 TABLET ORAL at 08:57

## 2020-12-31 RX ADMIN — DEXMEDETOMIDINE HYDROCHLORIDE 1.4 MCG/KG/HR: 400 INJECTION INTRAVENOUS at 01:52

## 2020-12-31 RX ADMIN — Medication 30 ML: at 11:21

## 2020-12-31 RX ADMIN — FAMOTIDINE 20 MG: 10 INJECTION, SOLUTION INTRAVENOUS at 09:00

## 2020-12-31 RX ADMIN — SODIUM CHLORIDE 100 ML/HR: 9 INJECTION, SOLUTION INTRAVENOUS at 17:53

## 2020-12-31 RX ADMIN — HYDRALAZINE HYDROCHLORIDE 20 MG: 20 INJECTION INTRAMUSCULAR; INTRAVENOUS at 12:36

## 2020-12-31 RX ADMIN — DEXMEDETOMIDINE HYDROCHLORIDE 1.4 MCG/KG/HR: 400 INJECTION INTRAVENOUS at 07:16

## 2020-12-31 RX ADMIN — Medication 10 ML: at 05:43

## 2020-12-31 RX ADMIN — PROPOFOL 25 MCG/KG/MIN: 10 INJECTION, EMULSION INTRAVENOUS at 18:57

## 2020-12-31 RX ADMIN — QUETIAPINE FUMARATE 75 MG: 25 TABLET ORAL at 17:52

## 2020-12-31 RX ADMIN — CHLORHEXIDINE GLUCONATE 15 ML: 0.12 RINSE ORAL at 09:12

## 2020-12-31 RX ADMIN — QUETIAPINE FUMARATE 75 MG: 25 TABLET ORAL at 21:35

## 2020-12-31 RX ADMIN — PROPOFOL 35 MCG/KG/MIN: 10 INJECTION, EMULSION INTRAVENOUS at 12:36

## 2020-12-31 RX ADMIN — METHYLPREDNISOLONE SODIUM SUCCINATE 40 MG: 40 INJECTION, POWDER, FOR SOLUTION INTRAMUSCULAR; INTRAVENOUS at 05:40

## 2020-12-31 RX ADMIN — METHYLPREDNISOLONE SODIUM SUCCINATE 40 MG: 40 INJECTION, POWDER, FOR SOLUTION INTRAMUSCULAR; INTRAVENOUS at 17:52

## 2020-12-31 RX ADMIN — DEXMEDETOMIDINE HYDROCHLORIDE 1.4 MCG/KG/HR: 400 INJECTION INTRAVENOUS at 10:09

## 2020-12-31 RX ADMIN — AMPICILLIN SODIUM AND SULBACTAM SODIUM 3 G: 2; 1 INJECTION, POWDER, FOR SOLUTION INTRAMUSCULAR; INTRAVENOUS at 11:43

## 2020-12-31 RX ADMIN — CHLORHEXIDINE GLUCONATE 15 ML: 0.12 RINSE ORAL at 21:36

## 2020-12-31 RX ADMIN — AMLODIPINE BESYLATE 5 MG: 5 TABLET ORAL at 08:58

## 2020-12-31 RX ADMIN — DEXMEDETOMIDINE HYDROCHLORIDE 1.4 MCG/KG/HR: 400 INJECTION INTRAVENOUS at 16:35

## 2020-12-31 RX ADMIN — CLONIDINE HYDROCHLORIDE 0.1 MG: 0.1 TABLET ORAL at 17:52

## 2020-12-31 RX ADMIN — PROPOFOL 50 MCG/KG/MIN: 10 INJECTION, EMULSION INTRAVENOUS at 23:22

## 2020-12-31 RX ADMIN — DEXMEDETOMIDINE HYDROCHLORIDE 1.4 MCG/KG/HR: 400 INJECTION INTRAVENOUS at 12:37

## 2020-12-31 RX ADMIN — PROPOFOL 40 MCG/KG/MIN: 10 INJECTION, EMULSION INTRAVENOUS at 01:53

## 2020-12-31 RX ADMIN — PROPOFOL 40 MCG/KG/MIN: 10 INJECTION, EMULSION INTRAVENOUS at 05:37

## 2020-12-31 RX ADMIN — SODIUM CHLORIDE, SODIUM LACTATE, POTASSIUM CHLORIDE, AND CALCIUM CHLORIDE 30 ML/HR: 600; 310; 30; 20 INJECTION, SOLUTION INTRAVENOUS at 01:52

## 2020-12-31 RX ADMIN — Medication 10 ML: at 21:36

## 2020-12-31 RX ADMIN — AMPICILLIN SODIUM AND SULBACTAM SODIUM 3 G: 2; 1 INJECTION, POWDER, FOR SOLUTION INTRAMUSCULAR; INTRAVENOUS at 18:20

## 2020-12-31 RX ADMIN — PROPOFOL 50 MCG/KG/MIN: 10 INJECTION, EMULSION INTRAVENOUS at 01:52

## 2020-12-31 RX ADMIN — AMPICILLIN SODIUM AND SULBACTAM SODIUM 3 G: 2; 1 INJECTION, POWDER, FOR SOLUTION INTRAMUSCULAR; INTRAVENOUS at 05:40

## 2020-12-31 RX ADMIN — DEXMEDETOMIDINE HYDROCHLORIDE 1.4 MCG/KG/HR: 400 INJECTION INTRAVENOUS at 05:38

## 2020-12-31 RX ADMIN — DEXMEDETOMIDINE HYDROCHLORIDE 1.4 MCG/KG/HR: 400 INJECTION INTRAVENOUS at 22:01

## 2020-12-31 RX ADMIN — ENOXAPARIN SODIUM 40 MG: 40 INJECTION SUBCUTANEOUS at 08:59

## 2020-12-31 NOTE — PROGRESS NOTES
Bedside shift change report given to Dale Barragan RN  (oncoming nurse) by Ignacio Sotelo RN (offgoing nurse). Report included the following information SBAR, Kardex, ED Summary, Intake/Output, MAR and Recent Results. SHIFT SUMMARY:    0800 Initial Shift  Assessment performed           Mental Status: Intubated. No command following but moves ext. Spont. Pupils equal and reactive. Respiratory: Intubated. FiO2 45%           Cardiac: Sinus           GI/: Granados           IV Drips: Precedex 1.4mcg/kg/hr, Propofol 40mcg/kg/min,   0830 US tech at bedside for dopplers BLE. D-dimer sent. 1100 Spoke with Alessandra and updated on patient status. 1600 Patients brother at bedside. Updated on plan of care. 1800 Repositioned and mouth care performed. No changes at this time. Will continue to wean propofol drip as able. Bedside shift change report given to JAMES Alexis (oncoming nurse) by Dale Barragan RN (offgoing nurse). Report included the following information SBAR, Kardex, ED Summary, Intake/Output, MAR and Recent Results.

## 2020-12-31 NOTE — PROGRESS NOTES
[facility]   [address] [address], [facility] [address] [facility] [place], [facility]  Phone: [phone]   [phone]       Nephrology Progress Note  Kaity Sood     1965     [id number]  Date of Admission : 12/21/2020 12/31/20    CC: Follow up for YISSEL    Assessment and Plan   YISSEL  - ATN from Sustained Hypotension and rapid lowering of BP   - UA : trace blood and 3+ protein --> serologic w/u neg except + gammopathy screen  - renal US : unremarkable except for benign cyst   - Cr stable, stable UOP  - cont present care  - daily labs and I/Os    Haemophilus/Streptococcus pneumonia:  - abx per ID    Resp failure:  - on the vent  - LE dopplers today to r/o DVT  - if CTA needed, would start IVF prior to contrast     Malignant HTN   Hypertensive Urgency   - negative UDS  - resolved     Anemia:  - hgb stable  - + M spike along with elevated K light chains and IgM levels  - will need heme eval eventually     Interval History:  Seen and examined. Remains sedated on the vent. BP stable. Cr stable. UOP stable. No other issues overnight per RN. Review of Systems: Review of systems not obtained due to patient factors.     Current Medications:   Current Facility-Administered Medications   Medication Dose Route Frequency    methylPREDNISolone (PF) (SOLU-MEDROL) injection 40 mg  40 mg IntraVENous Q12H    famotidine (PF) (PEPCID) 20 mg in 0.9% sodium chloride 10 mL injection  20 mg IntraVENous M33Z    multivit-folic acid-herbal 556 (WELLESSE PLUS) oral liquid 30 mL  30 mL Oral DAILY    0.9% sodium chloride infusion  3 mL/hr IntraVENous CONTINUOUS    0.9% sodium chloride infusion  5 mL/hr IntraVENous CONTINUOUS    docusate (COLACE) 50 mg/5 mL oral liquid 100 mg  100 mg Oral DAILY    ampicillin-sulbactam (UNASYN) 3 g in 0.9% sodium chloride (MBP/ADV) 100 mL MBP  3 g IntraVENous Q6H    QUEtiapine (SEROquel) tablet 75 mg  75 mg Oral TID    fentaNYL citrate (PF) injection  mcg   mcg IntraVENous Q1H PRN    acetaminophen (TYLENOL) solution 650 mg  650 mg Per NG tube Q6H PRN    nicotine (NICODERM CQ) 14 mg/24 hr patch 1 Patch  1 Patch TransDERmal DAILY    labetaloL (NORMODYNE;TRANDATE) injection 20 mg  20 mg IntraVENous Q6H PRN    hydrALAZINE (APRESOLINE) 20 mg/mL injection 20 mg  20 mg IntraVENous Q6H PRN    amLODIPine (NORVASC) tablet 5 mg  5 mg Oral DAILY    scopolamine (TRANSDERM-SCOP) 1 mg over 3 days 1 Patch  1 Patch TransDERmal Q72H    cloNIDine HCL (CATAPRES) tablet 0.1 mg  0.1 mg Oral BID    dexmedeTOMidine in 0.9 % NaCl (PRECEDEX) 400 mcg/100 mL (4 mcg/mL) infusion soln  0.1-1.4 mcg/kg/hr IntraVENous TITRATE    enoxaparin (LOVENOX) injection 40 mg  40 mg SubCUTAneous DAILY    lactated Ringers infusion  30 mL/hr IntraVENous CONTINUOUS    chlorhexidine (ORAL CARE KIT) 0.12 % mouthwash 15 mL  15 mL Oral Q12H    senna (SENOKOT) tablet 17.2 mg  2 Tab Oral DAILY    sodium chloride (NS) flush 5-40 mL  5-40 mL IntraVENous Q8H    sodium chloride (NS) flush 5-40 mL  5-40 mL IntraVENous PRN    acetaminophen (TYLENOL) tablet 650 mg  650 mg Oral Q6H PRN    Or    acetaminophen (TYLENOL) suppository 650 mg  650 mg Rectal Q6H PRN    polyethylene glycol (MIRALAX) packet 17 g  17 g Oral DAILY PRN    promethazine (PHENERGAN) tablet 12.5 mg  12.5 mg Oral Q6H PRN    Or    ondansetron (ZOFRAN) injection 4 mg  4 mg IntraVENous Q6H PRN    propofol (DIPRIVAN) 10 mg/mL infusion  0-50 mcg/kg/min IntraVENous TITRATE    fentaNYL (PF) 1,500 mcg/30 mL (50 mcg/mL) infusion  0-200 mcg/hr IntraVENous TITRATE      No Known Allergies    Objective:  Vitals:    Vitals:    12/31/20 0600 12/31/20 0636 12/31/20 0700 12/31/20 0800   BP:       Pulse: 61  65 66   Resp: 20  20 20   Temp:    97.2 °F (36.2 °C)   SpO2: 97%  97% 96%   Weight:  99.3 kg (218 lb 14.7 oz)     Height:         Intake and Output:  12/31 0701 - 12/31 1900  In: -   Out: 200 [Urine:200]  12/29 1901 - 12/31 0700  In: 5195.5 [I.V.:4575.5]  Out: 0172 [JWGIJ:2251]    Physical Examination:  General:  On vent   HEENT: Frontal lipoma   Lungs : diminished   CVS: RRR,s1,s2, normal, No murmur   Extremities: NO Edema  Neurologic: sedated on vent   Psych: Unable to assess       []    High complexity decision making was performed  []    Patient is at high-risk of decompensation with multiple organ involvement    Lab Data Personally Reviewed: I have reviewed all the pertinent labs, microbiology data and radiology studies during assessment. Recent Labs     12/31/20 0425 12/30/20 0353 12/29/20 0427    138 136   K 4.2 4.6 4.9   * 110* 105   CO2 23 23 25   * 168* 145*   BUN 46* 48* 40*   CREA 1.43* 1.48* 1.46*   CA 9.1 9.0 9.2   MG 2.5* 2.4 2.5*   PHOS 3.4 3.4 4.2     Recent Labs     12/31/20 0425 12/30/20 0353 12/29/20 0427   WBC 9.9 9.4 9.6   HGB 12.8 12.6 12.4   HCT 41.4 40.3 39.3   * 394 361     No results found for: SDES  Lab Results   Component Value Date/Time    Culture result: SCANT NORMAL RESPIRATORY ALDO 12/28/2020 04:56 PM    Culture result: MRSA NOT PRESENT 12/24/2020 09:20 AM    Culture result:  12/24/2020 09:20 AM         Screening of patient nares for MRSA is for surveillance purposes and, if positive, to facilitate isolation considerations in high risk settings. It is not intended for automatic decolonization interventions per se as regimens are not sufficiently effective to warrant routine use.     Culture result: NO GROWTH 2 DAYS 12/23/2020 04:16 PM    Culture result: (A) 12/22/2020 08:45 AM     RARE HAEMOPHILUS INFLUENZAE BETA LACTAMASE NEGATIVE    Culture result: RARE NORMAL RESPIRATORY ALDO 12/22/2020 08:45 AM     Recent Results (from the past 24 hour(s))   MAGNESIUM    Collection Time: 12/31/20  4:25 AM   Result Value Ref Range    Magnesium 2.5 (H) 1.6 - 2.4 mg/dL   PHOSPHORUS    Collection Time: 12/31/20  4:25 AM   Result Value Ref Range    Phosphorus 3.4 2.6 - 4.7 MG/DL   CBC W/O DIFF    Collection Time: 12/31/20 4:25 AM   Result Value Ref Range    WBC 9.9 4.1 - 11.1 K/uL    RBC 4.80 4. 10 - 5.70 M/uL    HGB 12.8 12.1 - 17.0 g/dL    HCT 41.4 36.6 - 50.3 %    MCV 86.3 80.0 - 99.0 FL    MCH 26.7 26.0 - 34.0 PG    MCHC 30.9 30.0 - 36.5 g/dL    RDW 15.2 (H) 11.5 - 14.5 %    PLATELET 547 (H) 717 - 400 K/uL    MPV 9.5 8.9 - 12.9 FL    NRBC 0.0 0  WBC    ABSOLUTE NRBC 0.00 0.00 - 2.09 K/uL   METABOLIC PANEL, BASIC    Collection Time: 12/31/20  4:25 AM   Result Value Ref Range    Sodium 141 136 - 145 mmol/L    Potassium 4.2 3.5 - 5.1 mmol/L    Chloride 113 (H) 97 - 108 mmol/L    CO2 23 21 - 32 mmol/L    Anion gap 5 5 - 15 mmol/L    Glucose 160 (H) 65 - 100 mg/dL    BUN 46 (H) 6 - 20 MG/DL    Creatinine 1.43 (H) 0.70 - 1.30 MG/DL    BUN/Creatinine ratio 32 (H) 12 - 20      GFR est AA >60 >60 ml/min/1.73m2    GFR est non-AA 52 (L) >60 ml/min/1.73m2    Calcium 9.1 8.5 - 10.1 MG/DL   D DIMER    Collection Time: 12/31/20  9:10 AM   Result Value Ref Range    D-dimer 4.39 (H) 0.00 - 0.65 mg/L FEU           Total time spent with patient:  xxx   min. Care Plan discussed with:  Patient     Family      RN      Consulting Physician [address] [facility],         I have reviewed the flowsheets. Chart and Pertinent Notes have been reviewed. No change in PMH ,family and social history from Consult note.       Luz eLntz MD

## 2020-12-31 NOTE — PROGRESS NOTES
ID Progress Note  2020    Subjective:     Afebrile. Remains on the vent. Fio2 of 45%     Objective:     Vitals:   Visit Vitals  BP (!) 154/89 (BP 1 Location: Right arm, BP Patient Position: At rest)   Pulse 62   Temp 97 °F (36.1 °C)   Resp 20   Ht 5' 11\" (1.803 m)   Wt 99.3 kg (218 lb 14.7 oz)   SpO2 98%   BMI 30.53 kg/m²        Tmax:  Temp (24hrs), Av °F (36.7 °C), Min:97 °F (36.1 °C), Max:99.5 °F (37.5 °C)      Exam:    Intubated   (+) lipoma on forehead   Lung clear, no rales, wheezes or rhonchi   Heart: s1, s2, RRR, no murmurs rubs or clicks  Abdomen: soft nontender, no guarding or rebound  (+) angel     Labs:   Lab Results   Component Value Date/Time    WBC 9.9 2020 04:25 AM    HGB 12.8 2020 04:25 AM    HCT 41.4 2020 04:25 AM    PLATELET 857 (H) 7337 04:25 AM    MCV 86.3 2020 04:25 AM     Lab Results   Component Value Date/Time    Sodium 141 2020 04:25 AM    Potassium 4.2 2020 04:25 AM    Chloride 113 (H) 2020 04:25 AM    CO2 23 2020 04:25 AM    Anion gap 5 2020 04:25 AM    Glucose 160 (H) 2020 04:25 AM    BUN 46 (H) 2020 04:25 AM    Creatinine 1.43 (H) 2020 04:25 AM    BUN/Creatinine ratio 32 (H) 2020 04:25 AM    GFR est AA >60 2020 04:25 AM    GFR est non-AA 52 (L) 2020 04:25 AM    Calcium 9.1 2020 04:25 AM    Bilirubin, total 0.4 2020 04:26 AM    Alk.  phosphatase 82 2020 04:26 AM    Protein, total 7.4 2020 04:26 AM    Albumin 2.2 (L) 2020 04:26 AM    Globulin 5.2 (H) 2020 04:26 AM    A-G Ratio 0.4 (L) 2020 04:26 AM    ALT (SGPT) 71 2020 04:26 AM           Assessment:     #1 respiratory failure with Haemophilus/Streptococcus pneumonia     #2 nonischemic cardiomyopathy     #3 chronic kidney disease     #4 chronic tobacco abuse     #5 hypertension               Recommendations:     Continue unasyn which should cover both haemophilus and aspiration     Team available over the weekend if needed.      Crow Garduno MD

## 2020-12-31 NOTE — PROGRESS NOTES
12/31/2020 -   RAFIA:  - RUR: 18%  - Disposition is TBD dependent on progression    - Patient had issues with multiple pauses and bradycardia over night  - Patient to have dopplers today, 12/31  - Patient to have CXR today, 12/31  - Patient may have VQ Scan tomorrow, 1/1  - ABX, Steroids, and Tube Feeds continue  - Patient is likely going to need a trach some time next week  CRM: Joycie Schlatter, MPH, [address] [place] [address]; Z: [phone]

## 2020-12-31 NOTE — PROGRESS NOTES
[facility]    ICU TEAM Progress Note    Name: Gloria Truong   : 1965   MRN: [id number]   Date: 2020      Assessment     ICU Problems:    1. Acute Hypoxic Respiratory Failure  2. CAP (Haemophilus influenzae & Streptococcus)  3. Aspiration PNA  4. Metabolic Encephalopathy  5. Obesity   6. Nx nonischemic cardiomyopathy   7. Chronic tobacco abuse  8. YISSEL/CKD stage 2  9. Left frontal lipoma-Chronic   10. Hx tobacco abuse/? COPD    Imagin2020      ICU Comprehensive Plan of Care:   1. Acute Hypoxic Respiratory Failure (Haemophilus influenza PNA; Tobacco Abuse Hx)  - SAT today - discussed with bedside RN  - Seroquel 75 mg q8  - Monitor QTC  - Unasyn   - Wean Methylpred again  - Scopolamine patch  - Continue ventilator support - optimize PEEP - Driving pressure is good  - Wean FiO2 for SpO2 > 90%  - HOB > 30  - Propofol/Precedex  - Ultrasound LE's to r/o DVT  - D-dimer  - Start NS at 100 cc/hr for 12 hours --> CT PE protocol in AM on 2021    2. Elevated troponin (resolving)  -ECHO 40-45%   -Cardiology following    3. YISSEL/CKD stage 2  -From Hypotension  -Nephrology following  -Levophed MAP > 65 mmHg  -Strict I and O  -KVO    4. HTN  -- PRN Labetalol  -- Norvasc    Plans for this Shift:     1. Ventilator support, will wean as tolerated  2. SBP Goal of: > 90 mmHg  3. MAP Goal of: > 65 mmHg  4. Phenylephrine - For above SBP/MAP goals  5. IVFs: LR  6. Transfusion Trigger (Hgb): <7 g/dL  7. Respiratory Goals:  a. Chlorhexidine   b. Optimize PEEP/Ventilation/Oxygenation  c. Goal Tidal Volume 6 cc/kg based on IBW  d. Aim for lung protective ventilation  e. Head of bed > 30 degrees  f. Aggressive bronchopulmonary hygiene  8. Pulmonary toilet: Duo-Nebs   9. SpO2 Goal: > 92%  10. Keep K>4; Mg>2   11. PT/OT  12. Goals of Care Discussion with family Pending   13. Plan of Care/Code Status: Full Code  14.  Appreciate Consultants Input Cardiology, nephrology   15. Discussed Care Plan with Bedside RN  16. Documentation of Current Medications  17. Rest of Plan Below:    F - Feeding:  Yes   A - Analgesia: Fentanyl  S - Sedation: Propofol and Precedex  T - DVT Prophylaxis: SCD's or Sequential Compression Device   H - Head of Bed: > 30 Degrees  U - Ulcer Prophylaxis: Protonix (pantoprazole)   G - Glycemic Control: Insulin  S - Spontaneous Breathing Trial: Pending  B - Bowel Regimen: Docusate (Colace)  I - Indwelling Catheter:   Tubes: ETT  Lines: Peripheral IV  Drains: Granados Catheter  D - De-escalation of Antibiotics:      Subjective:   Progress Note: 12/31/2020      Reason for ICU Admission: Acute hypoxic respiratory failure      HPI: Eliza Dias is a 51 yo AAM with a PMH significant for nonischemic cardiomyopathy, left frontal lipoma, CKD, obesity, chronic tobacco abuse, who presented to the ER via EMS with co increased SOB X2 weeks. Pt is currently sedated and on the ventilator, unable to provide HPI thereofre obtained via chart and provider. Pt failed bipap while in the ER with continued worsening hypoxia, therefore pt was intubated and placed on the ventilator. On arrival to ED pt was found to be hypertensive with /100.  Patient was then transferred to ICU for further management in care.       Overnight Events:   12/31/2020  Down to 45%      Active Problem List:     Problem List  Date Reviewed: 9/28/2020          Codes Class    Hypoxia ICD-10-CM: R09.02  ICD-9-CM: 799.02         Cervical radiculopathy ICD-10-CM: M54.12  ICD-9-CM: 723.4         Arm paresthesia, left ICD-10-CM: R20.2  ICD-9-CM: 782.0         Intractable headache ICD-10-CM: R51.9  ICD-9-CM: 784.0         Dilated cardiomyopathy (HCC) ICD-10-CM: I42.0  ICD-9-CM: 425.4         NSTEMI (non-ST elevated myocardial infarction) ([facility] [address] ICD-10-CM: I21.4  ICD-9-CM: 410.70         HTN (hypertension) ICD-10-CM: I10  ICD-9-CM: 401.9               Past Medical History: has a past medical history of Hypertension. Past Surgical History:      has no past surgical history on file. Home Medications:     Prior to Admission medications    Medication Sig Start Date End Date Taking? Authorizing Provider   aspirin-acetaminophen-caffeine ([employer] Extra Strength) 520-260-32.5 mg pwpk Take 1 Package by mouth two (2) times a day. Yes Provider, Historical   ibuprofen (MOTRIN) 600 mg tablet Take 600 mg by mouth every six (6) hours as needed for Pain. Yes Provider, Historical   carvediloL (COREG) 25 mg tablet Take 1 Tab by mouth two (2) times daily (with meals). 20  Yes Kofi Ibanez MD   isosorbide mononitrate ER (IMDUR) 30 mg tablet Take 1 Tab by mouth daily. 20  Yes Kofi Ibanez MD   cloNIDine HCL (CATAPRES) 0.1 mg tablet Take 1 Tab by mouth two (2) times a day. 20  Yes Kofi Ibanez MD   aspirin 81 mg chewable tablet Take 1 Tab by mouth daily. 19  Yes Therman Level, MD       Allergies/Social/Family History:     No Known Allergies   Social History     Tobacco Use    Smoking status: Current Every Day Smoker     Packs/day: 1.00    Smokeless tobacco: Never Used   Substance Use Topics    Alcohol use: Yes     Alcohol/week: 5.0 standard drinks     Types: 6 Cans of beer per week     Comment: Occasionally      Family History   Problem Relation Age of Onset    Stroke Father     Cancer Sister        Review of Systems:     Review of systems not obtained due to patient factors.     Objective:   Vital Signs:  Visit Vitals  BP (!) 146/86 (BP 1 Location: Right arm, BP Patient Position: At rest)   Pulse 66   Temp 97.2 °F (36.2 °C)   Resp 20   Ht 5' 11\" (1.803 m)   Wt 99.3 kg (218 lb 14.7 oz)   SpO2 96%   BMI 30.53 kg/m²    O2 Flow Rate (L/min): 15 l/min O2 Device: Endotracheal tube, Ventilator Temp (24hrs), Av °F (37.2 °C), Min:97.2 °F (36.2 °C), Max:100.4 °F (38 °C)           Intake/Output:     Intake/Output Summary (Last 24 hours) at 12/31/2020 1047  Last data filed at 12/31/2020 0800  Gross per 24 hour   Intake 3945.64 ml   Output 3120 ml   Net 825.64 ml       Physical Exam:    General:  Sedated and on the ventilator. No acute distress. Eyes:  Sclera anicteric. Pupils equal, round, reactive to light. Mouth/Throat: Orotracheal tube in place. Neck: Supple. Lungs:   Rhonchi TH scattered, diminished in the BLL   Cardiovascular:  Regular rate and rhythm, no murmur, click, rub, or gallop. Abdomen:   Soft, non-tender, bowel sounds normal, non-distended. Extremities: No cyanosis or edema. Skin: No acute rash or lesions. Lipoma noted over left frontal region    Lymph Nodes: Cervical and supraclavicular normal.   Musculoskeletal:  No swelling or deformity. Lines/Devices:  Intact, no erythema, drainage, or tenderness. Psychiatric: Sedated and appears comfortable on ventilator. LABS AND  DATA: Personally reviewed  Recent Labs     12/31/20 0425 12/30/20  0353   WBC 9.9 9.4   HGB 12.8 12.6   HCT 41.4 40.3   * 394     Recent Labs     12/31/20  0425 12/30/20  0353    138   K 4.2 4.6   * 110*   CO2 23 23   BUN 46* 48*   CREA 1.43* 1.48*   * 168*   CA 9.1 9.0   MG 2.5* 2.4   PHOS 3.4 3.4     No results for input(s): AP, TBIL, TP, ALB, GLOB, AML, LPSE in the last 72 hours. No lab exists for component: SGOT, GPT, AMYP  No results for input(s): INR, PTP, APTT, INREXT, INREXT in the last 72 hours. Recent Labs     12/29/20  0949   PHI 7.39   PCO2I 40.5   PO2I 56*   FIO2I 0.40     No results for input(s): CPK, CKMB, TROIQ, BNPP in the last 72 hours.     Hemodynamics:   PAP:   CO:     Wedge:   CI:     CVP:    SVR:       PVR:       Ventilator Settings:  Mode Rate Tidal Volume Pressure FiO2 PEEP   Assist control   420 ml    45 % 10 cm H20     Peak airway pressure: 29 cm H2O    Minute ventilation: 8.88 l/min        MEDS: Reviewed    Chest X-Ray:  CXR Results  (Last 48 hours)               12/31/20 0901  XR CHEST PORT Final result    Impression:  IMPRESSION: No significant change       Narrative:  EXAM: XR CHEST PORT       INDICATION: Resp Fail       COMPARISON: 12/29/2020       FINDINGS: A portable AP radiograph of the chest was obtained at 849 hours.   Patient's on cardiac monitor. Endotracheal tube, enteric feeding tube, and right   IJ central venous catheter are in stable position.. Patchy right basilar   airspace opacity and small left pleural effusion with left lower lobe volume   loss again noted without significant change.. Heart size is normal..  The bones   and soft tissues are grossly within normal limits.            12/29/20 1111  XR CHEST PORT Final result    Impression:  IMPRESSION:       Decreased diffuse interstitial and bibasilar opacities. Stable small pleural   effusions.           Narrative:  EXAM:  XR CHEST PORT       INDICATION: Bibasilar opacities and pleural effusions.       COMPARISON: 12/29/2020 at 0822 hours       TECHNIQUE: Portable AP semiupright chest view at 1104 hours       FINDINGS: The endotracheal tube, enteric tube, and right IJ catheter are stable.   The cardiomediastinal contours are stable.       Diffuse interstitial and bibasilar opacities are decreased. There are stable   small pleural effusions. There is no pneumothorax. The bones and upper abdomen   are stable.                   ECHO:  Pending     Multidisciplinary Rounds Completed:  Yes    ABCDEF Bundle/Checklist Completed:  Yes    SPECIAL EQUIPMENT  None    DISPOSITION  Stay in ICU    CRITICAL CARE CONSULTANT NOTE  I had a face to face encounter with the patient, reviewed and interpreted patient data including clinical events, labs, images, vital signs, I/O's, and examined patient.  I have discussed the case and the plan and management of the patient's care with the consulting services, the bedside nurses and the respiratory therapist.      NOTE OF PERSONAL INVOLVEMENT IN CARE   This patient has a high probability of imminent,  clinically significant deterioration, which requires the highest level of preparedness to intervene urgently. I participated in the decision-making and personally managed or directed the management of the following life and organ supporting interventions that required my frequent assessment to treat or prevent imminent deterioration. I personally spent 90 minutes of critical care time. This is time spent at this critically ill patient's bedside actively involved in patient care as well as the coordination of care and discussions with the patient's family. This does not include any procedural time which has been billed separately.     [address] [facility] ([place], [place])  12/31/2020

## 2021-01-01 ENCOUNTER — APPOINTMENT (OUTPATIENT)
Dept: CT IMAGING | Age: 56
DRG: 207 | End: 2021-01-01
Attending: ANESTHESIOLOGY

## 2021-01-01 ENCOUNTER — APPOINTMENT (OUTPATIENT)
Dept: GENERAL RADIOLOGY | Age: 56
DRG: 207 | End: 2021-01-01
Attending: ANESTHESIOLOGY

## 2021-01-01 LAB
ANION GAP SERPL CALC-SCNC: 2 MMOL/L (ref 5–15)
ARTERIAL PATENCY WRIST A: ABNORMAL
BASE DEFICIT BLD-SCNC: 3 MMOL/L
BDY SITE: ABNORMAL
BUN SERPL-MCNC: 40 MG/DL (ref 6–20)
BUN/CREAT SERPL: 35 (ref 12–20)
CA-I BLD-SCNC: 1.33 MMOL/L (ref 1.12–1.32)
CALCIUM SERPL-MCNC: 9 MG/DL (ref 8.5–10.1)
CHLORIDE SERPL-SCNC: 116 MMOL/L (ref 97–108)
CO2 SERPL-SCNC: 23 MMOL/L (ref 21–32)
CREAT SERPL-MCNC: 1.14 MG/DL (ref 0.7–1.3)
ERYTHROCYTE [DISTWIDTH] IN BLOOD BY AUTOMATED COUNT: 15.3 % (ref 11.5–14.5)
GAS FLOW.O2 O2 DELIVERY SYS: ABNORMAL L/MIN
GAS FLOW.O2 SETTING OXYMISER: 20 BPM
GLUCOSE SERPL-MCNC: 135 MG/DL (ref 65–100)
HCO3 BLD-SCNC: 21.9 MMOL/L (ref 22–26)
HCT VFR BLD AUTO: 41.1 % (ref 36.6–50.3)
HGB BLD-MCNC: 12.6 G/DL (ref 12.1–17)
MAGNESIUM SERPL-MCNC: 2.2 MG/DL (ref 1.6–2.4)
MCH RBC QN AUTO: 26.6 PG (ref 26–34)
MCHC RBC AUTO-ENTMCNC: 30.7 G/DL (ref 30–36.5)
MCV RBC AUTO: 86.9 FL (ref 80–99)
NRBC # BLD: 0 K/UL (ref 0–0.01)
NRBC BLD-RTO: 0 PER 100 WBC
O2/TOTAL GAS SETTING VFR VENT: 45 %
PCO2 BLD: 34.8 MMHG (ref 35–45)
PEEP RESPIRATORY: 10 CMH2O
PH BLD: 7.41 [PH] (ref 7.35–7.45)
PHOSPHATE SERPL-MCNC: 3 MG/DL (ref 2.6–4.7)
PLATELET # BLD AUTO: 406 K/UL (ref 150–400)
PMV BLD AUTO: 9.4 FL (ref 8.9–12.9)
PO2 BLD: 78 MMHG (ref 80–100)
POTASSIUM SERPL-SCNC: 3.9 MMOL/L (ref 3.5–5.1)
RBC # BLD AUTO: 4.73 M/UL (ref 4.1–5.7)
SAO2 % BLD: 96 % (ref 92–97)
SODIUM SERPL-SCNC: 141 MMOL/L (ref 136–145)
SPECIMEN TYPE: ABNORMAL
VENTILATION MODE VENT: ABNORMAL
VT SETTING VENT: 420 ML
WBC # BLD AUTO: 9.2 K/UL (ref 4.1–11.1)

## 2021-01-01 PROCEDURE — 77030005513 HC CATH URETH FOL11 MDII -B

## 2021-01-01 PROCEDURE — 82803 BLOOD GASES ANY COMBINATION: CPT

## 2021-01-01 PROCEDURE — 84100 ASSAY OF PHOSPHORUS: CPT

## 2021-01-01 PROCEDURE — 74011250636 HC RX REV CODE- 250/636: Performed by: INTERNAL MEDICINE

## 2021-01-01 PROCEDURE — 83735 ASSAY OF MAGNESIUM: CPT

## 2021-01-01 PROCEDURE — 36415 COLL VENOUS BLD VENIPUNCTURE: CPT

## 2021-01-01 PROCEDURE — 74011250636 HC RX REV CODE- 250/636: Performed by: NURSE PRACTITIONER

## 2021-01-01 PROCEDURE — 74011250636 HC RX REV CODE- 250/636: Performed by: ANESTHESIOLOGY

## 2021-01-01 PROCEDURE — 74011000258 HC RX REV CODE- 258: Performed by: INTERNAL MEDICINE

## 2021-01-01 PROCEDURE — 74011000636 HC RX REV CODE- 636: Performed by: RADIOLOGY

## 2021-01-01 PROCEDURE — 77030020291 HC FLEXSEAL FMS BMS -C

## 2021-01-01 PROCEDURE — 74018 RADEX ABDOMEN 1 VIEW: CPT

## 2021-01-01 PROCEDURE — 74011250637 HC RX REV CODE- 250/637: Performed by: ANESTHESIOLOGY

## 2021-01-01 PROCEDURE — 80048 BASIC METABOLIC PNL TOTAL CA: CPT

## 2021-01-01 PROCEDURE — 65620000000 HC RM CCU GENERAL

## 2021-01-01 PROCEDURE — 74011000250 HC RX REV CODE- 250: Performed by: ANESTHESIOLOGY

## 2021-01-01 PROCEDURE — 71275 CT ANGIOGRAPHY CHEST: CPT

## 2021-01-01 PROCEDURE — 74011000250 HC RX REV CODE- 250: Performed by: NURSE PRACTITIONER

## 2021-01-01 PROCEDURE — 85027 COMPLETE CBC AUTOMATED: CPT

## 2021-01-01 RX ORDER — MIDAZOLAM HYDROCHLORIDE 1 MG/ML
2 INJECTION, SOLUTION INTRAMUSCULAR; INTRAVENOUS ONCE
Status: COMPLETED | OUTPATIENT
Start: 2021-01-01 | End: 2021-01-01

## 2021-01-01 RX ORDER — QUETIAPINE FUMARATE 100 MG/1
100 TABLET, FILM COATED ORAL 3 TIMES DAILY
Status: DISCONTINUED | OUTPATIENT
Start: 2021-01-01 | End: 2021-01-02

## 2021-01-01 RX ORDER — MIDAZOLAM HYDROCHLORIDE 1 MG/ML
INJECTION, SOLUTION INTRAMUSCULAR; INTRAVENOUS
Status: DISPENSED
Start: 2021-01-01 | End: 2021-01-01

## 2021-01-01 RX ORDER — LANOLIN ALCOHOL/MO/W.PET/CERES
3 CREAM (GRAM) TOPICAL
Status: DISCONTINUED | OUTPATIENT
Start: 2021-01-01 | End: 2021-01-02

## 2021-01-01 RX ADMIN — IOPAMIDOL 80 ML: 755 INJECTION, SOLUTION INTRAVENOUS at 05:49

## 2021-01-01 RX ADMIN — AMPICILLIN SODIUM AND SULBACTAM SODIUM 3 G: 2; 1 INJECTION, POWDER, FOR SOLUTION INTRAMUSCULAR; INTRAVENOUS at 17:11

## 2021-01-01 RX ADMIN — CHLORHEXIDINE GLUCONATE 15 ML: 0.12 RINSE ORAL at 20:20

## 2021-01-01 RX ADMIN — DEXMEDETOMIDINE HYDROCHLORIDE 1.4 MCG/KG/HR: 400 INJECTION INTRAVENOUS at 09:34

## 2021-01-01 RX ADMIN — MIDAZOLAM 2 MG: 1 INJECTION INTRAMUSCULAR; INTRAVENOUS at 10:30

## 2021-01-01 RX ADMIN — QUETIAPINE FUMARATE 100 MG: 100 TABLET ORAL at 21:00

## 2021-01-01 RX ADMIN — DEXMEDETOMIDINE HYDROCHLORIDE 1.4 MCG/KG/HR: 400 INJECTION INTRAVENOUS at 15:04

## 2021-01-01 RX ADMIN — DEXMEDETOMIDINE HYDROCHLORIDE 1.4 MCG/KG/HR: 400 INJECTION INTRAVENOUS at 06:14

## 2021-01-01 RX ADMIN — DEXMEDETOMIDINE HYDROCHLORIDE 1.4 MCG/KG/HR: 400 INJECTION INTRAVENOUS at 12:07

## 2021-01-01 RX ADMIN — DEXMEDETOMIDINE HYDROCHLORIDE 1.4 MCG/KG/HR: 400 INJECTION INTRAVENOUS at 21:00

## 2021-01-01 RX ADMIN — Medication 10 ML: at 06:15

## 2021-01-01 RX ADMIN — ENOXAPARIN SODIUM 40 MG: 40 INJECTION SUBCUTANEOUS at 08:35

## 2021-01-01 RX ADMIN — AMPICILLIN SODIUM AND SULBACTAM SODIUM 3 G: 2; 1 INJECTION, POWDER, FOR SOLUTION INTRAMUSCULAR; INTRAVENOUS at 06:15

## 2021-01-01 RX ADMIN — AMLODIPINE BESYLATE 5 MG: 5 TABLET ORAL at 08:33

## 2021-01-01 RX ADMIN — PROPOFOL 50 MCG/KG/MIN: 10 INJECTION, EMULSION INTRAVENOUS at 05:04

## 2021-01-01 RX ADMIN — FAMOTIDINE 20 MG: 10 INJECTION, SOLUTION INTRAVENOUS at 08:34

## 2021-01-01 RX ADMIN — AMPICILLIN SODIUM AND SULBACTAM SODIUM 3 G: 2; 1 INJECTION, POWDER, FOR SOLUTION INTRAMUSCULAR; INTRAVENOUS at 12:40

## 2021-01-01 RX ADMIN — QUETIAPINE FUMARATE 100 MG: 100 TABLET ORAL at 15:05

## 2021-01-01 RX ADMIN — CHLORHEXIDINE GLUCONATE 15 ML: 0.12 RINSE ORAL at 08:38

## 2021-01-01 RX ADMIN — FAMOTIDINE 20 MG: 10 INJECTION, SOLUTION INTRAVENOUS at 20:20

## 2021-01-01 RX ADMIN — AMPICILLIN SODIUM AND SULBACTAM SODIUM 3 G: 2; 1 INJECTION, POWDER, FOR SOLUTION INTRAMUSCULAR; INTRAVENOUS at 23:02

## 2021-01-01 RX ADMIN — QUETIAPINE FUMARATE 100 MG: 100 TABLET ORAL at 08:34

## 2021-01-01 RX ADMIN — PROPOFOL 35 MCG/KG/MIN: 10 INJECTION, EMULSION INTRAVENOUS at 09:34

## 2021-01-01 RX ADMIN — HYDRALAZINE HYDROCHLORIDE 20 MG: 20 INJECTION INTRAMUSCULAR; INTRAVENOUS at 02:52

## 2021-01-01 RX ADMIN — HYDRALAZINE HYDROCHLORIDE 20 MG: 20 INJECTION INTRAMUSCULAR; INTRAVENOUS at 23:02

## 2021-01-01 RX ADMIN — Medication 30 ML: at 08:34

## 2021-01-01 RX ADMIN — PROPOFOL 40 MCG/KG/MIN: 10 INJECTION, EMULSION INTRAVENOUS at 17:18

## 2021-01-01 RX ADMIN — CLONIDINE HYDROCHLORIDE 0.1 MG: 0.1 TABLET ORAL at 17:11

## 2021-01-01 RX ADMIN — DEXMEDETOMIDINE HYDROCHLORIDE 1.4 MCG/KG/HR: 400 INJECTION INTRAVENOUS at 17:56

## 2021-01-01 RX ADMIN — Medication 3 MG: at 20:20

## 2021-01-01 RX ADMIN — Medication 10 ML: at 21:00

## 2021-01-01 RX ADMIN — CLONIDINE HYDROCHLORIDE 0.1 MG: 0.1 TABLET ORAL at 08:34

## 2021-01-01 RX ADMIN — PROPOFOL 45 MCG/KG/MIN: 10 INJECTION, EMULSION INTRAVENOUS at 13:26

## 2021-01-01 RX ADMIN — PROPOFOL 50 MCG/KG/MIN: 10 INJECTION, EMULSION INTRAVENOUS at 02:22

## 2021-01-01 RX ADMIN — METHYLPREDNISOLONE SODIUM SUCCINATE 40 MG: 40 INJECTION, POWDER, FOR SOLUTION INTRAMUSCULAR; INTRAVENOUS at 06:15

## 2021-01-01 RX ADMIN — PROPOFOL 40 MCG/KG/MIN: 10 INJECTION, EMULSION INTRAVENOUS at 21:00

## 2021-01-01 NOTE — PROGRESS NOTES
[facility]   [address] [facility], [address] [place] [address], [facility]  Phone: [phone]   [phone]       Nephrology Progress Note  Stella Mcburney     1965     [id number]  Date of Admission : 12/21/2020 01/01/21    CC: Follow up for YISSEL    Assessment and Plan   YISSEL  - ATN from Sustained Hypotension and rapid lowering of BP   - UA : trace blood and 3+ protein --> serologic w/u neg except + gammopathy screen  - renal US : unremarkable except for benign cyst   - cont IVF post contrast x 6 hrs then stop  - daily labs and I/Os    Haemophilus/Streptococcus pneumonia:  - abx per ID    Resp failure:  - on the vent  - LE dopplers neg and CTA neg on 12/31  - for eventual trach     Malignant HTN   Hypertensive Urgency   - negative UDS  - resolved     Anemia:  - hgb stable  - + M spike along with elevated K light chains and IgM levels  - will need heme eval eventually     Interval History:  Seen and examined. Remains sedated on the vent. CTA and dopplers noted. Cr better. UOP excellent. On IV NS. Review of Systems: Review of systems not obtained due to patient factors.     Current Medications:   Current Facility-Administered Medications   Medication Dose Route Frequency    QUEtiapine (SEROquel) tablet 100 mg  100 mg Oral TID    melatonin tablet 3 mg  3 mg Oral QHS    [START ON 1/2/2021] methylPREDNISolone (PF) (SOLU-MEDROL) injection 40 mg  40 mg IntraVENous DAILY    0.9% sodium chloride infusion  100 mL/hr IntraVENous CONTINUOUS    famotidine (PF) (PEPCID) 20 mg in 0.9% sodium chloride 10 mL injection  20 mg IntraVENous Z51C    multivit-folic acid-herbal 368 (WELLESSE PLUS) oral liquid 30 mL  30 mL Oral DAILY    0.9% sodium chloride infusion  3 mL/hr IntraVENous CONTINUOUS    0.9% sodium chloride infusion  5 mL/hr IntraVENous CONTINUOUS    docusate (COLACE) 50 mg/5 mL oral liquid 100 mg  100 mg Oral DAILY    ampicillin-sulbactam (UNASYN) 3 g in 0.9% sodium chloride (MBP/ADV) 100 mL MBP  3 g IntraVENous Q6H    fentaNYL citrate (PF) injection  mcg   mcg IntraVENous Q1H PRN    acetaminophen (TYLENOL) solution 650 mg  650 mg Per NG tube Q6H PRN    nicotine (NICODERM CQ) 14 mg/24 hr patch 1 Patch  1 Patch TransDERmal DAILY    labetaloL (NORMODYNE;TRANDATE) injection 20 mg  20 mg IntraVENous Q6H PRN    hydrALAZINE (APRESOLINE) 20 mg/mL injection 20 mg  20 mg IntraVENous Q6H PRN    amLODIPine (NORVASC) tablet 5 mg  5 mg Oral DAILY    scopolamine (TRANSDERM-SCOP) 1 mg over 3 days 1 Patch  1 Patch TransDERmal Q72H    cloNIDine HCL (CATAPRES) tablet 0.1 mg  0.1 mg Oral BID    dexmedeTOMidine in 0.9 % NaCl (PRECEDEX) 400 mcg/100 mL (4 mcg/mL) infusion soln  0.1-1.4 mcg/kg/hr IntraVENous TITRATE    enoxaparin (LOVENOX) injection 40 mg  40 mg SubCUTAneous DAILY    chlorhexidine (ORAL CARE KIT) 0.12 % mouthwash 15 mL  15 mL Oral Q12H    senna (SENOKOT) tablet 17.2 mg  2 Tab Oral DAILY    sodium chloride (NS) flush 5-40 mL  5-40 mL IntraVENous Q8H    sodium chloride (NS) flush 5-40 mL  5-40 mL IntraVENous PRN    acetaminophen (TYLENOL) tablet 650 mg  650 mg Oral Q6H PRN    Or    acetaminophen (TYLENOL) suppository 650 mg  650 mg Rectal Q6H PRN    polyethylene glycol (MIRALAX) packet 17 g  17 g Oral DAILY PRN    promethazine (PHENERGAN) tablet 12.5 mg  12.5 mg Oral Q6H PRN    Or    ondansetron (ZOFRAN) injection 4 mg  4 mg IntraVENous Q6H PRN    propofol (DIPRIVAN) 10 mg/mL infusion  0-50 mcg/kg/min IntraVENous TITRATE    fentaNYL (PF) 1,500 mcg/30 mL (50 mcg/mL) infusion  0-200 mcg/hr IntraVENous TITRATE      No Known Allergies    Objective:  Vitals:    Vitals:    01/01/21 0500 01/01/21 0503 01/01/21 0600 01/01/21 0700   BP:       Pulse: 71 71 69 70   Resp: 20 20 20 20   Temp:       SpO2: 97% 97% 96% 97%   Weight:       Height:         Intake and Output:  No intake/output data recorded.   12/30 1901 - 01/01 0700  In: 7799.1 [I.V.:7009.1]  Out: 6842 [Urine:4840; Drains:100]    Physical Examination:  General:  On vent   HEENT: Frontal lipoma   Lungs : diminished   CVS: RRR,s1,s2, normal, No murmur   Extremities: NO Edema  Neurologic: sedated on vent   Psych: Unable to assess       []    High complexity decision making was performed  []    Patient is at high-risk of decompensation with multiple organ involvement    Lab Data Personally Reviewed: I have reviewed all the pertinent labs, microbiology data and radiology studies during assessment. Recent Labs     01/01/21  0354 12/31/20 0425 12/30/20 0353    141 138   K 3.9 4.2 4.6   * 113* 110*   CO2 23 23 23   * 160* 168*   BUN 40* 46* 48*   CREA 1.14 1.43* 1.48*   CA 9.0 9.1 9.0   MG 2.2 2.5* 2.4   PHOS 3.0 3.4 3.4     Recent Labs     01/01/21  0354 12/31/20 0425 12/30/20 0353   WBC 9.2 9.9 9.4   HGB 12.6 12.8 12.6   HCT 41.1 41.4 40.3   * 403* 394     No results found for: SDES  Lab Results   Component Value Date/Time    Culture result: SCANT NORMAL RESPIRATORY ALDO 12/28/2020 04:56 PM    Culture result: MRSA NOT PRESENT 12/24/2020 09:20 AM    Culture result:  12/24/2020 09:20 AM         Screening of patient nares for MRSA is for surveillance purposes and, if positive, to facilitate isolation considerations in high risk settings. It is not intended for automatic decolonization interventions per se as regimens are not sufficiently effective to warrant routine use.     Culture result: NO GROWTH 2 DAYS 12/23/2020 04:16 PM    Culture result: (A) 12/22/2020 08:45 AM     RARE HAEMOPHILUS INFLUENZAE BETA LACTAMASE NEGATIVE    Culture result: RARE NORMAL RESPIRATORY ALDO 12/22/2020 08:45 AM     Recent Results (from the past 24 hour(s))   MAGNESIUM    Collection Time: 01/01/21  3:54 AM   Result Value Ref Range    Magnesium 2.2 1.6 - 2.4 mg/dL   PHOSPHORUS    Collection Time: 01/01/21  3:54 AM   Result Value Ref Range    Phosphorus 3.0 2.6 - 4.7 MG/DL   CBC W/O DIFF    Collection Time: 01/01/21  3:54 AM Result Value Ref Range    WBC 9.2 4.1 - 11.1 K/uL    RBC 4.73 4.10 - 5.70 M/uL    HGB 12.6 12.1 - 17.0 g/dL    HCT 41.1 36.6 - 50.3 %    MCV 86.9 80.0 - 99.0 FL    MCH 26.6 26.0 - 34.0 PG    MCHC 30.7 30.0 - 36.5 g/dL    RDW 15.3 (H) 11.5 - 14.5 %    PLATELET 282 (H) 415 - 400 K/uL    MPV 9.4 8.9 - 12.9 FL    NRBC 0.0 0  WBC    ABSOLUTE NRBC 0.00 0.00 - 9.64 K/uL   METABOLIC PANEL, BASIC    Collection Time: 01/01/21  3:54 AM   Result Value Ref Range    Sodium 141 136 - 145 mmol/L    Potassium 3.9 3.5 - 5.1 mmol/L    Chloride 116 (H) 97 - 108 mmol/L    CO2 23 21 - 32 mmol/L    Anion gap 2 (L) 5 - 15 mmol/L    Glucose 135 (H) 65 - 100 mg/dL    BUN 40 (H) 6 - 20 MG/DL    Creatinine 1.14 0.70 - 1.30 MG/DL    BUN/Creatinine ratio 35 (H) 12 - 20      GFR est AA >60 >60 ml/min/1.73m2    GFR est non-AA >60 >60 ml/min/1.73m2    Calcium 9.0 8.5 - 10.1 MG/DL   POC EG7    Collection Time: 01/01/21  5:11 AM   Result Value Ref Range    Calcium, ionized (POC) 1.33 (H) 1.12 - 1.32 mmol/L    FIO2 (POC) 45 %    pH (POC) 7.41 7.35 - 7.45      pCO2 (POC) 34.8 (L) 35.0 - 45.0 MMHG    pO2 (POC) 78 (L) 80 - 100 MMHG    HCO3 (POC) 21.9 (L) 22 - 26 MMOL/L    Base deficit (POC) 3 mmol/L    sO2 (POC) 96 92 - 97 %    Site DRAWN FROM ARTERIAL LINE      Device: VENT      Mode ASSIST CONTROL      Tidal volume 420 ml    Set Rate 20 bpm    PEEP/CPAP (POC) 10 cmH2O    Allens test (POC) N/A      Specimen type (POC) ARTERIAL             Total time spent with patient:  xxx   min. Care Plan discussed with:  Patient     Family      RN      Consulting Physician [id number] [facility],         I have reviewed the flowsheets. Chart and Pertinent Notes have been reviewed. No change in PMH ,family and social history from Consult note.       Chiara Beauchamp MD

## 2021-01-01 NOTE — PROGRESS NOTES
[facility]    ICU TEAM Progress Note    Name: Hunter Palomares   : 1965   MRN: [id number]   Date: 2021      Assessment     ICU Problems:    1. Acute Hypoxic Respiratory Failure  1. CAP (Haemophilus influenzae & Streptococcus)  2. Aspiration PNA  3. Hx tobacco abuse/? COPD  2. Metabolic Encephalopathy  1. ?Steroids/?Infection  2. Hyperactive Delirium   3. Obesity   4. YISSEL/CKD stage 2 (resolving)  5. Left frontal lipoma-Chronic       [email or web address]        ICU Comprehensive Plan of Care:   1. Acute Hypoxic Respiratory Failure (Haemophilus influenza PNA; Tobacco Abuse Hx)  - SAT today - discussed with bedside RN  - Seroquel 100 mg q8  - Monitor QTC  - Unasyn   - Add Melatonin  - Wean Methylpred to 40 mg daily  - Scopolamine patch  - Continue ventilator support - optimize PEEP - Driving pressure is good  - Wean FiO2 for SpO2 > 90%  - HOB > 30  - Propofol/Precedex  - Ultrasound LE --> NEG  - CT CHEST PE PROT --> NEG  - Consult Thoracic for possible Perc Trach on 2020    2. Elevated troponin (resolving)  -ECHO 40-45%   -Cardiology following    3. YISSEL/CKD stage 2  -From Hypotension  -Nephrology following  -Strict I and O  -NS for 6 hours post CT    4. HTN  -- PRN Labetalol  -- Norvasc    Respiratory Goals:  a. Chlorhexidine   b. Optimize PEEP/Ventilation/Oxygenation  c. Goal Tidal Volume 6 cc/kg based on IBW  d. Aim for lung protective ventilation  e. Head of bed > 30 degrees  f.  Aggressive bronchopulmonary hygiene  Goals of Care Discussion with family Yes   Plan of Care/Code Status: Full Code  Appreciate Consultants Input Cardiology, Nephrology   Discussed Care Plan with Bedside RN  Documentation of Current Medications    Subjective:   Progress Note: 2021      Reason for ICU Admission: Acute hypoxic respiratory failure      HPI: Hunter Palomares is a 49 yo AAM with a PMH significant for nonischemic cardiomyopathy, left frontal lipoma, CKD, obesity, chronic tobacco abuse, who presented to the ER via EMS with co increased SOB X2 weeks. Pt is currently sedated and on the ventilator, unable to provide HPI thereofre obtained via chart and provider. Pt failed bipap while in the ER with continued worsening hypoxia, therefore pt was intubated and placed on the ventilator. On arrival to ED pt was found to be hypertensive with /100. Patient was then transferred to ICU for further management in care.       Overnight Events:   1/1/2021  Awakens to voice, FIO2 down to 40%      Active Problem List:     Problem List  Date Reviewed: 9/28/2020          Codes Class    Hypoxia ICD-10-CM: R09.02  ICD-9-CM: 799.02         Cervical radiculopathy ICD-10-CM: M54.12  ICD-9-CM: 723.4         Arm paresthesia, left ICD-10-CM: R20.2  ICD-9-CM: 782.0         Intractable headache ICD-10-CM: R51.9  ICD-9-CM: 784.0         Dilated cardiomyopathy (HCC) ICD-10-CM: I42.0  ICD-9-CM: 425.4         NSTEMI (non-ST elevated myocardial infarction) ([facility] [address] ICD-10-CM: I21.4  ICD-9-CM: 410.70         HTN (hypertension) ICD-10-CM: I10  ICD-9-CM: 401.9               Past Medical History:      has a past medical history of Hypertension. Past Surgical History:      has no past surgical history on file. Home Medications:     Prior to Admission medications    Medication Sig Start Date End Date Taking? Authorizing Provider   aspirin-acetaminophen-caffeine ([employer]'s Extra Strength) 520-260-32.5 mg pwpk Take 1 Package by mouth two (2) times a day. Yes Provider, Historical   ibuprofen (MOTRIN) 600 mg tablet Take 600 mg by mouth every six (6) hours as needed for Pain. Yes Provider, Historical   carvediloL (COREG) 25 mg tablet Take 1 Tab by mouth two (2) times daily (with meals). 7/16/20  Yes Philippe Brown MD   isosorbide mononitrate ER (IMDUR) 30 mg tablet Take 1 Tab by mouth daily. 7/16/20  Yes Philippe Brown MD   cloNIDine HCL (CATAPRES) 0.1 mg tablet Take 1 Tab by mouth two (2) times a day.  1/16/20  Yes Marii Moeller III, MD   aspirin 81 mg chewable tablet Take 1 Tab by mouth daily. 19  Yes Stevie Miller MD       Allergies/Social/Family History:     No Known Allergies   Social History     Tobacco Use    Smoking status: Current Every Day Smoker     Packs/day: 1.00    Smokeless tobacco: Never Used   Substance Use Topics    Alcohol use: Yes     Alcohol/week: 5.0 standard drinks     Types: 6 Cans of beer per week     Comment: Occasionally      Family History   Problem Relation Age of Onset    Stroke Father     Cancer Sister        Review of Systems:     Review of systems not obtained due to patient factors. Objective:   Vital Signs:  Visit Vitals  BP (!) 154/89 (BP 1 Location: Right arm, BP Patient Position: At rest)   Pulse 70   Temp 99 °F (37.2 °C)   Resp 20   Ht 5' 11\" (1.803 m)   Wt 99.7 kg (219 lb 12.8 oz)   SpO2 97%   BMI 30.66 kg/m²    O2 Flow Rate (L/min): 15 l/min O2 Device: Endotracheal tube, Ventilator Temp (24hrs), Av °F (36.7 °C), Min:97 °F (36.1 °C), Max:99 °F (37.2 °C)           Intake/Output:     Intake/Output Summary (Last 24 hours) at 2021 0955  Last data filed at 2021 0700  Gross per 24 hour   Intake 3749.36 ml   Output 3095 ml   Net 654.36 ml       Physical Exam:    General:  Sedated and on the ventilator. No acute distress. Eyes:  Sclera anicteric. Pupils equal, round, reactive to light. Mouth/Throat: Orotracheal tube in place. Neck: Supple. Lungs:   Rhonchi TH scattered, diminished in the BLL   Cardiovascular:  Regular rate and rhythm, no murmur, click, rub, or gallop. Abdomen:   Soft, non-tender, bowel sounds normal, non-distended. Extremities: No cyanosis or edema. Skin: No acute rash or lesions. Lipoma noted over left frontal region    Lymph Nodes: Cervical and supraclavicular normal.   Musculoskeletal:  No swelling or deformity. Lines/Devices:  Intact, no erythema, drainage, or tenderness. Psychiatric: Sedated and appears comfortable on ventilator.          LABS AND  DATA: Personally reviewed  Recent Labs     01/01/21  0354 12/31/20  0425   WBC 9.2 9.9   HGB 12.6 12.8   HCT 41.1 41.4   * 403*     Recent Labs     01/01/21  0354 12/31/20  0425    141   K 3.9 4.2   * 113*   CO2 23 23   BUN 40* 46*   CREA 1.14 1.43*   * 160*   CA 9.0 9.1   MG 2.2 2.5*   PHOS 3.0 3.4     No results for input(s): AP, TBIL, TP, ALB, GLOB, AML, LPSE in the last 72 hours. No lab exists for component: SGOT, GPT, AMYP  No results for input(s): INR, PTP, APTT, INREXT, INREXT in the last 72 hours. Recent Labs     01/01/21  0511   PHI 7.41   PCO2I 34.8*   PO2I 78*   FIO2I 45     No results for input(s): CPK, CKMB, TROIQ, BNPP in the last 72 hours. Hemodynamics:   PAP:   CO:     Wedge:   CI:     CVP:    SVR:       PVR:       Ventilator Settings:  Mode Rate Tidal Volume Pressure FiO2 PEEP   Assist control   420 ml    45 % 8 cm H20     Peak airway pressure: 24 cm H2O    Minute ventilation: 9.11 l/min        MEDS: Reviewed    Chest X-Ray:  CXR Results  (Last 48 hours)               12/31/20 0901  XR CHEST PORT Final result    Impression:  IMPRESSION: No significant change       Narrative:  EXAM: XR CHEST PORT       INDICATION: Resp Fail       COMPARISON: 12/29/2020       FINDINGS: A portable AP radiograph of the chest was obtained at 849 hours. Patient's on cardiac monitor. Endotracheal tube, enteric feeding tube, and right   IJ central venous catheter are in stable position. Radha Graven Patchy right basilar   airspace opacity and small left pleural effusion with left lower lobe volume   loss again noted without significant change. Radha Graven Heart size is normal..  The bones   and soft tissues are grossly within normal limits. ECHO:  Pending     Multidisciplinary Rounds Completed:   Yes    ABCDEF Bundle/Checklist Completed:  Yes    SPECIAL EQUIPMENT  None    DISPOSITION  Stay in ICU    CRITICAL CARE CONSULTANT NOTE  I had a face to face encounter with the patient, reviewed and interpreted patient data including clinical events, labs, images, vital signs, I/O's, and examined patient. I have discussed the case and the plan and management of the patient's care with the consulting services, the bedside nurses and the respiratory therapist.      NOTE OF PERSONAL INVOLVEMENT IN CARE   This patient has a high probability of imminent, clinically significant deterioration, which requires the highest level of preparedness to intervene urgently. I participated in the decision-making and personally managed or directed the management of the following life and organ supporting interventions that required my frequent assessment to treat or prevent imminent deterioration. I personally spent 75 minutes of critical care time. This is time spent at this critically ill patient's bedside actively involved in patient care as well as the coordination of care and discussions with the patient's family. This does not include any procedural time which has been billed separately.     [address]  1/1/2021

## 2021-01-01 NOTE — INTERDISCIPLINARY ROUNDS
Multidisciplinary rounds were held 1/1/21. Today's plan/goal includes (but not limited to): Wean propofol as able and continue to assess neuro status. Possibly tracheostomy on Monday.

## 2021-01-01 NOTE — PROGRESS NOTES
2000 - Report received from [address] [place] [address], RN. VSS, incontinence care given for loose liquid stool. Spoke w/ finance, update given. 2100 - Pt restless, bucking vent & not ventilating, O2 sats 80s, pulling on restraints, Precedex maxed, Propofol titrated. 2200 - CHG bath given. Large liquid BM.  0000 - Pt coughing with copious secretions, pt coughed out DHF during suctioning, new one inserted. KUB obtained to check placemnt. Large liquid BM. Flexiseal placed per MD order. Assigned nurse, Jonah Wang, RN, and verifying nurse, Rosie Smith RN, verify a provider order for the Fecal Management System is present and have reviewed the indications and contraindications confirming it is appropriate to proceed with insertion of the Fecal Management System. 0400 - AM labs drawn. 0500 - Pt down to CT.  0600 - Back from CT. Able to begin weaning Propofol. Bedside and Verbal shift change report given to [address] [place] [address] (oncoming nurse) by Ghanshyam Tse (offgoing nurse). Report included the following information SBAR, Kardex, Intake/Output, MAR, Accordion, Recent Results, Cardiac Rhythm -NSR and Alarm Parameters .

## 2021-01-01 NOTE — PROGRESS NOTES
Bedside shift change report given to Jocelyn Caputo RN (oncoming nurse) by Arlyn Chahal RN (offgoing nurse). Report included the following information SBAR, Kardex, ED Summary, Intake/Output, MAR and Recent Results. SHIFT SUMMARY:    0800 Initial Shift  Assessment performed           Mental Status: Intubated. Respiratory: FiO2 45%           Cardiac: Sinus           GI/: Granados/ Flexi           IV Drips: Propofol 30mcg/kg/min, precedex 1.4mcg/kg/hr, NS 100cc/hr. 0900 Wife at bedside. Updated on plan of care by intensivist. Will continue to wean propofol as able. 1000 Retracted ETT 2cm with RT at bedside per intensivist. Patient continuously gagging on tube. Maxed propofol. 1030 Patient hypertensive SBP >170s and continuing to gag on tube despite being maxed on propofol and precedex. Sats decreased to 88%. Orders for 2mg Versed IV to be given. 1100 Pt sats increased to 94%. Appears calm at this time and BP stable SBP 140s. HR 80s. Will continue to closely monitor. 1400 Stopped saline per orders. 1800 Updated Alessandra on patient status. Bedside shift change report given to Arlyn Chahal RN (oncoming nurse) by Jocelyn Caputo RN (offgoing nurse). Report included the following information SBAR, Kardex, ED Summary, Intake/Output, MAR and Recent Results.

## 2021-01-02 LAB
ANION GAP SERPL CALC-SCNC: 3 MMOL/L (ref 5–15)
ANION GAP SERPL CALC-SCNC: 6 MMOL/L (ref 5–15)
ARTERIAL PATENCY WRIST A: ABNORMAL
ARTERIAL PATENCY WRIST A: ABNORMAL
BASE DEFICIT BLD-SCNC: 5 MMOL/L
BASE DEFICIT BLD-SCNC: 6 MMOL/L
BDY SITE: ABNORMAL
BDY SITE: ABNORMAL
BUN SERPL-MCNC: 37 MG/DL (ref 6–20)
BUN SERPL-MCNC: 42 MG/DL (ref 6–20)
BUN/CREAT SERPL: 30 (ref 12–20)
BUN/CREAT SERPL: 34 (ref 12–20)
CA-I BLD-SCNC: 1.3 MMOL/L (ref 1.12–1.32)
CA-I BLD-SCNC: 1.34 MMOL/L (ref 1.12–1.32)
CALCIUM SERPL-MCNC: 9.1 MG/DL (ref 8.5–10.1)
CALCIUM SERPL-MCNC: 9.8 MG/DL (ref 8.5–10.1)
CHLORIDE SERPL-SCNC: 112 MMOL/L (ref 97–108)
CHLORIDE SERPL-SCNC: 118 MMOL/L (ref 97–108)
CO2 SERPL-SCNC: 21 MMOL/L (ref 21–32)
CO2 SERPL-SCNC: 24 MMOL/L (ref 21–32)
CREAT SERPL-MCNC: 1.08 MG/DL (ref 0.7–1.3)
CREAT SERPL-MCNC: 1.42 MG/DL (ref 0.7–1.3)
ERYTHROCYTE [DISTWIDTH] IN BLOOD BY AUTOMATED COUNT: 15.2 % (ref 11.5–14.5)
GAS FLOW.O2 O2 DELIVERY SYS: ABNORMAL L/MIN
GAS FLOW.O2 O2 DELIVERY SYS: ABNORMAL L/MIN
GAS FLOW.O2 SETTING OXYMISER: 20 BPM
GAS FLOW.O2 SETTING OXYMISER: 20 BPM
GLUCOSE SERPL-MCNC: 109 MG/DL (ref 65–100)
GLUCOSE SERPL-MCNC: 133 MG/DL (ref 65–100)
HCO3 BLD-SCNC: 18.9 MMOL/L (ref 22–26)
HCO3 BLD-SCNC: 19.7 MMOL/L (ref 22–26)
HCT VFR BLD AUTO: 39.3 % (ref 36.6–50.3)
HGB BLD-MCNC: 12.4 G/DL (ref 12.1–17)
MAGNESIUM SERPL-MCNC: 2 MG/DL (ref 1.6–2.4)
MAGNESIUM SERPL-MCNC: 2.2 MG/DL (ref 1.6–2.4)
MCH RBC QN AUTO: 27 PG (ref 26–34)
MCHC RBC AUTO-ENTMCNC: 31.6 G/DL (ref 30–36.5)
MCV RBC AUTO: 85.4 FL (ref 80–99)
NRBC # BLD: 0 K/UL (ref 0–0.01)
NRBC BLD-RTO: 0 PER 100 WBC
O2/TOTAL GAS SETTING VFR VENT: 40 %
O2/TOTAL GAS SETTING VFR VENT: 40 %
PCO2 BLD: 30.9 MMHG (ref 35–45)
PCO2 BLD: 32.6 MMHG (ref 35–45)
PEEP RESPIRATORY: 8 CMH2O
PEEP RESPIRATORY: 8 CMH2O
PH BLD: 7.39 [PH] (ref 7.35–7.45)
PH BLD: 7.39 [PH] (ref 7.35–7.45)
PHOSPHATE SERPL-MCNC: 3 MG/DL (ref 2.6–4.7)
PLATELET # BLD AUTO: 440 K/UL (ref 150–400)
PMV BLD AUTO: 10.3 FL (ref 8.9–12.9)
PO2 BLD: 76 MMHG (ref 80–100)
POTASSIUM SERPL-SCNC: 3.6 MMOL/L (ref 3.5–5.1)
POTASSIUM SERPL-SCNC: 4.6 MMOL/L (ref 3.5–5.1)
RBC # BLD AUTO: 4.6 M/UL (ref 4.1–5.7)
SAO2 % BLD: 95 % (ref 92–97)
SODIUM SERPL-SCNC: 142 MMOL/L (ref 136–145)
SODIUM SERPL-SCNC: 142 MMOL/L (ref 136–145)
SPECIMEN TYPE: ABNORMAL
SPECIMEN TYPE: ABNORMAL
VENTILATION MODE VENT: ABNORMAL
VENTILATION MODE VENT: ABNORMAL
VOLUME CONTROL IVLC: YES
VOLUME CONTROL IVLC: YES
VT SETTING VENT: 420 ML
VT SETTING VENT: 420 ML
WBC # BLD AUTO: 9.1 K/UL (ref 4.1–11.1)

## 2021-01-02 PROCEDURE — P9045 ALBUMIN (HUMAN), 5%, 250 ML: HCPCS | Performed by: INTERNAL MEDICINE

## 2021-01-02 PROCEDURE — 74011000258 HC RX REV CODE- 258: Performed by: ANESTHESIOLOGY

## 2021-01-02 PROCEDURE — 65620000000 HC RM CCU GENERAL

## 2021-01-02 PROCEDURE — 84100 ASSAY OF PHOSPHORUS: CPT

## 2021-01-02 PROCEDURE — 94003 VENT MGMT INPAT SUBQ DAY: CPT

## 2021-01-02 PROCEDURE — 74011250636 HC RX REV CODE- 250/636: Performed by: INTERNAL MEDICINE

## 2021-01-02 PROCEDURE — 74011000250 HC RX REV CODE- 250: Performed by: NURSE PRACTITIONER

## 2021-01-02 PROCEDURE — 83735 ASSAY OF MAGNESIUM: CPT

## 2021-01-02 PROCEDURE — 74011250636 HC RX REV CODE- 250/636

## 2021-01-02 PROCEDURE — 82803 BLOOD GASES ANY COMBINATION: CPT

## 2021-01-02 PROCEDURE — 74011250637 HC RX REV CODE- 250/637: Performed by: ANESTHESIOLOGY

## 2021-01-02 PROCEDURE — 74011000250 HC RX REV CODE- 250: Performed by: ANESTHESIOLOGY

## 2021-01-02 PROCEDURE — 74011000250 HC RX REV CODE- 250

## 2021-01-02 PROCEDURE — 74011250636 HC RX REV CODE- 250/636: Performed by: NURSE PRACTITIONER

## 2021-01-02 PROCEDURE — 36415 COLL VENOUS BLD VENIPUNCTURE: CPT

## 2021-01-02 PROCEDURE — 80048 BASIC METABOLIC PNL TOTAL CA: CPT

## 2021-01-02 PROCEDURE — 74011000258 HC RX REV CODE- 258: Performed by: INTERNAL MEDICINE

## 2021-01-02 PROCEDURE — 74011250636 HC RX REV CODE- 250/636: Performed by: ANESTHESIOLOGY

## 2021-01-02 PROCEDURE — 85027 COMPLETE CBC AUTOMATED: CPT

## 2021-01-02 PROCEDURE — P9047 ALBUMIN (HUMAN), 25%, 50ML: HCPCS | Performed by: ANESTHESIOLOGY

## 2021-01-02 RX ORDER — SODIUM CHLORIDE, SODIUM LACTATE, POTASSIUM CHLORIDE, CALCIUM CHLORIDE 600; 310; 30; 20 MG/100ML; MG/100ML; MG/100ML; MG/100ML
50 INJECTION, SOLUTION INTRAVENOUS CONTINUOUS
Status: DISCONTINUED | OUTPATIENT
Start: 2021-01-02 | End: 2021-01-05

## 2021-01-02 RX ORDER — POTASSIUM CHLORIDE 29.8 MG/ML
20 INJECTION INTRAVENOUS
Status: COMPLETED | OUTPATIENT
Start: 2021-01-02 | End: 2021-01-02

## 2021-01-02 RX ORDER — METOLAZONE 5 MG/1
10 TABLET ORAL ONCE
Status: COMPLETED | OUTPATIENT
Start: 2021-01-02 | End: 2021-01-02

## 2021-01-02 RX ORDER — ALBUMIN HUMAN 250 G/1000ML
12.5 SOLUTION INTRAVENOUS EVERY 4 HOURS
Status: COMPLETED | OUTPATIENT
Start: 2021-01-02 | End: 2021-01-03

## 2021-01-02 RX ORDER — SODIUM CHLORIDE, SODIUM LACTATE, POTASSIUM CHLORIDE, CALCIUM CHLORIDE 600; 310; 30; 20 MG/100ML; MG/100ML; MG/100ML; MG/100ML
50 INJECTION, SOLUTION INTRAVENOUS CONTINUOUS
Status: DISCONTINUED | OUTPATIENT
Start: 2021-01-02 | End: 2021-01-03

## 2021-01-02 RX ORDER — FLUMAZENIL 0.1 MG/ML
0.3 INJECTION INTRAVENOUS ONCE
Status: COMPLETED | OUTPATIENT
Start: 2021-01-02 | End: 2021-01-02

## 2021-01-02 RX ORDER — FUROSEMIDE 10 MG/ML
40 INJECTION INTRAMUSCULAR; INTRAVENOUS EVERY 6 HOURS
Status: DISPENSED | OUTPATIENT
Start: 2021-01-02 | End: 2021-01-03

## 2021-01-02 RX ORDER — ALBUMIN HUMAN 50 G/1000ML
25 SOLUTION INTRAVENOUS ONCE
Status: COMPLETED | OUTPATIENT
Start: 2021-01-02 | End: 2021-01-02

## 2021-01-02 RX ORDER — LORAZEPAM 2 MG/ML
INJECTION INTRAMUSCULAR
Status: COMPLETED
Start: 2021-01-02 | End: 2021-01-02

## 2021-01-02 RX ORDER — LORAZEPAM 2 MG/ML
0.5 INJECTION INTRAMUSCULAR ONCE
Status: COMPLETED | OUTPATIENT
Start: 2021-01-02 | End: 2021-01-02

## 2021-01-02 RX ORDER — FLUMAZENIL 0.1 MG/ML
INJECTION INTRAVENOUS
Status: COMPLETED
Start: 2021-01-02 | End: 2021-01-02

## 2021-01-02 RX ADMIN — SODIUM CHLORIDE, POTASSIUM CHLORIDE, SODIUM LACTATE AND CALCIUM CHLORIDE 75 ML/HR: 600; 310; 30; 20 INJECTION, SOLUTION INTRAVENOUS at 20:51

## 2021-01-02 RX ADMIN — Medication 10 ML: at 21:00

## 2021-01-02 RX ADMIN — SODIUM CHLORIDE, POTASSIUM CHLORIDE, SODIUM LACTATE AND CALCIUM CHLORIDE 50 ML/HR: 600; 310; 30; 20 INJECTION, SOLUTION INTRAVENOUS at 18:01

## 2021-01-02 RX ADMIN — DEXMEDETOMIDINE HYDROCHLORIDE 1.4 MCG/KG/HR: 400 INJECTION INTRAVENOUS at 08:04

## 2021-01-02 RX ADMIN — FAMOTIDINE 20 MG: 10 INJECTION, SOLUTION INTRAVENOUS at 20:32

## 2021-01-02 RX ADMIN — PROPOFOL 30 MCG/KG/MIN: 10 INJECTION, EMULSION INTRAVENOUS at 08:46

## 2021-01-02 RX ADMIN — ALBUMIN (HUMAN) 25 G: 12.5 INJECTION, SOLUTION INTRAVENOUS at 20:40

## 2021-01-02 RX ADMIN — Medication 10 ML: at 13:33

## 2021-01-02 RX ADMIN — LORAZEPAM 0.5 MG: 2 INJECTION INTRAMUSCULAR at 14:38

## 2021-01-02 RX ADMIN — AMPICILLIN SODIUM AND SULBACTAM SODIUM 3 G: 2; 1 INJECTION, POWDER, FOR SOLUTION INTRAMUSCULAR; INTRAVENOUS at 12:06

## 2021-01-02 RX ADMIN — CHLORHEXIDINE GLUCONATE 15 ML: 0.12 RINSE ORAL at 12:21

## 2021-01-02 RX ADMIN — PROPOFOL 40 MCG/KG/MIN: 10 INJECTION, EMULSION INTRAVENOUS at 04:22

## 2021-01-02 RX ADMIN — SODIUM CHLORIDE 3 ML/HR: 9 INJECTION, SOLUTION INTRAVENOUS at 21:00

## 2021-01-02 RX ADMIN — ALBUMIN (HUMAN) 12.5 G: 0.25 INJECTION, SOLUTION INTRAVENOUS at 15:54

## 2021-01-02 RX ADMIN — FUROSEMIDE 40 MG: 10 INJECTION, SOLUTION INTRAMUSCULAR; INTRAVENOUS at 08:03

## 2021-01-02 RX ADMIN — POTASSIUM CHLORIDE 20 MEQ: 400 INJECTION, SOLUTION INTRAVENOUS at 17:55

## 2021-01-02 RX ADMIN — AMPICILLIN SODIUM AND SULBACTAM SODIUM 3 G: 2; 1 INJECTION, POWDER, FOR SOLUTION INTRAMUSCULAR; INTRAVENOUS at 05:21

## 2021-01-02 RX ADMIN — Medication 10 ML: at 05:21

## 2021-01-02 RX ADMIN — DEXMEDETOMIDINE HYDROCHLORIDE 1.4 MCG/KG/HR: 400 INJECTION INTRAVENOUS at 11:13

## 2021-01-02 RX ADMIN — METOLAZONE 10 MG: 5 TABLET ORAL at 08:03

## 2021-01-02 RX ADMIN — FLUMAZENIL 0.3 MG: 0.1 INJECTION INTRAVENOUS at 14:49

## 2021-01-02 RX ADMIN — DEXMEDETOMIDINE HYDROCHLORIDE 1.2 MCG/KG/HR: 400 INJECTION INTRAVENOUS at 14:05

## 2021-01-02 RX ADMIN — FLUMAZENIL 0.3 MG: 0.1 INJECTION, SOLUTION INTRAVENOUS at 14:49

## 2021-01-02 RX ADMIN — PROPOFOL 5 MCG/KG/MIN: 10 INJECTION, EMULSION INTRAVENOUS at 14:05

## 2021-01-02 RX ADMIN — PROPOFOL 40 MCG/KG/MIN: 10 INJECTION, EMULSION INTRAVENOUS at 00:06

## 2021-01-02 RX ADMIN — CHLORHEXIDINE GLUCONATE 15 ML: 0.12 RINSE ORAL at 20:40

## 2021-01-02 RX ADMIN — SODIUM CHLORIDE, POTASSIUM CHLORIDE, SODIUM LACTATE AND CALCIUM CHLORIDE 500 ML: 600; 310; 30; 20 INJECTION, SOLUTION INTRAVENOUS at 18:54

## 2021-01-02 RX ADMIN — SODIUM CHLORIDE 5 ML/HR: 9 INJECTION, SOLUTION INTRAVENOUS at 20:56

## 2021-01-02 RX ADMIN — POTASSIUM CHLORIDE 20 MEQ: 400 INJECTION, SOLUTION INTRAVENOUS at 18:55

## 2021-01-02 RX ADMIN — ENOXAPARIN SODIUM 40 MG: 40 INJECTION SUBCUTANEOUS at 08:04

## 2021-01-02 RX ADMIN — LORAZEPAM 0.5 MG: 2 INJECTION INTRAMUSCULAR; INTRAVENOUS at 14:38

## 2021-01-02 RX ADMIN — AMPICILLIN SODIUM AND SULBACTAM SODIUM 3 G: 2; 1 INJECTION, POWDER, FOR SOLUTION INTRAMUSCULAR; INTRAVENOUS at 23:27

## 2021-01-02 RX ADMIN — AMPICILLIN SODIUM AND SULBACTAM SODIUM 3 G: 2; 1 INJECTION, POWDER, FOR SOLUTION INTRAMUSCULAR; INTRAVENOUS at 17:54

## 2021-01-02 RX ADMIN — CLONIDINE HYDROCHLORIDE 0.1 MG: 0.1 TABLET ORAL at 08:03

## 2021-01-02 RX ADMIN — DEXMEDETOMIDINE HYDROCHLORIDE 1.4 MCG/KG/HR: 400 INJECTION INTRAVENOUS at 05:21

## 2021-01-02 RX ADMIN — FAMOTIDINE 20 MG: 10 INJECTION, SOLUTION INTRAVENOUS at 08:03

## 2021-01-02 RX ADMIN — ALBUMIN (HUMAN) 12.5 G: 0.25 INJECTION, SOLUTION INTRAVENOUS at 20:33

## 2021-01-02 RX ADMIN — FUROSEMIDE 40 MG: 10 INJECTION, SOLUTION INTRAMUSCULAR; INTRAVENOUS at 17:55

## 2021-01-02 RX ADMIN — METHYLPREDNISOLONE SODIUM SUCCINATE 20 MG: 40 INJECTION, POWDER, FOR SOLUTION INTRAMUSCULAR; INTRAVENOUS at 08:02

## 2021-01-02 RX ADMIN — FUROSEMIDE 40 MG: 10 INJECTION, SOLUTION INTRAMUSCULAR; INTRAVENOUS at 12:06

## 2021-01-02 RX ADMIN — Medication 30 ML: at 08:12

## 2021-01-02 RX ADMIN — DEXMEDETOMIDINE HYDROCHLORIDE 1.4 MCG/KG/HR: 400 INJECTION INTRAVENOUS at 02:25

## 2021-01-02 RX ADMIN — AMLODIPINE BESYLATE 5 MG: 5 TABLET ORAL at 08:03

## 2021-01-02 RX ADMIN — QUETIAPINE FUMARATE 100 MG: 100 TABLET ORAL at 08:03

## 2021-01-02 RX ADMIN — ACETAMINOPHEN ORAL SOLUTION 650 MG: 650 SOLUTION ORAL at 20:32

## 2021-01-02 RX ADMIN — DEXMEDETOMIDINE HYDROCHLORIDE 1.4 MCG/KG/HR: 400 INJECTION INTRAVENOUS at 00:05

## 2021-01-02 NOTE — PROGRESS NOTES
[facility]    ICU TEAM Progress Note    Name: Carol Kuo   : 1965   MRN: [id number]   Date: 2021      Assessment     ICU Problems:    1. Acute Hypoxic Respiratory Failure  1. CAP (Haemophilus influenzae & Streptococcus)  2. Aspiration PNA  3. Hx tobacco abuse/? COPD  2. Metabolic Encephalopathy  1. ?Steroids/?Infection  2. Hyperactive Delirium   3. Obesity   4. YISSEL/CKD stage 2 (resolving)  5. Left frontal lipoma-Chronic       [email or web address]        ICU Comprehensive Plan of Care:   1. Acute Hypoxic Respiratory Failure (Haemophilus influenza PNA; Tobacco Abuse Hx)  - SAT/SBT today - discussed with bedside RN  - Seroquel 100 mg q8  - Monitor QTC  - Unasyn   - Melatonin  - Wean Methylpred again  - Scopolamine patch  - Continue ventilator support - optimize PEEP - Driving pressure is good  - Wean FiO2 for SpO2 > 90%  - Lasix/Metolozone  - HOB > 30  - Propofol/Precedex  - Ultrasound LE --> NEG  - CT CHEST PE PROT --> NEG  - Consult Thoracic for possible Perc Trach on 2020    2. Elevated troponin (resolving)  -ECHO 40-45%   -Cardiology following    3. YISSEL/CKD stage 2  -From Hypotension  -Nephrology following  -Strict I and O  -NS for 6 hours post CT    4. HTN  -- PRN Labetalol  -- Norvasc    Respiratory Goals:  a. Chlorhexidine   b. Optimize PEEP/Ventilation/Oxygenation  c. Goal Tidal Volume 6 cc/kg based on IBW  d. Aim for lung protective ventilation  e. Head of bed > 30 degrees  f.  Aggressive bronchopulmonary hygiene  Goals of Care Discussion with family Yes   Plan of Care/Code Status: Full Code  Appreciate Consultants Input Cardiology, Nephrology   Discussed Care Plan with Bedside RN  Documentation of Current Medications    Subjective:   Progress Note: 2021      Reason for ICU Admission: Acute hypoxic respiratory failure      HPI: Carol Kuo is a 51 yo AAM with a PMH significant for nonischemic cardiomyopathy, left frontal lipoma, CKD, obesity, chronic tobacco abuse, who presented to the ER via EMS with co increased SOB X2 weeks. Pt is currently sedated and on the ventilator, unable to provide HPI thereofre obtained via chart and provider. Pt failed bipap while in the ER with continued worsening hypoxia, therefore pt was intubated and placed on the ventilator. On arrival to ED pt was found to be hypertensive with /100. Patient was then transferred to ICU for further management in care.       Overnight Events:   1/2/2021  Awakens to voice, FIO2 down to 30%    Active Problem List:     Problem List  Date Reviewed: 9/28/2020          Codes Class    Hypoxia ICD-10-CM: R09.02  ICD-9-CM: 799.02         Cervical radiculopathy ICD-10-CM: M54.12  ICD-9-CM: 723.4         Arm paresthesia, left ICD-10-CM: R20.2  ICD-9-CM: 782.0         Intractable headache ICD-10-CM: R51.9  ICD-9-CM: 784.0         Dilated cardiomyopathy (HCC) ICD-10-CM: I42.0  ICD-9-CM: 425.4         NSTEMI (non-ST elevated myocardial infarction) ([facility] [address] ICD-10-CM: I21.4  ICD-9-CM: 410.70         HTN (hypertension) ICD-10-CM: I10  ICD-9-CM: 401.9               Past Medical History:      has a past medical history of Hypertension. Past Surgical History:      has no past surgical history on file. Home Medications:     Prior to Admission medications    Medication Sig Start Date End Date Taking? Authorizing Provider   aspirin-acetaminophen-caffeine ([employer] Extra Strength) 520-260-32.5 mg pwpk Take 1 Package by mouth two (2) times a day. Yes Provider, Historical   ibuprofen (MOTRIN) 600 mg tablet Take 600 mg by mouth every six (6) hours as needed for Pain. Yes Provider, Historical   carvediloL (COREG) 25 mg tablet Take 1 Tab by mouth two (2) times daily (with meals). 7/16/20  Yes Kojo Nieto MD   isosorbide mononitrate ER (IMDUR) 30 mg tablet Take 1 Tab by mouth daily. 7/16/20  Yes Kojo Nieto MD   cloNIDine HCL (CATAPRES) 0.1 mg tablet Take 1 Tab by mouth two (2) times a day.  1/16/20  Yes Hawk Rodgers, Jan Walters MD   aspirin 81 mg chewable tablet Take 1 Tab by mouth daily. 19  Yes Royer Bailey MD       Allergies/Social/Family History:     No Known Allergies   Social History     Tobacco Use    Smoking status: Current Every Day Smoker     Packs/day: 1.00    Smokeless tobacco: Never Used   Substance Use Topics    Alcohol use: Yes     Alcohol/week: 5.0 standard drinks     Types: 6 Cans of beer per week     Comment: Occasionally      Family History   Problem Relation Age of Onset    Stroke Father     Cancer Sister        Review of Systems:     Review of systems not obtained due to patient factors. Objective:   Vital Signs:  Visit Vitals  BP (!) 153/86 (BP 1 Location: Left arm, BP Patient Position: At rest)   Pulse 61   Temp 97.5 °F (36.4 °C)   Resp 20   Ht 5' 11\" (1.803 m)   Wt 99.5 kg (219 lb 5.7 oz)   SpO2 97%   BMI 30.59 kg/m²    O2 Flow Rate (L/min): 15 l/min O2 Device: Endotracheal tube, Ventilator Temp (24hrs), Av.4 °F (36.9 °C), Min:97 °F (36.1 °C), Max:100 °F (37.8 °C)           Intake/Output:     Intake/Output Summary (Last 24 hours) at 2021 0744  Last data filed at 2021 0700  Gross per 24 hour   Intake 2686.97 ml   Output 2625 ml   Net 61.97 ml       Physical Exam:    General:  Sedated and on the ventilator. No acute distress. Eyes:  Sclera anicteric. Pupils equal, round, reactive to light. Mouth/Throat: Orotracheal tube in place. Neck: Supple. Lungs:   Rhonchi TH scattered, diminished in the BLL   Cardiovascular:  Regular rate and rhythm, no murmur, click, rub, or gallop. Abdomen:   Soft, non-tender, bowel sounds normal, non-distended. Extremities: No cyanosis or edema. Skin: No acute rash or lesions. Lipoma noted over left frontal region    Lymph Nodes: Cervical and supraclavicular normal.   Musculoskeletal:  No swelling or deformity. Lines/Devices:  Intact, no erythema, drainage, or tenderness. Psychiatric: Sedated and appears comfortable on ventilator. LABS AND  DATA: Personally reviewed  Recent Labs     01/02/21  0359 01/01/21  0354   WBC 9.1 9.2   HGB 12.4 12.6   HCT 39.3 41.1   * 406*     Recent Labs     01/02/21  0359 01/01/21  0354    141   K 4.6 3.9   * 116*   CO2 21 23   BUN 37* 40*   CREA 1.08 1.14   * 135*   CA 9.1 9.0   MG 2.2 2.2   PHOS 3.0 3.0     No results for input(s): AP, TBIL, TP, ALB, GLOB, AML, LPSE in the last 72 hours. No lab exists for component: SGOT, GPT, AMYP  No results for input(s): INR, PTP, APTT, INREXT, INREXT in the last 72 hours. Recent Labs     01/02/21  0424 01/02/21  0419 01/01/21  0511   PHI 7.39 7.39 7.41   PCO2I 30.9* 32.6* 34.8*   PO2I 76*  --  78*   FIO2I 40 40 45     No results for input(s): CPK, CKMB, TROIQ, BNPP in the last 72 hours. Hemodynamics:   PAP:   CO:     Wedge:   CI:     CVP:    SVR:       PVR:       Ventilator Settings:  Mode Rate Tidal Volume Pressure FiO2 PEEP   Assist control, Volume control   420 ml    40 % 8 cm H20     Peak airway pressure: 27 cm H2O    Minute ventilation: 9.05 l/min        MEDS: Reviewed    Chest X-Ray:  CXR Results  (Last 48 hours)               12/31/20 0901  XR CHEST PORT Final result    Impression:  IMPRESSION: No significant change       Narrative:  EXAM: XR CHEST PORT       INDICATION: Resp Fail       COMPARISON: 12/29/2020       FINDINGS: A portable AP radiograph of the chest was obtained at 849 hours. Patient's on cardiac monitor. Endotracheal tube, enteric feeding tube, and right   IJ central venous catheter are in stable position. Jj Confer Patchy right basilar   airspace opacity and small left pleural effusion with left lower lobe volume   loss again noted without significant change. Jj Confer Heart size is normal..  The bones   and soft tissues are grossly within normal limits. ECHO:  Pending     Multidisciplinary Rounds Completed:   Yes    ABCDEF Bundle/Checklist Completed:  Yes    SPECIAL EQUIPMENT  None    DISPOSITION  Stay in ICU    CRITICAL CARE CONSULTANT NOTE  I had a face to face encounter with the patient, reviewed and interpreted patient data including clinical events, labs, images, vital signs, I/O's, and examined patient. I have discussed the case and the plan and management of the patient's care with the consulting services, the bedside nurses and the respiratory therapist.      NOTE OF PERSONAL INVOLVEMENT IN CARE   This patient has a high probability of imminent, clinically significant deterioration, which requires the highest level of preparedness to intervene urgently. I participated in the decision-making and personally managed or directed the management of the following life and organ supporting interventions that required my frequent assessment to treat or prevent imminent deterioration. I personally spent 85 minutes of critical care time. This is time spent at this critically ill patient's bedside actively involved in patient care as well as the coordination of care and discussions with the patient's family. This does not include any procedural time which has been billed separately.     [address]  1/2/2021

## 2021-01-02 NOTE — PROGRESS NOTES
ID Progress Note  2021    Subjective:     Afebrile. Remains on the vent. Fio2 of 30%    Objective:     Vitals:   Visit Vitals  /65 (BP 1 Location: Left arm, BP Patient Position: At rest)   Pulse 64   Temp 98.2 °F (36.8 °C)   Resp 20   Ht 5' 11\" (1.803 m)   Wt 99.5 kg (219 lb 5.7 oz)   SpO2 94%   BMI 30.59 kg/m²        Tmax:  Temp (24hrs), Av.8 °F (36.6 °C), Min:97 °F (36.1 °C), Max:99.4 °F (37.4 °C)      Exam:    Intubated   (+) lipoma on forehead   Lung clear, no rales, wheezes or rhonchi   Heart: s1, s2, RRR, no murmurs rubs or clicks  Abdomen: soft nontender, no guarding or rebound  (+) angel     Labs:   Lab Results   Component Value Date/Time    WBC 9.1 2021 03:59 AM    HGB 12.4 2021 03:59 AM    HCT 39.3 2021 03:59 AM    PLATELET 448 (H) 1234 03:59 AM    MCV 85.4 2021 03:59 AM     Lab Results   Component Value Date/Time    Sodium 142 2021 03:59 AM    Potassium 4.6 2021 03:59 AM    Chloride 118 (H) 2021 03:59 AM    CO2 21 2021 03:59 AM    Anion gap 3 (L) 2021 03:59 AM    Glucose 133 (H) 2021 03:59 AM    BUN 37 (H) 2021 03:59 AM    Creatinine 1.08 2021 03:59 AM    BUN/Creatinine ratio 34 (H) 2021 03:59 AM    GFR est AA >60 2021 03:59 AM    GFR est non-AA >60 2021 03:59 AM    Calcium 9.1 2021 03:59 AM    Bilirubin, total 0.4 2020 04:26 AM    Alk. phosphatase 82 2020 04:26 AM    Protein, total 7.4 2020 04:26 AM    Albumin 2.2 (L) 2020 04:26 AM    Globulin 5.2 (H) 2020 04:26 AM    A-G Ratio 0.4 (L) 2020 04:26 AM    ALT (SGPT) 71 2020 04:26 AM           Assessment:     #1 respiratory failure with Haemophilus/Streptococcus pneumonia     #2 nonischemic cardiomyopathy     #3 chronic kidney disease     #4 chronic tobacco abuse     #5 hypertension               Recommendations:     Continue unasyn which should cover both haemophilus and aspiration until .  Oxygen requirements decreasing. Will check back Monday. Please call with questions.      Dominique Barajas MD

## 2021-01-02 NOTE — PROGRESS NOTES
1440: Extubated patient at this time, RN, DO, PCT, and patients family at bedside. Patient placed on 4LPM at this time. 1450: HFNC initiated per DO patient placed on 30LPM 42%, patient tolerated well. Will continue to monitor patient.

## 2021-01-02 NOTE — PROGRESS NOTES
Bedside shift change report given to Clive Mccormack RN (oncoming nurse) by Sonia Hutchinson RN (offgoing nurse). Report included the following information SBAR, Kardex, ED Summary, Intake/Output, MAR and Recent Results. 1000: Patient drowsy but arouses to voice and follows verbal commands. Fiance at bedside and updated on plan of care. 1200: Weaning sedation as able. 1415: Patient gagging continuously on ETT and large amounts of thick secretions around ETT. Attempted to increase sedation and  patient but unsuccessful. 1438: Patient still gagging on tube and sitting up in bed. Intensivisit ordered 0.5mg ativan one time and plan to extubate to Hi-Flow. 1445: Extubated to Hi-Flow with RT.     1500: Patient on 30L FiO2 100%. Sats 96%. Fiance at bedside with patient. Patient drowsy but follows verbal commands and nods appropriately. 1852: intensivist at bedside. HR 140s sinus tach. Orders for 500cc LR bolus in replacement of maintenance fluids. 1902: Patient had large BM and flexi seal is out in bed. Bathed patient and cream placed on bottom. No open areas at this time. Bedside shift change report given to Katey Schmitt RN (oncoming nurse) by Clive Mccormack RN (offgoing nurse). Report included the following information SBAR, Kardex, ED Summary, Intake/Output, MAR and Recent Results.

## 2021-01-02 NOTE — PROGRESS NOTES
2000 - Report received from Rudell Opitz, [facility]. VSS.  2100 - CHG bath given. Spoke w/ wife, update given. 2300 - PRN hydralazine given for SBP >160.  0400 - AM labs drawn. Bedside and Verbal shift change report given to Rudell Opitz (oncoming nurse) by Jennifer Vail (offgoing nurse). Report included the following information SBAR, Kardex, Intake/Output, MAR, Accordion, Recent Results, Cardiac Rhythm -NSR and Alarm Parameters .

## 2021-01-02 NOTE — INTERDISCIPLINARY ROUNDS
Multidisciplinary rounds were held 1/2/21. Today's plan/goal includes (but not limited to):  Control HTN

## 2021-01-03 ENCOUNTER — APPOINTMENT (OUTPATIENT)
Dept: GENERAL RADIOLOGY | Age: 56
DRG: 207 | End: 2021-01-03
Attending: ANESTHESIOLOGY

## 2021-01-03 LAB
ANION GAP SERPL CALC-SCNC: 6 MMOL/L (ref 5–15)
ATRIAL RATE: 145 BPM
BNP SERPL-MCNC: 855 PG/ML
BUN SERPL-MCNC: 36 MG/DL (ref 6–20)
BUN/CREAT SERPL: 23 (ref 12–20)
CALCIUM SERPL-MCNC: 9.9 MG/DL (ref 8.5–10.1)
CALCULATED P AXIS, ECG09: 54 DEGREES
CALCULATED R AXIS, ECG10: 6 DEGREES
CALCULATED T AXIS, ECG11: 97 DEGREES
CHLORIDE SERPL-SCNC: 110 MMOL/L (ref 97–108)
CO2 SERPL-SCNC: 27 MMOL/L (ref 21–32)
CREAT SERPL-MCNC: 1.59 MG/DL (ref 0.7–1.3)
DIAGNOSIS, 93000: NORMAL
ERYTHROCYTE [DISTWIDTH] IN BLOOD BY AUTOMATED COUNT: 15.4 % (ref 11.5–14.5)
GLUCOSE SERPL-MCNC: 121 MG/DL (ref 65–100)
HCT VFR BLD AUTO: 40.7 % (ref 36.6–50.3)
HGB BLD-MCNC: 12.7 G/DL (ref 12.1–17)
MAGNESIUM SERPL-MCNC: 1.9 MG/DL (ref 1.6–2.4)
MCH RBC QN AUTO: 26.5 PG (ref 26–34)
MCHC RBC AUTO-ENTMCNC: 31.2 G/DL (ref 30–36.5)
MCV RBC AUTO: 85 FL (ref 80–99)
NRBC # BLD: 0 K/UL (ref 0–0.01)
NRBC BLD-RTO: 0 PER 100 WBC
P-R INTERVAL, ECG05: 128 MS
PHOSPHATE SERPL-MCNC: 3.6 MG/DL (ref 2.6–4.7)
PLATELET # BLD AUTO: 423 K/UL (ref 150–400)
PMV BLD AUTO: 9.5 FL (ref 8.9–12.9)
POTASSIUM SERPL-SCNC: 2.8 MMOL/L (ref 3.5–5.1)
Q-T INTERVAL, ECG07: 276 MS
QRS DURATION, ECG06: 90 MS
QTC CALCULATION (BEZET), ECG08: 428 MS
RBC # BLD AUTO: 4.79 M/UL (ref 4.1–5.7)
SODIUM SERPL-SCNC: 143 MMOL/L (ref 136–145)
VENTRICULAR RATE, ECG03: 145 BPM
WBC # BLD AUTO: 12 K/UL (ref 4.1–11.1)

## 2021-01-03 PROCEDURE — 74011000250 HC RX REV CODE- 250: Performed by: INTERNAL MEDICINE

## 2021-01-03 PROCEDURE — 94640 AIRWAY INHALATION TREATMENT: CPT

## 2021-01-03 PROCEDURE — 65620000000 HC RM CCU GENERAL

## 2021-01-03 PROCEDURE — P9047 ALBUMIN (HUMAN), 25%, 50ML: HCPCS | Performed by: ANESTHESIOLOGY

## 2021-01-03 PROCEDURE — 74011000258 HC RX REV CODE- 258: Performed by: ANESTHESIOLOGY

## 2021-01-03 PROCEDURE — 80048 BASIC METABOLIC PNL TOTAL CA: CPT

## 2021-01-03 PROCEDURE — 85027 COMPLETE CBC AUTOMATED: CPT

## 2021-01-03 PROCEDURE — 83880 ASSAY OF NATRIURETIC PEPTIDE: CPT

## 2021-01-03 PROCEDURE — 74011250636 HC RX REV CODE- 250/636: Performed by: ANESTHESIOLOGY

## 2021-01-03 PROCEDURE — 93005 ELECTROCARDIOGRAM TRACING: CPT

## 2021-01-03 PROCEDURE — 77030029065 HC DRSG HEMO QCLOT ZMED -B

## 2021-01-03 PROCEDURE — 36415 COLL VENOUS BLD VENIPUNCTURE: CPT

## 2021-01-03 PROCEDURE — 74011000250 HC RX REV CODE- 250: Performed by: ANESTHESIOLOGY

## 2021-01-03 PROCEDURE — 74011250637 HC RX REV CODE- 250/637: Performed by: ANESTHESIOLOGY

## 2021-01-03 PROCEDURE — 74011250636 HC RX REV CODE- 250/636: Performed by: NURSE PRACTITIONER

## 2021-01-03 PROCEDURE — 84100 ASSAY OF PHOSPHORUS: CPT

## 2021-01-03 PROCEDURE — 83735 ASSAY OF MAGNESIUM: CPT

## 2021-01-03 PROCEDURE — 77010033711 HC HIGH FLOW OXYGEN

## 2021-01-03 PROCEDURE — 74011000250 HC RX REV CODE- 250: Performed by: NURSE PRACTITIONER

## 2021-01-03 PROCEDURE — 71045 X-RAY EXAM CHEST 1 VIEW: CPT

## 2021-01-03 PROCEDURE — 94664 DEMO&/EVAL PT USE INHALER: CPT

## 2021-01-03 PROCEDURE — 77010033678 HC OXYGEN DAILY

## 2021-01-03 PROCEDURE — 74011250636 HC RX REV CODE- 250/636: Performed by: INTERNAL MEDICINE

## 2021-01-03 RX ORDER — LABETALOL HYDROCHLORIDE 5 MG/ML
INJECTION, SOLUTION INTRAVENOUS
Status: DISPENSED
Start: 2021-01-03 | End: 2021-01-03

## 2021-01-03 RX ORDER — IPRATROPIUM BROMIDE AND ALBUTEROL SULFATE 2.5; .5 MG/3ML; MG/3ML
3 SOLUTION RESPIRATORY (INHALATION)
Status: DISCONTINUED | OUTPATIENT
Start: 2021-01-03 | End: 2021-01-04

## 2021-01-03 RX ORDER — POTASSIUM CHLORIDE 29.8 MG/ML
20 INJECTION INTRAVENOUS
Status: COMPLETED | OUTPATIENT
Start: 2021-01-03 | End: 2021-01-03

## 2021-01-03 RX ORDER — HYDRALAZINE HYDROCHLORIDE 20 MG/ML
20 INJECTION INTRAMUSCULAR; INTRAVENOUS ONCE
Status: COMPLETED | OUTPATIENT
Start: 2021-01-03 | End: 2021-01-03

## 2021-01-03 RX ORDER — CARVEDILOL 12.5 MG/1
12.5 TABLET ORAL 2 TIMES DAILY WITH MEALS
Status: DISCONTINUED | OUTPATIENT
Start: 2021-01-03 | End: 2021-01-04

## 2021-01-03 RX ORDER — MAGNESIUM SULFATE 1 G/100ML
1 INJECTION INTRAVENOUS ONCE
Status: COMPLETED | OUTPATIENT
Start: 2021-01-03 | End: 2021-01-03

## 2021-01-03 RX ORDER — LABETALOL HYDROCHLORIDE 5 MG/ML
40 INJECTION, SOLUTION INTRAVENOUS
Status: DISCONTINUED | OUTPATIENT
Start: 2021-01-03 | End: 2021-01-07 | Stop reason: HOSPADM

## 2021-01-03 RX ADMIN — HYDRALAZINE HYDROCHLORIDE 20 MG: 20 INJECTION INTRAMUSCULAR; INTRAVENOUS at 05:16

## 2021-01-03 RX ADMIN — CLONIDINE HYDROCHLORIDE 0.1 MG: 0.1 TABLET ORAL at 18:07

## 2021-01-03 RX ADMIN — AMLODIPINE BESYLATE 5 MG: 5 TABLET ORAL at 08:21

## 2021-01-03 RX ADMIN — ALBUMIN (HUMAN) 12.5 G: 0.25 INJECTION, SOLUTION INTRAVENOUS at 05:17

## 2021-01-03 RX ADMIN — POTASSIUM CHLORIDE 20 MEQ: 400 INJECTION, SOLUTION INTRAVENOUS at 05:27

## 2021-01-03 RX ADMIN — POTASSIUM CHLORIDE 20 MEQ: 400 INJECTION, SOLUTION INTRAVENOUS at 08:22

## 2021-01-03 RX ADMIN — ONDANSETRON 4 MG: 2 INJECTION INTRAMUSCULAR; INTRAVENOUS at 18:15

## 2021-01-03 RX ADMIN — CLONIDINE HYDROCHLORIDE 0.1 MG: 0.1 TABLET ORAL at 08:21

## 2021-01-03 RX ADMIN — POTASSIUM BICARBONATE 40 MEQ: 782 TABLET, EFFERVESCENT ORAL at 08:21

## 2021-01-03 RX ADMIN — FAMOTIDINE 20 MG: 10 INJECTION, SOLUTION INTRAVENOUS at 21:06

## 2021-01-03 RX ADMIN — ACETAMINOPHEN ORAL SOLUTION 650 MG: 650 SOLUTION ORAL at 03:10

## 2021-01-03 RX ADMIN — LABETALOL HYDROCHLORIDE 20 MG: 5 INJECTION INTRAVENOUS at 06:05

## 2021-01-03 RX ADMIN — Medication 30 ML: at 08:21

## 2021-01-03 RX ADMIN — AMPICILLIN SODIUM AND SULBACTAM SODIUM 3 G: 2; 1 INJECTION, POWDER, FOR SOLUTION INTRAMUSCULAR; INTRAVENOUS at 11:04

## 2021-01-03 RX ADMIN — HYDRALAZINE HYDROCHLORIDE 20 MG: 20 INJECTION INTRAMUSCULAR; INTRAVENOUS at 00:24

## 2021-01-03 RX ADMIN — SODIUM CHLORIDE, POTASSIUM CHLORIDE, SODIUM LACTATE AND CALCIUM CHLORIDE 75 ML/HR: 600; 310; 30; 20 INJECTION, SOLUTION INTRAVENOUS at 17:58

## 2021-01-03 RX ADMIN — IPRATROPIUM BROMIDE AND ALBUTEROL SULFATE 3 ML: .5; 3 SOLUTION RESPIRATORY (INHALATION) at 11:20

## 2021-01-03 RX ADMIN — IPRATROPIUM BROMIDE AND ALBUTEROL SULFATE 3 ML: .5; 3 SOLUTION RESPIRATORY (INHALATION) at 15:20

## 2021-01-03 RX ADMIN — POTASSIUM CHLORIDE 20 MEQ: 400 INJECTION, SOLUTION INTRAVENOUS at 09:46

## 2021-01-03 RX ADMIN — POTASSIUM CHLORIDE 20 MEQ: 400 INJECTION, SOLUTION INTRAVENOUS at 06:58

## 2021-01-03 RX ADMIN — LABETALOL HYDROCHLORIDE 15 MG: 5 INJECTION INTRAVENOUS at 00:58

## 2021-01-03 RX ADMIN — Medication 10 ML: at 21:07

## 2021-01-03 RX ADMIN — POTASSIUM BICARBONATE 40 MEQ: 782 TABLET, EFFERVESCENT ORAL at 18:07

## 2021-01-03 RX ADMIN — SODIUM CHLORIDE, POTASSIUM CHLORIDE, SODIUM LACTATE AND CALCIUM CHLORIDE 75 ML/HR: 600; 310; 30; 20 INJECTION, SOLUTION INTRAVENOUS at 03:20

## 2021-01-03 RX ADMIN — CARVEDILOL 12.5 MG: 12.5 TABLET, FILM COATED ORAL at 18:07

## 2021-01-03 RX ADMIN — ALBUMIN (HUMAN) 12.5 G: 0.25 INJECTION, SOLUTION INTRAVENOUS at 02:15

## 2021-01-03 RX ADMIN — FAMOTIDINE 20 MG: 10 INJECTION, SOLUTION INTRAVENOUS at 08:21

## 2021-01-03 RX ADMIN — AMPICILLIN SODIUM AND SULBACTAM SODIUM 3 G: 2; 1 INJECTION, POWDER, FOR SOLUTION INTRAMUSCULAR; INTRAVENOUS at 05:17

## 2021-01-03 RX ADMIN — CARVEDILOL 12.5 MG: 12.5 TABLET, FILM COATED ORAL at 10:59

## 2021-01-03 RX ADMIN — AMPICILLIN SODIUM AND SULBACTAM SODIUM 3 G: 2; 1 INJECTION, POWDER, FOR SOLUTION INTRAMUSCULAR; INTRAVENOUS at 18:07

## 2021-01-03 RX ADMIN — Medication 10 ML: at 05:17

## 2021-01-03 RX ADMIN — IPRATROPIUM BROMIDE AND ALBUTEROL SULFATE 3 ML: .5; 3 SOLUTION RESPIRATORY (INHALATION) at 19:27

## 2021-01-03 RX ADMIN — MAGNESIUM SULFATE HEPTAHYDRATE 1 G: 1 INJECTION, SOLUTION INTRAVENOUS at 05:24

## 2021-01-03 RX ADMIN — HYDRALAZINE HYDROCHLORIDE 20 MG: 20 INJECTION INTRAMUSCULAR; INTRAVENOUS at 10:15

## 2021-01-03 RX ADMIN — ENOXAPARIN SODIUM 40 MG: 40 INJECTION SUBCUTANEOUS at 08:20

## 2021-01-03 RX ADMIN — Medication 10 ML: at 18:08

## 2021-01-03 NOTE — PROGRESS NOTES
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 + contrast on 12/31  - UA : trace blood and 3+ protein --> serologic w/u neg except + gammopathy screen  - renal US : unremarkable except for benign cyst   - hold lasix now that he is extubated  - LR 75cc/hr  - daily labs and I/Os    Hypokalemia:  - repletion ordered    Haemophilus/Streptococcus pneumonia:  - abx per ID    Resp failure:  - from PNA and volume  - LE dopplers neg and CTA neg on 12/31  - extubated 1/2/21     Malignant HTN   Hypertensive Urgency   - negative UDS  - resolved     Anemia:  - hgb stable  - + M spike along with elevated K light chains and IgM levels  - will need heme eval eventually     Interval History:  Seen and examined. Extubated, awake and alert. UOP > 6 L. Cr up, K low. No issues overnight per RN. Review of Systems: Review of systems not obtained due to patient factors.     Current Medications:   Current Facility-Administered Medications   Medication Dose Route Frequency    labetaloL (NORMODYNE;TRANDATE) injection 40 mg  40 mg IntraVENous Q6H PRN    potassium chloride 20 mEq in 50 ml IVPB  20 mEq IntraVENous Q1H    magnesium sulfate 1 g/100 ml IVPB (premix or compounded)  1 g IntraVENous ONCE    potassium bicarb-citric acid (EFFER-K) tablet 40 mEq  40 mEq Oral BID    ampicillin-sulbactam (UNASYN) 3 g in 0.9% sodium chloride (MBP/ADV) 100 mL MBP  3 g IntraVENous Q6H    lactated Ringers infusion  75 mL/hr IntraVENous CONTINUOUS    famotidine (PF) (PEPCID) 20 mg in 0.9% sodium chloride 10 mL injection  20 mg IntraVENous O56O    multivit-folic acid-herbal 568 (WELLESSE PLUS) oral liquid 30 mL  30 mL Oral DAILY    0.9% sodium chloride infusion  3 mL/hr IntraVENous CONTINUOUS    0.9% sodium chloride infusion  5 mL/hr IntraVENous CONTINUOUS    docusate (COLACE) 50 mg/5 mL oral liquid 100 mg  100 mg Oral DAILY    acetaminophen (TYLENOL) solution 650 mg  650 mg Per NG tube Q6H PRN    nicotine (NICODERM CQ) 14 mg/24 hr patch 1 Patch  1 Patch TransDERmal DAILY    hydrALAZINE (APRESOLINE) 20 mg/mL injection 20 mg  20 mg IntraVENous Q6H PRN    amLODIPine (NORVASC) tablet 5 mg  5 mg Oral DAILY    cloNIDine HCL (CATAPRES) tablet 0.1 mg  0.1 mg Oral BID    enoxaparin (LOVENOX) injection 40 mg  40 mg SubCUTAneous DAILY    senna (SENOKOT) tablet 17.2 mg  2 Tab Oral DAILY    sodium chloride (NS) flush 5-40 mL  5-40 mL IntraVENous Q8H    sodium chloride (NS) flush 5-40 mL  5-40 mL IntraVENous PRN    acetaminophen (TYLENOL) tablet 650 mg  650 mg Oral Q6H PRN    Or    acetaminophen (TYLENOL) suppository 650 mg  650 mg Rectal Q6H PRN    polyethylene glycol (MIRALAX) packet 17 g  17 g Oral DAILY PRN    promethazine (PHENERGAN) tablet 12.5 mg  12.5 mg Oral Q6H PRN    Or    ondansetron (ZOFRAN) injection 4 mg  4 mg IntraVENous Q6H PRN      No Known Allergies    Objective:  Vitals:    Vitals:    01/03/21 0500 01/03/21 0600 01/03/21 0700 01/03/21 0816   BP: (!) 161/96 (!) 155/89 131/86    Pulse: (!) 107 (!) 116 95    Resp: 17 14 17    Temp:       SpO2: 99% 99% 100% 98%   Weight:       Height:         Intake and Output:  No intake/output data recorded.   01/01 1901 - 01/03 0700  In: 5297.2 [I.V.:4887.2]  Out: 8350 [Urine:8200; Drains:150]    Physical Examination:  General:  awake, extubated  HEENT: Frontal lipoma   Lungs : decreased basilar breath sounds   CVS: RRR,s1,s2, normal, No murmur   Extremities: no LE edema  Neurologic: awake  Psych: Unable to assess       []    High complexity decision making was performed  []    Patient is at high-risk of decompensation with multiple organ involvement    Lab Data Personally Reviewed: I have reviewed all the pertinent labs, microbiology data and radiology studies during assessment. Recent Labs     01/03/21  0358 01/02/21  1540 01/02/21  0359 01/01/21  0354    142 142 141   K 2.8* 3.6 4.6 3.9   * 112* 118* 116*   CO2 27 24 21 23   * 109* 133* 135*   BUN 36* 42* 37* 40*   CREA 1.59* 1.42* 1.08 1.14   CA 9.9 9.8 9.1 9.0   MG 1.9 2.0 2.2 2.2   PHOS 3.6  --  3.0 3.0     Recent Labs     01/03/21  0358 01/02/21  0359 01/01/21 0354   WBC 12.0* 9.1 9.2   HGB 12.7 12.4 12.6   HCT 40.7 39.3 41.1   * 440* 406*     No results found for: SDES  Lab Results   Component Value Date/Time    Culture result: SCANT NORMAL RESPIRATORY ALDO 12/28/2020 04:56 PM    Culture result: MRSA NOT PRESENT 12/24/2020 09:20 AM    Culture result:  12/24/2020 09:20 AM         Screening of patient nares for MRSA is for surveillance purposes and, if positive, to facilitate isolation considerations in high risk settings. It is not intended for automatic decolonization interventions per se as regimens are not sufficiently effective to warrant routine use.     Culture result: NO GROWTH 2 DAYS 12/23/2020 04:16 PM    Culture result: (A) 12/22/2020 08:45 AM     RARE HAEMOPHILUS INFLUENZAE BETA LACTAMASE NEGATIVE    Culture result: RARE NORMAL RESPIRATORY ALDO 12/22/2020 08:45 AM     Recent Results (from the past 24 hour(s))   METABOLIC PANEL, BASIC    Collection Time: 01/02/21  3:40 PM   Result Value Ref Range    Sodium 142 136 - 145 mmol/L    Potassium 3.6 3.5 - 5.1 mmol/L    Chloride 112 (H) 97 - 108 mmol/L    CO2 24 21 - 32 mmol/L    Anion gap 6 5 - 15 mmol/L    Glucose 109 (H) 65 - 100 mg/dL    BUN 42 (H) 6 - 20 MG/DL    Creatinine 1.42 (H) 0.70 - 1.30 MG/DL    BUN/Creatinine ratio 30 (H) 12 - 20      GFR est AA >60 >60 ml/min/1.73m2    GFR est non-AA 52 (L) >60 ml/min/1.73m2    Calcium 9.8 8.5 - 10.1 MG/DL   MAGNESIUM    Collection Time: 01/02/21  3:40 PM   Result Value Ref Range    Magnesium 2.0 1.6 - 2.4 mg/dL MAGNESIUM    Collection Time: 01/03/21  3:58 AM   Result Value Ref Range    Magnesium 1.9 1.6 - 2.4 mg/dL   PHOSPHORUS    Collection Time: 01/03/21  3:58 AM   Result Value Ref Range    Phosphorus 3.6 2.6 - 4.7 MG/DL   CBC W/O DIFF    Collection Time: 01/03/21  3:58 AM   Result Value Ref Range    WBC 12.0 (H) 4.1 - 11.1 K/uL    RBC 4.79 4.10 - 5.70 M/uL    HGB 12.7 12.1 - 17.0 g/dL    HCT 40.7 36.6 - 50.3 %    MCV 85.0 80.0 - 99.0 FL    MCH 26.5 26.0 - 34.0 PG    MCHC 31.2 30.0 - 36.5 g/dL    RDW 15.4 (H) 11.5 - 14.5 %    PLATELET 690 (H) 296 - 400 K/uL    MPV 9.5 8.9 - 12.9 FL    NRBC 0.0 0  WBC    ABSOLUTE NRBC 0.00 0.00 - 6.42 K/uL   METABOLIC PANEL, BASIC    Collection Time: 01/03/21  3:58 AM   Result Value Ref Range    Sodium 143 136 - 145 mmol/L    Potassium 2.8 (L) 3.5 - 5.1 mmol/L    Chloride 110 (H) 97 - 108 mmol/L    CO2 27 21 - 32 mmol/L    Anion gap 6 5 - 15 mmol/L    Glucose 121 (H) 65 - 100 mg/dL    BUN 36 (H) 6 - 20 MG/DL    Creatinine 1.59 (H) 0.70 - 1.30 MG/DL    BUN/Creatinine ratio 23 (H) 12 - 20      GFR est AA 55 (L) >60 ml/min/1.73m2    GFR est non-AA 46 (L) >60 ml/min/1.73m2    Calcium 9.9 8.5 - 10.1 MG/DL   NT-PRO BNP    Collection Time: 01/03/21  3:58 AM   Result Value Ref Range    NT pro- (H) <125 PG/ML           Total time spent with patient:  xxx   min. Care Plan discussed with:  Patient     Family      RN      Consulting Physician [address],         I have reviewed the flowsheets. Chart and Pertinent Notes have been reviewed. No change in PMH ,family and social history from Consult note.       Carlos A Horta MD

## 2021-01-03 NOTE — PROGRESS NOTES
07 Bedside shift change report given to Maribel Phoenix (oncoming nurse) by Valerio Lake (offgoing nurse). Report included the following information SBAR, Intake/Output, MAR, Recent Results and Cardiac Rhythm Sinus Tach. 0815 RT at bedside to wean HiFlow NC  1015 /98(117); PRN Hydralazine given. 56 Dr. Susi Castle at bedside. Plan to start PO Carvedilol and remove arterial line  1040 R Radial A-Line removed. Hemostasis achieved using manual compression  1530 Pt had small bowel movement. Incontinence care performed. [address] [place] [address],[address] Patient's wallet and its contents given to patient's significant other Ritu, by Security to take home. 1815 Pt vomited clear fluid. PRN Zofran given. Dr. Susi Castle at bedside. Plan to hold tube feed.  Bedside shift change report given to Shantelle (oncoming nurse) by Maribel Phoenix (offgoing nurse). Report included the following information SBAR, Intake/Output, MAR, Recent Results and Cardiac Rhythm Sinus Tach.

## 2021-01-03 NOTE — PROGRESS NOTES
[facility]    ICU TEAM Progress Note    Name: Adalid Chow   : 1965   MRN: [id number]   Date: 1/3/2021      Assessment     ICU Problems:    1. Acute Hypoxic Respiratory Failure  1. CAP (Haemophilus influenzae & Streptococcus)  2. Aspiration PNA  3. Hx tobacco abuse/? COPD  2. Metabolic Encephalopathy  1. ?Steroids/?Infection  2. Hyperactive Delirium   3. HTN   4. YISSEL/CKD stage 2   1. ? Mult Myeloma (based on +M spike, with K light chains and IgM levels) -- Needs Heme/Onc consult prior to discharge  5. Left frontal lipoma-Chronic     Imagin/3/2020      2020        ICU Comprehensive Plan of Care:   1. Acute Hypoxic Respiratory Failure (Haemophilus influenza PNA; Tobacco Abuse Hx)  - Unasyn (stops on )  - Wean Methylpred to off  - Wean FiO2 for SpO2 > 90%  - HOB > 30    2. Elevated troponin (resolving)  -ECHO 40-45%   -Cardiology following    3. YISSEL/CKD stage 2  -From Hypotension/CN/Sara-extubation diuresis  -LR at 75 cc/hr  -Nephrology following  -Strict I and O    4. HTN  -- PRN Labetalol  -- Norvasc/Clonidine    Respiratory Goals:  a. Head of bed > 30 degrees  b. Aggressive bronchopulmonary hygiene  c. Incentive spirometry  Goals of Care Discussion with family Yes   Plan of Care/Code Status: Full Code  Appreciate Consultants Input Cardiology, Nephrology   Discussed Care Plan with Bedside RN  Documentation of Current Medications    Subjective:   Progress Note: 1/3/2021      Reason for ICU Admission: Acute hypoxic respiratory failure      HPI: Adalid Chow is a 51 yo AAM with a PMH significant for nonischemic cardiomyopathy, left frontal lipoma, CKD, obesity, chronic tobacco abuse, who presented to the ER via EMS with co increased SOB X2 weeks. Pt is currently sedated and on the ventilator, unable to provide HPI thereofre obtained via chart and provider. Pt failed bipap while in the ER with continued worsening hypoxia, therefore pt was intubated and placed on the ventilator.  On arrival to ED pt was found to be hypertensive with /100. Patient was then transferred to ICU for further management in care.       Overnight Events:   1/3/2021  Extubated yesterday, on HFNC 100%, 30L    Active Problem List:     Problem List  Date Reviewed: 9/28/2020          Codes Class    Hypoxia ICD-10-CM: R09.02  ICD-9-CM: 799.02         Cervical radiculopathy ICD-10-CM: M54.12  ICD-9-CM: 723.4         Arm paresthesia, left ICD-10-CM: R20.2  ICD-9-CM: 782.0         Intractable headache ICD-10-CM: R51.9  ICD-9-CM: 784.0         Dilated cardiomyopathy (HCC) ICD-10-CM: I42.0  ICD-9-CM: 425.4         NSTEMI (non-ST elevated myocardial infarction) ([facility] [address] ICD-10-CM: I21.4  ICD-9-CM: 410.70         HTN (hypertension) ICD-10-CM: I10  ICD-9-CM: 401.9               Past Medical History:      has a past medical history of Hypertension. Past Surgical History:      has no past surgical history on file. Home Medications:     Prior to Admission medications    Medication Sig Start Date End Date Taking? Authorizing Provider   aspirin-acetaminophen-caffeine ([employer] Extra Strength) 520-260-32.5 mg pwpk Take 1 Package by mouth two (2) times a day. Yes Provider, Historical   ibuprofen (MOTRIN) 600 mg tablet Take 600 mg by mouth every six (6) hours as needed for Pain. Yes Provider, Historical   carvediloL (COREG) 25 mg tablet Take 1 Tab by mouth two (2) times daily (with meals). 7/16/20  Yes Ann Connors MD   isosorbide mononitrate ER (IMDUR) 30 mg tablet Take 1 Tab by mouth daily. 7/16/20  Yes Ann Connors MD   cloNIDine HCL (CATAPRES) 0.1 mg tablet Take 1 Tab by mouth two (2) times a day. 1/16/20  Yes Ann Connors MD   aspirin 81 mg chewable tablet Take 1 Tab by mouth daily.  12/13/19  Yes Royer Bailey MD       Allergies/Social/Family History:     No Known Allergies   Social History     Tobacco Use    Smoking status: Current Every Day Smoker     Packs/day: 1.00    Smokeless tobacco: Never Used   Substance Use Topics    Alcohol use: Yes     Alcohol/week: 5.0 standard drinks     Types: 6 Cans of beer per week     Comment: Occasionally      Family History   Problem Relation Age of Onset    Stroke Father     Cancer Sister        Review of Systems:     Review of systems not obtained due to patient factors. Objective:   Vital Signs:  Visit Vitals  /86   Pulse 95   Temp 100.2 °F (37.9 °C)   Resp 17   Ht 5' 11\" (1.803 m)   Wt 98.6 kg (217 lb 6 oz)   SpO2 98%   BMI 30.32 kg/m²    O2 Flow Rate (L/min): 30 l/min O2 Device: Hi flow nasal cannula Temp (24hrs), Av.8 °F (37.7 °C), Min:98.2 °F (36.8 °C), Max:100.4 °F (38 °C)           Intake/Output:     Intake/Output Summary (Last 24 hours) at 1/3/2021 0855  Last data filed at 1/3/2021 0700  Gross per 24 hour   Intake 3645.15 ml   Output 7075 ml   Net -3429.85 ml       Physical Exam:    General:  No acute distress. Eyes:  Sclera anicteric. Pupils equal, round, reactive to light. Mouth/Throat: Supple. Neck: Supple. Lungs: Moderate effort, crackles throughout   Cardiovascular:  Regular rate and rhythm, no murmur, click, rub, or gallop. Abdomen:   Soft, non-tender, bowel sounds normal, non-distended. Extremities: No cyanosis or edema. Skin: No acute rash or lesions. Lipoma noted over left frontal region    Lymph Nodes: Cervical and supraclavicular normal.   Musculoskeletal:  No swelling or deformity. Lines/Devices:  Intact, no erythema, drainage, or tenderness.    Psychiatric: Comfortable     LABS AND  DATA: Personally reviewed  Recent Labs     21  0358 21  0359   WBC 12.0* 9.1   HGB 12.7 12.4   HCT 40.7 39.3   * 440*     Recent Labs     21  0358 21  1540 21  0359    142 142   K 2.8* 3.6 4.6   * 112* 118*   CO2 27 24 21   BUN 36* 42* 37*   CREA 1.59* 1.42* 1.08   * 109* 133*   CA 9.9 9.8 9.1   MG 1.9 2.0 2.2   PHOS 3.6  --  3.0     No results for input(s): AP, TBIL, TP, ALB, GLOB, AML, LPSE in the last 72 hours. No lab exists for component: SGOT, GPT, AMYP  No results for input(s): INR, PTP, APTT, INREXT, INREXT in the last 72 hours. Recent Labs     01/02/21  0424 01/02/21  0419 01/01/21  0511   PHI 7.39 7.39 7.41   PCO2I 30.9* 32.6* 34.8*   PO2I 76*  --  78*   FIO2I 40 40 45     No results for input(s): CPK, CKMB, TROIQ, BNPP in the last 72 hours. Hemodynamics:   PAP:   CO:     Wedge:   CI:     CVP:    SVR:       PVR:       Ventilator Settings:  Mode Rate Tidal Volume Pressure FiO2 PEEP   Assist control, Volume control   420 ml    90 % 8 cm H20     Peak airway pressure: 25 cm H2O    Minute ventilation: 9.04 l/min        MEDS: Reviewed    Chest X-Ray:  CXR Results  (Last 48 hours)    None            ECHO:  Pending     Multidisciplinary Rounds Completed: Yes    ABCDEF Bundle/Checklist Completed:  Yes    SPECIAL EQUIPMENT  None    DISPOSITION  Stay in ICU    CRITICAL CARE CONSULTANT NOTE  I had a face to face encounter with the patient, reviewed and interpreted patient data including clinical events, labs, images, vital signs, I/O's, and examined patient. I have discussed the case and the plan and management of the patient's care with the consulting services, the bedside nurses and the respiratory therapist.      NOTE OF PERSONAL INVOLVEMENT IN CARE   This patient has a high probability of imminent, clinically significant deterioration, which requires the highest level of preparedness to intervene urgently. I participated in the decision-making and personally managed or directed the management of the following life and organ supporting interventions that required my frequent assessment to treat or prevent imminent deterioration. I personally spent 60 minutes of critical care time. This is time spent at this critically ill patient's bedside actively involved in patient care as well as the coordination of care and discussions with the patient's family.   This does not include any procedural time which has been billed separately.     Russ Lutz DO  Staff Anesthesiologist/Intensivist  [facility]  1/3/2021

## 2021-01-03 NOTE — PROGRESS NOTES
Patient's HR reached 150 and SBP reached 170's. Hydralazine 20mg given with no response. Spoke with Dr. Brayan Potter and he ordered labetolol 40mg however while pushing this slowly his SBP decreased to the 90's and HR around 110, so patient only received 15mg of this.

## 2021-01-03 NOTE — PROGRESS NOTES
1930: Bedside and Verbal shift change report given to James Dan RN (oncoming nurse) by Kiana Guerra RN (offgoing nurse). Report included the following information SBAR, Kardex, ED Summary, Procedure Summary, Intake/Output, MAR, Recent Results, Med Rec Status, Cardiac Rhythm ST, Alarm Parameters  and Dual Neuro Assessment. 2000: Resumed pt care. HR sustaining 130-140, new onset fever 100.4F, PRN tylenol given. Dr. Addy Jenkins notified    2100: Dr. Addy Jenkins @ bedside for ultrasound, pt extremely dry. Orders to start LR @ 75/hr, 2 bottles albumin     0030: , . PRN hydralazine given     0045: Pt now tachycardic 150's, Dr. Addy Jenkins notified. EKG ordered, made MD aware of previous labetalol administration with increased HR/BP after admin. Would like to proceed with medication, will monitor pt closely     0300: Temp 100.4, PRN tylenol given     0400: AM labs drawn and sent     0500: Pt -170, not due for PRN antihypertensives. Dr. Addy Jenkins paged, orders for 20mg hydralazine IVP now x1    0520: K+ 2.8, Mg 1.9, replaced per protocol     0530: -180 despite hydralazine, MD paged, orders to give 20mg labetolol IVP x1     0730: Bedside and Verbal shift change report given to Stephanie Hernandes RN (oncoming nurse) by James Dan RN (offgoing nurse). Report included the following information SBAR, Kardex, ED Summary, Procedure Summary, Intake/Output, MAR, Recent Results, Med Rec Status, Cardiac Rhythm ST, Alarm Parameters  and Dual Neuro Assessment.

## 2021-01-03 NOTE — PROGRESS NOTES
Physical Therapy  Received consult, chart reviewed, and spoke with RN. Pt was extubated yesterday and currently on HiFlow NC. Pt tachycardic and SBP running 170s overnight and currently . RN requested to hold PT today. Will follow up tomorrow.    Jessica Montes PT

## 2021-01-04 ENCOUNTER — APPOINTMENT (OUTPATIENT)
Dept: GENERAL RADIOLOGY | Age: 56
DRG: 207 | End: 2021-01-04
Attending: INTERNAL MEDICINE

## 2021-01-04 ENCOUNTER — APPOINTMENT (OUTPATIENT)
Dept: CT IMAGING | Age: 56
DRG: 207 | End: 2021-01-04
Attending: FAMILY MEDICINE

## 2021-01-04 LAB
ANION GAP SERPL CALC-SCNC: 3 MMOL/L (ref 5–15)
ARTERIAL PATENCY WRIST A: YES
BASE EXCESS BLD CALC-SCNC: 9 MMOL/L
BDY SITE: ABNORMAL
BUN SERPL-MCNC: 29 MG/DL (ref 6–20)
BUN/CREAT SERPL: 20 (ref 12–20)
CA-I BLD-SCNC: 1.3 MMOL/L (ref 1.12–1.32)
CALCIUM SERPL-MCNC: 10.3 MG/DL (ref 8.5–10.1)
CHLORIDE SERPL-SCNC: 105 MMOL/L (ref 97–108)
CO2 SERPL-SCNC: 32 MMOL/L (ref 21–32)
CREAT SERPL-MCNC: 1.42 MG/DL (ref 0.7–1.3)
ERYTHROCYTE [DISTWIDTH] IN BLOOD BY AUTOMATED COUNT: 15.1 % (ref 11.5–14.5)
GAS FLOW.O2 O2 DELIVERY SYS: ABNORMAL L/MIN
GAS FLOW.O2 SETTING OXYMISER: 6 L/M
GLUCOSE SERPL-MCNC: 134 MG/DL (ref 65–100)
HCO3 BLD-SCNC: 33.1 MMOL/L (ref 22–26)
HCT VFR BLD AUTO: 43.1 % (ref 36.6–50.3)
HGB BLD-MCNC: 13.4 G/DL (ref 12.1–17)
MAGNESIUM SERPL-MCNC: 2.1 MG/DL (ref 1.6–2.4)
MCH RBC QN AUTO: 26.6 PG (ref 26–34)
MCHC RBC AUTO-ENTMCNC: 31.1 G/DL (ref 30–36.5)
MCV RBC AUTO: 85.7 FL (ref 80–99)
NRBC # BLD: 0 K/UL (ref 0–0.01)
NRBC BLD-RTO: 0 PER 100 WBC
PCO2 BLD: 50.4 MMHG (ref 35–45)
PH BLD: 7.43 [PH] (ref 7.35–7.45)
PHOSPHATE SERPL-MCNC: 2.9 MG/DL (ref 2.6–4.7)
PLATELET # BLD AUTO: 437 K/UL (ref 150–400)
PMV BLD AUTO: 9.3 FL (ref 8.9–12.9)
POTASSIUM SERPL-SCNC: 4 MMOL/L (ref 3.5–5.1)
RBC # BLD AUTO: 5.03 M/UL (ref 4.1–5.7)
SODIUM SERPL-SCNC: 140 MMOL/L (ref 136–145)
SPECIMEN TYPE: ABNORMAL
TOTAL RESP. RATE, ITRR: 15
WBC # BLD AUTO: 10.6 K/UL (ref 4.1–11.1)

## 2021-01-04 PROCEDURE — 94640 AIRWAY INHALATION TREATMENT: CPT

## 2021-01-04 PROCEDURE — 74011000250 HC RX REV CODE- 250: Performed by: ANESTHESIOLOGY

## 2021-01-04 PROCEDURE — 36415 COLL VENOUS BLD VENIPUNCTURE: CPT

## 2021-01-04 PROCEDURE — 80048 BASIC METABOLIC PNL TOTAL CA: CPT

## 2021-01-04 PROCEDURE — 92612 ENDOSCOPY SWALLOW (FEES) VID: CPT

## 2021-01-04 PROCEDURE — 74011250637 HC RX REV CODE- 250/637: Performed by: FAMILY MEDICINE

## 2021-01-04 PROCEDURE — 74011250637 HC RX REV CODE- 250/637: Performed by: INTERNAL MEDICINE

## 2021-01-04 PROCEDURE — 84100 ASSAY OF PHOSPHORUS: CPT

## 2021-01-04 PROCEDURE — 74011250636 HC RX REV CODE- 250/636: Performed by: ANESTHESIOLOGY

## 2021-01-04 PROCEDURE — 70450 CT HEAD/BRAIN W/O DYE: CPT

## 2021-01-04 PROCEDURE — 74011250637 HC RX REV CODE- 250/637: Performed by: NURSE PRACTITIONER

## 2021-01-04 PROCEDURE — 99254 IP/OBS CNSLTJ NEW/EST MOD 60: CPT | Performed by: INTERNAL MEDICINE

## 2021-01-04 PROCEDURE — 74011250636 HC RX REV CODE- 250/636: Performed by: NURSE PRACTITIONER

## 2021-01-04 PROCEDURE — 92610 EVALUATE SWALLOWING FUNCTION: CPT

## 2021-01-04 PROCEDURE — 71045 X-RAY EXAM CHEST 1 VIEW: CPT

## 2021-01-04 PROCEDURE — 97166 OT EVAL MOD COMPLEX 45 MIN: CPT

## 2021-01-04 PROCEDURE — 82803 BLOOD GASES ANY COMBINATION: CPT

## 2021-01-04 PROCEDURE — 74011250637 HC RX REV CODE- 250/637: Performed by: ANESTHESIOLOGY

## 2021-01-04 PROCEDURE — 97161 PT EVAL LOW COMPLEX 20 MIN: CPT

## 2021-01-04 PROCEDURE — 36600 WITHDRAWAL OF ARTERIAL BLOOD: CPT

## 2021-01-04 PROCEDURE — 65660000001 HC RM ICU INTERMED STEPDOWN

## 2021-01-04 PROCEDURE — 85027 COMPLETE CBC AUTOMATED: CPT

## 2021-01-04 PROCEDURE — 77030018831 HC SOL IRR H20 BAXT -A

## 2021-01-04 PROCEDURE — 74011000250 HC RX REV CODE- 250: Performed by: NURSE PRACTITIONER

## 2021-01-04 PROCEDURE — 74011250636 HC RX REV CODE- 250/636: Performed by: INTERNAL MEDICINE

## 2021-01-04 PROCEDURE — 97530 THERAPEUTIC ACTIVITIES: CPT

## 2021-01-04 PROCEDURE — 77010033711 HC HIGH FLOW OXYGEN

## 2021-01-04 PROCEDURE — 83735 ASSAY OF MAGNESIUM: CPT

## 2021-01-04 PROCEDURE — 74011000258 HC RX REV CODE- 258: Performed by: ANESTHESIOLOGY

## 2021-01-04 PROCEDURE — 74011000250 HC RX REV CODE- 250: Performed by: INTERNAL MEDICINE

## 2021-01-04 RX ORDER — CLONIDINE HYDROCHLORIDE 0.2 MG/1
0.2 TABLET ORAL EVERY 8 HOURS
Status: DISCONTINUED | OUTPATIENT
Start: 2021-01-04 | End: 2021-01-07 | Stop reason: HOSPADM

## 2021-01-04 RX ORDER — CLONIDINE HYDROCHLORIDE 0.1 MG/1
0.1 TABLET ORAL EVERY 8 HOURS
Status: DISCONTINUED | OUTPATIENT
Start: 2021-01-04 | End: 2021-01-04

## 2021-01-04 RX ORDER — IPRATROPIUM BROMIDE AND ALBUTEROL SULFATE 2.5; .5 MG/3ML; MG/3ML
3 SOLUTION RESPIRATORY (INHALATION)
Status: DISCONTINUED | OUTPATIENT
Start: 2021-01-04 | End: 2021-01-07 | Stop reason: HOSPADM

## 2021-01-04 RX ORDER — AMLODIPINE BESYLATE 5 MG/1
10 TABLET ORAL DAILY
Status: DISCONTINUED | OUTPATIENT
Start: 2021-01-04 | End: 2021-01-07 | Stop reason: HOSPADM

## 2021-01-04 RX ORDER — CARVEDILOL 12.5 MG/1
25 TABLET ORAL 2 TIMES DAILY WITH MEALS
Status: DISCONTINUED | OUTPATIENT
Start: 2021-01-04 | End: 2021-01-07 | Stop reason: HOSPADM

## 2021-01-04 RX ADMIN — ACETAMINOPHEN 650 MG: 325 TABLET ORAL at 16:59

## 2021-01-04 RX ADMIN — AMPICILLIN SODIUM AND SULBACTAM SODIUM 3 G: 2; 1 INJECTION, POWDER, FOR SOLUTION INTRAMUSCULAR; INTRAVENOUS at 06:06

## 2021-01-04 RX ADMIN — AMLODIPINE BESYLATE 10 MG: 5 TABLET ORAL at 08:04

## 2021-01-04 RX ADMIN — ONDANSETRON 4 MG: 2 INJECTION INTRAMUSCULAR; INTRAVENOUS at 02:25

## 2021-01-04 RX ADMIN — Medication 30 ML: at 08:03

## 2021-01-04 RX ADMIN — CLONIDINE HYDROCHLORIDE 0.2 MG: 0.2 TABLET ORAL at 20:25

## 2021-01-04 RX ADMIN — SODIUM CHLORIDE, POTASSIUM CHLORIDE, SODIUM LACTATE AND CALCIUM CHLORIDE 75 ML/HR: 600; 310; 30; 20 INJECTION, SOLUTION INTRAVENOUS at 06:07

## 2021-01-04 RX ADMIN — IPRATROPIUM BROMIDE AND ALBUTEROL SULFATE 3 ML: .5; 3 SOLUTION RESPIRATORY (INHALATION) at 09:07

## 2021-01-04 RX ADMIN — AMPICILLIN SODIUM AND SULBACTAM SODIUM 3 G: 2; 1 INJECTION, POWDER, FOR SOLUTION INTRAMUSCULAR; INTRAVENOUS at 17:00

## 2021-01-04 RX ADMIN — FAMOTIDINE 20 MG: 10 INJECTION, SOLUTION INTRAVENOUS at 20:25

## 2021-01-04 RX ADMIN — AMPICILLIN SODIUM AND SULBACTAM SODIUM 3 G: 2; 1 INJECTION, POWDER, FOR SOLUTION INTRAMUSCULAR; INTRAVENOUS at 10:42

## 2021-01-04 RX ADMIN — CARVEDILOL 25 MG: 12.5 TABLET, FILM COATED ORAL at 08:04

## 2021-01-04 RX ADMIN — AMPICILLIN SODIUM AND SULBACTAM SODIUM 3 G: 2; 1 INJECTION, POWDER, FOR SOLUTION INTRAMUSCULAR; INTRAVENOUS at 00:24

## 2021-01-04 RX ADMIN — ENOXAPARIN SODIUM 40 MG: 40 INJECTION SUBCUTANEOUS at 08:04

## 2021-01-04 RX ADMIN — LABETALOL HYDROCHLORIDE 40 MG: 5 INJECTION INTRAVENOUS at 08:06

## 2021-01-04 RX ADMIN — LABETALOL HYDROCHLORIDE 40 MG: 5 INJECTION INTRAVENOUS at 02:25

## 2021-01-04 RX ADMIN — DOCUSATE SODIUM 100 MG: 50 LIQUID ORAL at 08:04

## 2021-01-04 RX ADMIN — CLONIDINE HYDROCHLORIDE 0.2 MG: 0.2 TABLET ORAL at 13:11

## 2021-01-04 RX ADMIN — Medication 10 ML: at 06:07

## 2021-01-04 RX ADMIN — CLONIDINE HYDROCHLORIDE 0.1 MG: 0.1 TABLET ORAL at 08:04

## 2021-01-04 RX ADMIN — CARVEDILOL 25 MG: 12.5 TABLET, FILM COATED ORAL at 16:59

## 2021-01-04 RX ADMIN — HYDRALAZINE HYDROCHLORIDE 20 MG: 20 INJECTION INTRAMUSCULAR; INTRAVENOUS at 10:41

## 2021-01-04 RX ADMIN — HYDRALAZINE HYDROCHLORIDE 20 MG: 20 INJECTION INTRAMUSCULAR; INTRAVENOUS at 00:05

## 2021-01-04 RX ADMIN — FAMOTIDINE 20 MG: 10 INJECTION, SOLUTION INTRAVENOUS at 08:03

## 2021-01-04 NOTE — CONSULTS
[facility] at [address], [address] [place]: [phone]  F: [phone]    Reason for Visit:   Ranjit Celis is a 50 y.o. male who is seen in consultation at the request of Dr. Luh Goodrich for evaluation of shoaib kasper. Treatment History:   None from us    History of Present Illness:     Pt seen today for hospital consult for possible MM.    pt in CCU admitted with respiratory failure/ PNA/ hypertensive crisis. Slowly recovering post vent. Had gammopathy labs done by renal for renal insuff. Came back with shoaib kasper. No anemia. Calcium a little high today but has been normal.   No hx of blood problems. Wife at bedside. Pt slow to speak. Appears comfortable. Up in chair. States slowly getting better. Past Medical History:   Diagnosis Date    Hypertension       History reviewed. No pertinent surgical history. Social History     Tobacco Use    Smoking status: Current Every Day Smoker     Packs/day: 1.00    Smokeless tobacco: Never Used   Substance Use Topics    Alcohol use:  Yes     Alcohol/week: 5.0 standard drinks     Types: 6 Cans of beer per week     Comment: Occasionally      Family History   Problem Relation Age of Onset    Stroke Father     Cancer Sister      Current Facility-Administered Medications   Medication Dose Route Frequency    amLODIPine (NORVASC) tablet 10 mg  10 mg Oral DAILY    carvediloL (COREG) tablet 25 mg  25 mg Oral BID WITH MEALS    albuterol-ipratropium (DUO-NEB) 2.5 MG-0.5 MG/3 ML  3 mL Nebulization Q6H PRN    cloNIDine HCL (CATAPRES) tablet 0.2 mg  0.2 mg Oral Q8H    labetaloL (NORMODYNE;TRANDATE) injection 40 mg  40 mg IntraVENous Q6H PRN    lactated Ringers infusion  50 mL/hr IntraVENous CONTINUOUS    famotidine (PF) (PEPCID) 20 mg in 0.9% sodium chloride 10 mL injection  20 mg IntraVENous [id number]    multivit-folic acid-herbal 236 (WELLESSE PLUS) oral liquid 30 mL  30 mL Oral DAILY    0.9% sodium chloride infusion  3 mL/hr IntraVENous CONTINUOUS    0.9% sodium chloride infusion  5 mL/hr IntraVENous CONTINUOUS    docusate (COLACE) 50 mg/5 mL oral liquid 100 mg  100 mg Oral DAILY    acetaminophen (TYLENOL) solution 650 mg  650 mg Per NG tube Q6H PRN    nicotine (NICODERM CQ) 14 mg/24 hr patch 1 Patch  1 Patch TransDERmal DAILY    hydrALAZINE (APRESOLINE) 20 mg/mL injection 20 mg  20 mg IntraVENous Q6H PRN    enoxaparin (LOVENOX) injection 40 mg  40 mg SubCUTAneous DAILY    senna (SENOKOT) tablet 17.2 mg  2 Tab Oral DAILY    sodium chloride (NS) flush 5-40 mL  5-40 mL IntraVENous Q8H    sodium chloride (NS) flush 5-40 mL  5-40 mL IntraVENous PRN    acetaminophen (TYLENOL) tablet 650 mg  650 mg Oral Q6H PRN    Or    acetaminophen (TYLENOL) suppository 650 mg  650 mg Rectal Q6H PRN    polyethylene glycol (MIRALAX) packet 17 g  17 g Oral DAILY PRN    promethazine (PHENERGAN) tablet 12.5 mg  12.5 mg Oral Q6H PRN    Or    ondansetron (ZOFRAN) injection 4 mg  4 mg IntraVENous Q6H PRN      No Known Allergies     Review of Systems: A complete review of systems was obtained, negative except as described above. Physical Exam:     Visit Vitals  BP (!) 126/90   Pulse 81   Temp 99.9 °F (37.7 °C)   Resp 14   Ht 5' 11\" (1.803 m)   Wt 217 lb 6 oz (98.6 kg)   SpO2 97%   BMI 30.32 kg/m²     ECOG PS: 2  General: No distress  Eyes:  anicteric sclerae  HENT: Atraumatic  Neck: Supple  Respiratory: decreased bs b/l,  CV: Normal rate, regular rhythm  GI: Soft, nontender  MS: in chair, general weakness  Skin: warm, dry  Psych: awake, in chair, slowly conversant    Results:     Lab Results   Component Value Date/Time    WBC 10.6 01/04/2021 04:51 AM    HGB 13.4 01/04/2021 04:51 AM    HCT 43.1 01/04/2021 04:51 AM    PLATELET 546 (H) 61/14/2146 04:51 AM    MCV 85.7 01/04/2021 04:51 AM    ABS.  NEUTROPHILS 10.0 (H) 12/22/2020 02:51 AM     Lab Results   Component Value Date/Time    Sodium 140 01/04/2021 04:51 AM    Potassium 4.0 01/04/2021 04:51 AM    Chloride 105 01/04/2021 04:51 AM    CO2 32 01/04/2021 04:51 AM    Glucose 134 (H) 01/04/2021 04:51 AM    BUN 29 (H) 01/04/2021 04:51 AM    Creatinine 1.42 (H) 01/04/2021 04:51 AM    GFR est AA >60 01/04/2021 04:51 AM    GFR est non-AA 52 (L) 01/04/2021 04:51 AM    Calcium 10.3 (H) 01/04/2021 04:51 AM     Lab Results   Component Value Date/Time    Bilirubin, total 0.4 12/26/2020 04:26 AM    ALT (SGPT) 71 12/26/2020 04:26 AM    Alk. phosphatase 82 12/26/2020 04:26 AM    Protein, total 7.4 12/26/2020 04:26 AM    Albumin 2.2 (L) 12/26/2020 04:26 AM    Globulin 5.2 (H) 12/26/2020 04:26 AM       CT Results (most recent):  Results from [place] encounter on 12/21/20   CT HEAD WO CONT    Narrative EXAM: CT HEAD WO CONT    INDICATION: AMS    COMPARISON: 2020. CONTRAST: None. TECHNIQUE: Unenhanced CT of the head was performed using 5 mm images. Brain and  bone windows were generated. Coronal and sagittal reformats. CT dose reduction  was achieved through use of a standardized protocol tailored for this  examination and automatic exposure control for dose modulation. FINDINGS:  The ventricles and sulci are normal in size, shape and configuration. . There is  no significant white matter disease. There is no intracranial hemorrhage,  extra-axial collection, or mass effect. The basilar cisterns are open. No CT  evidence of acute infarct. There is a superficial lipoma left frontal region  The bone windows demonstrate no abnormalities. The visualized portions of the  paranasal sinuses and mastoid air cells are clear. Impression IMPRESSION:   No acute intracranial abnormality identified. Records reviewed and summarized above. Pathology report(s) reviewed above. Radiology report(s) reviewed above. 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 No high calcium prior to today  Chronic RI unclear if related.   Creat 1.4 improved. No LAD or bone lesions on CT chest.  Does not have criteria for MM. But since IgM protein more unusual,  Non urgent BMB would be helpful for dx. Will check serum viscosity since has had MS changes but likely due to illnesses. Pt admitted for respiratory failure/PNA/ malignant HTN. Does not seem related to possible blood problem. Would recommend mendoza with BMB once pt is able/ as outpt if d/c'd. Would recommend skeletal survey for bone eval.   Consider CT A/P for liver / spleen eval.   Pt is still recovering from acute events and still in CCU. Discussed possible blood problem with pt/ wife at bedside today. Discussed further w/u needed once feeling better. We will follow    2) respiratory post vent/ PNA/ malignant HTN. Smoker. Per ICU team.     We will follow  Call if questions  Can fu with us as outpt if d/c'd    I appreciate the opportunity to participate in Mr. Guerline hramon.     Signed By: Christian Watson DO

## 2021-01-04 NOTE — PROGRESS NOTES
SLP Contact Note    SLP evaluation complete. Pt with no overt s/s of aspiration but is at high risk for silent aspiration given prolonged intubation and subsequent dysphonia. Will plan to complete a Fiberoptic Endoscopic Evaluation of Swallow (FEES) at bedside this afternoon.      Thank you,  Issac Boxer, M.Ed, [address] [facility], [place], operated by [facility]  Speech-Language Pathologist

## 2021-01-04 NOTE — PROGRESS NOTES
0730 Bedside and Verbal shift change report given to Susanna RN (oncoming nurse) by Tavares Tristan RN (offgoing nurse). Report included the following information SBAR.     0800 PRN labetalol given    1020 Pt placed on 6 L nasal cannula    1040 Pt remains hypertensive. PRN hydralazine given    1100 Al Kevin downgraded pt to Intermediate level of care. 1200 Pt up to chair with PT    1345 Speech at bedside for FEES swallowing   assessment. Recommend regular diet/thin liquids    1740 Pt to CT    1930 Bedside and Verbal shift change report given to [facility] (oncoming nurse) by Cat RN (offgoing nurse). Report included the following information SBAR.

## 2021-01-04 NOTE — PROGRESS NOTES
Problem: Self Care Deficits Care Plan (Adult)  Goal: *Acute Goals and Plan of Care (Insert Text)  Description:   FUNCTIONAL STATUS PRIOR TO ADMISSION: Patient was independent and active without use of DME.     HOME SUPPORT: The patient lived with wife but did not require assist.    Occupational Therapy Goals  Initiated 1/4/2021  1. Patient will perform self-feeding with moderate assistance  within 7 day(s). 2.  Patient will perform grooming 2/5 with moderate assistance  within 7 day(s). 3.  Patient will perform standing upper body ADLs 1 min with moderate assistance  within 7 day(s). 4.  Patient will perform toilet transfers with moderate assistance  within 7 day(s). 5.  Patient will perform all aspects of toileting with moderate assistance  within 7 day(s). 6.  Patient will participate in upper extremity therapeutic exercise/activities with moderate assistance within 7 day(s). Outcome: Not Met   OCCUPATIONAL THERAPY EVALUATION  Patient: Kaykay Narvaez (28 y.o. male)  Date: 1/4/2021  Primary Diagnosis: Hypoxia [R09.02]        Precautions:   Fall    ASSESSMENT  Based on the objective data described below, the patient presents with decreased ROM, FM coordination, strength, functional reach, cardiopulmonary tolerance, standing tolerance, endurance, and cognition (STM loss, complex processing and response time). Current Level of Function Impacting Discharge (ADLs/self-care): max A upper body ADLs, total A lower body ADLs, bed-chair max A    Other factors to consider for discharge: wife at home is independent     Patient will benefit from skilled therapy intervention to address the above noted impairments.        PLAN :  Recommendations and Planned Interventions: self care training, functional mobility training, therapeutic exercise, balance training, therapeutic activities, cognitive retraining, endurance activities, patient education, home safety training, and family training/education    Frequency/Duration: Patient will be followed by occupational therapy 5 times a week to address goals. Recommendation for discharge: (in order for the patient to meet his/her long term goals)  Therapy 3 hours per day 5-7 days per week at this time, pending progression    This discharge recommendation:  Has not yet been discussed the attending provider and/or case management    IF patient discharges home will need the following DME: TBD       SUBJECTIVE:   Patient stated Better to be up.     OBJECTIVE DATA SUMMARY:   HISTORY:   Past Medical History:   Diagnosis Date    Hypertension    History reviewed. No pertinent surgical history. Expanded or extensive additional review of patient history:     Home Situation  Home Environment: Private residence  # Steps to Enter: 4  Rails to Enter: Yes  Hand Rails : Right  One/Two Story Residence: One story  Living Alone: No  Support Systems: Spouse/Significant Other/Partner  Patient Expects to be Discharged to[de-identified] Private residence  Current DME Used/Available at Home: None    Hand dominance: Right    EXAMINATION OF PERFORMANCE DEFICITS:  Cognitive/Behavioral Status:  Neurologic State: Alert  Orientation Level: Oriented to person;Oriented to place  Cognition: Follows commands  Perception: Appears intact  Perseveration: No perseveration noted  Safety/Judgement: Awareness of environment    Skin: intact    Edema: intact    Hearing:   Auditory  Auditory Impairment: None    Vision/Perceptual:                                     Range of Motion:  Shoulder elevation WDL  Shoulder flexion ~20*  Elbow flexion ~40*  Wrist flexion, extension and circumduction decreased  AROM: Generally decreased, functional                         Strength:  2/5 shoulders  +2/5 elbows  -3/5 wrist - digits  Strength: Generally decreased, functional                Coordination:  Coordination: Generally decreased, functional  Fine Motor Skills-Upper: Left Impaired;Right Impaired Gross Motor Skills-Upper: Left Impaired;Right Impaired    Tone & Sensation:    Tone: Normal  Sensation: Intact                      Balance:  Sitting: Impaired; Without support  Sitting - Static: Fair (occasional)  Sitting - Dynamic: Poor (constant support)  Standing: Impaired; With support  Standing - Static: Fair;Poor  Standing - Dynamic : Poor    Functional Mobility and Transfers for ADLs:  Bed Mobility: with PT  Supine to Sit: Maximum assistance;Assist x2  Scooting: Maximum assistance    Transfers:with PT  Sit to Stand: Moderate assistance;Assist x2  Stand to Sit: Maximum assistance;Assist x2    ADL Assessment:  Feeding: Total assistance    Oral Facial Hygiene/Grooming: Maximum assistance setup cloth, proximal support elbow and shoulder to wash face, physical A for thorougness while sitting in chair supported    Bathing: Total assistance    Upper Body Dressing: Total assistance    Lower Body Dressing: Total assistance sitting unsupported functional reach to knees then leaning back in chair    Toileting: Total assistance                ADL Intervention and task modifications:         Patient instructed and indicated understanding the benefits of maintaining activity tolerance, functional mobility, and independence with self care tasks during acute stay  to ensure safe return home and to baseline. Encouraged patient to increase frequency and duration OOB, be out of bed for all meals, perform daily ADLs (as approved by RN/MD regarding bathing etc), and performing functional mobility to/from [facility]. Pacing - sit one hour and get back to bed prior to fatigue so can assist and prevent fall. Wife present and acknowledged as well 2* STM loss for patient. Informed nurse as well. Cognitive Retraining  Safety/Judgement: Awareness of environment    Therapeutic Exercise: Instruction on benefits shoulders-digits flexion and extension increase reps and sets daily, PLB.  Demonstrated with min  A elbow-digits; max A shoulders to gain flexion and ROM, supervision shoulder elevation. Functional Measure:      Occupational Therapy Evaluation Charge Determination   History Examination Decision-Making           Based on the above components, the patient evaluation is determined to be of the following complexity level:   Pain Rating:  None noted    Activity Tolerance:   requires frequent rest breaks and observed SOB with activity 6L NC  Vitals:    01/04/21 1300 01/04/21 1400 01/04/21 1500 01/04/21 1600   BP: (!) 138/92 (!) 145/93 133/85 127/84   BP 1 Location:    Right arm   BP Patient Position:    At rest   Pulse: 100 (!) 110 92 88   Resp: 18 18 15 15   Temp:    99.9 °F (37.7 °C)   SpO2: 94%  98% 97%   Weight:       Height:             After treatment patient left in no apparent distress:    Sitting in chair, Call bell within reach, and Caregiver / family present    COMMUNICATION/EDUCATION:   The patients plan of care was discussed with: Physical therapist and Registered nurse. Home safety education was provided and the patient/caregiver indicated understanding., Patient/family have participated as able in goal setting and plan of care. , and Patient/family agree to work toward stated goals and plan of care. This patients plan of care is appropriate for delegation to [facility] [place].     Thank you for this referral.  Antonioza Clock  Time Calculation: 23 mins

## 2021-01-04 NOTE — PROGRESS NOTES
Orders received, chart reviewed and patient evaluated by occupational therapy. Pending progression with skilled acute occupational therapy, recommend:  Therapy 3 hours per day 5-7 days per week     Recommend with nursing patient to complete as able in order to maintain strength, endurance and independence: OOB to chair 3x/day with 2 assist and functional mobility to [facility] with 2 assist. Thank you for your assistance. Full evaluation to follow.

## 2021-01-04 NOTE — PROGRESS NOTES
Problem: Dysphagia (Adult)  Goal: *Acute Goals and Plan of Care (Insert Text)  Description: Speech Therapy Goals  Initiated 1/4/2020    1. Patient will participate in a Fiberoptic Endoscopic Evaluation of Swallow (FEES) within 3 days. Outcome: Progressing Towards Goal     SPEECH LANGUAGE PATHOLOGY BEDSIDE SWALLOW EVALUATION  Patient: Raquel Granados (26 y.o. male)  Date: 1/4/2021  Primary Diagnosis: Hypoxia [R09.02]        Precautions:        ASSESSMENT :  Based on the objective data described below, the patient presents with high risk for silent prandial aspiration on this date given recent prolonged intubation with subsequent dysphonia. Therefore, will plan on completing a Fiberoptic Endoscopic Evaluation of Swallow (FEES) at bedside to objectively assess safety of swallow physiology. Recommends to follow. Patient will benefit from skilled intervention to address the above impairments. Patients rehabilitation potential is considered to be Fair     PLAN :  Recommendations and Planned Interventions:  --NPO  --FEES this afternoon  Frequency/Duration: Patient will be followed by speech-language pathology 3 times a week to address goals. Discharge Recommendations: To Be Determined     SUBJECTIVE:   Patient stated, \"I can do that. OBJECTIVE:     Past Medical History:   Diagnosis Date    Hypertension    History reviewed. No pertinent surgical history.   Prior Level of Function/Home Situation:   Home Situation  Home Environment: Private residence  One/Two Story Residence: One story  Living Alone: No  Support Systems: Family member(s)  Patient Expects to be Discharged to[de-identified] Private residence  Current DME Used/Available at Home: None  Diet prior to admission: Regular diet/thin liquids  Current Diet:  NPO   Cognitive and Communication Status:  Neurologic State: Alert  Orientation Level: Oriented to person, Oriented to place, Disoriented to situation, Disoriented to time  Cognition: Follows commands  Perception: Appears intact  Perseveration: No perseveration noted       P.O. Trials:  Patient Position: upright in bed  Vocal quality prior to P.O.: Hoarse  Consistency Presented: Thin liquid;Puree  How Presented: SLP-fed/presented;Straw;Spoon     Bolus Acceptance: No impairment  Bolus Formation/Control: No impairment     Propulsion: No impairment  Oral Residue: None  Initiation of Swallow: Delayed (# of seconds)  Laryngeal Elevation: Decreased  Aspiration Signs/Symptoms: None  Pharyngeal Phase Characteristics: No impairment, issues, or problems              Oral Phase Severity: No impairment  Pharyngeal Phase Severity : Other (comment)(at risk)    NOMS:   The NOMS functional outcome measure was used to quantify this patient's level of swallowing impairment. Based on the NOMS, the patient was determined to be at level 1 for swallow function       NOMS Swallowing Levels:  Level 1 (CN): NPO  Level 2 (CM): NPO but takes consistency in therapy  Level 3 (CL): Takes less than 50% of nutrition p.o. and continues with nonoral feedings; and/or safe with mod cues; and/or max diet restriction  Level 4 (CK): Safe swallow but needs mod cues; and/or mod diet restriction; and/or still requires some nonoral feeding/supplements  Level 5 (CJ): Safe swallow with min diet restriction; and/or needs min cues  Level 6 (CI): Independent with p.o.; rare cues; usually self cues; may need to avoid some foods or needs extra time  Level 7 ([address] [place] [address]): Independent for all p.o.  HA. (2003). National Outcomes Measurement System (NOMS): Adult Speech-Language Pathology User's Guide. Pain:  Pain Scale 1: Numeric (0 - 10)  Pain Intensity 1: 0       After treatment:   Call bell within reach and Nursing notified    COMMUNICATION/EDUCATION:     The patient's plan of care including recommendations, planned interventions, and recommended diet changes were discussed with: Registered nurse.      Patient is unable to participate in goal setting and plan of care.    Thank you for this referral.  Radha Bro, SLP  Time Calculation: 20 mins

## 2021-01-04 NOTE — PROGRESS NOTES
[facility] Adult  Hospitalist Group     ICU Transfer/Accept Summary     [place] [address] [place]  DATE OF TRANSFER: 1/4/2021       PATIENT ID: Jeremy Barakat  MRN: [id number]   YOB: 1965    PRIMARY CARE PROVIDER: Minh Cabrera MD   DATE OF ADMISSION: 12/21/2020  4:40 PM    ATTENDING PHYSICIAN: Av Chavez MD  CONSULTATIONS:   IP CONSULT TO NEPHROLOGY  IP CONSULT TO INFECTIOUS DISEASES  IP CONSULT TO HEMATOLOGY  IP CONSULT TO CARDIOLOGY    PROCEDURES/SURGERIES:   * No surgery found *    REASON FOR ADMISSION: <principal problem not specified>     HOSPITAL PROBLEM LIST:  Patient Active Problem List   Diagnosis Code    HTN (hypertension) I10    NSTEMI (non-ST elevated myocardial infarction) ([facility] [address] I21.4    Dilated cardiomyopathy ([facility] [address] I42.0    Arm paresthesia, left R20.2    Intractable headache R51.9    Cervical radiculopathy M54.12    Hypoxia R09.02         Brief HPI and Hospital Course:      72-year-old gentleman with longstanding poorly controlled hypertension admitted to the hospital via ER on December 21 and hypertensive emergency and hypoxic respiratory failure. He improved slowly and was extubated on January 2. Assessment and Plan:    Acute hypoxic respiratory failure: Likely multifactorial, patient with hypertensive emergency with pulmonary edema and possible CAP.   Patient extubated on January 2, continue IV antibiotics, diuretics on hold due to worsening renal function, now on high flow, optimize blood pressure control, repeat ABG today, repeat chest x-ray today, monitor closely    YISSEL on CKD stage II: Nephrology on board, creatinine trending down, renal ultrasound with benign cyst, currently on LR, continue to monitor    Hypercalcemia: Concern for multiple myeloma, could be exacerbated with contraction alkalosis, PTH level pending, hematology oncology consult pending, monitor, will obtain ionized calcium level    Anemia: H&H stable, monitor, stool occult blood pending    Acute metabolic encephalopathy: Likely secondary to above, patient continues to be confused, per ICU attending it is improving, patient pulled off feeding tube yesterday, monitor, repeat a CT of the head to rule out any acute abnormalities in the interim, monitor, may consider further work-up if persists    Hypertensive urgency: Increase clonidine dose, cardiology consult, echocardiogram with EF of around 40 to 45% and mild concentric hypertrophy, monitor and reassess as needed, further intervention per hospital course    GI DVT prophylaxis: SCDs    Nutrition: Patient pulled out feeding tube last night, awaiting swallow eval, monitor                           PHYSICAL EXAMINATION:  Visit Vitals  BP (!) 145/99 (BP Patient Position: Sitting)   Pulse (!) 108   Temp 99.5 °F (37.5 °C)   Resp 28   Ht 5' 11\" (1.803 m)   Wt 98.6 kg (217 lb 6 oz)   SpO2 (!) 88%   BMI 30.32 kg/m²       General:           Mildly confused, alert x1, no acute distress  HEENT:           Atraumatic, MMM            Neck:               Supple, symmetrical,  thyroid: non tender  Lungs:             Decreased basal breath sounds bilateral lung bases  Heart:              Regular  rhythm,  No  murmur   No edema  Abdomen:       Soft, non-tender. Not distended. Bowel sounds normal  Extremities:     No cyanosis. No clubbing,  +2 distal pulses  Skin:                Not pale. Not Jaundiced  No rashes   Psych:             Not anxious or agitated.   Neurologic:       Cranial nerves could not be tested, moves all 4, strength could not be tested as patient not participating in the exam    Labs:     Recent Labs     01/04/21  0451 01/03/21  0358   WBC 10.6 12.0*   HGB 13.4 12.7   HCT 43.1 40.7   * 423*     Recent Labs     01/04/21  0451 01/03/21  0358 01/02/21  1540 01/02/21  0359    143 142 142   K 4.0 2.8* 3.6 4.6    110* 112* 118*   CO2 32 27 24 21   BUN 29* 36* 42* 37*   CREA 1.42* 1.59* 1.42* 1.08   * 121* 109* 133*   CA 10.3* 9.9 9.8 9.1   MG 2.1 1.9 2.0 2.2   PHOS 2.9 3.6  --  3.0     No results for input(s): ALT, AP, TBIL, TBILI, TP, ALB, GLOB, GGT, AML, LPSE in the last 72 hours. No lab exists for component: SGOT, GPT, AMYP, HLPSE  No results for input(s): INR, PTP, APTT, INREXT in the last 72 hours. No results for input(s): FE, TIBC, PSAT, FERR in the last 72 hours. No results found for: FOL, RBCF   No results for input(s): PH, PCO2, PO2 in the last 72 hours. No results for input(s): CPK, CKNDX, TROIQ in the last 72 hours.     No lab exists for component: CPKMB  Lab Results   Component Value Date/Time    Cholesterol, total 121 07/12/2020 01:42 AM    HDL Cholesterol 31 07/12/2020 01:42 AM    LDL, calculated 77.8 07/12/2020 01:42 AM    Triglyceride 61 07/12/2020 01:42 AM    CHOL/HDL Ratio 3.9 07/12/2020 01:42 AM     No results found for: [facility]  Lab Results   Component Value Date/Time    Color YELLOW/STRAW 12/21/2020 07:32 PM    Appearance CLEAR 12/21/2020 07:32 PM    Specific gravity 1.010 12/21/2020 07:32 PM    pH (UA) 5.5 12/21/2020 07:32 PM    Protein 300 (A) 12/21/2020 07:32 PM    Glucose Negative 12/21/2020 07:32 PM    Ketone Negative 12/21/2020 07:32 PM    Bilirubin Negative 12/21/2020 07:32 PM    Urobilinogen 0.2 12/21/2020 07:32 PM    Nitrites Negative 12/21/2020 07:32 PM    Leukocyte Esterase Negative 12/21/2020 07:32 PM    Epithelial cells FEW 12/21/2020 07:32 PM    Bacteria Negative 12/21/2020 07:32 PM    WBC 0-4 12/21/2020 07:32 PM    RBC 0-5 12/21/2020 07:32 PM         CODE STATUS:  x Full Code    DNR    Partial    Comfort Care       Signed:   Parvin Ramirez MD  Date of Service:  1/4/2021  12:42 PM

## 2021-01-04 NOTE — PROGRESS NOTES
Problem: Mobility Impaired (Adult and Pediatric)  Goal: *Acute Goals and Plan of Care (Insert Text)  Description: FUNCTIONAL STATUS PRIOR TO ADMISSION: Patient was independent and active without use of DME. 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 HOME SUPPORT PRIOR TO ADMISSION: The patient lived with his wife but did not require assist.    Physical Therapy Goals  Initiated 1/4/2021  1. Patient will move from supine to sit and sit to supine , scoot up and down, and roll side to side in bed with minimal assistance within 7 day(s). 2.  Patient will transfer from bed to chair and chair to bed with moderate assistance x1 using the least restrictive device within 7 day(s). 3.  Patient will perform sit to stand with minimal assistance within 7 day(s). 4.  Patient will ambulate with moderate assistance  for 20 feet with the least restrictive device within 7 day(s). 5.  Patient will be independent with HEP within 7 days. Outcome: Progressing Towards Goal   PHYSICAL THERAPY EVALUATION  Patient: Kaity Sood (56 y.o. male)  Date: 1/4/2021  Primary Diagnosis: Hypoxia [R09.02]        Precautions:   Fall    ASSESSMENT  Based on the objective data described below, the patient presents with impaired sitting and standing balance, generalized weakness, decreased endurance, and decreased activity tolerance following admission for hypoxia and PNA. Patient s/p 2 weeks sedated and on ventilator but with overall 3/5 strength in LEs. Colton Hugheses to mobilize. Overall mod-max Ax2 for bed mobility and transfer to chair. Able to initiate steps but with weakness noted in hips, especially lacking extension and knee buckling with transfer. Given high level of independence and activity at baseline, anticipate good progress with acute care, but likely need for short rehab stay at d/c. SpO2 stable on 6L/min during session. Recommend 2 assist with gait belt for stand pivot transfer back to bed.      Current Level of Function Impacting Discharge (mobility/balance): mod-max Ax2    Functional Outcome Measure: The patient scored 1/28 on the Tinetti outcome measure which is indicative of high fall risk. Other factors to consider for discharge: previously independent, 6L/min, 2 weeks on vent     Patient will benefit from skilled therapy intervention to address the above noted impairments. PLAN :  Recommendations and Planned Interventions: bed mobility training, transfer training, gait training, therapeutic exercises, neuromuscular re-education, patient and family training/education, and therapeutic activities      Frequency/Duration: Patient will be followed by physical therapy:  5 times a week to address goals. Recommendation for discharge: (in order for the patient to meet his/her long term goals)  Therapy 3 hours per day 5-7 days per week    This discharge recommendation:  Has been made in collaboration with the attending provider and/or case management    IF patient discharges home will need the following DME: to be determined (TBD)         SUBJECTIVE:   Patient stated one floor.  when providing home set up history, wife confirmed    OBJECTIVE DATA SUMMARY:   HISTORY:    Past Medical History:   Diagnosis Date    Hypertension    History reviewed. No pertinent surgical history. Personal factors and/or comorbidities impacting plan of care: [facility]    Home Situation  Home Environment: Private residence  # Steps to Enter: 4  Rails to Enter: Yes  Hand Rails : Right  One/Two Story Residence: One story  Living Alone: No  Support Systems: Spouse/Significant Other/Partner  Patient Expects to be Discharged to[de-identified] Private residence  Current DME Used/Available at Home: None    EXAMINATION/PRESENTATION/DECISION MAKING:   Critical Behavior:  Neurologic State: Alert  Orientation Level: Oriented to person, Oriented to place, Disoriented to situation, Disoriented to time  Cognition: Follows commands  Hearing:   Auditory  Auditory Impairment: None  Range Of Motion:  AROM: Generally decreased, functional  Strength:    Strength: Generally decreased, functional, appears weaker UEs>LEs  Tone & Sensation:   Tone: Normal  Sensation: Intact  Coordination:  Coordination: Generally decreased, functional  Functional Mobility:  Bed Mobility:  Supine to Sit: Maximum assistance;Assist x2  Scooting: Maximum assistance  Transfers:  Sit to Stand: Moderate assistance;Assist x2  Stand to Sit: Maximum assistance;Assist x2  Stand Pivot Transfers: Moderate assistance;Assist x2     Balance:   Sitting: Impaired; Without support  Sitting - Static: Fair (occasional)  Sitting - Dynamic: Poor (constant support)  Standing: Impaired; With support  Standing - Static: Fair;Poor  Standing - Dynamic : Poor    Functional Measure:  Tinetti test:    Sitting Balance: 1  Arises: 0  Attempts to Rise: 0  Immediate Standing Balance: 0  Standing Balance: 0  Nudged: 0  Eyes Closed: 0  Turn 360 Degrees - Continuous/Discontinuous: 0  Turn 360 Degrees - Steady/Unsteady: 0  Sitting Down: 0  Balance Score: 1 Balance total score  Indication of Gait: 0  R Step Length/Height: 0  L Step Length/Height: 0  R Foot Clearance: 0  L Foot Clearance: 0  Step Symmetry: 0  Step Continuity: 0  Path: 0  Trunk: 0  Walking Time: 0  Gait Score: 0 Gait total score  Total Score: 1/28 Overall total score         Tinetti Tool Score Risk of Falls  <19 = High Fall Risk  19-24 = Moderate Fall Risk  25-28 = Low Fall Risk  Tinetti ME. Performance-Oriented Assessment of Mobility Problems in Elderly Patients. [address]; [id number].  (Scoring Description: PT Bulletin Feb. 10, 1993)    Older adults: Marino Barboza et al, 2009; n = 1000 [facility] elderly evaluated with ABC, ANGELIQUE, ADL, and IADL)  · Mean ANGELIQUE score for males aged 69-68 years = 26.21(3.40)  · Mean ANGELIQUE score for females age 69-68 years = 25.16(4.30)  · Mean ANGELIQUE score for males over 80 years = 23.29(6.02)  · Mean ANGELIQUE score for females over 80 years = 17.20(8.32) Physical Therapy Evaluation Charge Determination   History Examination Presentation Decision-Making   HIGH Complexity :3+ comorbidities / personal factors will impact the outcome/ POC  HIGH Complexity : 4+ Standardized tests and measures addressing body structure, function, activity limitation and / or participation in recreation  LOW Complexity : Stable, uncomplicated  Other outcome measures Tinetti 1/28  HIGH       Based on the above components, the patient evaluation is determined to be of the following complexity level: LOW     Pain Rating:  Denied pain    Activity Tolerance:   Fair    After treatment patient left in no apparent distress:   Sitting in chair, Call bell within reach, and Caregiver / family present    COMMUNICATION/EDUCATION:   The patients plan of care was discussed with: Occupational therapist and Registered nurse. Fall prevention education was provided and the patient/caregiver indicated understanding., Patient/family have participated as able in goal setting and plan of care. , and Patient/family agree to work toward stated goals and plan of care.     Thank you for this referral.  Kojo Azul, PT, DPT   Time Calculation: 17 mins

## 2021-01-04 NOTE — PROGRESS NOTES
1930: Bedside shift report received from [facility].    Katharinejakub Bruce: Koko Patrick MD at bedside. Orders received for chest xray, decrease MIV fluids to 50ml/hr, and requested to hold all diuretics. 0730: Bedside and Verbal shift change report given to Cat RN(oncoming nurse) by Catrina Cordova RN (offgoing nurse).  Report included the following information SBAR, Kardex, Procedure Summary, Intake/Output, MAR and Recent Results. '

## 2021-01-04 NOTE — PROGRESS NOTES
[facility]    ICU TEAM Progress Note    Name: Kaykay Narvaez   : 1965   MRN: [id number]   Date: 2021      I  Subjective:   Progress Note: 2021      Reason for ICU Admission: Respiratory failure    Interval history: 53-year-old gentleman with longstanding poorly controlled hypertension admitted to the hospital via ER on  and hypertensive emergency and hypoxic respiratory failure. He improved slowly and was extubated on . Overnight Events:   No acute event, weaning down the high flow nasal cannula, pulled out his tube feeding and pending swallow evaluation. No fever no nausea no vomiting, cooperative. Active Problem List:     Problem List  Date Reviewed: 2020          Codes Class    Hypoxia ICD-10-CM: R09.02  ICD-9-CM: 799.02         Cervical radiculopathy ICD-10-CM: M54.12  ICD-9-CM: 723.4         Arm paresthesia, left ICD-10-CM: R20.2  ICD-9-CM: 782.0         Intractable headache ICD-10-CM: R51.9  ICD-9-CM: 784.0         Dilated cardiomyopathy (HCC) ICD-10-CM: I42.0  ICD-9-CM: 425.4         NSTEMI (non-ST elevated myocardial infarction) ([facility] [address] ICD-10-CM: I21.4  ICD-9-CM: 410.70         HTN (hypertension) ICD-10-CM: I10  ICD-9-CM: 401.9               Past Medical History:      has a past medical history of Hypertension. Past Surgical History:      has no past surgical history on file. Home Medications:     Prior to Admission medications    Medication Sig Start Date End Date Taking? Authorizing Provider   aspirin-acetaminophen-caffeine ([employer] Extra Strength) 520-260-32.5 mg pwpk Take 1 Package by mouth two (2) times a day. Yes Provider, Historical   ibuprofen (MOTRIN) 600 mg tablet Take 600 mg by mouth every six (6) hours as needed for Pain. Yes Provider, Historical   carvediloL (COREG) 25 mg tablet Take 1 Tab by mouth two (2) times daily (with meals).  20  Yes Philippe Brown MD   isosorbide mononitrate ER (IMDUR) 30 mg tablet Take 1 Tab by mouth daily. 20  Yes Kimberlee Bergeron MD   cloNIDine HCL (CATAPRES) 0.1 mg tablet Take 1 Tab by mouth two (2) times a day. 20  Yes Kimberlee Bergeron MD   aspirin 81 mg chewable tablet Take 1 Tab by mouth daily. 19  Yes Helena Romero MD       Allergies/Social/Family History:     No Known Allergies   Social History     Tobacco Use    Smoking status: Current Every Day Smoker     Packs/day: 1.00    Smokeless tobacco: Never Used   Substance Use Topics    Alcohol use: Yes     Alcohol/week: 5.0 standard drinks     Types: 6 Cans of beer per week     Comment: Occasionally      Family History   Problem Relation Age of Onset    Stroke Father     Cancer Sister        Review of Systems:     I reviewed 10 system and they are negative but as in interval history    Objective:   Vital Signs:  Visit Vitals  BP (!) 172/111 (BP 1 Location: Right arm, BP Patient Position: At rest)   Pulse (!) 105   Temp 99.3 °F (37.4 °C)   Resp 20   Ht 5' 11\" (1.803 m)   Wt 98.6 kg (217 lb 6 oz)   SpO2 97%   BMI 30.32 kg/m²    O2 Flow Rate (L/min): 25 l/min(weaned) O2 Device: Heated, Hi flow nasal cannula Temp (24hrs), Av.5 °F (37.5 °C), Min:99 °F (37.2 °C), Max:100 °F (37.8 °C)           Intake/Output:     Intake/Output Summary (Last 24 hours) at 2021 0915  Last data filed at 2021 0800  Gross per 24 hour   Intake 1869.83 ml   Output 4620 ml   Net -2750.17 ml       Physical Exam:    General:  No acute distress. Eyes:  Sclera anicteric. Pupils equal, round, reactive to light. Mouth/Throat: Supple. Neck: Supple. Lungs:   good effort, occasional fine crepitation   Cardiovascular:  Regular rate and rhythm, no murmur, click, rub, or gallop. Abdomen:   Soft, non-tender, bowel sounds normal, non-distended. Extremities: No cyanosis or edema. Skin: No acute rash or lesions.  Lipoma noted over left frontal region    Lymph Nodes: Cervical and supraclavicular normal.   Musculoskeletal:  No swelling or deformity. Lines/Devices:  Intact, no erythema, drainage, or tenderness. Psychiatric: Comfortable         LABS AND  DATA: Personally reviewed  Recent Labs     01/04/21 0451 01/03/21  0358   WBC 10.6 12.0*   HGB 13.4 12.7   HCT 43.1 40.7   * 423*     Recent Labs     01/04/21  0451 01/03/21  0358    143   K 4.0 2.8*    110*   CO2 32 27   BUN 29* 36*   CREA 1.42* 1.59*   * 121*   CA 10.3* 9.9   MG 2.1 1.9   PHOS 2.9 3.6     No results for input(s): AP, TBIL, TP, ALB, GLOB, AML, LPSE in the last 72 hours. No lab exists for component: SGOT, GPT, AMYP  No results for input(s): INR, PTP, APTT, INREXT in the last 72 hours. Recent Labs     01/02/21 0424 01/02/21  0419   PHI 7.39 7.39   PCO2I 30.9* 32.6*   PO2I 76*  --    FIO2I 40 40     No results for input(s): CPK, CKMB, TROIQ, BNPP in the last 72 hours. Hemodynamics:   PAP:   CO:     Wedge:   CI:     CVP:    SVR:       PVR:       Ventilator Settings:  Mode Rate Tidal Volume Pressure FiO2 PEEP   Assist control, Volume control   420 ml    40 % 8 cm H20     Peak airway pressure: 25 cm H2O    Minute ventilation: 9.04 l/min        MEDS: Reviewed    Chest X-Ray:  CXR Results  (Last 48 hours)               01/04/21 0715  XR CHEST PORT Final result    Impression:  IMPRESSION:    No significant change. Narrative:  INDICATION:    B/L pleural effusions        EXAMINATION:  AP CHEST, PORTABLE       COMPARISON: 1/3/2021       FINDINGS: Single AP portable view of the chest at 710 hours demonstrates no   change in the right IJ central venous catheter. The cardiomediastinal silhouette   is stable. There is no new airspace disease. The lungs are hypoinspiratory. Small effusions are suspected, not significantly changed. 01/03/21 0941  XR CHEST PORT Final result    Impression:  IMPRESSION: Extubation. Otherwise, stable chest x-ray appearance.        Narrative:  EXAM: XR CHEST PORT       INDICATION: Resp Insuf       COMPARISON: 12/31/2020       FINDINGS: A portable AP radiograph of the chest was obtained at 0933 hours. Transesophageal tube is again shown extending into stomach as is right IJ line   terminating at the cavoatrial junction. The endotracheal tube is removed in the   interval. Low lung volumes are shown. Small bilateral pleural effusions and   ill-defined left greater than right bibasilar patchy pulmonary densities appear   unchanged. There is no pneumothorax. Cardiac and mediastinal contours are   stable. ECHO:  December 2019  · Left Ventricle: Normal cavity size. Moderate concentric hypertrophy. Mild-to-moderate systolic dysfunction. Estimated left ventricular ejection fraction is 41 - 45%. Visually measured ejection fraction. Left ventricular global hypokinesis. · Left Atrium: Moderately dilated left atrium. · Right Ventricle: Not well visualized. Assessment and Plan:   1. Acute Hypoxic Respiratory Failure: Multifactorial, pulmonary edema with hypotension emergency, community-acquired pneumonia. Improving and was extubated on January 2. Today's last day of antibiotic. Continue blood pressure control. If needed can increase the dose of clonidine. Wean FiO2 as tolerated    2. Metabolic Encephalopathy: Multifactorial, improving. Likely due to ICU delirium in setting of infection and steroid use    3. HTN : Continue current medication, titrate for optimal blood pressure control  4. YISSEL/CKD stage 2 : Appreciate nephrology input, diuretics discontinued as he seems to be contracted with hypercalcemia. ? Mult Myeloma (based on +M spike, with K light chains and IgM levels) : Given the patient is improving and with the current hypercalcemia I will place a consult for hematologist and I appreciate their input    Swallow evaluation today by speech therapist.  Piero Malagon  At this time there is no need for ICU level care.   Will transfer to intermediate care unit    [address] [place] [address] NOTE  I had a face to face encounter with the patient, reviewed and interpreted patient data including clinical events, labs, images, vital signs, I/O's, and examined patient. I have discussed the case and the plan and management of the patient's care with the consulting services, the bedside nurses and the respiratory therapist.      NOTE OF PERSONAL INVOLVEMENT IN CARE   This patient has a high probability of imminent, clinically significant deterioration, which requires the highest level of preparedness to intervene urgently. I participated in the decision-making and personally managed or directed the management of the following life and organ supporting interventions that required my frequent assessment to treat or prevent imminent deterioration. I personally spent 40 minutes of critical care time. This is time spent at this critically ill patient's bedside actively involved in patient care as well as the coordination of care and discussions with the patient's family. This does not include any procedural time which has been billed separately. Cordie Apley, M.D.   Staff Intensivist/Pulmonologist  [facility]  1/4/2021

## 2021-01-04 NOTE — PROGRESS NOTES
Problem: Dysphagia (Adult)  Goal: *Acute Goals and Plan of Care (Insert Text)  Description: Speech Therapy Goals  Initiated 1/4/2020    1. Patient will participate in a Fiberoptic Endoscopic Evaluation of Swallow (FEES) within 3 days. 1/4/2021 1544 by Conor Stewart SLP  Outcome: Resolved/Met  1/4/2021 1144 by Conor Stewart SLP  Outcome: Progressing Towards Goal     Speech Language Pathology  Fiberoptic Endoscopic Evaluation of Swallowing-FEES with Discharge  Patient: Stella Mcburney (85 y.o. male)  Date: 1/4/2021  Primary Diagnosis: Hypoxia [R09.02]        Precautions:  Fall    ASSESSMENT :  Based on the objective data described below, the patient presents with functional oropharyngeal phases of swallow with no aspiration, penetration, nor residue appreciated. Pt cleared for regular diet/thin liquids. However, noted to have L true vocal fold hypomobility. Could be 2/2 prolonged intubation. However, if this does not improve, would recommend an ENT consult. Otherwise, further skilled acute therapy provided by a speech-language pathologist is not indicated at this time. PLAN :  Recommendations and Planned Interventions:  --Regular diet/thin liquids  --general aspiration precautions  --ENT consult given L true vocal fold hypomobility  Discharge Recommendations: None     SUBJECTIVE:   Patient stated, \"Thank you. OBJECTIVE:     Past Medical History:   Diagnosis Date    Hypertension    History reviewed. No pertinent surgical history.   Prior Level of Function/Home Situation:   Home Situation  Home Environment: Private residence  # Steps to Enter: 4  Rails to Enter: Yes  Hand Rails : Right  One/Two Story Residence: One story  Living Alone: No  Support Systems: Spouse/Significant Other/Partner  Patient Expects to be Discharged to[de-identified] Private residence  Current DME Used/Available at Home: None  Diet prior to admission: Regular diet/thin liquids  Current Diet:  NPO     Patient Position: upright in chair    Part I:   Anatomic-Physiologic Assessment:     Movement:  L true vocal fold hypomobility  Base of Tongue Movement:  Unable to assess  Secretions:  WFL  Appearance of Secretions:  None  Effectiveness of Swallow in Clearing Secretions:  n/a  VF Symmetry and Appearance: ? Bleeding on left arytenoid and left true vocal fold hypomobility  Phonation:  Reduced volume/hoarseness        Trial 1:   Consistency Presented: Thin liquid;Puree; Solid   How Presented: SLP-fed/presented;Spoon;Straw       Bolus Acceptance: No impairment   Bolus Formation/Control: No impairment:     Propulsion: No impairment   Oral Residue: None   Initiation of Swallow: No impairment   Timing: No impairment   Penetration: None   Aspiration/Timing: No evidence of aspiration   Pharyngeal Clearance: No residue                       Decreased Tongue Base Retraction?: (unable to assess)  Laryngeal Elevation: WFL (within functional limits)  Aspiration/Penetration Score: 1 (No penetration or aspiration-Contrast does not enter the airway)  Pharyngeal Symmetry: Not assessed  Pharyngeal-Esophageal Segment: No impairment  Pharyngeal Dysfunction: None    Oral Phase Severity: No impairment  Pharyngeal Phase Severity: N/A  NOMS:   The NOMS functional outcome measure was used to quantify this patient's level of swallowing impairment. Based on the NOMS, the patient was determined to be at level 7 for swallow function         NOMS Swallowing Levels:  Level 1 (CN): NPO  Level 2 (CM): NPO but takes consistency in therapy  Level 3 (CL): Takes less than 50% of nutrition p.o. and continues with nonoral feedings; and/or safe with mod cues; and/or max diet restriction  Level 4 (CK):  Safe swallow but needs mod cues; and/or mod diet restriction; and/or still requires some nonoral feeding/supplements  Level 5 (CJ): Safe swallow with min diet restriction; and/or needs min cues  Level 6 (CI): Independent with p.o.; rare cues; usually self cues; may need to avoid some foods or needs extra time  Level 7 [facility]): Independent for all p.o.  HA. (2003). National Outcomes Measurement System (NOMS): Adult Speech-Language Pathology User's Guide. COMMUNICATION/EDUCATION:     The patient's plan of care including findings from FEES, recommendations, planned interventions, and recommended diet changes were discussed with: Registered nurse. Patient/family have participated as able in goal setting and plan of care.     Thank you for this referral.  DESTINY Hernandez  Time Calculation: 30 mins

## 2021-01-04 NOTE — PROGRESS NOTES
SLP Contact Note    FEES complete. Recommend regular diet/thin liquids. Pt's left vocal fold hypomobile and thus recommend ENT consult. Full note to follow.       Thank you,  RUY PenaEd, [id number] [facility]  Speech-Language Pathologist

## 2021-01-05 LAB
ANION GAP SERPL CALC-SCNC: 3 MMOL/L (ref 5–15)
BUN SERPL-MCNC: 29 MG/DL (ref 6–20)
BUN/CREAT SERPL: 22 (ref 12–20)
CALCIUM SERPL-MCNC: 10 MG/DL (ref 8.5–10.1)
CHLORIDE SERPL-SCNC: 101 MMOL/L (ref 97–108)
CO2 SERPL-SCNC: 32 MMOL/L (ref 21–32)
CREAT SERPL-MCNC: 1.31 MG/DL (ref 0.7–1.3)
ERYTHROCYTE [DISTWIDTH] IN BLOOD BY AUTOMATED COUNT: 15 % (ref 11.5–14.5)
GLUCOSE SERPL-MCNC: 126 MG/DL (ref 65–100)
HCT VFR BLD AUTO: 42.2 % (ref 36.6–50.3)
HGB BLD-MCNC: 13.2 G/DL (ref 12.1–17)
MAGNESIUM SERPL-MCNC: 2.2 MG/DL (ref 1.6–2.4)
MCH RBC QN AUTO: 27 PG (ref 26–34)
MCHC RBC AUTO-ENTMCNC: 31.3 G/DL (ref 30–36.5)
MCV RBC AUTO: 86.3 FL (ref 80–99)
NRBC # BLD: 0 K/UL (ref 0–0.01)
NRBC BLD-RTO: 0 PER 100 WBC
PHOSPHATE SERPL-MCNC: 3.4 MG/DL (ref 2.6–4.7)
PLATELET # BLD AUTO: 435 K/UL (ref 150–400)
PMV BLD AUTO: 9.6 FL (ref 8.9–12.9)
POTASSIUM SERPL-SCNC: 3.8 MMOL/L (ref 3.5–5.1)
RBC # BLD AUTO: 4.89 M/UL (ref 4.1–5.7)
SODIUM SERPL-SCNC: 136 MMOL/L (ref 136–145)
WBC # BLD AUTO: 9.4 K/UL (ref 4.1–11.1)

## 2021-01-05 PROCEDURE — 74011250637 HC RX REV CODE- 250/637: Performed by: ANESTHESIOLOGY

## 2021-01-05 PROCEDURE — 80048 BASIC METABOLIC PNL TOTAL CA: CPT

## 2021-01-05 PROCEDURE — 74011000250 HC RX REV CODE- 250: Performed by: NURSE PRACTITIONER

## 2021-01-05 PROCEDURE — 74011250636 HC RX REV CODE- 250/636: Performed by: ANESTHESIOLOGY

## 2021-01-05 PROCEDURE — 97110 THERAPEUTIC EXERCISES: CPT

## 2021-01-05 PROCEDURE — 97116 GAIT TRAINING THERAPY: CPT

## 2021-01-05 PROCEDURE — 84100 ASSAY OF PHOSPHORUS: CPT

## 2021-01-05 PROCEDURE — 36415 COLL VENOUS BLD VENIPUNCTURE: CPT

## 2021-01-05 PROCEDURE — 74011250637 HC RX REV CODE- 250/637: Performed by: INTERNAL MEDICINE

## 2021-01-05 PROCEDURE — 97535 SELF CARE MNGMENT TRAINING: CPT

## 2021-01-05 PROCEDURE — 74011000250 HC RX REV CODE- 250: Performed by: INTERNAL MEDICINE

## 2021-01-05 PROCEDURE — 74011250636 HC RX REV CODE- 250/636: Performed by: NURSE PRACTITIONER

## 2021-01-05 PROCEDURE — 83735 ASSAY OF MAGNESIUM: CPT

## 2021-01-05 PROCEDURE — 74011250637 HC RX REV CODE- 250/637: Performed by: FAMILY MEDICINE

## 2021-01-05 PROCEDURE — 97530 THERAPEUTIC ACTIVITIES: CPT

## 2021-01-05 PROCEDURE — 85027 COMPLETE CBC AUTOMATED: CPT

## 2021-01-05 PROCEDURE — 65660000001 HC RM ICU INTERMED STEPDOWN

## 2021-01-05 RX ORDER — HALOPERIDOL 1 MG/1
1 TABLET ORAL
Status: DISCONTINUED | OUTPATIENT
Start: 2021-01-05 | End: 2021-01-07 | Stop reason: HOSPADM

## 2021-01-05 RX ORDER — BALSAM PERU/CASTOR OIL
OINTMENT (GRAM) TOPICAL EVERY 8 HOURS
Status: DISCONTINUED | OUTPATIENT
Start: 2021-01-05 | End: 2021-01-07 | Stop reason: HOSPADM

## 2021-01-05 RX ADMIN — AMLODIPINE BESYLATE 10 MG: 5 TABLET ORAL at 09:16

## 2021-01-05 RX ADMIN — CLONIDINE HYDROCHLORIDE 0.2 MG: 0.2 TABLET ORAL at 21:24

## 2021-01-05 RX ADMIN — CLONIDINE HYDROCHLORIDE 0.2 MG: 0.2 TABLET ORAL at 14:18

## 2021-01-05 RX ADMIN — DOCUSATE SODIUM 100 MG: 50 LIQUID ORAL at 09:16

## 2021-01-05 RX ADMIN — LABETALOL HYDROCHLORIDE 40 MG: 5 INJECTION INTRAVENOUS at 20:02

## 2021-01-05 RX ADMIN — Medication 17.2 MG: at 09:16

## 2021-01-05 RX ADMIN — CLONIDINE HYDROCHLORIDE 0.2 MG: 0.2 TABLET ORAL at 06:29

## 2021-01-05 RX ADMIN — CARVEDILOL 25 MG: 12.5 TABLET, FILM COATED ORAL at 18:35

## 2021-01-05 RX ADMIN — Medication 10 ML: at 14:20

## 2021-01-05 RX ADMIN — FAMOTIDINE 20 MG: 10 INJECTION, SOLUTION INTRAVENOUS at 09:16

## 2021-01-05 RX ADMIN — FAMOTIDINE 20 MG: 10 INJECTION, SOLUTION INTRAVENOUS at 21:23

## 2021-01-05 RX ADMIN — CARVEDILOL 25 MG: 12.5 TABLET, FILM COATED ORAL at 09:16

## 2021-01-05 RX ADMIN — ENOXAPARIN SODIUM 40 MG: 40 INJECTION SUBCUTANEOUS at 09:16

## 2021-01-05 RX ADMIN — Medication 30 ML: at 09:21

## 2021-01-05 NOTE — PROGRESS NOTES
[address] [facility] Adult  Hospitalist Group                                                                                          Hospitalist Progress Note  Ollie Cockayne, MD  Answering service: [phone] from in house phone        Date of Service:  2021  NAME:  Ranjit Celis  :  1965  MRN:  [id number]      Admission Summary:   53-year-old gentleman with longstanding poorly controlled hypertension admitted to the hospital via ER on  and hypertensive emergency and hypoxic respiratory failure.  He improved slowly and was extubated on . Interval history / Subjective:   Pt sitting in chair gets agitated as per family easily, on NC oxygen  Nods to all questions      Assessment & Plan:     Acute hypoxic respiratory failure: Likely multifactorial - resolving   -patient with hypertensive emergency with pulmonary edema and possible CAP.   Patient extubated on , continue IV antibiotics  - diuretics on hold due to worsening renal function, now on NC     YISSEL on CKD stage II:   -Nephrology on board, creatinine trending down, renal ultrasound with benign cyst, currently on LR, continue to monitor     Hypercalcemia:   -Concern for multiple myeloma, could be exacerbated with contraction alkalosis, PTH level pending, hematology oncology BMB when able   - MGUS IgM vs MM/ Waldenstroms m spike on gammopathy panel     Anemia:   H&H stable, monitor, stool occult blood pending     Acute metabolic encephalopathy:   Likely secondary to above, patient continues to be confused, per ICU attending it is improving, patient pulled off feeding tube yesterday, monitor, repeat a CT of the head to rule out any acute abnormalities in the interim, monitor, may consider further work-up if persists     Hypertensive urgency:   Increase clonidine dose, cardiology consult, echocardiogram with EF of around 40 to 45% and mild concentric hypertrophy, BP stable today      GI DVT prophylaxis: SCDs     Nutrition: Patient pulled out feeding tube last night, awaiting swallow eval, monitor       Code status: Full  DVT prophylaxis: scd    Care Plan discussed with: Patient/Family and Nurse  Anticipated Disposition: Home w/Family  Anticipated Discharge: 24 hours to 48 hours     Hospital Problems  Date Reviewed: 9/28/2020          Codes Class Noted POA    Hypoxia ICD-10-CM: R09.02  ICD-9-CM: 799.02  12/21/2020 Unknown                Review of Systems:   A comprehensive review of systems was negative. Vital Signs:    Last 24hrs VS reviewed since prior progress note. Most recent are:  Visit Vitals  /82   Pulse 87   Temp 98.7 °F (37.1 °C)   Resp 23   Ht 5' 11\" (1.803 m)   Wt 98.4 kg (216 lb 14.9 oz)   SpO2 96%   BMI 30.26 kg/m²         Intake/Output Summary (Last 24 hours) at 1/5/2021 1457  Last data filed at 1/5/2021 1400  Gross per 24 hour   Intake 1155 ml   Output 1660 ml   Net -505 ml        Physical Examination:     I had a face to face encounter with this patient and independently examined them on 1/5/2021 as outlined below:          Constitutional:  No acute distress, cooperative, pleasant NC oxygen   ENT:  mmm   Resp:  CTA bilaterally. No wheezing/rhonchi/rales   CV:  Regular rhythm, normal rate    GI:  Soft, non distended, non tender. bs+    Musculoskeletal:  No edema, warm, 2+ pulses throughout    Neurologic:  Moves all extremities. AAOx0,      Psych:  Good insight, Not anxious nor agitated.        Data Review:    I personally reviewed  Image and labs      Labs:     Recent Labs     01/05/21 0421 01/04/21  0451   WBC 9.4 10.6   HGB 13.2 13.4   HCT 42.2 43.1   * 437*     Recent Labs     01/05/21  0421 01/04/21 0451 01/03/21  0358    140 143   K 3.8 4.0 2.8*    105 110*   CO2 32 32 27   BUN 29* 29* 36*   CREA 1.31* 1.42* 1.59*   * 134* 121*   CA 10.0 10.3* 9.9   MG 2.2 2.1 1.9   PHOS 3.4 2.9 3.6     No results for input(s): ALT, AP, TBIL, TBILI, TP, ALB, GLOB, GGT, AML, LPSE in the last 72 hours. No lab exists for component: SGOT, GPT, AMYP, HLPSE  No results for input(s): INR, PTP, APTT, INREXT in the last 72 hours. No results for input(s): FE, TIBC, PSAT, FERR in the last 72 hours. No results found for: FOL, RBCF   No results for input(s): PH, PCO2, PO2 in the last 72 hours. No results for input(s): CPK, CKNDX, TROIQ in the last 72 hours.     No lab exists for component: CPKMB  Lab Results   Component Value Date/Time    Cholesterol, total 121 07/12/2020 01:42 AM    HDL Cholesterol 31 07/12/2020 01:42 AM    LDL, calculated 77.8 07/12/2020 01:42 AM    Triglyceride 61 07/12/2020 01:42 AM    CHOL/HDL Ratio 3.9 07/12/2020 01:42 AM     No results found for: [facility]  Lab Results   Component Value Date/Time    Color YELLOW/STRAW 12/21/2020 07:32 PM    Appearance CLEAR 12/21/2020 07:32 PM    Specific gravity 1.010 12/21/2020 07:32 PM    pH (UA) 5.5 12/21/2020 07:32 PM    Protein 300 (A) 12/21/2020 07:32 PM    Glucose Negative 12/21/2020 07:32 PM    Ketone Negative 12/21/2020 07:32 PM    Bilirubin Negative 12/21/2020 07:32 PM    Urobilinogen 0.2 12/21/2020 07:32 PM    Nitrites Negative 12/21/2020 07:32 PM    Leukocyte Esterase Negative 12/21/2020 07:32 PM    Epithelial cells FEW 12/21/2020 07:32 PM    Bacteria Negative 12/21/2020 07:32 PM    WBC 0-4 12/21/2020 07:32 PM    RBC 0-5 12/21/2020 07:32 PM         Medications Reviewed:     Current Facility-Administered Medications   Medication Dose Route Frequency    amLODIPine (NORVASC) tablet 10 mg  10 mg Oral DAILY    carvediloL (COREG) tablet 25 mg  25 mg Oral BID WITH MEALS    albuterol-ipratropium (DUO-NEB) 2.5 MG-0.5 MG/3 ML  3 mL Nebulization Q6H PRN    cloNIDine HCL (CATAPRES) tablet 0.2 mg  0.2 mg Oral Q8H    labetaloL (NORMODYNE;TRANDATE) injection 40 mg  40 mg IntraVENous Q6H PRN    famotidine (PF) (PEPCID) 20 mg in 0.9% sodium chloride 10 mL injection  20 mg IntraVENous S93R    multivit-folic acid-herbal 646 (WELLESSE PLUS) oral liquid 30 mL  30 mL Oral DAILY    0.9% sodium chloride infusion  3 mL/hr IntraVENous CONTINUOUS    0.9% sodium chloride infusion  5 mL/hr IntraVENous CONTINUOUS    docusate (COLACE) 50 mg/5 mL oral liquid 100 mg  100 mg Oral DAILY    acetaminophen (TYLENOL) solution 650 mg  650 mg Per NG tube Q6H PRN    nicotine (NICODERM CQ) 14 mg/24 hr patch 1 Patch  1 Patch TransDERmal DAILY    hydrALAZINE (APRESOLINE) 20 mg/mL injection 20 mg  20 mg IntraVENous Q6H PRN    enoxaparin (LOVENOX) injection 40 mg  40 mg SubCUTAneous DAILY    senna (SENOKOT) tablet 17.2 mg  2 Tab Oral DAILY    sodium chloride (NS) flush 5-40 mL  5-40 mL IntraVENous Q8H    sodium chloride (NS) flush 5-40 mL  5-40 mL IntraVENous PRN    acetaminophen (TYLENOL) tablet 650 mg  650 mg Oral Q6H PRN    Or    acetaminophen (TYLENOL) suppository 650 mg  650 mg Rectal Q6H PRN    polyethylene glycol (MIRALAX) packet 17 g  17 g Oral DAILY PRN    promethazine (PHENERGAN) tablet 12.5 mg  12.5 mg Oral Q6H PRN    Or    ondansetron (ZOFRAN) injection 4 mg  4 mg IntraVENous Q6H PRN     ______________________________________________________________________  EXPECTED LENGTH OF STAY: 12d 0h  ACTUAL LENGTH OF STAY:          15                 Kerry Stoner MD

## 2021-01-05 NOTE — CONSULTS
Consult received and chart reviewed. Left vocal cord hypomobility noted on FESS. Currently tolerating regular diet. No acute intervention required for vocal cord paresis. If he isn't able to tolerate a diet can re-eval. Otherwise he can f/u with us as an outpatient.

## 2021-01-05 NOTE — PROGRESS NOTES
[facility]   [address] [facility], [facility] [address] [facility], [facility]  Phone: [phone]   [phone]       Nephrology Progress Note  Sayra Guadalupe     1965     [id number]  Date of Admission : 12/21/2020 01/05/21    CC: Follow up for YISSEL    Assessment and Plan   YISSEL  - initial insult from ATN  - recurrent YISSEL from diuetics + contrast on 12/31  - UA : trace blood and 3+ protein --> serologic w/u neg except + gammopathy screen  - renal US : unremarkable except for benign cyst   - alkalotic and hypercalcemic --> hold diuretics   - stop IVF and watch   - ok to remove angel / voiding trial   - daily labs and I/Os    Hypercalcemia   Contraction alkalosis   - hold diuretics     Haemophilus/Streptococcus pneumonia:  - abx per ID    Resp failure:  - from PNA and volume  - LE dopplers neg and CTA neg on 12/31  - extubated 1/2/21     Malignant HTN   Hypertensive Urgency   - negative UDS  - continue current meds      Anemia:  - hgb stable  - + M spike along with elevated K light chains and IgM levels  - will need heme eval eventually     Interval History:  Seen and examined. On O2. .   Cr down   Cleared for swallow and drinking fluids   Improving oxygenation and BP   Started clonidine overnight     Review of Systems: Review of systems not obtained due to patient factors.     Current Medications:   Current Facility-Administered Medications   Medication Dose Route Frequency    amLODIPine (NORVASC) tablet 10 mg  10 mg Oral DAILY    carvediloL (COREG) tablet 25 mg  25 mg Oral BID WITH MEALS    albuterol-ipratropium (DUO-NEB) 2.5 MG-0.5 MG/3 ML  3 mL Nebulization Q6H PRN    cloNIDine HCL (CATAPRES) tablet 0.2 mg  0.2 mg Oral Q8H    labetaloL (NORMODYNE;TRANDATE) injection 40 mg  40 mg IntraVENous Q6H PRN    lactated Ringers infusion  50 mL/hr IntraVENous CONTINUOUS    famotidine (PF) (PEPCID) 20 mg in 0.9% sodium chloride 10 mL injection  20 mg IntraVENous R07B    multivit-folic acid-herbal 811 (WELLESSE PLUS) oral liquid 30 mL  30 mL Oral DAILY    0.9% sodium chloride infusion  3 mL/hr IntraVENous CONTINUOUS    0.9% sodium chloride infusion  5 mL/hr IntraVENous CONTINUOUS    docusate (COLACE) 50 mg/5 mL oral liquid 100 mg  100 mg Oral DAILY    acetaminophen (TYLENOL) solution 650 mg  650 mg Per NG tube Q6H PRN    nicotine (NICODERM CQ) 14 mg/24 hr patch 1 Patch  1 Patch TransDERmal DAILY    hydrALAZINE (APRESOLINE) 20 mg/mL injection 20 mg  20 mg IntraVENous Q6H PRN    enoxaparin (LOVENOX) injection 40 mg  40 mg SubCUTAneous DAILY    senna (SENOKOT) tablet 17.2 mg  2 Tab Oral DAILY    sodium chloride (NS) flush 5-40 mL  5-40 mL IntraVENous Q8H    sodium chloride (NS) flush 5-40 mL  5-40 mL IntraVENous PRN    acetaminophen (TYLENOL) tablet 650 mg  650 mg Oral Q6H PRN    Or    acetaminophen (TYLENOL) suppository 650 mg  650 mg Rectal Q6H PRN    polyethylene glycol (MIRALAX) packet 17 g  17 g Oral DAILY PRN    promethazine (PHENERGAN) tablet 12.5 mg  12.5 mg Oral Q6H PRN    Or    ondansetron (ZOFRAN) injection 4 mg  4 mg IntraVENous Q6H PRN      No Known Allergies    Objective:  Vitals:    Vitals:    01/05/21 0100 01/05/21 0200 01/05/21 0300 01/05/21 0400   BP: 132/86 132/87 (!) 130/90 (!) 130/90   Pulse: 81 74 80 91   Resp: 18 13 14 15   Temp:       SpO2: 97% 95% 98% 95%   Weight:    98.4 kg (216 lb 14.9 oz)   Height:         Intake and Output:  01/04 1901 - 01/05 0700  In: 550 [I.V.:550]  Out: 775 [Urine:775]  01/03 0701 - 01/04 1900  In: 2735.8 [I.V.:2635.8]  Out: 0920 [Urine:6270]    Physical Examination:  General:  on high flow,  HEENT: Frontal lipoma   Lungs : decreased basilar breath sounds   CVS: RRR,s1,s2, normal, No murmur   Extremities: no LE edema  Neurologic: awake, laert, following commands  Psych: not agitated, calm and slight confusion        []    High complexity decision making was performed  []    Patient is at high-risk of decompensation with multiple organ involvement    Lab Data Personally Reviewed: I have reviewed all the pertinent labs, microbiology data and radiology studies during assessment. Recent Labs     01/05/21 0421 01/04/21 0451 01/03/21 0358 01/02/21  1540    140 143 142   K 3.8 4.0 2.8* 3.6    105 110* 112*   CO2 32 32 27 24   * 134* 121* 109*   BUN 29* 29* 36* 42*   CREA 1.31* 1.42* 1.59* 1.42*   CA 10.0 10.3* 9.9 9.8   MG  --  2.1 1.9 2.0   PHOS  --  2.9 3.6  --      Recent Labs     01/05/21 0421 01/04/21 0451 01/03/21 0358   WBC 9.4 10.6 12.0*   HGB 13.2 13.4 12.7   HCT 42.2 43.1 40.7   * 437* 423*     No results found for: SDES  Lab Results   Component Value Date/Time    Culture result: SCANT NORMAL RESPIRATORY ALDO 12/28/2020 04:56 PM    Culture result: MRSA NOT PRESENT 12/24/2020 09:20 AM    Culture result:  12/24/2020 09:20 AM         Screening of patient nares for MRSA is for surveillance purposes and, if positive, to facilitate isolation considerations in high risk settings. It is not intended for automatic decolonization interventions per se as regimens are not sufficiently effective to warrant routine use. Culture result: NO GROWTH 2 DAYS 12/23/2020 04:16 PM    Culture result: (A) 12/22/2020 08:45 AM     RARE HAEMOPHILUS INFLUENZAE BETA LACTAMASE NEGATIVE    Culture result: RARE NORMAL RESPIRATORY ALDO 12/22/2020 08:45 AM     Recent Results (from the past 24 hour(s))   POC EG7    Collection Time: 01/04/21  6:00 PM   Result Value Ref Range    Calcium, ionized (POC) 1.30 1.12 - 1.32 mmol/L    pH (POC) 7.43 7.35 - 7.45      pCO2 (POC) 50.4 (H) 35.0 - 45.0 MMHG    HCO3 (POC) 33.1 (H) 22 - 26 MMOL/L    Base excess (POC) 9 mmol/L    Site RIGHT RADIAL      Device: NASAL CANNULA      Flow rate (POC) 6 L/M    Allens test (POC) YES      Specimen type (POC) ARTERIAL      Total resp. rate 15     CBC W/O DIFF    Collection Time: 01/05/21  4:21 AM   Result Value Ref Range    WBC 9.4 4.1 - 11.1 K/uL    RBC 4.89 4. 10 - 5.70 M/uL    HGB 13.2 12.1 - 17.0 g/dL    HCT 42.2 36.6 - 50.3 %    MCV 86.3 80.0 - 99.0 FL    MCH 27.0 26.0 - 34.0 PG    MCHC 31.3 30.0 - 36.5 g/dL    RDW 15.0 (H) 11.5 - 14.5 %    PLATELET 802 (H) 180 - 400 K/uL    MPV 9.6 8.9 - 12.9 FL    NRBC 0.0 0  WBC    ABSOLUTE NRBC 0.00 0.00 - 9.24 K/uL   METABOLIC PANEL, BASIC    Collection Time: 01/05/21  4:21 AM   Result Value Ref Range    Sodium 136 136 - 145 mmol/L    Potassium 3.8 3.5 - 5.1 mmol/L    Chloride 101 97 - 108 mmol/L    CO2 32 21 - 32 mmol/L    Anion gap 3 (L) 5 - 15 mmol/L    Glucose 126 (H) 65 - 100 mg/dL    BUN 29 (H) 6 - 20 MG/DL    Creatinine 1.31 (H) 0.70 - 1.30 MG/DL    BUN/Creatinine ratio 22 (H) 12 - 20      GFR est AA >60 >60 ml/min/1.73m2    GFR est non-AA 57 (L) >60 ml/min/1.73m2    Calcium 10.0 8.5 - 10.1 MG/DL           Total time spent with patient:  xxx   min. Care Plan discussed with:  Patient     Family      RN      Consulting Physician [facility],         I have reviewed the flowsheets. Chart and Pertinent Notes have been reviewed. No change in PMH ,family and social history from Consult note.       Manan Price MD

## 2021-01-05 NOTE — PROGRESS NOTES
0730 Bedside and Verbal shift change report given to JAMES Leone and Bambi Wilder (oncoming nurse) by Zohra Vizcarra (offgoing nurse). Report included the following information SBAR, Kardex, Intake/Output, MAR and Cardiac Rhythm NSR.   0800 Patient up to chair with two assist.   Alberto [facility] Dr. Bonny Cabrera regarding tele orders, will see patient first. Remains with IMCU orders. 1115 Patient attempting to get out of chair without assistance, chair alarm placed under patient. Reminded patient to call out for assistance. 1400 angel removed without difficulty  1430 PT/OT at bedside, patient ambulating in reynoso, back in bed.

## 2021-01-05 NOTE — PROGRESS NOTES
Transitions of Care Plan:   RUR: 15%   Disposition:  Inpatient Rehab recommended - jose bed/self-pay   Baseline: independent; employed; no DME    1145 - CCU provided update - patient improving; pleasantly confused - has improved; remains weak; 6LPM. Healthcare decision maker is Nahomi Late. Chart reviewed - treatment team recs are for inpatient rehab given weakness and lengthy hospitalization/intubation. 1155 - CM spoke with patient's Nahomi boo. CM provided explanation of patient's transition of care plan - progressing towards inpatient rehab. CM inquired into insurance. Kelechi Estevez stated she will call patient's employer, [employer], to see if he signed up for insurance during open enrollment. Unsure if patient has insurance effective 1/1/21. CM provided explanation of financial assistance placement at inpatient rehab - Kelechi Estevez agreeable to start process at [facility] [employer] 1210 - Referral sent to [facility] via [employer]     Ne Leonardo, MPH

## 2021-01-05 NOTE — PROGRESS NOTES
Problem: Mobility Impaired (Adult and Pediatric)  Goal: *Acute Goals and Plan of Care (Insert Text)  Description: FUNCTIONAL STATUS PRIOR TO ADMISSION: Patient was independent and active without use of DME. 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 HOME SUPPORT PRIOR TO ADMISSION: The patient lived with his wife but did not require assist.    Physical Therapy Goals  Initiated 1/4/2021  1. Patient will move from supine to sit and sit to supine , scoot up and down, and roll side to side in bed with minimal assistance within 7 day(s). 2.  Patient will transfer from bed to chair and chair to bed with moderate assistance x1 using the least restrictive device within 7 day(s). 3.  Patient will perform sit to stand with minimal assistance within 7 day(s). 4.  Patient will ambulate with moderate assistance  for 20 feet with the least restrictive device within 7 day(s). 5.  Patient will be independent with HEP within 7 days. Outcome: Progressing Towards Goal   PHYSICAL THERAPY TREATMENT  Patient: Diony Lawson (89 y.o. male)  Date: 1/5/2021  Diagnosis: Hypoxia [R09.02] <principal problem not specified>       Precautions: Fall, Bed Alarm  Chart, physical therapy assessment, plan of care and goals were reviewed. ASSESSMENT  Patient continues with skilled PT services and is progressing towards goals. Patient received seated in chair, agreeable to therapy and motivated. SpO2 stable on 6L/min throughout session. Able to improve balance and coordination with upright activity with use of RW for gait. Ambulated short distance by bed but able to progress beyond room as well. No overt LOB but increasingly flexed posture with fatigue. Educated on seated exercises to complete on his own while in chair. Continue to anticipate he will be an excellent candidate for intense rehab setting at d/.      Can transfer to chair via stand pivot with RW and gait belt + 2 assist.    Current Level of Function Impacting Discharge (mobility/balance): CGA-min Ax2     Other factors to consider for discharge: on 6L/min, previously independent, spent two weeks vented and sedated         PLAN :  Patient continues to benefit from skilled intervention to address the above impairments. Continue treatment per established plan of care. to address goals. Recommendation for discharge: (in order for the patient to meet his/her long term goals)  Therapy 3 hours per day 5-7 days per week    This discharge recommendation:  Has been made in collaboration with the attending provider and/or case management    IF patient discharges home will need the following DME: to be determined (TBD)       SUBJECTIVE:   Patient stated Ok I will.  when discussing OOB to chair for dinner time vs breakfast tomorrow     OBJECTIVE DATA SUMMARY:   Critical Behavior:  Neurologic State: Alert  Orientation Level: Oriented to person, Oriented to place, Oriented to situation  Cognition: Appropriate decision making, Follows commands  Safety/Judgement: Awareness of environment, Fall prevention  Functional Mobility Training:  Bed Mobility:  Supine to Sit: (Pt received OOB in chair)  Sit to Supine: Moderate assistance(for LE mgmt)  Transfers:  Sit to Stand: Minimum assistance;Assist x2  Stand to Sit: Minimum assistance;Assist x2(cues for hand placement)  Balance:  Sitting: Without support  Sitting - Static: Good (unsupported)  Sitting - Dynamic: Fair (occasional)  Standing: Impaired; With support  Standing - Static: Constant support; Fair  Standing - Dynamic : Constant support;Fair;Poor  Ambulation/Gait Training:  Distance (ft): 20 Feet (ft)  Assistive Device: Gait belt;Walker, rolling(6L/min)  Ambulation - Level of Assistance: Contact guard assistance;Minimal assistance;Assist x2  Gait Abnormalities: Decreased step clearance(flexed posture)  Base of Support: Widened   Speed/Kirstie: Slow  Step Length: Right shortened;Left shortened      Therapeutic Exercises:   LAQ with isometric hold and concentration on eccentric lowering 1x8  Seated marching  Glut sets  Pain Rating:  Denied pain    Activity Tolerance:   Good and requires rest breaks    After treatment patient left in no apparent distress:   Supine in bed, Call bell within reach, Caregiver / family present, and Side rails x 3    COMMUNICATION/COLLABORATION:   The patients plan of care was discussed with: Occupational therapist and Registered nurse.      Raymond Laird PT, DPT   Time Calculation: 24 mins

## 2021-01-05 NOTE — PROGRESS NOTES
ID Progress Note  2021    Subjective:     Afebrile. Extubated. On 6 liters. No complaints. Objective:     Vitals:   Visit Vitals  /79 (BP 1 Location: Right arm, BP Patient Position: At rest)   Pulse 83   Temp 97.9 °F (36.6 °C)   Resp 18   Ht 5' 11\" (1.803 m)   Wt 98.4 kg (216 lb 14.9 oz)   SpO2 97%   BMI 30.26 kg/m²        Tmax:  Temp (24hrs), Av.6 °F (37 °C), Min:97.9 °F (36.6 °C), Max:99.3 °F (37.4 °C)      Exam:    Intubated   (+) lipoma on forehead   Lung clear, no rales, wheezes or rhonchi   Heart: s1, s2, RRR, no murmurs rubs or clicks  Abdomen: soft nontender, no guarding or rebound      Labs:   Lab Results   Component Value Date/Time    WBC 9.4 2021 04:21 AM    HGB 13.2 2021 04:21 AM    HCT 42.2 2021 04:21 AM    PLATELET 914 (H)  04:21 AM    MCV 86.3 2021 04:21 AM     Lab Results   Component Value Date/Time    Sodium 136 2021 04:21 AM    Potassium 3.8 2021 04:21 AM    Chloride 101 2021 04:21 AM    CO2 32 2021 04:21 AM    Anion gap 3 (L) 2021 04:21 AM    Glucose 126 (H) 2021 04:21 AM    BUN 29 (H) 2021 04:21 AM    Creatinine 1.31 (H) 2021 04:21 AM    BUN/Creatinine ratio 22 (H) 2021 04:21 AM    GFR est AA >60 2021 04:21 AM    GFR est non-AA 57 (L) 2021 04:21 AM    Calcium 10.0 2021 04:21 AM    Bilirubin, total 0.4 2020 04:26 AM    Alk. phosphatase 82 2020 04:26 AM    Protein, total 7.4 2020 04:26 AM    Albumin 2.2 (L) 2020 04:26 AM    Globulin 5.2 (H) 2020 04:26 AM    A-G Ratio 0.4 (L) 2020 04:26 AM    ALT (SGPT) 71 2020 04:26 AM           Assessment:     #1 respiratory failure with Haemophilus/Streptococcus pneumonia     #2 nonischemic cardiomyopathy     #3 chronic kidney disease     #4 chronic tobacco abuse     #5 hypertension               Recommendations:      Oxygen requirements decreasing. Off unasyn now. Observe off abx.        [id number] [facility]  Rohit Benedict MD

## 2021-01-05 NOTE — PROGRESS NOTES
ID Progress Note  2021    Subjective:     Afebrile. Extubated. On 6 liters. No complaints. Objective:     Vitals:   Visit Vitals  /81   Pulse 86   Temp 99.9 °F (37.7 °C)   Resp 20   Ht 5' 11\" (1.803 m)   Wt 98.6 kg (217 lb 6 oz)   SpO2 97%   BMI 30.32 kg/m²        Tmax:  Temp (24hrs), Av.5 °F (37.5 °C), Min:99 °F (37.2 °C), Max:99.9 °F (37.7 °C)      Exam:    Intubated   (+) lipoma on forehead   Lung clear, no rales, wheezes or rhonchi   Heart: s1, s2, RRR, no murmurs rubs or clicks  Abdomen: soft nontender, no guarding or rebound      Labs:   Lab Results   Component Value Date/Time    WBC 10.6 2021 04:51 AM    HGB 13.4 2021 04:51 AM    HCT 43.1 2021 04:51 AM    PLATELET 707 (H)  04:51 AM    MCV 85.7 2021 04:51 AM     Lab Results   Component Value Date/Time    Sodium 140 2021 04:51 AM    Potassium 4.0 2021 04:51 AM    Chloride 105 2021 04:51 AM    CO2 32 2021 04:51 AM    Anion gap 3 (L) 2021 04:51 AM    Glucose 134 (H) 2021 04:51 AM    BUN 29 (H) 2021 04:51 AM    Creatinine 1.42 (H) 2021 04:51 AM    BUN/Creatinine ratio 20 2021 04:51 AM    GFR est AA >60 2021 04:51 AM    GFR est non-AA 52 (L) 2021 04:51 AM    Calcium 10.3 (H) 2021 04:51 AM    Bilirubin, total 0.4 2020 04:26 AM    Alk. phosphatase 82 2020 04:26 AM    Protein, total 7.4 2020 04:26 AM    Albumin 2.2 (L) 2020 04:26 AM    Globulin 5.2 (H) 2020 04:26 AM    A-G Ratio 0.4 (L) 2020 04:26 AM    ALT (SGPT) 71 2020 04:26 AM           Assessment:     #1 respiratory failure with Haemophilus/Streptococcus pneumonia     #2 nonischemic cardiomyopathy     #3 chronic kidney disease     #4 chronic tobacco abuse     #5 hypertension               Recommendations:      Oxygen requirements decreasing. Off unasyn now. Observe off abx.        Rosa Garcia MD

## 2021-01-06 PROBLEM — R09.02 HYPOXIA: Status: RESOLVED | Noted: 2020-12-21 | Resolved: 2021-01-06

## 2021-01-06 LAB
ANION GAP SERPL CALC-SCNC: 4 MMOL/L (ref 5–15)
BUN SERPL-MCNC: 26 MG/DL (ref 6–20)
BUN/CREAT SERPL: 18 (ref 12–20)
CALCIUM SERPL-MCNC: 9.9 MG/DL (ref 8.5–10.1)
CHLORIDE SERPL-SCNC: 101 MMOL/L (ref 97–108)
CO2 SERPL-SCNC: 28 MMOL/L (ref 21–32)
CREAT SERPL-MCNC: 1.42 MG/DL (ref 0.7–1.3)
ERYTHROCYTE [DISTWIDTH] IN BLOOD BY AUTOMATED COUNT: 14.5 % (ref 11.5–14.5)
GLUCOSE SERPL-MCNC: 104 MG/DL (ref 65–100)
HCT VFR BLD AUTO: 40.6 % (ref 36.6–50.3)
HGB BLD-MCNC: 12.9 G/DL (ref 12.1–17)
MAGNESIUM SERPL-MCNC: 1.9 MG/DL (ref 1.6–2.4)
MCH RBC QN AUTO: 27.2 PG (ref 26–34)
MCHC RBC AUTO-ENTMCNC: 31.8 G/DL (ref 30–36.5)
MCV RBC AUTO: 85.7 FL (ref 80–99)
NRBC # BLD: 0 K/UL (ref 0–0.01)
NRBC BLD-RTO: 0 PER 100 WBC
PHOSPHATE SERPL-MCNC: NORMAL MG/DL (ref 2.6–4.7)
PLATELET # BLD AUTO: 347 K/UL (ref 150–400)
PMV BLD AUTO: 10.1 FL (ref 8.9–12.9)
POTASSIUM SERPL-SCNC: 3.5 MMOL/L (ref 3.5–5.1)
RBC # BLD AUTO: 4.74 M/UL (ref 4.1–5.7)
SODIUM SERPL-SCNC: 133 MMOL/L (ref 136–145)
WBC # BLD AUTO: 14.9 K/UL (ref 4.1–11.1)

## 2021-01-06 PROCEDURE — 65270000029 HC RM PRIVATE

## 2021-01-06 PROCEDURE — 85027 COMPLETE CBC AUTOMATED: CPT

## 2021-01-06 PROCEDURE — 80048 BASIC METABOLIC PNL TOTAL CA: CPT

## 2021-01-06 PROCEDURE — 74011250637 HC RX REV CODE- 250/637: Performed by: INTERNAL MEDICINE

## 2021-01-06 PROCEDURE — 74011000250 HC RX REV CODE- 250: Performed by: NURSE PRACTITIONER

## 2021-01-06 PROCEDURE — 83735 ASSAY OF MAGNESIUM: CPT

## 2021-01-06 PROCEDURE — 97530 THERAPEUTIC ACTIVITIES: CPT

## 2021-01-06 PROCEDURE — 74011250637 HC RX REV CODE- 250/637: Performed by: FAMILY MEDICINE

## 2021-01-06 PROCEDURE — 84100 ASSAY OF PHOSPHORUS: CPT

## 2021-01-06 PROCEDURE — 74011250637 HC RX REV CODE- 250/637: Performed by: ANESTHESIOLOGY

## 2021-01-06 PROCEDURE — 74011250636 HC RX REV CODE- 250/636: Performed by: ANESTHESIOLOGY

## 2021-01-06 PROCEDURE — 97116 GAIT TRAINING THERAPY: CPT

## 2021-01-06 PROCEDURE — 36415 COLL VENOUS BLD VENIPUNCTURE: CPT

## 2021-01-06 PROCEDURE — 74011250636 HC RX REV CODE- 250/636: Performed by: NURSE PRACTITIONER

## 2021-01-06 PROCEDURE — 74011250637 HC RX REV CODE- 250/637: Performed by: HOSPITALIST

## 2021-01-06 RX ORDER — AMLODIPINE BESYLATE 10 MG/1
10 TABLET ORAL DAILY
Qty: 30 TAB | Refills: 0 | Status: SHIPPED | OUTPATIENT
Start: 2021-01-07 | End: 2021-01-07 | Stop reason: SDUPTHER

## 2021-01-06 RX ORDER — FAMOTIDINE 20 MG/1
20 TABLET, FILM COATED ORAL 2 TIMES DAILY
Status: DISCONTINUED | OUTPATIENT
Start: 2021-01-06 | End: 2021-01-07 | Stop reason: HOSPADM

## 2021-01-06 RX ADMIN — CLONIDINE HYDROCHLORIDE 0.2 MG: 0.2 TABLET ORAL at 21:28

## 2021-01-06 RX ADMIN — Medication: at 14:00

## 2021-01-06 RX ADMIN — FAMOTIDINE 20 MG: 20 TABLET, FILM COATED ORAL at 17:54

## 2021-01-06 RX ADMIN — FAMOTIDINE 20 MG: 10 INJECTION, SOLUTION INTRAVENOUS at 10:00

## 2021-01-06 RX ADMIN — DOCUSATE SODIUM 100 MG: 50 LIQUID ORAL at 10:00

## 2021-01-06 RX ADMIN — HALOPERIDOL 1 MG: 1 TABLET ORAL at 04:04

## 2021-01-06 RX ADMIN — CARVEDILOL 25 MG: 12.5 TABLET, FILM COATED ORAL at 17:53

## 2021-01-06 RX ADMIN — Medication: at 22:41

## 2021-01-06 RX ADMIN — CLONIDINE HYDROCHLORIDE 0.2 MG: 0.2 TABLET ORAL at 06:00

## 2021-01-06 RX ADMIN — CLONIDINE HYDROCHLORIDE 0.2 MG: 0.2 TABLET ORAL at 15:22

## 2021-01-06 RX ADMIN — CARVEDILOL 25 MG: 12.5 TABLET, FILM COATED ORAL at 10:01

## 2021-01-06 RX ADMIN — Medication 17.2 MG: at 10:01

## 2021-01-06 RX ADMIN — ENOXAPARIN SODIUM 40 MG: 40 INJECTION SUBCUTANEOUS at 10:01

## 2021-01-06 RX ADMIN — Medication: at 06:00

## 2021-01-06 RX ADMIN — Medication 10 ML: at 21:28

## 2021-01-06 RX ADMIN — Medication 30 ML: at 10:07

## 2021-01-06 RX ADMIN — AMLODIPINE BESYLATE 10 MG: 5 TABLET ORAL at 10:00

## 2021-01-06 NOTE — ROUTINE PROCESS
New patient Hospital follow-up PCP transitional care appointment has been scheduled with JENNIFER Grimes for C[facility]ar 15 @ 8Am (earliest available). Pending patient discharge.   Mathieu Smart, Care Management Specialist.

## 2021-01-06 NOTE — ROUTINE PROCESS
TRANSFER - IN REPORT: 
 
Verbal report received from Solitario(name) on Kita Ellie  being received from CCU(unit) for routine progression of care Report consisted of patients Situation, Background, Assessment and  
Recommendations(SBAR). Information from the following report(s) SBAR, Kardex, ED Summary, MAR, Recent Results and Cardiac Rhythm NST/ST was reviewed with the receiving nurse. Opportunity for questions and clarification was provided. Assessment completed upon patients arrival to unit and care assumed.   
 
 
 
\

## 2021-01-06 NOTE — PROGRESS NOTES
Problem: Mobility Impaired (Adult and Pediatric)  Goal: *Acute Goals and Plan of Care (Insert Text)  Description: FUNCTIONAL STATUS PRIOR TO ADMISSION: Patient was independent and active without use of DME. 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 HOME SUPPORT PRIOR TO ADMISSION: The patient lived with his wife but did not require assist.    Physical Therapy Goals  Initiated 1/4/2021  1. Patient will move from supine to sit and sit to supine , scoot up and down, and roll side to side in bed with minimal assistance within 7 day(s). 2.  Patient will transfer from bed to chair and chair to bed with moderate assistance x1 using the least restrictive device within 7 day(s). 3.  Patient will perform sit to stand with minimal assistance within 7 day(s). 4.  Patient will ambulate with moderate assistance  for 20 feet with the least restrictive device within 7 day(s). 5.  Patient will be independent with HEP within 7 days. Outcome: Progressing Towards Goal    PHYSICAL THERAPY TREATMENT  Patient: Vidya Urbina (38 y.o. male)  Date: 1/6/2021  Diagnosis: Hypoxia [R09.02] <principal problem not specified>       Precautions: Fall, Bed Alarm  Chart, physical therapy assessment, plan of care and goals were reviewed. ASSESSMENT  Patient continues with skilled PT services and is progressing towards goals. Pt received in chair and attempting to get out of the chair. Pt presents with some confusion, he was able to perform gait training with RW and Min A due to 2 LOB upon start of ambulation with improvements as he continued. Pt needs VC during gait training for RW placement for safety and increase stability during ambulation. STS from chair he continues to need VC for hand placement on sitting surface as opposed to RW. He was able to perform standing balance activities with BUE crossing midline with moving objects to different height levels to improve balance and body awareness.  Pt seems easily distracted with therapy this session. Pt was returned back to chair at the end of the session with call bell, chair alarm activated and table within reach. Current Level of Function Impacting Discharge (mobility/balance): Contact guard assistance/ min A with mobility    Other factors to consider for discharge:fall risk!, impulsive at times, poor safety awarness         PLAN :  Patient continues to benefit from skilled intervention to address the above impairments. Continue treatment per established plan of care. to address goals. Recommendation for discharge: (in order for the patient to meet his/her long term goals)  Therapy 3 hours per day 5-7 days per week    This discharge recommendation:  Has not yet been discussed the attending provider and/or case management    IF patient discharges home will need the following DME: to be determined (TBD)       SUBJECTIVE:   Patient stated Where are my pants and cell phone? Lexy Quinn    OBJECTIVE DATA SUMMARY:   Critical Behavior:  Neurologic State: Alert  Orientation Level: Oriented to person, Oriented to place  Cognition: Appropriate for age attention/concentration, Appropriate decision making, Appropriate safety awareness  Safety/Judgement: Awareness of environment, Fall prevention  Functional Mobility Training:  Bed Mobility:                    Transfers:  Sit to Stand: Contact guard assistance  Stand to Sit: Contact guard assistance;Minimum assistance                             Balance:  Sitting: Impaired; Without support  Sitting - Static: Good (unsupported)  Sitting - Dynamic: Fair (occasional)  Standing: Impaired; With support  Standing - Static: Constant support;Poor  Standing - Dynamic : Constant support;Poor  Ambulation/Gait Training:  Distance (ft): 50 Feet (ft)  Assistive Device: Gait belt;Walker, rolling  Ambulation - Level of Assistance: Contact guard assistance;Minimal assistance        Gait Abnormalities: Decreased step clearance; Path deviations Speed/Kirstie: Slow  Step Length: Left shortened;Right shortened                   Pain Rating:  No pain reported     Activity Tolerance:   Good, but has poor safety awareness and is currently on 6L. After treatment patient left in no apparent distress:   Sitting in chair, Call bell within reach, and Bed / chair alarm activated    COMMUNICATION/COLLABORATION:   The patients plan of care was discussed with: Physical therapy assistant and Registered nurse.      John Rodriges   Time Calculation: 28 mins

## 2021-01-06 NOTE — PROGRESS NOTES
[facility]   [address] [facility], [address] [place] [address], [facility]  Phone: [phone]   [phone]       Nephrology Progress Note  Raquel Angel     1965     [id number]  Date of Admission : 12/21/2020 01/06/21    CC: Follow up for YISSEL    Assessment and Plan   YISSEL  - initial insult from ATN  - recurrent YISSEL from diuetics + contrast on 12/31  - UA : trace blood and 3+ protein --> serologic w/u neg except + gammopathy screen  - renal US : unremarkable except for benign cyst   - alkalotic and hypercalcemic --> hold diuretics   - Cr bumped after stopping IVF and removing angel --> watch for now   - daily labs and I/Os    Hypercalcemia   Contraction alkalosis   - hold diuretics     Haemophilus/Streptococcus pneumonia:  - abx per ID    Resp failure:  - from PNA and volume  - LE dopplers neg and CTA neg on 12/31  - extubated 1/2/21     Malignant HTN   Hypertensive Urgency   - negative UDS  - continue current meds      Anemia:  - hgb stable  - + M spike along with elevated K light chains and IgM levels  - will need heme eval eventually     Interval History:  Transferred to floor   Voided few times after angel removal   Uneventful night   No complaints     Review of Systems: Review of systems not obtained due to patient factors.     Current Medications:   Current Facility-Administered Medications   Medication Dose Route Frequency    haloperidoL (HALDOL) tablet 1 mg  1 mg Oral Q8H PRN    balsam peru-castor oiL (VENELEX) ointment   Topical Q8H    amLODIPine (NORVASC) tablet 10 mg  10 mg Oral DAILY    carvediloL (COREG) tablet 25 mg  25 mg Oral BID WITH MEALS    albuterol-ipratropium (DUO-NEB) 2.5 MG-0.5 MG/3 ML  3 mL Nebulization Q6H PRN    cloNIDine HCL (CATAPRES) tablet 0.2 mg  0.2 mg Oral Q8H    labetaloL (NORMODYNE;TRANDATE) injection 40 mg  40 mg IntraVENous Q6H PRN    famotidine (PF) (PEPCID) 20 mg in 0.9% sodium chloride 10 mL injection  20 mg IntraVENous Y40F    multivit-folic acid-herbal 275 (WELLESSE PLUS) oral liquid 30 mL  30 mL Oral DAILY    0.9% sodium chloride infusion  3 mL/hr IntraVENous CONTINUOUS    0.9% sodium chloride infusion  5 mL/hr IntraVENous CONTINUOUS    docusate (COLACE) 50 mg/5 mL oral liquid 100 mg  100 mg Oral DAILY    acetaminophen (TYLENOL) solution 650 mg  650 mg Per NG tube Q6H PRN    nicotine (NICODERM CQ) 14 mg/24 hr patch 1 Patch  1 Patch TransDERmal DAILY    hydrALAZINE (APRESOLINE) 20 mg/mL injection 20 mg  20 mg IntraVENous Q6H PRN    enoxaparin (LOVENOX) injection 40 mg  40 mg SubCUTAneous DAILY    senna (SENOKOT) tablet 17.2 mg  2 Tab Oral DAILY    sodium chloride (NS) flush 5-40 mL  5-40 mL IntraVENous Q8H    sodium chloride (NS) flush 5-40 mL  5-40 mL IntraVENous PRN    acetaminophen (TYLENOL) tablet 650 mg  650 mg Oral Q6H PRN    Or    acetaminophen (TYLENOL) suppository 650 mg  650 mg Rectal Q6H PRN    polyethylene glycol (MIRALAX) packet 17 g  17 g Oral DAILY PRN    promethazine (PHENERGAN) tablet 12.5 mg  12.5 mg Oral Q6H PRN    Or    ondansetron (ZOFRAN) injection 4 mg  4 mg IntraVENous Q6H PRN      No Known Allergies    Objective:  Vitals:    Vitals:    01/05/21 2303 01/06/21 0152 01/06/21 0342 01/06/21 0410   BP: (!) 140/94 110/86 (!) 140/89    Pulse: 91  95    Resp: 19  20    Temp: 99.9 °F (37.7 °C)  99 °F (37.2 °C)    SpO2: 96%  98%    Weight:    81.8 kg (180 lb 5.4 oz)   Height:         Intake and Output:  No intake/output data recorded.   01/04 1901 - 01/06 0700  In: 945 [I.V.:945]  Out: 1535 [Urine:1535]    Physical Examination:  General:  on high flow,  HEENT: Frontal lipoma   Lungs : decreased basilar breath sounds   CVS: RRR,s1,s2, normal, No murmur   Extremities: no LE edema  Neurologic: awake, laert, following commands  Psych: not agitated, calm and slight confusion        []    High complexity decision making was performed  []    Patient is at high-risk of decompensation with multiple organ involvement    Lab Data Personally Reviewed: I have reviewed all the pertinent labs, microbiology data and radiology studies during assessment. Recent Labs     01/06/21 0335 01/05/21 0421 01/04/21 0451   * 136 140   K 3.5 3.8 4.0    101 105   CO2 28 32 32   * 126* 134*   BUN 26* 29* 29*   CREA 1.42* 1.31* 1.42*   CA 9.9 10.0 10.3*   MG 1.9 2.2 2.1   PHOS UNABLE TO OBTAIN ACCURATE RESULTS DUE TO GROSS LIPEMIA 3.4 2.9     Recent Labs     01/06/21 0335 01/05/21 0421 01/04/21 0451   WBC 14.9* 9.4 10.6   HGB 12.9 13.2 13.4   HCT 40.6 42.2 43.1    435* 437*     No results found for: [facility]  Lab Results   Component Value Date/Time    Culture result: SCANT NORMAL RESPIRATORY ALDO 12/28/2020 04:56 PM    Culture result: MRSA NOT PRESENT 12/24/2020 09:20 AM    Culture result:  12/24/2020 09:20 AM         Screening of patient nares for MRSA is for surveillance purposes and, if positive, to facilitate isolation considerations in high risk settings. It is not intended for automatic decolonization interventions per se as regimens are not sufficiently effective to warrant routine use.     Culture result: NO GROWTH 2 DAYS 12/23/2020 04:16 PM    Culture result: (A) 12/22/2020 08:45 AM     RARE HAEMOPHILUS INFLUENZAE BETA LACTAMASE NEGATIVE    Culture result: RARE NORMAL RESPIRATORY ALDO 12/22/2020 08:45 AM     Recent Results (from the past 24 hour(s))   MAGNESIUM    Collection Time: 01/06/21  3:35 AM   Result Value Ref Range    Magnesium 1.9 1.6 - 2.4 mg/dL   PHOSPHORUS    Collection Time: 01/06/21  3:35 AM   Result Value Ref Range    Phosphorus  2.6 - 4.7 MG/DL     UNABLE TO OBTAIN ACCURATE RESULTS DUE TO GROSS LIPEMIA   CBC W/O DIFF    Collection Time: 01/06/21  3:35 AM   Result Value Ref Range    WBC 14.9 (H) 4.1 - 11.1 K/uL    RBC 4.74 4.10 - 5.70 M/uL    HGB 12.9 12.1 - 17.0 g/dL    HCT 40.6 36.6 - 50.3 %    MCV 85.7 80.0 - 99.0 FL    MCH 27.2 26.0 - 34.0 PG    MCHC 31.8 30.0 - 36.5 g/dL    RDW 14.5 11.5 - 14.5 % PLATELET 986 148 - 353 K/uL    MPV 10.1 8.9 - 12.9 FL    NRBC 0.0 0  WBC    ABSOLUTE NRBC 0.00 0.00 - 3.85 K/uL   METABOLIC PANEL, BASIC    Collection Time: 01/06/21  3:35 AM   Result Value Ref Range    Sodium 133 (L) 136 - 145 mmol/L    Potassium 3.5 3.5 - 5.1 mmol/L    Chloride 101 97 - 108 mmol/L    CO2 28 21 - 32 mmol/L    Anion gap 4 (L) 5 - 15 mmol/L    Glucose 104 (H) 65 - 100 mg/dL    BUN 26 (H) 6 - 20 MG/DL    Creatinine 1.42 (H) 0.70 - 1.30 MG/DL    BUN/Creatinine ratio 18 12 - 20      GFR est AA >60 >60 ml/min/1.73m2    GFR est non-AA 52 (L) >60 ml/min/1.73m2    Calcium 9.9 8.5 - 10.1 MG/DL           Total time spent with patient:  xxx   min. Care Plan discussed with:  Patient     Family      RN      Consulting Physician [facility],         I have reviewed the flowsheets. Chart and Pertinent Notes have been reviewed. No change in PMH ,family and social history from Consult note.       Ja Saucedo MD

## 2021-01-06 NOTE — PROGRESS NOTES
RAFIA:    RUR 18%    CM received orders for [facility] PT/OT. Referral sent to [facility]. CM verified with family that patient is uninsured. CM received call from Judi at [facility]. They have denied patient for IPR.     1:40 - [facility] unable to accept without PCP to follow.           Familia iDmas, RN/CRM

## 2021-01-06 NOTE — PROGRESS NOTES
Comprehensive Nutrition Assessment    Type and Reason for Visit: Reassess    Nutrition Recommendations/Plan:    Begin High Calorie ONS - Suplena   Please document PO intake on flow sheets    Nutrition Assessment:    51 yo male admitted for Hypoxia [R09.02] who  has a past medical history of Hypertension, non-ischemic cardiomyopathy, chronic L frontal lipoma, CKD, obesity, and chronic tobacco use. Pt was vented & given OG feedings, once alert pt pulled feeding tube and was cleared to be advanced to Regular diet by SLP. YISSEL improved, anemia, continues to have encephalopathy, concern for possible mult myeloma. Pt is confused. SLP & ENT followed no intervention needed currently for L hypomobile vocal cord. RD observed 50% of b'fast meal consumed. Pt was difficult to understand at times and seemed to be looking for something during RD visit. Pt did agree to begin ONS shakes to increase calorie intake. Suplena provides: 425 kcal & 10.6g Protein     Malnutrition Assessment:  Malnutrition Status: At risk for malnutrition (specify)    Context:  Acute illness     Findings of the 6 clinical characteristics of malnutrition:   Energy Intake:  Mild decrease in energy intake (specify)  Weight Loss:  7 - Greater than 2% over 1 week - 7.3% loss since admission (16 days)  Body Fat Loss:  No significant body fat loss,     Muscle Mass Loss:  No significant muscle mass loss,    Fluid Accumulation:  No significant fluid accumulation,     Strength:  Not performed     Nutritionally Significant Medications: colace, pepcid, MVI, haldol, Senokot   PRN: miralax     Estimated Daily Nutrient Needs:  Energy (kcal): 2328 kcal (MSJ xAF 1.3); Weight Used for Energy Requirements: Current(92.4 kg)  Protein (g): 74-92g (0.8 -1.0g/kg);  Weight Used for Protein Requirements: Admission  Fluid (ml/day): ~2300ml; Method Used for Fluid Requirements: 1 ml/kcal    Nutrition Related Findings:       BM: 1/3  ABD: distended, intact, active  Edema: none noted  Wounds:  None       Current Nutrition Therapies:   DIET: REGULAR  Supplements: none   Additional Caloric Sources: none    Meal intake: No data found. Anthropometric Measures:  · Height:  5' 11\" (180.3 cm)  · Current Body Wt:  92.4 kg (203 lb 11.3 oz)   · Admission Body Wt:  219 lb 9.3 oz(99.6 kg - bed)    · Usual Body Wt:        · Ideal Body Wt:   :  118.4 %   · BMI Categories:  Overweight (BMI 25.0-29. 9)    Body mass index is 28.41 kg/m². Last 3 Recorded Weights in this Encounter    01/05/21 0400 01/06/21 0410 01/06/21 0926   Weight: 98.4 kg (216 lb 14.9 oz) 81.8 kg (180 lb 5.4 oz) 92.4 kg (203 lb 11.3 oz)     Wt Readings from Last 10 Encounters:   01/06/21 92.4 kg (203 lb 11.3 oz)   07/12/20 105.1 kg (231 lb 11.3 oz)   07/10/20 96.6 kg (212 lb 15.4 oz)   01/16/20 99.6 kg (219 lb 9.6 oz)   12/12/19 98.7 kg (217 lb 9.5 oz)   09/12/17 97.7 kg (215 lb 6.2 oz)   12/30/16 102.2 kg (225 lb 5 oz)   09/01/16 99.3 kg (219 lb)   08/15/12 92.4 kg (203 lb 11.3 oz)   12/04/11 94.8 kg (208 lb 15.9 oz)     Nutrition Diagnosis:   · Inadequate oral intake related to cognitive or neurological impairment, early satiety as evidenced by weight loss during admission. Nutrition Interventions:   Food and/or Nutrient Delivery: Continue current diet, Start oral nutrition supplement  Nutrition Education and Counseling: Education needed  Coordination of Nutrition Care: Continue to monitor while inpatient    Goals:   Tolerance of 75% of meals and ONS over the next week       Nutrition Monitoring and Evaluation:   Behavioral-Environmental Outcomes: None identified  Food/Nutrient Intake Outcomes: Food and nutrient intake, Supplement intake  Physical Signs/Symptoms Outcomes: Biochemical data, Weight, Chewing or swallowing    Discharge Planning:    No discharge needs at this time     Lily Maldonado RD, MS   Contact via perfect serve

## 2021-01-06 NOTE — PROGRESS NOTES
ID Progress Note  2021    Subjective:     Afebrile. Extubated. On 6 liters. No complaints. No pain. No diarrhea. Objective:     Vitals:   Visit Vitals  BP (!) 144/95 (BP 1 Location: Right arm, BP Patient Position: At rest)   Pulse 93   Temp 98.5 °F (36.9 °C)   Resp 18   Ht 5' 11\" (1.803 m)   Wt 92.4 kg (203 lb 11.3 oz)   SpO2 94%   BMI 28.41 kg/m²        Tmax:  Temp (24hrs), Av.9 °F (37.2 °C), Min:98.2 °F (36.8 °C), Max:99.9 °F (37.7 °C)      Exam:    Extubated    (+) lipoma on forehead   Lung clear, no rales, wheezes or rhonchi   Heart: s1, s2, RRR, no murmurs rubs or clicks  Abdomen: soft nontender, no guarding or rebound      Labs:   Lab Results   Component Value Date/Time    WBC 14.9 (H) 2021 03:35 AM    HGB 12.9 2021 03:35 AM    HCT 40.6 2021 03:35 AM    PLATELET 027  03:35 AM    MCV 85.7 2021 03:35 AM     Lab Results   Component Value Date/Time    Sodium 133 (L) 2021 03:35 AM    Potassium 3.5 2021 03:35 AM    Chloride 101 2021 03:35 AM    CO2 28 2021 03:35 AM    Anion gap 4 (L) 2021 03:35 AM    Glucose 104 (H) 2021 03:35 AM    BUN 26 (H) 2021 03:35 AM    Creatinine 1.42 (H) 2021 03:35 AM    BUN/Creatinine ratio 18 2021 03:35 AM    GFR est AA >60 2021 03:35 AM    GFR est non-AA 52 (L) 2021 03:35 AM    Calcium 9.9 2021 03:35 AM    Bilirubin, total 0.4 2020 04:26 AM    Alk. phosphatase 82 2020 04:26 AM    Protein, total 7.4 2020 04:26 AM    Albumin 2.2 (L) 2020 04:26 AM    Globulin 5.2 (H) 2020 04:26 AM    A-G Ratio 0.4 (L) 2020 04:26 AM    ALT (SGPT) 71 2020 04:26 AM           Assessment:     #1 respiratory failure with Haemophilus/Streptococcus pneumonia     #2 nonischemic cardiomyopathy     #3 chronic kidney disease     #4 chronic tobacco abuse     #5 hypertension               Recommendations:      he is off oxygen. He feels well. Off unasyn now.  WBC up. Observe off abx.        Jade Conrad MD

## 2021-01-06 NOTE — PROGRESS NOTES
Clinical Pharmacy Note: IV to PO Automatic Conversion  Please note: Jo Ann Frost medication(s) (famotidine) has/have been changed from IV to PO based on the following critiera:    Patient is taking scheduled oral medications  Patient is tolerating tube feeds at goal rate or a full liquid, soft or regular diet    This IV to PO conversion is based on the [employer] approved automatic conversion policy for eligible patients. Please call with questions.

## 2021-01-06 NOTE — DISCHARGE INSTRUCTIONS
Smoking Cessation Program: This is a free, phone/text/email based, smoking cessation program. The program is individualized to meet each patient's needs. To enroll use the link - [email or web address] or text Siria Frantz to [phone] from any smart phone. Discharge Instructions       PATIENT ID: Ranjit Celis  MRN: [id number]   YOB: 1965    DATE OF ADMISSION: 12/21/2020  4:40 PM    DATE OF DISCHARGE: 1/6/2021    PRIMARY CARE PROVIDER: Denisse Tomlinson MD     ATTENDING PHYSICIAN: Kinjal Dan MD  DISCHARGING PROVIDER: Ollie Cockayne, MD    To contact this individual call [phone] and ask the  to page. If unavailable ask to be transferred the Adult Hospitalist Department. DISCHARGE DIAGNOSES RESP FAILURE     CONSULTATIONS: IP CONSULT TO NEPHROLOGY  IP CONSULT TO INFECTIOUS DISEASES  IP CONSULT TO HEMATOLOGY  IP CONSULT TO OTOLARYNGOLOGY  IP CONSULT TO CARDIOLOGY    PROCEDURES/SURGERIES: * No surgery found *    PENDING TEST RESULTS:   At the time of discharge the following test results are still pending:     FOLLOW UP APPOINTMENTS:   Follow-up Information     Follow up With Specialties Details Why Contact Info    Denisse Tomlinson MD Family Medicine In 1 week  [phone]             ADDITIONAL CARE RECOMMENDATIONS:     DIET: CARDIAC Diet    ACTIVITY: Activity as tolerated    WOUND CARE:     EQUIPMENT needed:       Radiology      DISCHARGE MEDICATIONS:   See Medication Reconciliation Form    · It is important that you take the medication exactly as they are prescribed. · Keep your medication in the bottles provided by the pharmacist and keep a list of the medication names, dosages, and times to be taken in your wallet. · Do not take other medications without consulting your doctor. NOTIFY YOUR PHYSICIAN FOR ANY OF THE FOLLOWING:   Fever over 101 degrees for 24 hours.    Chest pain, shortness of breath, fever, chills, nausea, vomiting, diarrhea, change in mentation, falling, weakness, bleeding. Severe pain or pain not relieved by medications. Or, any other signs or symptoms that you may have questions about. DISPOSITION:  X  Home With:   OT X PT X HH  RN       SNF/Inpatient Rehab/LTAC    Independent/assisted living    Hospice    Other:     CDMP Checked: Yes X     PROBLEM LIST Updated:   Yes X       Signed:   Kevin Alvarenga MD  1/6/2021  11:56 AM

## 2021-01-06 NOTE — PROGRESS NOTES
[facility] Adult  Hospitalist Group                                                                                          Hospitalist Progress Note  Caren Wild MD  Answering service: [phone] from in house phone        Date of Service:  2021  NAME:  Hunter Palomares  :  1965  MRN:  [id number]      Admission Summary:   61-year-old gentleman with longstanding poorly controlled hypertension admitted to the hospital via ER on  and hypertensive emergency and hypoxic respiratory failure.  He improved slowly and was extubated on . Interval history / Subjective:   Pt sitting in chair eating well, no oxygen required  D/w family and pt wants to go home with HH/PT d/w RN, CM      Assessment & Plan:     Acute hypoxic respiratory failure: Likely multifactorial - resolved   -patient with hypertensive emergency with pulmonary edema and possible CAP.   Patient extubated on , off abx now   - diuretics on hold due to worsening renal function, now on NC      YISSEL on CKD stage II:   -Nephrology on board, creatinine slightly up, renal ultrasound with benign cyst  -avoid nephrotoxins     Hypercalcemia: resolved   -Concern for multiple myeloma, could be exacerbated with contraction alkalosis,  - ANCA/ ASHISH/ CH50 C3 - WNL  - MGUS IgM vs MM/ Waldenstroms m spike on gammopathy panel out pt f/u with oncology /nephrology      Anemia:   H&H stable, monitor     Acute metabolic encephalopathy:   Likely secondary to above, patient continues to be confused, mild cognitive dysfunction      Hypertensive urgency:   Increase clonidine dose, cardiology consult, echocardiogram with EF of around 40 to 45% and mild concentric hypertrophy, BP stable today          Code status: Full  DVT prophylaxis: scd    Care Plan discussed with: Patient/Family and Nurse  Anticipated Disposition: Home w/Family  Anticipated Discharge: 24 hrs ready for d/c      Hospital Problems  Date Reviewed: 2021 None            Review of Systems:   A comprehensive review of systems was negative. Vital Signs:    Last 24hrs VS reviewed since prior progress note. Most recent are:  Visit Vitals  /88 (BP 1 Location: Left arm, BP Patient Position: Sitting)   Pulse (!) 105   Temp 98.9 °F (37.2 °C)   Resp 18   Ht 5' 11\" (1.803 m)   Wt 92.4 kg (203 lb 11.3 oz)   SpO2 95%   BMI 28.41 kg/m²         Intake/Output Summary (Last 24 hours) at 1/6/2021 1159  Last data filed at 1/5/2021 2303  Gross per 24 hour   Intake 175 ml   Output 400 ml   Net -225 ml        Physical Examination:     I had a face to face encounter with this patient and independently examined them on 1/6/2021 as outlined below:          Constitutional:  No acute distress, cooperative, pleasant NC oxygen   ENT:  mmm   Resp:  CTA bilaterally. No wheezing/rhonchi/rales   CV:  Regular rhythm, normal rate    GI:  Soft, non distended, non tender. bs+    Musculoskeletal:  No edema, warm, 2+ pulses throughout    Neurologic:  Moves all extremities. AAOx0,      Psych:  Good insight, Not anxious nor agitated. Data Review:    I personally reviewed  Image and labs      Labs:     Recent Labs     01/06/21 0335 01/05/21  0421   WBC 14.9* 9.4   HGB 12.9 13.2   HCT 40.6 42.2    435*     Recent Labs     01/06/21  0335 01/05/21  0421 01/04/21  0451   * 136 140   K 3.5 3.8 4.0    101 105   CO2 28 32 32   BUN 26* 29* 29*   CREA 1.42* 1.31* 1.42*   * 126* 134*   CA 9.9 10.0 10.3*   MG 1.9 2.2 2.1   PHOS UNABLE TO OBTAIN ACCURATE RESULTS DUE TO GROSS LIPEMIA 3.4 2.9     No results for input(s): ALT, AP, TBIL, TBILI, TP, ALB, GLOB, GGT, AML, LPSE in the last 72 hours. No lab exists for component: SGOT, GPT, AMYP, HLPSE  No results for input(s): INR, PTP, APTT, INREXT, INREXT in the last 72 hours. No results for input(s): FE, TIBC, PSAT, FERR in the last 72 hours.    No results found for: FOL, RBCF   No results for input(s): PH, PCO2, PO2 in the last 72 hours. No results for input(s): CPK, CKNDX, TROIQ in the last 72 hours.     No lab exists for component: CPKMB  Lab Results   Component Value Date/Time    Cholesterol, total 121 07/12/2020 01:42 AM    HDL Cholesterol 31 07/12/2020 01:42 AM    LDL, calculated 77.8 07/12/2020 01:42 AM    Triglyceride 61 07/12/2020 01:42 AM    CHOL/HDL Ratio 3.9 07/12/2020 01:42 AM     No results found for: [facility]  Lab Results   Component Value Date/Time    Color YELLOW/STRAW 12/21/2020 07:32 PM    Appearance CLEAR 12/21/2020 07:32 PM    Specific gravity 1.010 12/21/2020 07:32 PM    pH (UA) 5.5 12/21/2020 07:32 PM    Protein 300 (A) 12/21/2020 07:32 PM    Glucose Negative 12/21/2020 07:32 PM    Ketone Negative 12/21/2020 07:32 PM    Bilirubin Negative 12/21/2020 07:32 PM    Urobilinogen 0.2 12/21/2020 07:32 PM    Nitrites Negative 12/21/2020 07:32 PM    Leukocyte Esterase Negative 12/21/2020 07:32 PM    Epithelial cells FEW 12/21/2020 07:32 PM    Bacteria Negative 12/21/2020 07:32 PM    WBC 0-4 12/21/2020 07:32 PM    RBC 0-5 12/21/2020 07:32 PM         Medications Reviewed:     Current Facility-Administered Medications   Medication Dose Route Frequency    haloperidoL (HALDOL) tablet 1 mg  1 mg Oral Q8H PRN    balsam peru-castor oiL (VENELEX) ointment   Topical Q8H    amLODIPine (NORVASC) tablet 10 mg  10 mg Oral DAILY    carvediloL (COREG) tablet 25 mg  25 mg Oral BID WITH MEALS    albuterol-ipratropium (DUO-NEB) 2.5 MG-0.5 MG/3 ML  3 mL Nebulization Q6H PRN    cloNIDine HCL (CATAPRES) tablet 0.2 mg  0.2 mg Oral Q8H    labetaloL (NORMODYNE;TRANDATE) injection 40 mg  40 mg IntraVENous Q6H PRN    famotidine (PF) (PEPCID) 20 mg in 0.9% sodium chloride 10 mL injection  20 mg IntraVENous E33Y    multivit-folic acid-herbal 871 (WELLESSE PLUS) oral liquid 30 mL  30 mL Oral DAILY    0.9% sodium chloride infusion  3 mL/hr IntraVENous CONTINUOUS    0.9% sodium chloride infusion  5 mL/hr IntraVENous CONTINUOUS    docusate (COLACE) 50 mg/5 mL oral liquid 100 mg  100 mg Oral DAILY    acetaminophen (TYLENOL) solution 650 mg  650 mg Per NG tube Q6H PRN    nicotine (NICODERM CQ) 14 mg/24 hr patch 1 Patch  1 Patch TransDERmal DAILY    hydrALAZINE (APRESOLINE) 20 mg/mL injection 20 mg  20 mg IntraVENous Q6H PRN    enoxaparin (LOVENOX) injection 40 mg  40 mg SubCUTAneous DAILY    senna (SENOKOT) tablet 17.2 mg  2 Tab Oral DAILY    sodium chloride (NS) flush 5-40 mL  5-40 mL IntraVENous Q8H    sodium chloride (NS) flush 5-40 mL  5-40 mL IntraVENous PRN    acetaminophen (TYLENOL) tablet 650 mg  650 mg Oral Q6H PRN    Or    acetaminophen (TYLENOL) suppository 650 mg  650 mg Rectal Q6H PRN    polyethylene glycol (MIRALAX) packet 17 g  17 g Oral DAILY PRN    promethazine (PHENERGAN) tablet 12.5 mg  12.5 mg Oral Q6H PRN    Or    ondansetron (ZOFRAN) injection 4 mg  4 mg IntraVENous Q6H PRN     ______________________________________________________________________  EXPECTED LENGTH OF STAY: 12d 0h  ACTUAL LENGTH OF STAY:          16                 Beverlee Gowers, MD

## 2021-01-07 VITALS
RESPIRATION RATE: 14 BRPM | DIASTOLIC BLOOD PRESSURE: 71 MMHG | HEART RATE: 88 BPM | OXYGEN SATURATION: 90 % | TEMPERATURE: 98.6 F | BODY MASS INDEX: 28.52 KG/M2 | WEIGHT: 203.71 LBS | SYSTOLIC BLOOD PRESSURE: 111 MMHG | HEIGHT: 71 IN

## 2021-01-07 LAB
ANION GAP SERPL CALC-SCNC: 7 MMOL/L (ref 5–15)
BUN SERPL-MCNC: 28 MG/DL (ref 6–20)
BUN/CREAT SERPL: 19 (ref 12–20)
CALCIUM SERPL-MCNC: 9.7 MG/DL (ref 8.5–10.1)
CHLORIDE SERPL-SCNC: 100 MMOL/L (ref 97–108)
CO2 SERPL-SCNC: 28 MMOL/L (ref 21–32)
CREAT SERPL-MCNC: 1.47 MG/DL (ref 0.7–1.3)
ERYTHROCYTE [DISTWIDTH] IN BLOOD BY AUTOMATED COUNT: 14.1 % (ref 11.5–14.5)
GLUCOSE SERPL-MCNC: 130 MG/DL (ref 65–100)
HCT VFR BLD AUTO: 38.3 % (ref 36.6–50.3)
HGB BLD-MCNC: 12.2 G/DL (ref 12.1–17)
MAGNESIUM SERPL-MCNC: 1.9 MG/DL (ref 1.6–2.4)
MCH RBC QN AUTO: 26.9 PG (ref 26–34)
MCHC RBC AUTO-ENTMCNC: 31.9 G/DL (ref 30–36.5)
MCV RBC AUTO: 84.4 FL (ref 80–99)
NRBC # BLD: 0 K/UL (ref 0–0.01)
NRBC BLD-RTO: 0 PER 100 WBC
PHOSPHATE SERPL-MCNC: 2.8 MG/DL (ref 2.6–4.7)
PLATELET # BLD AUTO: 379 K/UL (ref 150–400)
PMV BLD AUTO: 9.7 FL (ref 8.9–12.9)
POTASSIUM SERPL-SCNC: 3.4 MMOL/L (ref 3.5–5.1)
RBC # BLD AUTO: 4.54 M/UL (ref 4.1–5.7)
SODIUM SERPL-SCNC: 135 MMOL/L (ref 136–145)
WBC # BLD AUTO: 10.8 K/UL (ref 4.1–11.1)

## 2021-01-07 PROCEDURE — 84100 ASSAY OF PHOSPHORUS: CPT

## 2021-01-07 PROCEDURE — 74011250637 HC RX REV CODE- 250/637: Performed by: HOSPITALIST

## 2021-01-07 PROCEDURE — 80048 BASIC METABOLIC PNL TOTAL CA: CPT

## 2021-01-07 PROCEDURE — 83735 ASSAY OF MAGNESIUM: CPT

## 2021-01-07 PROCEDURE — 74011250637 HC RX REV CODE- 250/637: Performed by: INTERNAL MEDICINE

## 2021-01-07 PROCEDURE — 74011250637 HC RX REV CODE- 250/637: Performed by: NURSE PRACTITIONER

## 2021-01-07 PROCEDURE — 85027 COMPLETE CBC AUTOMATED: CPT

## 2021-01-07 PROCEDURE — 74011250637 HC RX REV CODE- 250/637: Performed by: FAMILY MEDICINE

## 2021-01-07 PROCEDURE — 36415 COLL VENOUS BLD VENIPUNCTURE: CPT

## 2021-01-07 PROCEDURE — 97116 GAIT TRAINING THERAPY: CPT

## 2021-01-07 PROCEDURE — 74011250637 HC RX REV CODE- 250/637: Performed by: ANESTHESIOLOGY

## 2021-01-07 RX ORDER — AMLODIPINE BESYLATE 10 MG/1
10 TABLET ORAL DAILY
Qty: 30 TAB | Refills: 0 | Status: SHIPPED | OUTPATIENT
Start: 2021-01-07 | End: 2021-02-01 | Stop reason: SDUPTHER

## 2021-01-07 RX ADMIN — Medication 30 ML: at 09:24

## 2021-01-07 RX ADMIN — CARVEDILOL 25 MG: 12.5 TABLET, FILM COATED ORAL at 06:32

## 2021-01-07 RX ADMIN — CLONIDINE HYDROCHLORIDE 0.2 MG: 0.2 TABLET ORAL at 06:32

## 2021-01-07 RX ADMIN — AMLODIPINE BESYLATE 10 MG: 5 TABLET ORAL at 09:14

## 2021-01-07 RX ADMIN — Medication 40 ML: at 06:32

## 2021-01-07 RX ADMIN — ACETAMINOPHEN 650 MG: 325 TABLET ORAL at 09:24

## 2021-01-07 RX ADMIN — Medication: at 06:32

## 2021-01-07 RX ADMIN — FAMOTIDINE 20 MG: 20 TABLET, FILM COATED ORAL at 09:14

## 2021-01-07 NOTE — PROGRESS NOTES
[facility] Adult  Hospitalist Group                                                                                          Hospitalist Progress Note  Beverlee Gowers, MD  Answering service: [phone] OR [phone] from in house phone        Date of Service:  2021  NAME:  Eliza Dias  :  1965  MRN:  [id number]      Admission Summary:   68-year-old gentleman with longstanding poorly controlled hypertension admitted to the hospital via ER on  and hypertensive emergency and hypoxic respiratory failure.  He improved slowly and was extubated on . Interval history / Subjective:   Patient seen and examined no complaints speaking and communicating better he is not on any oxygen off antibiotics  D/w family and pt wants to go home with HH/PT d/w RN, CM patient has no insurance that is delaying his discharge but  will come up with a solution today probably will be discharging later today     Assessment & Plan:     Acute hypoxic respiratory failure: Likely multifactorial - resolved   -patient with hypertensive emergency with pulmonary edema and possible CAP.   Patient extubated on , off abx now   - diuretics on hold due to worsening renal function, now on NC      YISSEL on CKD stage II:   -Nephrology on board, creatinine slightly up, renal ultrasound with benign cyst  -avoid nephrotoxins     Hypercalcemia: resolved   -Concern for multiple myeloma, could be exacerbated with contraction alkalosis,  - ANCA/ ASHISH/ CH50 C3 - WNL  - MGUS IgM vs MM/ Waldenstroms m spike on gammopathy panel out pt f/u with oncology /nephrology      Anemia:   H&H stable, monitor     Acute metabolic encephalopathy: Resolved  Likely secondary to above, patient continues to be confused, mild cognitive dysfunction      Hypertensive urgency: Resolved  Increase clonidine dose, cardiology consult, echocardiogram with EF of around 40 to 45% and mild concentric hypertrophy, BP stable today      Code status: Full  DVT prophylaxis: scd    Care Plan discussed with: Patient/Family and Nurse  Anticipated Disposition: Home w/Family  Anticipated Discharge: 24 hrs ready for d/c      Hospital Problems  Date Reviewed: 1/6/2021    None            Review of Systems:   A comprehensive review of systems was negative. Vital Signs:    Last 24hrs VS reviewed since prior progress note. Most recent are:  Visit Vitals  /71 (BP 1 Location: Left arm, BP Patient Position: At rest)   Pulse 88   Temp 98.6 °F (37 °C)   Resp 14   Ht 5' 11\" (1.803 m)   Wt 92.4 kg (203 lb 11.3 oz)   SpO2 90%   BMI 28.41 kg/m²         Intake/Output Summary (Last 24 hours) at 1/7/2021 1059  Last data filed at 1/7/2021 0314  Gross per 24 hour   Intake 400 ml   Output    Net 400 ml        Physical Examination:     I had a face to face encounter with this patient and independently examined them on 1/7/2021 as outlined below:          Constitutional:  No acute distress, cooperative, on RA   ENT:  mmm   Resp:  CTA bilaterally. CV:  Regular rhythm, normal rate    GI:  Soft, non distended, non tender. bs+    Musculoskeletal:  No edema, warm, 2+ pulses throughout    Neurologic:  Moves all extremities. AAOx 2/3 ,      Psych:  Good insight, Not anxious nor agitated. Data Review:    I personally reviewed  Image and labs      Labs:     Recent Labs     01/07/21 0341 01/06/21  0335   WBC 10.8 14.9*   HGB 12.2 12.9   HCT 38.3 40.6    347     Recent Labs     01/07/21  0341 01/06/21  0335 01/05/21  0421   NA  --  133* 136   K  --  3.5 3.8   CL  --  101 101   CO2  --  28 32   BUN  --  26* 29*   CREA  --  1.42* 1.31*   GLU  --  104* 126*   CA  --  9.9 10.0   MG 1.9 1.9 2.2   PHOS 2.8 UNABLE TO OBTAIN ACCURATE RESULTS DUE TO GROSS LIPEMIA 3.4     No results for input(s): ALT, AP, TBIL, TBILI, TP, ALB, GLOB, GGT, AML, LPSE in the last 72 hours.     No lab exists for component: SGOT, GPT, AMYP, HLPSE  No results for input(s): INR, PTP, APTT, INREXT, INREXT in the last 72 hours. No results for input(s): FE, TIBC, PSAT, FERR in the last 72 hours. No results found for: FOL, RBCF   No results for input(s): PH, PCO2, PO2 in the last 72 hours. No results for input(s): CPK, CKNDX, TROIQ in the last 72 hours.     No lab exists for component: CPKMB  Lab Results   Component Value Date/Time    Cholesterol, total 121 07/12/2020 01:42 AM    HDL Cholesterol 31 07/12/2020 01:42 AM    LDL, calculated 77.8 07/12/2020 01:42 AM    Triglyceride 61 07/12/2020 01:42 AM    CHOL/HDL Ratio 3.9 07/12/2020 01:42 AM     No results found for: [facility]  Lab Results   Component Value Date/Time    Color YELLOW/STRAW 12/21/2020 07:32 PM    Appearance CLEAR 12/21/2020 07:32 PM    Specific gravity 1.010 12/21/2020 07:32 PM    pH (UA) 5.5 12/21/2020 07:32 PM    Protein 300 (A) 12/21/2020 07:32 PM    Glucose Negative 12/21/2020 07:32 PM    Ketone Negative 12/21/2020 07:32 PM    Bilirubin Negative 12/21/2020 07:32 PM    Urobilinogen 0.2 12/21/2020 07:32 PM    Nitrites Negative 12/21/2020 07:32 PM    Leukocyte Esterase Negative 12/21/2020 07:32 PM    Epithelial cells FEW 12/21/2020 07:32 PM    Bacteria Negative 12/21/2020 07:32 PM    WBC 0-4 12/21/2020 07:32 PM    RBC 0-5 12/21/2020 07:32 PM         Medications Reviewed:     Current Facility-Administered Medications   Medication Dose Route Frequency    famotidine (PEPCID) tablet 20 mg  20 mg Oral BID    haloperidoL (HALDOL) tablet 1 mg  1 mg Oral Q8H PRN    balsam peru-castor oiL (VENELEX) ointment   Topical Q8H    amLODIPine (NORVASC) tablet 10 mg  10 mg Oral DAILY    carvediloL (COREG) tablet 25 mg  25 mg Oral BID WITH MEALS    albuterol-ipratropium (DUO-NEB) 2.5 MG-0.5 MG/3 ML  3 mL Nebulization Q6H PRN    cloNIDine HCL (CATAPRES) tablet 0.2 mg  0.2 mg Oral Q8H    labetaloL (NORMODYNE;TRANDATE) injection 40 mg  40 mg IntraVENous Q6H PRN    multivit-folic acid-herbal 626 (WELLESSE PLUS) oral liquid 30 mL  30 mL Oral DAILY  0.9% sodium chloride infusion  3 mL/hr IntraVENous CONTINUOUS    0.9% sodium chloride infusion  5 mL/hr IntraVENous CONTINUOUS    docusate (COLACE) 50 mg/5 mL oral liquid 100 mg  100 mg Oral DAILY    acetaminophen (TYLENOL) solution 650 mg  650 mg Per NG tube Q6H PRN    nicotine (NICODERM CQ) 14 mg/24 hr patch 1 Patch  1 Patch TransDERmal DAILY    hydrALAZINE (APRESOLINE) 20 mg/mL injection 20 mg  20 mg IntraVENous Q6H PRN    enoxaparin (LOVENOX) injection 40 mg  40 mg SubCUTAneous DAILY    senna (SENOKOT) tablet 17.2 mg  2 Tab Oral DAILY    sodium chloride (NS) flush 5-40 mL  5-40 mL IntraVENous Q8H    sodium chloride (NS) flush 5-40 mL  5-40 mL IntraVENous PRN    acetaminophen (TYLENOL) tablet 650 mg  650 mg Oral Q6H PRN    Or    acetaminophen (TYLENOL) suppository 650 mg  650 mg Rectal Q6H PRN    polyethylene glycol (MIRALAX) packet 17 g  17 g Oral DAILY PRN    promethazine (PHENERGAN) tablet 12.5 mg  12.5 mg Oral Q6H PRN    Or    ondansetron (ZOFRAN) injection 4 mg  4 mg IntraVENous Q6H PRN     ______________________________________________________________________  EXPECTED LENGTH OF STAY: 12d 0h  ACTUAL LENGTH OF STAY:          Eusebio Shetty MD

## 2021-01-07 NOTE — PROGRESS NOTES
Discharge instructions done with patient at bedside. IV access and central line removed. Patient verbalizes understanding of discharge instructions. No further questions at this time.  AVS signed, see chart

## 2021-01-07 NOTE — PROGRESS NOTES
Transition of Care: home with adriano/followup with new PCP; outpatient PT with [facility] ([facility])    Transport Plan: in car with adriano    RUR: 17%    Dx: hypoxia    CM noted discharge order    0900: CM met with patient to inform of discharge plan that includes possible outpatient PT at a [facility] location; he verbalized understanding; patient will work with PT this morning; CM called patients sarah Mauro to inquire about possible insurance coverage through her employer; she stated that the insurance situation has not worked out and he is not covered by her insurance; she has applied for [employer] for him    1000: CM informed that patient has worked with PT and he is cleared; CM will fill out Outpatient PT forms    1130: outpatient PT form for [facility] has been filled out and completed and signed by MD; CAROLINE faxed to [phone]; patient and his fiance verbalized understanding and given copy of forms    CM following  Simba Hanley RN, CRM

## 2021-01-07 NOTE — PROGRESS NOTES
ID Progress Note  2021    Subjective:     Afebrile. Extubated. On room air. No complaints. Objective:     Vitals:   Visit Vitals  /71 (BP 1 Location: Left arm, BP Patient Position: At rest)   Pulse 88   Temp 98.6 °F (37 °C)   Resp 14   Ht 5' 11\" (1.803 m)   Wt 92.4 kg (203 lb 11.3 oz)   SpO2 90%   BMI 28.41 kg/m²        Tmax:  Temp (24hrs), Av.4 °F (36.9 °C), Min:98 °F (36.7 °C), Max:98.7 °F (37.1 °C)      Exam:    Extubated    (+) lipoma on forehead   Lung clear, no rales, wheezes or rhonchi   Heart: s1, s2, RRR, no murmurs rubs or clicks  Abdomen: soft nontender, no guarding or rebound      Labs:   Lab Results   Component Value Date/Time    WBC 10.8 2021 03:41 AM    HGB 12.2 2021 03:41 AM    HCT 38.3 2021 03:41 AM    PLATELET 523  03:41 AM    MCV 84.4 2021 03:41 AM     Lab Results   Component Value Date/Time    Sodium 133 (L) 2021 03:35 AM    Potassium 3.5 2021 03:35 AM    Chloride 101 2021 03:35 AM    CO2 28 2021 03:35 AM    Anion gap 4 (L) 2021 03:35 AM    Glucose 104 (H) 2021 03:35 AM    BUN 26 (H) 2021 03:35 AM    Creatinine 1.42 (H) 2021 03:35 AM    BUN/Creatinine ratio 18 2021 03:35 AM    GFR est AA >60 2021 03:35 AM    GFR est non-AA 52 (L) 2021 03:35 AM    Calcium 9.9 2021 03:35 AM    Bilirubin, total 0.4 2020 04:26 AM    Alk. phosphatase 82 2020 04:26 AM    Protein, total 7.4 2020 04:26 AM    Albumin 2.2 (L) 2020 04:26 AM    Globulin 5.2 (H) 2020 04:26 AM    A-G Ratio 0.4 (L) 2020 04:26 AM    ALT (SGPT) 71 2020 04:26 AM           Assessment:     #1 respiratory failure with Haemophilus/Streptococcus pneumonia     #2 nonischemic cardiomyopathy     #3 chronic kidney disease     #4 chronic tobacco abuse     #5 hypertension               Recommendations:      he is off oxygen. He feels well. Off unasyn now. WBC normal. Observe off abx.      Will sign off. Please call with questions.      Carolyn Ochoa MD

## 2021-01-07 NOTE — DISCHARGE SUMMARY
Discharge Summary       PATIENT ID: Claudine Pat  MRN: [id number]   YOB: 1965    DATE OF ADMISSION: 12/21/2020  4:40 PM    DATE OF DISCHARGE: 1/7/21   PRIMARY CARE PROVIDER: Saul Perez MD     ATTENDING PHYSICIAN: General Azul  DISCHARGING PROVIDER: Val Meza MD    To contact this individual call [phone] and ask the  to page. If unavailable ask to be transferred the Adult Hospitalist Department.     CONSULTATIONS: IP CONSULT TO NEPHROLOGY  IP CONSULT TO INFECTIOUS DISEASES  IP CONSULT TO HEMATOLOGY  IP CONSULT TO OTOLARYNGOLOGY  IP CONSULT TO CARDIOLOGY    PROCEDURES/SURGERIES: * No surgery found *    ADMITTING [address] [place] [address] COURSE:   61-year-old gentleman with longstanding poorly controlled hypertension admitted to the hospital via ER on December 21 and hypertensive emergency and hypoxic respiratory failure.  He improved slowly and was extubated on January 2.        Assessment & Plan:      Acute hypoxic respiratory failure: Likely multifactorial - resolved   - diuretics on hold due to worsening renal function     YISSEL on CKD stage II:   -Nephrology on board, creatinine slightly up, renal ultrasound with benign cyst  -avoid nephrotoxins f/u as out pt      Hypercalcemia: resolved   -Concern for multiple myeloma, could be exacerbated with contraction alkalosis  - ANCA/ ASHISH/ CH50 C3 - WNL  - MGUS IgM vs MM/ Waldenstroms m spike on gammopathy panel out pt f/u with oncology /nephrology  As out pt      Anemia:   H&H stable, monitor     Acute metabolic encephalopathy: Resolved     Hypertensive urgency: Resolved           Code status: Full             DISCHARGE DIAGNOSES / PLAN:      1. D/ c home with out pt PT     ADDITIONAL CARE RECOMMENDATIONS:        PENDING TEST RESULTS:   At the time of discharge the following test results are still pending:     FOLLOW UP APPOINTMENTS:    Follow-up Information     Follow up With Specialties Details Why Contact Info    Saul Perez MD Family Medicine In 1 week  1 [facility]  [phone]      Edgar George NP Internal Medicine Go on 1/15/2021 New patient appt has been scheduled for [address] @ [address]  [phone]               DIET: Cardiac Diet    ACTIVITY: Activity as tolerated    WOUND CARE:     EQUIPMENT needed:       DISCHARGE MEDICATIONS:  Current Discharge Medication List      START taking these medications    Details   amLODIPine (NORVASC) 10 mg tablet Take 1 Tab by mouth daily for 30 days. Indications: high blood pressure  Qty: 30 Tab, Refills: 0         CONTINUE these medications which have NOT CHANGED    Details   aspirin-acetaminophen-caffeine (Goody's Extra Strength) 520-260-32.5 mg pwpk Take 1 Package by mouth two (2) times a day. ibuprofen (MOTRIN) 600 mg tablet Take 600 mg by mouth every six (6) hours as needed for Pain. carvediloL (COREG) 25 mg tablet Take 1 Tab by mouth two (2) times daily (with meals). Qty: 180 Tab, Refills: 1    Associated Diagnoses: Elevated blood pressure reading      cloNIDine HCL (CATAPRES) 0.1 mg tablet Take 1 Tab by mouth two (2) times a day. Qty: 180 Tab, Refills: 1    Associated Diagnoses: Elevated blood pressure reading      aspirin 81 mg chewable tablet Take 1 Tab by mouth daily. Qty: 90 Tab, Refills: 0         STOP taking these medications       isosorbide mononitrate ER (IMDUR) 30 mg tablet Comments:   Reason for Stopping:                 NOTIFY YOUR PHYSICIAN FOR ANY OF THE FOLLOWING:   Fever over 101 degrees for 24 hours. Chest pain, shortness of breath, fever, chills, nausea, vomiting, diarrhea, change in mentation, falling, weakness, bleeding. Severe pain or pain not relieved by medications. Or, any other signs or symptoms that you may have questions about.     DISPOSITION:  x  Home With:   OT  PT  HH  RN       Long term SNF/Inpatient Rehab    Independent/assisted living    Hospice    Other:       PATIENT CONDITION AT DISCHARGE:     Functional status    Poor    x Deconditioned     Independent      Cognition     Lucid    x Forgetful     Dementia      Catheters/lines (plus indication)    Granados     PICC     PEG    x None      Code status   x  Full code     DNR      PHYSICAL EXAMINATION AT DISCHARGE:  General:          Alert, cooperative, no distress, appears stated age. HEENT:           Atraumatic, anicteric sclerae, pink conjunctivae                          No oral ulcers, mucosa moist, throat clear, dentition fair  Neck:               Supple, symmetrical  Lungs:             Clear to auscultation bilaterally. No Wheezing or Rhonchi. No rales. Chest wall:      No tenderness  No Accessory muscle use. Heart:              Regular  rhythm,  No  murmur   No edema  Abdomen:        Soft, non-tender. Not distended. Bowel sounds normal  Extremities:     No cyanosis. No clubbing,                            Skin turgor normal, Capillary refill normal  Skin:                Not pale. Not Jaundiced  No rashes   Psych:             Not anxious or agitated.   Neurologic:      Alert, moves all extremities,       CHRONIC MEDICAL DIAGNOSES:  Problem List as of 1/7/2021 Date Reviewed: 1/6/2021          Codes Class Noted - Resolved    Cervical radiculopathy ICD-10-CM: M54.12  ICD-9-CM: 723.4  7/12/2020 - Present        Arm paresthesia, left ICD-10-CM: R20.2  ICD-9-CM: 782.0  7/10/2020 - Present        Intractable headache ICD-10-CM: R51.9  ICD-9-CM: 784.0  7/10/2020 - Present        Dilated cardiomyopathy ([facility] ICD-10-CM: I42.0  ICD-9-CM: 425.4  1/6/2020 - Present        NSTEMI (non-ST elevated myocardial infarction) ([facility] ICD-10-CM: I21.4  ICD-9-CM: 410.70  12/9/2019 - Present        HTN (hypertension) ICD-10-CM: I10  ICD-9-CM: 401.9  9/1/2016 - Present        RESOLVED: Hypoxia ICD-10-CM: R09.02  ICD-9-CM: 799.02  12/21/2020 - 1/6/2021              Greater than 30  minutes were spent with the patient on counseling and coordination of care    Signed:   Ollie Cockayne, MD  1/7/2021  11:19 AM

## 2021-01-07 NOTE — PROGRESS NOTES
1952 Bedside and Verbal shift change report given to  (oncoming nurse) by Jillian(offgoing nurse). Report included the following information SBAR, ED Summary, MAR and Recent Results. Bedside and Verbal shift change report given to Dona (oncoming nurse) by  Enrrique Levo nurse). Report included the following information SBAR, ED Summary, Intake/Output, MAR and Recent Results.

## 2021-02-01 ENCOUNTER — OFFICE VISIT (OUTPATIENT)
Dept: INTERNAL MEDICINE CLINIC | Age: 56
End: 2021-02-01

## 2021-02-01 VITALS
HEART RATE: 79 BPM | RESPIRATION RATE: 16 BRPM | BODY MASS INDEX: 29.99 KG/M2 | WEIGHT: 214.2 LBS | HEIGHT: 71 IN | OXYGEN SATURATION: 96 % | TEMPERATURE: 98.6 F | SYSTOLIC BLOOD PRESSURE: 130 MMHG | DIASTOLIC BLOOD PRESSURE: 82 MMHG

## 2021-02-01 DIAGNOSIS — R31.9 URINARY TRACT INFECTION WITH HEMATURIA, SITE UNSPECIFIED: ICD-10-CM

## 2021-02-01 DIAGNOSIS — R35.81 NOCTURNAL POLYURIA: ICD-10-CM

## 2021-02-01 DIAGNOSIS — R73.09 ELEVATED GLUCOSE LEVEL: ICD-10-CM

## 2021-02-01 DIAGNOSIS — N39.0 URINARY TRACT INFECTION WITH HEMATURIA, SITE UNSPECIFIED: ICD-10-CM

## 2021-02-01 DIAGNOSIS — G47.00 INSOMNIA, UNSPECIFIED TYPE: ICD-10-CM

## 2021-02-01 DIAGNOSIS — R33.9 INCOMPLETE BLADDER EMPTYING: ICD-10-CM

## 2021-02-01 DIAGNOSIS — E11.65 TYPE 2 DIABETES MELLITUS WITH HYPERGLYCEMIA, WITHOUT LONG-TERM CURRENT USE OF INSULIN (HCC): ICD-10-CM

## 2021-02-01 DIAGNOSIS — F41.1 GENERALIZED ANXIETY DISORDER: ICD-10-CM

## 2021-02-01 DIAGNOSIS — M62.838 MUSCLE SPASM OF RIGHT SHOULDER: Primary | ICD-10-CM

## 2021-02-01 DIAGNOSIS — I10 ESSENTIAL HYPERTENSION: ICD-10-CM

## 2021-02-01 DIAGNOSIS — M77.8 TENDONITIS OF SHOULDER, RIGHT: ICD-10-CM

## 2021-02-01 LAB
A-G RATIO,AGRAT: 0.9 RATIO
ALBUMIN SERPL-MCNC: 4.5 G/DL (ref 3.9–5.4)
ALP SERPL-CCNC: 125 U/L (ref 38–126)
ALT SERPL-CCNC: 34 U/L (ref 0–50)
ANION GAP SERPL CALC-SCNC: 11 MMOL/L
AST SERPL W P-5'-P-CCNC: 24 U/L (ref 14–36)
BACTERIA,BACTU: ABNORMAL
BILIRUB SERPL-MCNC: 0.4 MG/DL (ref 0.2–1.3)
BILIRUB UR QL: NEGATIVE
BUN SERPL-MCNC: 18 MG/DL (ref 9–20)
BUN/CREATININE RATIO,BUCR: 15 RATIO
CALCIUM SERPL-MCNC: 10.3 MG/DL (ref 8.4–10.2)
CHLORIDE SERPL-SCNC: 101 MMOL/L (ref 98–107)
CHOL/HDL RATIO,CHHD: 5 RATIO (ref 0–4)
CHOLEST SERPL-MCNC: 168 MG/DL (ref 0–200)
CLARITY: CLEAR
CO2 SERPL-SCNC: 30 MMOL/L (ref 22–32)
COLOR UR: ABNORMAL
CREAT SERPL-MCNC: 1.2 MG/DL (ref 0.8–1.5)
ERYTHROCYTE [DISTWIDTH] IN BLOOD BY AUTOMATED COUNT: 14.8 %
GLOBULIN,GLOB: 4.8
GLUCOSE 24H UR-MRATE: NEGATIVE G/(24.H)
GLUCOSE SERPL-MCNC: 163 MG/DL (ref 75–110)
HBA1C MFR BLD HPLC: 6.6 % (ref 4–5.7)
HCT VFR BLD AUTO: 41.5 % (ref 37–51)
HDLC SERPL-MCNC: 31 MG/DL (ref 35–130)
HGB BLD-MCNC: 13.1 G/DL (ref 12–18)
HGB UR QL STRIP: ABNORMAL
KETONES UR QL STRIP.AUTO: ABNORMAL
LDL/HDL RATIO,LDHD: 3 RATIO
LDLC SERPL CALC-MCNC: 91 MG/DL (ref 0–130)
LEUKOCYTE ESTERASE: ABNORMAL
LYMPHOCYTES ABSOLUTE: 2.5 K/UL (ref 0.6–4.1)
LYMPHOCYTES NFR BLD: 32.3 % (ref 10–58.5)
MCH RBC QN AUTO: 26.4 PG (ref 26–32)
MCHC RBC AUTO-ENTMCNC: 31.6 G/DL (ref 30–36)
MCV RBC AUTO: 83.6 FL (ref 80–97)
MICROALBUMIN, URINE: 50 MG/L (ref 0–20)
MONOCYTES ABS-DIF,2141: 0.6 K/UL (ref 0–1.8)
MONOCYTES NFR BLD: 7.8 % (ref 0.1–24)
MUCUS,MUCUS: ABNORMAL
NEUTROPHILS # BLD: 59.9 % (ref 37–92)
NEUTROPHILS ABS,2156: 4.7 K/UL (ref 2–7.8)
NITRITE UR QL STRIP.AUTO: NEGATIVE
PH UR STRIP: 5 [PH] (ref 5–7)
PLATELET # BLD AUTO: 357 K/UL (ref 140–440)
POTASSIUM SERPL-SCNC: 3.9 MMOL/L (ref 3.6–5)
PROT SERPL-MCNC: 9.3 G/DL (ref 6.3–8.2)
PROT UR STRIP-MCNC: ABNORMAL MG/DL
PSA, TEST22: 2.1 NG/ML (ref 0–4)
RBC # BLD AUTO: 4.97 M/UL (ref 4.2–6.3)
RBC #/AREA URNS HPF: ABNORMAL #/HPF
SODIUM SERPL-SCNC: 142 MMOL/L (ref 137–145)
SP GR UR REFRACTOMETRY: 1.02 (ref 1–1.03)
TRIGL SERPL-MCNC: 232 MG/DL (ref 0–200)
TSH SERPL DL<=0.05 MIU/L-ACNC: 1.43 UIU/ML (ref 0.34–5.6)
UROBILINOGEN UR QL STRIP.AUTO: ABNORMAL
VLDLC SERPL CALC-MCNC: 46 MG/DL
WBC # BLD AUTO: 7.8 K/UL (ref 4.1–10.9)
WBC URNS QL MICRO: ABNORMAL #/HPF

## 2021-02-01 PROCEDURE — 85025 COMPLETE CBC W/AUTO DIFF WBC: CPT | Performed by: NURSE PRACTITIONER

## 2021-02-01 PROCEDURE — 82044 UR ALBUMIN SEMIQUANTITATIVE: CPT | Performed by: NURSE PRACTITIONER

## 2021-02-01 PROCEDURE — 83036 HEMOGLOBIN GLYCOSYLATED A1C: CPT | Performed by: NURSE PRACTITIONER

## 2021-02-01 PROCEDURE — 81001 URINALYSIS AUTO W/SCOPE: CPT | Performed by: NURSE PRACTITIONER

## 2021-02-01 PROCEDURE — 84443 ASSAY THYROID STIM HORMONE: CPT | Performed by: NURSE PRACTITIONER

## 2021-02-01 PROCEDURE — 99204 OFFICE O/P NEW MOD 45 MIN: CPT | Performed by: NURSE PRACTITIONER

## 2021-02-01 PROCEDURE — 80053 COMPREHEN METABOLIC PANEL: CPT | Performed by: NURSE PRACTITIONER

## 2021-02-01 PROCEDURE — 80061 LIPID PANEL: CPT | Performed by: NURSE PRACTITIONER

## 2021-02-01 RX ORDER — ESCITALOPRAM OXALATE 10 MG/1
10 TABLET ORAL DAILY
Qty: 30 TAB | Refills: 2 | Status: SHIPPED | OUTPATIENT
Start: 2021-02-01 | End: 2021-04-14 | Stop reason: SDUPTHER

## 2021-02-01 RX ORDER — BACLOFEN 10 MG/1
10 TABLET ORAL
Qty: 30 TAB | Refills: 2 | Status: SHIPPED | OUTPATIENT
Start: 2021-02-01 | End: 2021-03-01 | Stop reason: SDUPTHER

## 2021-02-01 RX ORDER — TAMSULOSIN HYDROCHLORIDE 0.4 MG/1
0.4 CAPSULE ORAL DAILY
Qty: 30 CAP | Refills: 2 | Status: SHIPPED | OUTPATIENT
Start: 2021-02-01 | End: 2021-03-01 | Stop reason: SDUPTHER

## 2021-02-01 RX ORDER — TRAZODONE HYDROCHLORIDE 50 MG/1
50 TABLET ORAL
Qty: 30 TAB | Refills: 2 | Status: SHIPPED | OUTPATIENT
Start: 2021-02-01

## 2021-02-01 NOTE — PROGRESS NOTES
Establish Care (new patient)       HPI:     Wilfred Bernard is a 50y.o. year old male who is here to establish as a new patient, and to discuss current medications and medical complaints. He is accompanied by his wife today. Patient was recently hospitalized in December through early January secondary to pneumonia with hypoxia. He states he was placed on a ventilator for a couple of weeks, and had a indwelling catheter at that time as well. He also apparently sustained injury to his left vocal cord during intubation, and continues to have a raspy voice. He states his breathing is now normalized, and denies any shortness of breath or cough at this time. Since his hospitalization, the patient complains of an ongoing issue with right shoulder pain and right upper back pain. He states it is worse when raising her right arm above head, or lifting heavy objects. He denies any rash or bruising to the area. He denies any history of injury to the area. He states he feels a \"knot\" to his upper back between the shoulder and base of neck. It is tender to touch. The patient also complains of increased urination at night. He states he often gets up 6-7 times a night to void. He states his urine stream is \"about medium,\" and denies any severe restriction or dribbling. Likewise, he denies any discharge or hematuria. He denies any pain, increased frequency, or sense of urgency. He denies any constipation of his bowels, and denies straining with defecation. The patient also states some issues with anxiety. He states this is a daily, and is independent of activity or thought processes. He states he has sudden feeling of anxiousness but has no specific identifiable trigger or cause. He states this started after he was released from the hospital.    The patient is also having issues with sleep.   He states since his hospitalization he has been not been able to sleep very well, and having difficulty falling asleep and staying asleep. Of course, frequent urination patterns at night are not helping. He has not tried anything over-the-counter to help with this issue. Visit Vitals  BP (!) 142/90 (BP 1 Location: Left upper arm, BP Patient Position: Sitting)   Pulse 79   Temp 98.6 °F (37 °C)   Resp 16   Ht 5' 11\" (1.803 m)   Wt 214 lb 3.2 oz (97.2 kg)   SpO2 96%   BMI 29.87 kg/m²       Past Medical History:   Diagnosis Date    Hypertension        History reviewed. No pertinent surgical history. Prior to Admission medications    Medication Sig Start Date End Date Taking? Authorizing Provider   amLODIPine (NORVASC) 10 mg tablet Take 1 Tab by mouth daily for 30 days. Indications: high blood pressure 1/7/21 2/6/21 Yes Zachary Byrnes MD   carvediloL (COREG) 25 mg tablet Take 1 Tab by mouth two (2) times daily (with meals). 7/16/20  Yes Kale Maciel MD   cloNIDine HCL (CATAPRES) 0.1 mg tablet Take 1 Tab by mouth two (2) times a day. 1/16/20  Yes Kale Maciel MD   aspirin 81 mg chewable tablet Take 1 Tab by mouth daily. 12/13/19  Yes Keegan Novak MD   aspirin-acetaminophen-caffeine (Goody's Extra Strength) 520-260-32.5 mg pwpk Take 1 Package by mouth two (2) times a day. Provider, Historical   ibuprofen (MOTRIN) 600 mg tablet Take 600 mg by mouth every six (6) hours as needed for Pain.     Provider, Historical        No Known Allergies     Social History     Socioeconomic History    Marital status: SINGLE     Spouse name: Not on file    Number of children: Not on file    Years of education: Not on file    Highest education level: Not on file   Occupational History    Not on file   Social Needs    Financial resource strain: Not on file    Food insecurity     Worry: Not on file     Inability: Not on file    Transportation needs     Medical: Not on file     Non-medical: Not on file   Tobacco Use    Smoking status: Current Every Day Smoker     Packs/day: 1.00    Smokeless tobacco: Never Used   Substance and Sexual Activity    Alcohol use: Not Currently     Alcohol/week: 5.0 standard drinks     Types: 6 Cans of beer per week     Comment: Occasionally    Drug use: No    Sexual activity: Yes     Partners: Female   Lifestyle    Physical activity     Days per week: Not on file     Minutes per session: Not on file    Stress: Not on file   Relationships    Social connections     Talks on phone: Not on file     Gets together: Not on file     Attends Worship service: Not on file     Active member of club or organization: Not on file     Attends meetings of clubs or organizations: Not on file     Relationship status: Not on file    Intimate partner violence     Fear of current or ex partner: Not on file     Emotionally abused: Not on file     Physically abused: Not on file     Forced sexual activity: Not on file   Other Topics Concern    Not on file   Social History Narrative    ** Merged History Encounter **             ROS:     Constitutional: He denies fevers, weight loss, sweats. Eyes: No blurred or double vision. ENT: No difficulty with swallowing, taste, or smell. Respiratory: No cough wheezing or shortness of breath. Cardiovascular: Denies chest pain, palpitations, unexplained indigestion or syncope. Gastrointestinal:  No changes in bowel movements, no abdominal pain, no bloating. Genitourinary: See HPI. Extremities: See HPI regarding right shoulder. Neurological:  No numbness, tingling, burring paresthesias or loss of motor strength. No syncope, dizziness or frequent headache  Skin:  No recent rashes or mole changes. Psychiatric/Behavioral: Positive for anxiety. Negative for depression.   Hematologic: no easy bruising or bleeding gums  Lymphatic: no lymph node enlargement or night sweats  Endocrine: no increased urination or increased thirst, w/o rapid weight change and no night sweats      Physical Examination:     Vitals:    02/01/21 1312   BP: (!) 142/90   Pulse: 79   Resp: 16 Temp: 98.6 °F (37 °C)   SpO2: 96%   Weight: 214 lb 3.2 oz (97.2 kg)   Height: 5' 11\" (1.803 m)   PainSc:   7   PainLoc: Shoulder        General appearance - alert, well appearing, and in no distress  Mental status - alert, oriented to person, place, and time  HEENT:  Eyes - pupillary responses were normal.  Extraocular muscle function intact. Lids and conjunctiva not injected. Pharynx- clear with teeth in good repair. No masses were noted  Neck - supple without thyromegaly or bruit. Lungs - clear to auscultation x5. No adventitious lung sounds heard. Cardiac- normal rate, regular rhythm with 2/6 systolic murmur. PMI not displaced. No gallop, rub or click  Extremities -  no edema  Lymphatics - no palpable lymphadenopathy. Skin - no rash or unusual mole change noted  Neurological - Cranial nerves II-XII grossly intact. Motor strength 5/5. DTR's 2+ and symmetric. Station and gait normal  Musculoskeletal -mild to moderate tenderness to right shoulder joint with active/passive range of motion. Positive point tenderness to right mid upper back at trapezius. There is a palpable knot that is quite tender. Psychiatric -patient speech has normal rate and rhythm and responds to questions appropriately. The patient's affect is blunted, and mood appears appropriate. Memory is intact. Patient's thought processes and content appeared to be within normal limits. Assessment/Plan:       ICD-10-CM ICD-9-CM    1. Muscle spasm of right shoulder  M62.838 728.85 baclofen (LIORESAL) 10 mg tablet   2. Tendonitis of shoulder, right  M77.8 726.10    3. Nocturnal polyuria  R35.1 788.43 PSA SCREENING (SCREENING)      URINALYSIS W/MICROSCOPIC      tamsulosin (Flomax) 0.4 mg capsule   4. Incomplete bladder emptying  R33.9 788.21 PSA SCREENING (SCREENING)      tamsulosin (Flomax) 0.4 mg capsule   5. Generalized anxiety disorder  F41.1 300.02 escitalopram oxalate (LEXAPRO) 10 mg tablet   6.  Insomnia, unspecified type G47.00 780.52 traZODone (DESYREL) 50 mg tablet   7. Essential hypertension  I10 401.9 CBC WITH AUTOMATED DIFF      METABOLIC PANEL, COMPREHENSIVE      LIPID PANEL      TSH 3RD GENERATION      URINE, MICROALBUMIN, SEMIQUANTITATIVE     1: We will do baseline labs today to reevaluate patient including: CBC, CMP, lipid panel, TSH, urinalysis, microalbumin, PSA.  2: Patient to perform stretching exercises and apply moist heat to painful muscular areas. Baclofen provided. 3: We will trial patient on Flomax due to suspect of possible BPH or mild prostatic irritation secondary to long-term indwelling catheter use. 4: I will try patient on Lexapro for management of his anxiety. 5: I will try patient on trazodone for management of his insomnia.  6: Patient to continue all other medications as directed. Healthy lifestyle management. Avoid excess fluid intake in the evening hours. 7: Patient to follow-up with me in approximately 1 month, or sooner as needed. Patient states understanding and agrees with plan. I have reviewed the patient's medical history in detail and updated the computerized patient record. We had a prolonged discussion about these complex clinical issues and went over the various important aspects to consider. All questions were answered. Advised him to call back or return to office if symptoms do not improve, change in nature, or persist.    He was given an after visit summary or informed of [employer] which includes patient instructions, diagnoses, current medications, & vitals. He expressed understanding with the diagnosis and plan.

## 2021-02-01 NOTE — TELEPHONE ENCOUNTER
Last Refill: N/A  Last Visit: 2/1/2021   Next Visit: 3/1/2021    Requested Prescriptions     Pending Prescriptions Disp Refills    amLODIPine (NORVASC) 10 mg tablet 30 Tab 0     Sig: Take 1 Tab by mouth daily for 30 days.  Indications: high blood pressure

## 2021-02-01 NOTE — PROGRESS NOTES
Katherine Ruiz is a 50 y.o. male    Chief Complaint   Patient presents with    [facility]     new patient       Visit Vitals  BP (!) 142/90 (BP 1 Location: Left upper arm, BP Patient Position: Sitting)   Pulse 79   Temp 98.6 °F (37 °C)   Resp 16   Ht 5' 11\" (1.803 m)   Wt 214 lb 3.2 oz (97.2 kg)   SpO2 96%   BMI 29.87 kg/m²           1. Have you been to the ER, urgent care clinic since your last visit? Hospitalized since your last visit? No     2. Have you seen or consulted any other health care providers outside of the [address] [place] [address] since your last visit? Include any pap smears or colon screening.  No

## 2021-02-02 RX ORDER — AMLODIPINE BESYLATE 10 MG/1
10 TABLET ORAL DAILY
Qty: 30 TAB | Refills: 0 | Status: SHIPPED | OUTPATIENT
Start: 2021-02-02 | End: 2021-03-01

## 2021-02-02 RX ORDER — METFORMIN HYDROCHLORIDE 500 MG/1
500 TABLET ORAL 2 TIMES DAILY WITH MEALS
Qty: 180 TAB | Refills: 1 | Status: SHIPPED | OUTPATIENT
Start: 2021-02-02 | End: 2021-04-14 | Stop reason: SDUPTHER

## 2021-02-02 NOTE — PROGRESS NOTES
Your glucose on blood test was moderately elevated. An additional test was ordered called a hemoglobin A1c. This is an average percent concentration of blood sugar calculation. Normal is 5.7% or less. Years is presently 6.6%, which indicates early type 2 diabetes. To stay ahead of this, I am going to place you on medication to reduce long-term risks. The medication will be: Metformin to be taken with breakfast and dinner every day. This elevated blood sugar may also be adding to your sense of anxiety. Your thyroid appears to be functioning normally. Your prostate number looks okay. Kidney function and liver functions appear okay. Your triglyceride levels are quite high. Work on reducing fat in your diet. Avoid fried foods, fast foods, and processed foods. Increase fiber found in colorful vegetables and salads. Blood counts are normal with no signs of anemia. Your urine test shows some damage likely caused by elevated blood glucose levels. This will probably normalize once your glucose is under control. We will need to recheck your labs in 3 months.

## 2021-02-03 LAB — BACTERIA UR CULT: NORMAL

## 2021-02-04 LAB
ACID FAST STN SPEC: NEGATIVE
MYCOBACTERIUM SPEC QL CULT: NEGATIVE
SPECIMEN PREPARATION: NORMAL
SPECIMEN SOURCE: NORMAL

## 2021-03-01 ENCOUNTER — OFFICE VISIT (OUTPATIENT)
Dept: INTERNAL MEDICINE CLINIC | Age: 56
End: 2021-03-01

## 2021-03-01 VITALS
HEIGHT: 71 IN | TEMPERATURE: 97.8 F | HEART RATE: 75 BPM | BODY MASS INDEX: 30.18 KG/M2 | SYSTOLIC BLOOD PRESSURE: 132 MMHG | WEIGHT: 215.6 LBS | OXYGEN SATURATION: 98 % | RESPIRATION RATE: 16 BRPM | DIASTOLIC BLOOD PRESSURE: 88 MMHG

## 2021-03-01 DIAGNOSIS — N40.1 BENIGN PROSTATIC HYPERPLASIA WITH NOCTURIA: ICD-10-CM

## 2021-03-01 DIAGNOSIS — I42.0 DILATED CARDIOMYOPATHY (HCC): ICD-10-CM

## 2021-03-01 DIAGNOSIS — I25.2 HISTORY OF NON-ST ELEVATION MYOCARDIAL INFARCTION (NSTEMI): ICD-10-CM

## 2021-03-01 DIAGNOSIS — R03.0 ELEVATED BLOOD PRESSURE READING: ICD-10-CM

## 2021-03-01 DIAGNOSIS — M62.838 MUSCLE SPASM OF RIGHT SHOULDER: ICD-10-CM

## 2021-03-01 DIAGNOSIS — I10 ESSENTIAL HYPERTENSION: Primary | ICD-10-CM

## 2021-03-01 DIAGNOSIS — R35.1 BENIGN PROSTATIC HYPERPLASIA WITH NOCTURIA: ICD-10-CM

## 2021-03-01 DIAGNOSIS — R33.9 INCOMPLETE BLADDER EMPTYING: ICD-10-CM

## 2021-03-01 PROBLEM — I21.4 NSTEMI (NON-ST ELEVATED MYOCARDIAL INFARCTION) (HCC): Status: RESOLVED | Noted: 2019-12-09 | Resolved: 2021-03-01

## 2021-03-01 PROCEDURE — 99213 OFFICE O/P EST LOW 20 MIN: CPT | Performed by: NURSE PRACTITIONER

## 2021-03-01 RX ORDER — TAMSULOSIN HYDROCHLORIDE 0.4 MG/1
0.4 CAPSULE ORAL DAILY
Qty: 90 CAP | Refills: 1 | Status: SHIPPED | OUTPATIENT
Start: 2021-03-01 | End: 2021-05-03 | Stop reason: SDUPTHER

## 2021-03-01 RX ORDER — AMLODIPINE BESYLATE 10 MG/1
TABLET ORAL
Qty: 90 TAB | Refills: 1 | Status: SHIPPED | OUTPATIENT
Start: 2021-03-01 | End: 2021-04-02 | Stop reason: SDUPTHER

## 2021-03-01 RX ORDER — CARVEDILOL 25 MG/1
25 TABLET ORAL 2 TIMES DAILY WITH MEALS
Qty: 180 TAB | Refills: 1 | Status: SHIPPED | OUTPATIENT
Start: 2021-03-01 | End: 2021-04-02 | Stop reason: SDUPTHER

## 2021-03-01 RX ORDER — BACLOFEN 10 MG/1
10 TABLET ORAL
Qty: 90 TAB | Refills: 1 | Status: SHIPPED | OUTPATIENT
Start: 2021-03-01 | End: 2021-05-03 | Stop reason: DRUGHIGH

## 2021-03-01 RX ORDER — CLONIDINE HYDROCHLORIDE 0.1 MG/1
0.1 TABLET ORAL 2 TIMES DAILY
Qty: 180 TAB | Refills: 1 | Status: SHIPPED | OUTPATIENT
Start: 2021-03-01 | End: 2021-04-02 | Stop reason: SDUPTHER

## 2021-03-01 RX ORDER — AMLODIPINE BESYLATE 10 MG/1
TABLET ORAL
Qty: 30 TAB | Refills: 0 | Status: SHIPPED | OUTPATIENT
Start: 2021-03-01 | End: 2021-03-01 | Stop reason: SDUPTHER

## 2021-03-01 NOTE — PROGRESS NOTES
Chief Complaint   Patient presents with    Hypertension     4 wk follow up       SUBJECTIVE:    Dai Nolen is a 50 y.o. male who is here today for a follow up appointment regarding his hypertension, non-ST elevated MI, dilated cardiomyopathy, borderline diabetes, anxiety, and BPH. The patient is being managed for hypertension. He is taking several medications to treat this, and is mostly compliant. However, the patient has apparently gotten confused, and discontinued his amlodipine inadvertently. He is no longer on his isosorbide mononitrate. He is also taking carvedilol 25 mg twice daily with an additional 1/2 tablet twice a day. He states he started doing this on his own, because he saw his \"blood pressure was getting higher. \"  He admits to not seeking medical attention for this matter, and did this entirely on his own. He is also continuing his clonidine 0.1 mg twice a day. He denies any adverse side effects of the medication. He denies any further chest pain, chest pressure, shortness of breath, dyspnea on exertion, palpitations, dizziness, headaches, or visual disturbance. In addition, the patient has been doing better on Flomax. He states he is now only getting up to go to the bathroom once or twice a night, rather than several times before starting the medication. He denies any hematuria, discharge, or difficulty passing his urine. He states the medication appears to be working well, and he denies any adverse side effects presently. Patient also suffers from insomnia, and is currently prescribed trazodone to help with this. He states he does take it every so often, but not every single night. He denies any side effects of the medication, and denies any morning hangover or lingering of the medication. He is requesting refills on several medications today.     Current Outpatient Medications   Medication Sig Dispense Refill    tamsulosin (Flomax) 0.4 mg capsule Take 1 Cap by mouth daily. Indications: PROSTATE 90 Cap 1    baclofen (LIORESAL) 10 mg tablet Take 1 Tab by mouth three (3) times daily as needed for Muscle Spasm(s). 90 Tab 1    carvediloL (COREG) 25 mg tablet Take 1 Tab by mouth two (2) times daily (with meals). 180 Tab 1    cloNIDine HCL (CATAPRES) 0.1 mg tablet Take 1 Tab by mouth two (2) times a day. 180 Tab 1    amLODIPine (NORVASC) 10 mg tablet TAKE 1 TABLET BY MOUTH ONCE DAILY FOR HIGH BLOOD PRESSURE 90 Tab 1    metFORMIN (GLUCOPHAGE) 500 mg tablet Take 1 Tab by mouth two (2) times daily (with meals). 180 Tab 1    traZODone (DESYREL) 50 mg tablet Take 1 Tab by mouth nightly. Indications: SLEEP 30 Tab 2    aspirin-acetaminophen-caffeine (Goody's Extra Strength) 520-260-32.5 mg pwpk Take 1 Package by mouth two (2) times a day.  ibuprofen (MOTRIN) 600 mg tablet Take 600 mg by mouth every six (6) hours as needed for Pain.  aspirin 81 mg chewable tablet Take 1 Tab by mouth daily. 90 Tab 0    escitalopram oxalate (LEXAPRO) 10 mg tablet Take 1 Tab by mouth daily. Indications: ANXIETY 30 Tab 2     Past Medical History:   Diagnosis Date    Hypertension      History reviewed. No pertinent surgical history. No Known Allergies    REVIEW OF SYSTEMS:  General: He denies any chills, fever, weight loss or gain, appetite or sleeping habits. ENT: He denies any acute headaches, nasal congestion, nosebleeds, or tinnitus  Eyes: No blurred or visual changes  Neck: No stiffness, pain, or swollen nodes  Respiratory: Negative for - acute cough, hemoptysis, shortness of breath, or wheezing  Cardiovascular : Negative for - acute chest pain, orthopnea, edema, palpitations, or shortness of breath  Gastrointestinal: Negative for - acute abdominal pain, blood in stools, tarry stools, heartburn, or nausea/vomiting  Genito-Urinary: Negative for - acute dysuria, frequency, urgency, hematuria, or discharge.   Musculoskeletal: Negative for - gait disturbance, acute joint pain, stiffness or swelling  Neurological: Negative for - TIA or stroke symptoms  Hematologic: Denies unexplained bruises or bleeding  Lymphatic: Negative for swollen glands/nodes  Integumentary: Denies any new rash or unexplained bruising  Endocrine: Denies malaise/lethargy, hot/cold intolerance, polyuria/polydipsia, or unexpected weight changes. Psychiatric: Denies any acute depression, anxiety, or psychosis. Social History     Socioeconomic History    Marital status: SINGLE     Spouse name: Not on file    Number of children: Not on file    Years of education: Not on file    Highest education level: Not on file   Tobacco Use    Smoking status: Former Smoker     Packs/day: 1.00    Smokeless tobacco: Never Used   Substance and Sexual Activity    Alcohol use: Not Currently     Alcohol/week: 5.0 standard drinks     Types: 6 Cans of beer per week     Comment: Occasionally    Drug use: No    Sexual activity: Yes     Partners: Female   Social History Narrative    ** Merged History Encounter **          Family History   Problem Relation Age of Onset    Stroke Father     Cancer Sister        OBJECTIVE:     Visit Vitals  /88 (BP 1 Location: Left upper arm, BP Patient Position: Sitting, BP Cuff Size: Large adult)   Pulse 75   Temp 97.8 °F (36.6 °C)   Resp 16   Ht 5' 11\" (1.803 m)   Wt 215 lb 9.6 oz (97.8 kg)   SpO2 98%   BMI 30.07 kg/m²       Constitutional: He appears well nourished, of stated age, and dressed appropriately. Eyes: Sclera anicteric, PERRLA, EOMI  Neck: Supple without lymphadenopathy. Respiratory: Clear to ascultation X5, normal inspiratory effort, no adventitious breath sounds. Cardiovascular: Regular rate and rhythm, no murmurs, rubs or gallops, PMI not displaced, No thrills, no peripheral edema  Hematologic: No purpura, petechiae or unexplained bruising  Lymphatic: No lymph node enlargemant. Integumentary: No unusual rashes or suspicious skin lesions noted.  Nails appear normal.  Neuro: Non-focal exam, A & O X 3.  Station and gait normal.  Psychiatric: Appropriate affect and demeanor, pleasant and cooperative. Patient's thought content and thought processing appear to be within normal limits. ASSESSMENT/PLAN:       ICD-10-CM ICD-9-CM    1. Essential hypertension  I10 401.9    2. History of non-ST elevation myocardial infarction (NSTEMI)  I25.2 412    3. Dilated cardiomyopathy (HCC)  I42.0 425.4    4. Benign prostatic hyperplasia with nocturia  N40.1 600.01 tamsulosin (Flomax) 0.4 mg capsule    R35.1 788.43    5. Incomplete bladder emptying  R33.9 788.21 tamsulosin (Flomax) 0.4 mg capsule   6. Muscle spasm of right shoulder  M62.838 728.85 baclofen (LIORESAL) 10 mg tablet   7. Elevated blood pressure reading  R03.0 796.2 carvediloL (COREG) 25 mg tablet      cloNIDine HCL (CATAPRES) 0.1 mg tablet      amLODIPine (NORVASC) 10 mg tablet     1: Patient thoroughly educated and counseled on alteration of current prescriptions and avoidance in the future. Patient acknowledges and states understanding. 2: Patient will resume amlodipine 10 mg daily. Reduce carvedilol to 25 mg twice a day as prescribed. 3: Continue all other medications as directed. Continue healthy lifestyle management. 4: Patient to follow-up with me in approximately 2 months, or sooner as needed. Patient states understanding and agrees with plan.  5: Patient to follow-up with cardiology for approval of return to work. Orders Placed This Encounter    tamsulosin (Flomax) 0.4 mg capsule    baclofen (LIORESAL) 10 mg tablet    carvediloL (COREG) 25 mg tablet    cloNIDine HCL (CATAPRES) 0.1 mg tablet    amLODIPine (NORVASC) 10 mg tablet         ATTENTION:   This medical record was transcribed using an electronic medical records system. Although proofread, it may and can contain electronic and spelling errors. Other human spelling and other errors may be present. Corrections may be executed at a later time.   Please feel free to contact us for any clarifications as needed. No results found for any visits on 03/01/21. Signed,  Yani Son. Mac Brand, MSN APRN FNP-BC    The patient verbalized understanding of the problems and plans as explained.

## 2021-03-01 NOTE — PROGRESS NOTES
Katherine Ruiz is a 50 y.o. male    Chief Complaint   Patient presents with    Hypertension     4 wk follow up       Visit Vitals  /88 (BP 1 Location: Left upper arm, BP Patient Position: Sitting, BP Cuff Size: Large adult)   Pulse 75   Temp 97.8 °F (36.6 °C)   Resp 16   Ht 5' 11\" (1.803 m)   Wt 215 lb 9.6 oz (97.8 kg)   SpO2 98%   BMI 30.07 kg/m²           1. Have you been to the ER, urgent care clinic since your last visit? Hospitalized since your last visit? No     2. Have you seen or consulted any other health care providers outside of the [address] [place] [address] since your last visit? Include any pap smears or colon screening.  No

## 2021-04-02 ENCOUNTER — OFFICE VISIT (OUTPATIENT)
Dept: CARDIOLOGY CLINIC | Age: 56
End: 2021-04-02

## 2021-04-02 VITALS
OXYGEN SATURATION: 98 % | WEIGHT: 215 LBS | BODY MASS INDEX: 30.1 KG/M2 | HEART RATE: 64 BPM | DIASTOLIC BLOOD PRESSURE: 80 MMHG | SYSTOLIC BLOOD PRESSURE: 130 MMHG | RESPIRATION RATE: 13 BRPM | HEIGHT: 71 IN

## 2021-04-02 DIAGNOSIS — R03.0 ELEVATED BLOOD PRESSURE READING: ICD-10-CM

## 2021-04-02 DIAGNOSIS — I42.0 DILATED CARDIOMYOPATHY (HCC): ICD-10-CM

## 2021-04-02 DIAGNOSIS — I10 ESSENTIAL HYPERTENSION: Primary | ICD-10-CM

## 2021-04-02 PROCEDURE — 99204 OFFICE O/P NEW MOD 45 MIN: CPT | Performed by: SPECIALIST

## 2021-04-02 RX ORDER — CLONIDINE HYDROCHLORIDE 0.1 MG/1
0.1 TABLET ORAL 2 TIMES DAILY
Qty: 180 TAB | Refills: 1 | Status: SHIPPED | OUTPATIENT
Start: 2021-04-02 | End: 2021-10-01 | Stop reason: SDUPTHER

## 2021-04-02 RX ORDER — CARVEDILOL 25 MG/1
25 TABLET ORAL 2 TIMES DAILY WITH MEALS
Qty: 180 TAB | Refills: 1 | Status: SHIPPED | OUTPATIENT
Start: 2021-04-02 | End: 2021-08-25 | Stop reason: SDUPTHER

## 2021-04-02 RX ORDER — AMLODIPINE BESYLATE 10 MG/1
TABLET ORAL
Qty: 90 TAB | Refills: 1 | Status: SHIPPED | OUTPATIENT
Start: 2021-04-02 | End: 2021-08-02 | Stop reason: SINTOL

## 2021-04-02 RX ORDER — ACETAMINOPHEN AND CODEINE PHOSPHATE 300; 30 MG/1; MG/1
TABLET ORAL AS NEEDED
COMMUNITY
Start: 2021-02-20 | End: 2021-08-04 | Stop reason: ALTCHOICE

## 2021-04-02 NOTE — PROGRESS NOTES
HISTORY OF PRESENT ILLNESS  Tao Galvin is a 50 y.o. male     SUMMARY:   Problem List  Date Reviewed: 4/2/2021          Codes Class Noted    Cervical radiculopathy ICD-10-CM: M54.12  ICD-9-CM: 723.4  7/12/2020        Arm paresthesia, left ICD-10-CM: R20.2  ICD-9-CM: 782.0  7/10/2020        Intractable headache ICD-10-CM: R51.9  ICD-9-CM: 784.0  7/10/2020        Dilated cardiomyopathy ([facility] [address] ICD-10-CM: I42.0  ICD-9-CM: 425.4  1/6/2020        HTN (hypertension) ICD-10-CM: I10  ICD-9-CM: 401.9  9/1/2016              Current Outpatient Medications on File Prior to Visit   Medication Sig    acetaminophen-codeine (TYLENOL #3) 300-30 mg per tablet as needed.  tamsulosin (Flomax) 0.4 mg capsule Take 1 Cap by mouth daily. Indications: PROSTATE    baclofen (LIORESAL) 10 mg tablet Take 1 Tab by mouth three (3) times daily as needed for Muscle Spasm(s).  carvediloL (COREG) 25 mg tablet Take 1 Tab by mouth two (2) times daily (with meals).  cloNIDine HCL (CATAPRES) 0.1 mg tablet Take 1 Tab by mouth two (2) times a day.  amLODIPine (NORVASC) 10 mg tablet TAKE 1 TABLET BY MOUTH ONCE DAILY FOR HIGH BLOOD PRESSURE    metFORMIN (GLUCOPHAGE) 500 mg tablet Take 1 Tab by mouth two (2) times daily (with meals).  traZODone (DESYREL) 50 mg tablet Take 1 Tab by mouth nightly. Indications: SLEEP    escitalopram oxalate (LEXAPRO) 10 mg tablet Take 1 Tab by mouth daily. Indications: ANXIETY    aspirin-acetaminophen-caffeine (Goody's Extra Strength) 520-260-32.5 mg pwpk Take 1 Package by mouth two (2) times a day.  ibuprofen (MOTRIN) 600 mg tablet Take 600 mg by mouth every six (6) hours as needed for Pain.  aspirin 81 mg chewable tablet Take 1 Tab by mouth daily. No current facility-administered medications on file prior to visit.         CARDIOLOGY STUDIES TO DATE:  12/19 echo lvef 40-45%   12/19 lexiscan with fixed inf defect   12/19 normal cardiac cath  12/21/20  echo lvef 40-45%, mild mr, lvh    Chief Complaint   Patient presents with    Follow-up     HPI :  He is self-referred for ongoing cardiac care. His girlfriend is a patient of mine. He was hospitalized and late December for about 2 weeks with respiratory failure which was felt to be due to consequence of pulmonary edema and COPD. During that hospitalization he had an echocardiogram which I reviewed and summarized above. His medications were adjusted and he is quit smoking. Blood pressures been doing well and he just finished physical therapy and wants to go back to work. He has hypertension but no history of diabetes or hyperlipidemia. Family history is negative for premature coronary disease. CARDIAC ROS:   negative for chest pain, dyspnea, palpitations, syncope, orthopnea, paroxysmal nocturnal dyspnea, exertional chest pressure/discomfort, claudication, lower extremity edema    Family History   Problem Relation Age of Onset    Stroke Father     Cancer Sister        Past Medical History:   Diagnosis Date    Hypertension     NSTEMI (non-ST elevated myocardial infarction) ([facility] [address] 12/9/2019       GENERAL ROS:  A comprehensive review of systems was negative except for that written in the HPI.     Visit Vitals  /80 (BP 1 Location: Left arm, BP Patient Position: Sitting, BP Cuff Size: Adult)   Pulse 64   Resp 13   Ht 5' 11\" (1.803 m)   Wt 215 lb (97.5 kg)   SpO2 98%   BMI 29.99 kg/m²       Wt Readings from Last 3 Encounters:   04/02/21 215 lb (97.5 kg)   03/01/21 215 lb 9.6 oz (97.8 kg)   02/01/21 214 lb 3.2 oz (97.2 kg)            BP Readings from Last 3 Encounters:   04/02/21 130/80   03/01/21 132/88   02/01/21 130/82       PHYSICAL EXAM  General appearance: alert, cooperative, no distress, appears stated age  Neurologic: Alert and oriented X 3  Neck: supple, symmetrical, trachea midline, no adenopathy, no carotid bruit and no JVD  Lungs: clear to auscultation bilaterally  Heart: regular rate and rhythm, S1, S2 normal, no murmur, click, rub or gallop  Abdomen: soft, non-tender. Bowel sounds normal. No masses,  no organomegaly  Extremities: extremities normal, atraumatic, no cyanosis or edema  Pulses: 2+ and symmetric    Lab Results   Component Value Date/Time    Cholesterol, total 168 02/01/2021 02:01 PM    Cholesterol, total 121 07/12/2020 01:42 AM    Cholesterol, total 166 12/09/2019 06:13 AM    Cholesterol, total 189 07/08/2015 07:45 PM    Cholesterol, total 207 (H) 01/14/2015 07:25 PM    HDL Cholesterol 31 (L) 02/01/2021 02:01 PM    HDL Cholesterol 31 07/12/2020 01:42 AM    HDL Cholesterol 47 12/09/2019 06:13 AM    HDL Cholesterol 34 07/08/2015 07:45 PM    HDL Cholesterol 43 01/14/2015 07:25 PM    LDL, calculated 91 02/01/2021 02:01 PM    LDL, calculated 77.8 07/12/2020 01:42 AM    LDL, calculated 99.6 12/09/2019 06:13 AM    LDL, calculated 124.6 (H) 07/08/2015 07:45 PM    LDL, calculated 142.2 (H) 01/14/2015 07:25 PM    Triglyceride 232 (H) 02/01/2021 02:01 PM    Triglyceride 61 07/12/2020 01:42 AM    Triglyceride 97 12/09/2019 06:13 AM    Triglyceride 152 (H) 07/08/2015 07:45 PM    Triglyceride 109 01/14/2015 07:25 PM    CHOL/HDL Ratio 5 (H) 02/01/2021 02:01 PM    CHOL/HDL Ratio 3.9 07/12/2020 01:42 AM    CHOL/HDL Ratio 3.5 12/09/2019 06:13 AM    CHOL/HDL Ratio 5.6 (H) 07/08/2015 07:45 PM    CHOL/HDL Ratio 4.8 01/14/2015 07:25 PM     ASSESSMENT :      He is stable and asymptomatic I think at this point well compensated on a good medical regimen. We talked at length about the importance of controlling his blood pressure given his risk of stroke renal failure and congestive heart failure. 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 We will repeat his echocardiogram at the time of his next office visit.   current treatment plan is effective, no change in therapy  lab results and schedule of future lab studies reviewed with patient  reviewed diet, exercise and weight control    Encounter Diagnoses   Name Primary?  Essential hypertension Yes     Orders Placed This Encounter    acetaminophen-codeine (TYLENOL #3) 300-30 mg per tablet       Follow-up and Dispositions    · Return in about 3 months (around 7/2/2021). Arleth Vazquez MD  4/2/2021  Please note that this dictation was completed with [employer], the computer voice recognition software. Quite often unanticipated grammatical, syntax, homophones, and other interpretive errors are inadvertently transcribed by the computer software. Please disregard these errors. Please excuse any errors that have escaped final proofreading. Thank you.

## 2021-04-02 NOTE — LETTER
4/2/2021 Mr. Marcel Ndiaye [address] [facility] [id number] To Whom It May Concern: 
 
Marcel Ndiaye is currently under the care of [facility]ettie DIAS. From a cardiac standpoint, he may return to work with limited duty max 3 days a week. If there are questions or concerns please have the patient contact our office. Sincerely, Taiwo Graham MD

## 2021-04-14 DIAGNOSIS — F41.1 GENERALIZED ANXIETY DISORDER: ICD-10-CM

## 2021-04-14 DIAGNOSIS — E11.65 TYPE 2 DIABETES MELLITUS WITH HYPERGLYCEMIA, WITHOUT LONG-TERM CURRENT USE OF INSULIN (HCC): ICD-10-CM

## 2021-04-14 RX ORDER — ESCITALOPRAM OXALATE 10 MG/1
10 TABLET ORAL DAILY
Qty: 30 TAB | Refills: 2 | Status: SHIPPED | OUTPATIENT
Start: 2021-04-14 | End: 2021-05-03 | Stop reason: SDUPTHER

## 2021-04-14 RX ORDER — METFORMIN HYDROCHLORIDE 500 MG/1
500 TABLET ORAL 2 TIMES DAILY WITH MEALS
Qty: 180 TAB | Refills: 1 | Status: SHIPPED | OUTPATIENT
Start: 2021-04-14 | End: 2021-07-15 | Stop reason: SDUPTHER

## 2021-04-14 NOTE — TELEPHONE ENCOUNTER
Last Refill: 02/01/21  Last Visit: 3/1/2021   Next Visit: 5/3/2021    Requested Prescriptions     Pending Prescriptions Disp Refills    metFORMIN (GLUCOPHAGE) 500 mg tablet 180 Tab 1     Sig: Take 1 Tab by mouth two (2) times daily (with meals).  escitalopram oxalate (LEXAPRO) 10 mg tablet 30 Tab 2     Sig: Take 1 Tab by mouth daily.  Indications: ANXIETY

## 2021-05-03 ENCOUNTER — OFFICE VISIT (OUTPATIENT)
Dept: INTERNAL MEDICINE CLINIC | Age: 56
End: 2021-05-03

## 2021-05-03 VITALS
DIASTOLIC BLOOD PRESSURE: 75 MMHG | SYSTOLIC BLOOD PRESSURE: 112 MMHG | HEART RATE: 62 BPM | RESPIRATION RATE: 16 BRPM | BODY MASS INDEX: 30.66 KG/M2 | HEIGHT: 71 IN | TEMPERATURE: 98.2 F | OXYGEN SATURATION: 99 % | WEIGHT: 219 LBS

## 2021-05-03 DIAGNOSIS — F41.1 GENERALIZED ANXIETY DISORDER: ICD-10-CM

## 2021-05-03 DIAGNOSIS — N40.1 BENIGN PROSTATIC HYPERPLASIA WITH NOCTURIA: ICD-10-CM

## 2021-05-03 DIAGNOSIS — M62.838 MUSCLE SPASM OF RIGHT SHOULDER: Primary | ICD-10-CM

## 2021-05-03 DIAGNOSIS — R60.9 PERIPHERAL EDEMA: ICD-10-CM

## 2021-05-03 DIAGNOSIS — R33.9 INCOMPLETE BLADDER EMPTYING: ICD-10-CM

## 2021-05-03 DIAGNOSIS — R35.1 BENIGN PROSTATIC HYPERPLASIA WITH NOCTURIA: ICD-10-CM

## 2021-05-03 DIAGNOSIS — I10 ESSENTIAL HYPERTENSION: ICD-10-CM

## 2021-05-03 DIAGNOSIS — R73.03 BORDERLINE TYPE 2 DIABETES MELLITUS: ICD-10-CM

## 2021-05-03 PROCEDURE — 99214 OFFICE O/P EST MOD 30 MIN: CPT | Performed by: NURSE PRACTITIONER

## 2021-05-03 RX ORDER — BACLOFEN 20 MG/1
20 TABLET ORAL 3 TIMES DAILY
Qty: 90 TAB | Refills: 1 | Status: SHIPPED | OUTPATIENT
Start: 2021-05-03 | End: 2021-08-25

## 2021-05-03 RX ORDER — ESCITALOPRAM OXALATE 20 MG/1
20 TABLET ORAL DAILY
Qty: 90 TAB | Refills: 1 | Status: SHIPPED | OUTPATIENT
Start: 2021-05-03 | End: 2021-08-25 | Stop reason: SDUPTHER

## 2021-05-03 RX ORDER — HYDROCHLOROTHIAZIDE 25 MG/1
25 TABLET ORAL DAILY
Qty: 90 TAB | Refills: 1 | Status: SHIPPED | OUTPATIENT
Start: 2021-05-03 | End: 2021-08-02 | Stop reason: SINTOL

## 2021-05-03 RX ORDER — TAMSULOSIN HYDROCHLORIDE 0.4 MG/1
0.4 CAPSULE ORAL 2 TIMES DAILY
Qty: 180 CAP | Refills: 1 | Status: SHIPPED | OUTPATIENT
Start: 2021-05-03 | End: 2021-10-11

## 2021-05-03 NOTE — PROGRESS NOTES
Chief Complaint   Patient presents with    Discuss Medications     2 month follow up       SUBJECTIVE:    Hui Sánchez is a 50 y.o. male who is here today for a follow up appointment regarding his anxiety, hypertension, muscle spasms, BPH, and prediabetes. The patient states he feels he is doing fairly well overall, and has had no acute events since our last visit together. He is taking his medications as prescribed, and denies any significant adverse side effects. However, the patient states that he has started having some swelling to his lower extremities over the past couple of months. He states it is better in the mornings, but much worse in the evenings. The patient is currently not adhering to any previously suggested diets including diabetic diet and low-sodium diet. The patient continues to eat what he prefers to eat without restriction. He does not currently check his blood sugars, and does not have a glucometer. He is requesting an increase in the strength of his muscle relaxants, and states that he is having muscle pain especially after being seated for periods of time. He states he occasionally does some stretching, but not on a regular basis. He states he is still getting up several times a night to go to the bathroom, and states the Flomax had initially helped somewhat, but was wondering if there is a stronger dose if possible. He denies any hematuria or acute urinary retention symptoms. Current Outpatient Medications   Medication Sig Dispense Refill    escitalopram oxalate (LEXAPRO) 20 mg tablet Take 1 Tab by mouth daily. Indications: ANXIETY 90 Tab 1    tamsulosin (Flomax) 0.4 mg capsule Take 1 Cap by mouth two (2) times a day. Indications: PROSTATE 180 Cap 1    baclofen (LIORESAL) 20 mg tablet Take 1 Tab by mouth three (3) times daily. Indications: MUSCLE SPASM 90 Tab 1    hydroCHLOROthiazide (HYDRODIURIL) 25 mg tablet Take 1 Tab by mouth daily.  Indications: visible water retention 90 Tab 1    metFORMIN (GLUCOPHAGE) 500 mg tablet Take 1 Tab by mouth two (2) times daily (with meals). 180 Tab 1    acetaminophen-codeine (TYLENOL #3) 300-30 mg per tablet as needed.  carvediloL (COREG) 25 mg tablet Take 1 Tab by mouth two (2) times daily (with meals). 180 Tab 1    cloNIDine HCL (CATAPRES) 0.1 mg tablet Take 1 Tab by mouth two (2) times a day. 180 Tab 1    amLODIPine (NORVASC) 10 mg tablet TAKE 1 TABLET BY MOUTH ONCE DAILY FOR HIGH BLOOD PRESSURE 90 Tab 1    traZODone (DESYREL) 50 mg tablet Take 1 Tab by mouth nightly. Indications: SLEEP 30 Tab 2    aspirin 81 mg chewable tablet Take 1 Tab by mouth daily. 90 Tab 0    aspirin-acetaminophen-caffeine (Goody's Extra Strength) 520-260-32.5 mg pwpk Take 1 Package by mouth two (2) times a day.  ibuprofen (MOTRIN) 600 mg tablet Take 600 mg by mouth every six (6) hours as needed for Pain. Past Medical History:   Diagnosis Date    Hypertension     NSTEMI (non-ST elevated myocardial infarction) ([facility] [address] 12/9/2019     History reviewed. No pertinent surgical history.   No Known Allergies       REVIEW OF SYSTEMS:                                        POSITIVE= bold nik  Negative = regular text    General:                     fever, chills, sweats, generalized weakness, weight loss/gain,                                       loss of appetite   Eyes:                           blurred vision, eye pain, loss of vision, double vision  ENT:                            rhinorrhea, pharyngitis   Respiratory:               cough, sputum production, SOB, PALSCENCIA, wheezing, pleuritic pain   Cardiology:                chest pain, palpitations, orthopnea, PND, edema, syncope   Gastrointestinal:       abdominal pain , N/V, diarrhea, dysphagia, constipation, bleeding   Genitourinary:           frequency, urgency, dysuria, hematuria, incontinence   Muskuloskeletal :      arthralgia, myalgia, back pain  Hematology:              easy bruising, nose or gum bleeding, lymphadenopathy   Dermatological:         rash, ulceration, pruritis, color change / jaundice  Endocrine:                 hot flashes or polydipsia   Neurological:             headache, dizziness, confusion, focal weakness, paresthesia,                                      Speech difficulties, memory loss, gait difficulty  Psychological:          Feelings of anxiety, depression, agitation         Social History     Socioeconomic History    Marital status: SINGLE     Spouse name: Not on file    Number of children: Not on file    Years of education: Not on file    Highest education level: Not on file   Tobacco Use    Smoking status: Former Smoker     Packs/day: 1.00    Smokeless tobacco: Never Used   Substance and Sexual Activity    Alcohol use: Not Currently     Alcohol/week: 5.0 standard drinks     Types: 6 Cans of beer per week     Comment: Occasionally    Drug use: No    Sexual activity: Yes     Partners: Female   Social History Narrative    ** Merged History Encounter **          Family History   Problem Relation Age of Onset    Stroke Father     Cancer Sister        OBJECTIVE:     Visit Vitals  /75 (BP 1 Location: Left upper arm, BP Patient Position: Sitting, BP Cuff Size: Large adult)   Pulse 62   Temp 98.2 °F (36.8 °C)   Resp 16   Ht 5' 11\" (1.803 m)   Wt 219 lb (99.3 kg)   SpO2 99%   BMI 30.54 kg/m²       Constitutional: He appears well nourished, of stated age, and dressed appropriately. Eyes: Sclera anicteric, PERRLA, EOMI  Neck: Supple without lymphadenopathy. Respiratory: Clear to ascultation X5, normal inspiratory effort, no adventitious breath sounds. Cardiovascular: Regular rate and rhythm, no murmurs, rubs or gallops, PMI not displaced, No thrills, no peripheral edema  Hematologic: No purpura, petechiae or unexplained bruising  Lymphatic: No lymph node enlargemant. Peripheral Vascular: Normal pulses palpable to PT/DP.   Neuro: Non-focal exam, A & O X 3.  Station and gait normal.  Psychiatric: Appropriate affect and demeanor, pleasant and cooperative. Patient's thought content and thought processing appear to be within normal limits. ASSESSMENT/PLAN:       ICD-10-CM ICD-9-CM    1. Muscle spasm of right shoulder  M62.838 728.85 baclofen (LIORESAL) 20 mg tablet   2. Generalized anxiety disorder  F41.1 300.02 escitalopram oxalate (LEXAPRO) 20 mg tablet   3. Benign prostatic hyperplasia with nocturia  N40.1 600.01 tamsulosin (Flomax) 0.4 mg capsule    R35.1 788.43    4. Incomplete bladder emptying  R33.9 788.21 tamsulosin (Flomax) 0.4 mg capsule   5. Essential hypertension  I10 401.9 hydroCHLOROthiazide (HYDRODIURIL) 25 mg tablet   6. Peripheral edema  R60.9 782.3 hydroCHLOROthiazide (HYDRODIURIL) 25 mg tablet   7. Borderline type 2 diabetes mellitus  R73.03 790.29 HEMOGLOBIN A1C WITH EAG      HEMOGLOBIN A1C WITH EAG     1: Per patient request, I will increase his baclofen to 20 mg daily for improved efficacy for muscle spasms and aches. 2: Patient has been advised to do regular stretching to reduce muscle pains. 3: Patient's dose of Lexapro will be increased to 20 mg daily for improved management of anxiety. 4: Patient to increase Flomax to 0.4 mg twice a day. Patient to avoid late night suppers with beverages. 5: I will add hydrochlorothiazide 25 mg daily for improved management of peripheral edema. This is likely secondary to amlodipine use. 6: Patient to avoid concentrated sweets, and practice 2 g sodium diet. 7: We will do labs today to recheck hemoglobin A1c.  8: Patient to continue all other medications as directed. 9: Patient to follow-up with me in 3 months as planned. Patient states understanding and agrees with plan.     Orders Placed This Encounter    HEMOGLOBIN A1C WITH EAG    escitalopram oxalate (LEXAPRO) 20 mg tablet    tamsulosin (Flomax) 0.4 mg capsule    baclofen (LIORESAL) 20 mg tablet    hydroCHLOROthiazide (HYDRODIURIL) 25 mg tablet         ATTENTION:   This medical record was transcribed using an electronic medical records system. Although proofread, it may and can contain electronic and spelling errors. Other human spelling and other errors may be present. Corrections may be executed at a later time. Please feel free to contact us for any clarifications as needed. Follow-up and Dispositions    · Return in about 3 months (around 8/3/2021) for Follow up . Signed,  Dalia Phillips MSN APRN FNP-BC

## 2021-05-03 NOTE — PROGRESS NOTES
Marianna Shipman is a 50 y.o. male    Chief Complaint   Patient presents with    Discuss Medications     2 month follow up       Visit Vitals  /75 (BP 1 Location: Left upper arm, BP Patient Position: Sitting, BP Cuff Size: Large adult)   Pulse 62   Temp 98.2 °F (36.8 °C)   Resp 16   Ht 5' 11\" (1.803 m)   Wt 219 lb (99.3 kg)   SpO2 99%   BMI 30.54 kg/m²           1. Have you been to the ER, urgent care clinic since your last visit? Hospitalized since your last visit? No     2. Have you seen or consulted any other health care providers outside of the [address] [place] [address] since your last visit? Include any pap smears or colon screening.  No

## 2021-05-04 LAB
EST. AVERAGE GLUCOSE BLD GHB EST-MCNC: 123 MG/DL
HBA1C MFR BLD: 5.9 % (ref 4–5.6)

## 2021-07-15 DIAGNOSIS — E11.65 TYPE 2 DIABETES MELLITUS WITH HYPERGLYCEMIA, WITHOUT LONG-TERM CURRENT USE OF INSULIN (HCC): ICD-10-CM

## 2021-07-15 RX ORDER — METFORMIN HYDROCHLORIDE 500 MG/1
500 TABLET ORAL 2 TIMES DAILY WITH MEALS
Qty: 180 TABLET | Refills: 1 | Status: SHIPPED | OUTPATIENT
Start: 2021-07-15

## 2021-07-15 NOTE — TELEPHONE ENCOUNTER
Last Refill: 04/14/21  Last Visit: 5/3/2021   Next Visit: 8/2/2021    Requested Prescriptions     Pending Prescriptions Disp Refills    metFORMIN (GLUCOPHAGE) 500 mg tablet 180 Tablet 1     Sig: Take 1 Tablet by mouth two (2) times daily (with meals).

## 2021-08-02 ENCOUNTER — OFFICE VISIT (OUTPATIENT)
Dept: INTERNAL MEDICINE CLINIC | Age: 56
End: 2021-08-02

## 2021-08-02 VITALS
SYSTOLIC BLOOD PRESSURE: 134 MMHG | BODY MASS INDEX: 30.46 KG/M2 | TEMPERATURE: 98.3 F | HEART RATE: 65 BPM | OXYGEN SATURATION: 99 % | DIASTOLIC BLOOD PRESSURE: 87 MMHG | WEIGHT: 217.6 LBS | HEIGHT: 71 IN | RESPIRATION RATE: 16 BRPM

## 2021-08-02 DIAGNOSIS — E11.65 TYPE 2 DIABETES MELLITUS WITH HYPERGLYCEMIA, WITHOUT LONG-TERM CURRENT USE OF INSULIN (HCC): Primary | ICD-10-CM

## 2021-08-02 DIAGNOSIS — F41.1 GENERALIZED ANXIETY DISORDER: ICD-10-CM

## 2021-08-02 DIAGNOSIS — N40.1 BENIGN PROSTATIC HYPERPLASIA WITH NOCTURIA: ICD-10-CM

## 2021-08-02 DIAGNOSIS — G47.62 NOCTURNAL LEG CRAMPS: ICD-10-CM

## 2021-08-02 DIAGNOSIS — R35.1 BENIGN PROSTATIC HYPERPLASIA WITH NOCTURIA: ICD-10-CM

## 2021-08-02 DIAGNOSIS — I10 ESSENTIAL HYPERTENSION: ICD-10-CM

## 2021-08-02 PROCEDURE — 99214 OFFICE O/P EST MOD 30 MIN: CPT | Performed by: NURSE PRACTITIONER

## 2021-08-02 RX ORDER — LISINOPRIL AND HYDROCHLOROTHIAZIDE 20; 25 MG/1; MG/1
1 TABLET ORAL DAILY
Qty: 90 TABLET | Refills: 1 | Status: SHIPPED | OUTPATIENT
Start: 2021-08-02 | End: 2022-01-05

## 2021-08-02 RX ORDER — LANOLIN ALCOHOL/MO/W.PET/CERES
400 CREAM (GRAM) TOPICAL
Qty: 90 TABLET | Refills: 1 | Status: SHIPPED | OUTPATIENT
Start: 2021-08-02 | End: 2022-04-15 | Stop reason: SDUPTHER

## 2021-08-02 NOTE — PROGRESS NOTES
Vik Fried is a 50 y.o. male    Chief Complaint   Patient presents with    Discuss Medications     3 month follow up       Visit Vitals  /87 (BP 1 Location: Left upper arm, BP Patient Position: Sitting, BP Cuff Size: Small adult)   Pulse 65   Temp 98.3 °F (36.8 °C)   Resp 16   Ht 5' 11\" (1.803 m)   Wt 217 lb 9.6 oz (98.7 kg)   SpO2 99%   BMI 30.35 kg/m²           1. Have you been to the ER, urgent care clinic since your last visit? Hospitalized since your last visit? No     2. Have you seen or consulted any other health care providers outside of the [address] [place] [address] since your last visit? Include any pap smears or colon screening.  No

## 2021-08-02 NOTE — PROGRESS NOTES
Chief Complaint   Patient presents with    Discuss Medications     3 month follow up       SUBJECTIVE:    Rina Perez is a 50 y.o. male who is here today for a follow up appointment regarding his diabetes, hypertension, anxiety, and complains of worsening leg cramps especially at night, and abdominal swelling. Patient has been taking his medications as prescribed for management of his diabetes and hypertension. He is not checking his blood sugars. He has a blood pressure monitor, but rarely uses it. He denies any adverse side effects of the medication other than dry mouth and the feeling of sluggishness. He has also begun to notice worsening swelling to his lower extremities as well as his abdomen. He states he has an appointment with cardiology on Wednesday, and a scheduled echocardiogram tomorrow. He intends to keep his appointments as requested. Current Outpatient Medications   Medication Sig Dispense Refill    metFORMIN (GLUCOPHAGE) 500 mg tablet Take 1 Tablet by mouth two (2) times daily (with meals). 180 Tablet 1    escitalopram oxalate (LEXAPRO) 20 mg tablet Take 1 Tab by mouth daily. Indications: ANXIETY 90 Tab 1    tamsulosin (Flomax) 0.4 mg capsule Take 1 Cap by mouth two (2) times a day. Indications: PROSTATE 180 Cap 1    baclofen (LIORESAL) 20 mg tablet Take 1 Tab by mouth three (3) times daily. Indications: MUSCLE SPASM 90 Tab 1    hydroCHLOROthiazide (HYDRODIURIL) 25 mg tablet Take 1 Tab by mouth daily. Indications: visible water retention 90 Tab 1    acetaminophen-codeine (TYLENOL #3) 300-30 mg per tablet as needed.  carvediloL (COREG) 25 mg tablet Take 1 Tab by mouth two (2) times daily (with meals). 180 Tab 1    cloNIDine HCL (CATAPRES) 0.1 mg tablet Take 1 Tab by mouth two (2) times a day.  180 Tab 1    amLODIPine (NORVASC) 10 mg tablet TAKE 1 TABLET BY MOUTH ONCE DAILY FOR HIGH BLOOD PRESSURE 90 Tab 1    ibuprofen (MOTRIN) 600 mg tablet Take 600 mg by mouth every six (6) hours as needed for Pain.  traZODone (DESYREL) 50 mg tablet Take 1 Tab by mouth nightly. Indications: SLEEP (Patient not taking: Reported on 8/2/2021) 30 Tab 2    aspirin-acetaminophen-caffeine (Goody's Extra Strength) 520-260-32.5 mg pwpk Take 1 Package by mouth two (2) times a day. (Patient not taking: Reported on 8/2/2021)      aspirin 81 mg chewable tablet Take 1 Tab by mouth daily. (Patient not taking: Reported on 8/2/2021) 90 Tab 0     Past Medical History:   Diagnosis Date    Hypertension     [facility]     History reviewed. No pertinent surgical history.   No Known Allergies    REVIEW OF SYSTEMS:                                        POSITIVE= bold text  Negative = regular text    General:                     fever, chills, sweats, generalized weakness, weight loss/gain,                                       loss of appetite   Eyes:                           blurred vision, eye pain, loss of vision, double vision  ENT:                            rhinorrhea, pharyngitis   Respiratory:               cough, sputum production, SOB, PLASCENCIA, wheezing, pleuritic pain   Cardiology:                chest pain, palpitations, orthopnea, PND, edema, syncope   Gastrointestinal:       abdominal pain , N/V, diarrhea, dysphagia, constipation, bleeding, bloating  Genitourinary:           frequency, urgency, dysuria, hematuria, incontinence   Muskuloskeletal :      arthralgia, myalgia, back pain  Hematology:              easy bruising, nose or gum bleeding, lymphadenopathy   Dermatological:         rash, ulceration, pruritis, color change / jaundice  Endocrine:                 hot flashes or polydipsia   Neurological:             headache, dizziness, confusion, focal weakness, paresthesia,                                      Speech difficulties, memory loss, gait difficulty  Psychological:          Feelings of anxiety, depression, agitation        Social History     Socioeconomic History    Marital status: SINGLE     Spouse name: Not on file    Number of children: Not on file    Years of education: Not on file    Highest education level: Not on file   Tobacco Use    Smoking status: Former Smoker     Packs/day: 1.00     Years: 30.00     Pack years: 30.00     Types: Cigarettes     Quit date: 2020     Years since quittin.6    Smokeless tobacco: Never Used   Substance and Sexual Activity    Alcohol use: Not Currently     Alcohol/week: 5.0 standard drinks     Types: 6 Cans of beer per week     Comment: Occasionally    Drug use: No    Sexual activity: Yes     Partners: Female   Social History Narrative    ** Merged History Encounter **          Social Determinants of Health     Financial Resource Strain:     Difficulty of Paying Living Expenses:    Food Insecurity:     Worried About Running Out of Food in the Last Year:     Ran Out of Food in the Last Year:    Transportation Needs:     Lack of Transportation (Medical):  Lack of Transportation (Non-Medical):    Physical Activity:     Days of Exercise per Week:     Minutes of Exercise per Session:    Stress:     Feeling of Stress :    Social Connections:     Frequency of Communication with Friends and Family:     Frequency of Social Gatherings with Friends and Family:     Attends Hinduism Services:     Active Member of Clubs or Organizations:     Attends Club or Organization Meetings:     Marital Status:      Family History   Problem Relation Age of Onset    Stroke Father     Cancer Sister        OBJECTIVE:     Visit Vitals  /87 (BP 1 Location: Left upper arm, BP Patient Position: Sitting, BP Cuff Size: Small adult)   Pulse 65   Temp 98.3 °F (36.8 °C)   Resp 16   Ht 5' 11\" (1.803 m)   Wt 217 lb 9.6 oz (98.7 kg)   SpO2 99%   BMI 30.35 kg/m²       Constitutional: He appears well nourished, of stated age, and dressed appropriately.   Eyes: Sclera anicteric, PERRLA, EOMI  Neck: Supple without lymphadenopathy. Thyroid normal, No JVD or bruits  Respiratory: Clear to ascultation X5, normal inspiratory effort, no adventitious breath sounds. Cardiovascular: Regular rate and rhythm, moderate systolic murmur, but without rubs or gallops, PMI not displaced, No thrills. Gastrointestinal: Abdomen slightly distended, soft, non-tender, bowel sounds normal and active X4. Hematologic: No purpura, petechiae or unexplained bruising  Lymphatic: No lymph node enlargemant. Peripheral Vascular: Edema noted to lower extremities. Neuro: Non-focal exam, A & O X 3  Psychiatric: Appropriate affect and demeanor, pleasant and cooperative. Patient's thought content and thought processing appear to be within normal limits. ASSESSMENT/PLAN:       ICD-10-CM ICD-9-CM    1. Type 2 diabetes mellitus with hyperglycemia, without long-term current use of insulin (HCC)  E11.65 250.00      790.29    2. Generalized anxiety disorder  F41.1 300.02    3. Benign prostatic hyperplasia with nocturia  N40.1 600.01     R35.1 788.43    4. Essential hypertension  I10 401.9 lisinopril-hydroCHLOROthiazide (PRINZIDE, ZESTORETIC) 20-25 mg per tablet      METABOLIC PANEL, COMPREHENSIVE      METABOLIC PANEL, COMPREHENSIVE   5. Nocturnal leg cramps  Q88.83 256.18 METABOLIC PANEL, COMPREHENSIVE      magnesium oxide (MAG-OX) 400 mg tablet      METABOLIC PANEL, COMPREHENSIVE     1: Patient to continue current medication for management of diabetes. 2: Patient appears to be holding onto fluid to lower extremities and likely to abdomen as well. I will have him discontinue his amlodipine and HCTZ for now. We will start lisinopril/HCTZ 20/25 1 tablet each morning. 3: Patient directed to monitor blood pressures on a regular basis, at least twice daily, and record results. Bring to next appointment. 4: Due to nocturnal muscle cramps, will provide patient with magnesium oxide to possibly help with this. Take as directed.   5: We will assess patient's electrolytes and kidney functions today via CMP.  6: Patient to continue all other medications as directed. Patient states understanding and agrees with plan. Follow-up with me in 4 weeks. ATTENTION:   This medical record was transcribed using an electronic medical records system. Although proofread, it may and can contain electronic and spelling errors. Other human spelling and other errors may be present. Corrections may be executed at a later time. Please feel free to contact us for any clarifications as needed. Signed,  Ambrosio Vega.  Kimberly Hernandez, MSN APRN FNP-BC

## 2021-08-02 NOTE — PATIENT INSTRUCTIONS
Check your blood pressures twice a day, and record the results. Bring these to your next appointment with me in 1 month.

## 2021-08-03 ENCOUNTER — ANCILLARY PROCEDURE (OUTPATIENT)
Dept: CARDIOLOGY CLINIC | Age: 56
End: 2021-08-03

## 2021-08-03 ENCOUNTER — TELEPHONE (OUTPATIENT)
Dept: CARDIOLOGY CLINIC | Age: 56
End: 2021-08-03

## 2021-08-03 VITALS
SYSTOLIC BLOOD PRESSURE: 134 MMHG | WEIGHT: 217 LBS | HEIGHT: 71 IN | DIASTOLIC BLOOD PRESSURE: 87 MMHG | BODY MASS INDEX: 30.38 KG/M2

## 2021-08-03 DIAGNOSIS — I42.0 DILATED CARDIOMYOPATHY (HCC): ICD-10-CM

## 2021-08-03 LAB
ALBUMIN SERPL-MCNC: 3.6 G/DL (ref 3.5–5)
ALBUMIN/GLOB SERPL: 0.6 {RATIO} (ref 1.1–2.2)
ALP SERPL-CCNC: 107 U/L (ref 45–117)
ALT SERPL-CCNC: 35 U/L (ref 12–78)
ANION GAP SERPL CALC-SCNC: 6 MMOL/L (ref 5–15)
AST SERPL-CCNC: 16 U/L (ref 15–37)
BILIRUB SERPL-MCNC: 0.2 MG/DL (ref 0.2–1)
BUN SERPL-MCNC: 17 MG/DL (ref 6–20)
BUN/CREAT SERPL: 14 (ref 12–20)
CALCIUM SERPL-MCNC: 10 MG/DL (ref 8.5–10.1)
CHLORIDE SERPL-SCNC: 106 MMOL/L (ref 97–108)
CO2 SERPL-SCNC: 27 MMOL/L (ref 21–32)
CREAT SERPL-MCNC: 1.23 MG/DL (ref 0.7–1.3)
ECHO AO ASC DIAM: 3.65 CM
ECHO AO ROOT DIAM: 3.43 CM
ECHO AV AREA PEAK VELOCITY: 2.46 CM2
ECHO AV AREA VTI: 2.59 CM2
ECHO AV AREA/BSA PEAK VELOCITY: 1.1 CM2/M2
ECHO AV AREA/BSA VTI: 1.2 CM2/M2
ECHO AV MEAN GRADIENT: 5.36 MMHG
ECHO AV PEAK GRADIENT: 11.37 MMHG
ECHO AV PEAK VELOCITY: 168.62 CM/S
ECHO AV VTI: 35.18 CM
ECHO LA AREA 4C: 18.82 CM2
ECHO LA MAJOR AXIS: 4.1 CM
ECHO LA MINOR AXIS: 1.88 CM
ECHO LA VOL 4C: 56.56 ML (ref 18–58)
ECHO LA VOLUME INDEX A4C: 25.94 ML/M2 (ref 16–28)
ECHO LV E' LATERAL VELOCITY: 6.81 CM/S
ECHO LV E' SEPTAL VELOCITY: 4.03 CM/S
ECHO LV EDV A2C: 166.58 ML
ECHO LV EDV A4C: 158.8 ML
ECHO LV EDV BP: 162.51 ML (ref 67–155)
ECHO LV EDV INDEX A4C: 72.8 ML/M2
ECHO LV EDV INDEX BP: 74.5 ML/M2
ECHO LV EDV NDEX A2C: 76.4 ML/M2
ECHO LV EJECTION FRACTION 3D: 50.4 PERCENT
ECHO LV EJECTION FRACTION A2C: 56 PERCENT
ECHO LV EJECTION FRACTION A4C: 61 PERCENT
ECHO LV EJECTION FRACTION BIPLANE: 57.9 PERCENT (ref 55–100)
ECHO LV ESV A2C: 73.49 ML
ECHO LV ESV A4C: 62.43 ML
ECHO LV ESV BP: 68.46 ML (ref 22–58)
ECHO LV ESV INDEX A2C: 33.7 ML/M2
ECHO LV ESV INDEX A4C: 28.6 ML/M2
ECHO LV ESV INDEX BP: 31.4 ML/M2
ECHO LV GLOBAL LONGITUDINAL STRAIN (GLS): -15.5 PERCENT
ECHO LV INTERNAL DIMENSION DIASTOLIC: 5.53 CM (ref 4.2–5.9)
ECHO LV INTERNAL DIMENSION SYSTOLIC: 3.51 CM
ECHO LV IVSD: 1.18 CM (ref 0.6–1)
ECHO LV MASS 2D: 285.9 G (ref 88–224)
ECHO LV MASS INDEX 2D: 131.1 G/M2 (ref 49–115)
ECHO LV POSTERIOR WALL DIASTOLIC: 1.29 CM (ref 0.6–1)
ECHO LVOT DIAM: 2.32 CM
ECHO LVOT PEAK GRADIENT: 3.86 MMHG
ECHO LVOT PEAK VELOCITY: 98.19 CM/S
ECHO LVOT SV: 90.9 ML
ECHO LVOT VTI: 21.53 CM
ECHO MV A VELOCITY: 81.11 CM/S
ECHO MV AREA PHT: 2.39 CM2
ECHO MV E DECELERATION TIME (DT): 317.41 MS
ECHO MV E VELOCITY: 58.94 CM/S
ECHO MV E/A RATIO: 0.73
ECHO MV E/E' LATERAL: 8.65
ECHO MV E/E' RATIO (AVERAGED): 11.64
ECHO MV E/E' SEPTAL: 14.63
ECHO MV PRESSURE HALF TIME (PHT): 92.05 MS
ECHO RA AREA 4C: 22.19 CM2
ECHO RV INTERNAL DIMENSION: 4.92 CM
ECHO RV TAPSE: 2.85 CM (ref 1.5–2)
ECHO TV REGURGITANT MAX VELOCITY: 212.75 CM/S
ECHO TV REGURGITANT PEAK GRADIENT: 18.11 MMHG
GLOBAL LONGITUDINAL STRAIN 2 CHAMBER: -14.9 PERCENT
GLOBAL LONGITUDINAL STRAIN 4 CHAMBER: -15.1 PERCENT
GLOBAL LONGITUDINAL STRAIN LONG AXIS: -16.4 PERCENT
GLOBULIN SER CALC-MCNC: 5.6 G/DL (ref 2–4)
GLUCOSE SERPL-MCNC: 99 MG/DL (ref 65–100)
POTASSIUM SERPL-SCNC: 3.9 MMOL/L (ref 3.5–5.1)
PROT SERPL-MCNC: 9.2 G/DL (ref 6.4–8.2)
SODIUM SERPL-SCNC: 139 MMOL/L (ref 136–145)

## 2021-08-03 PROCEDURE — 93306 TTE W/DOPPLER COMPLETE: CPT | Performed by: SPECIALIST

## 2021-08-03 NOTE — PROGRESS NOTES
Heart muscle function has returned to normal. One very mildly leaky valve, not a concern or worry.  Looks great

## 2021-08-03 NOTE — TELEPHONE ENCOUNTER
----- Message from [address], [place] sent at 8/3/2021 12:04 PM EDT -----  Heart muscle function has returned to normal. One very mildly leaky valve, not a concern or worry.  Looks great

## 2021-08-03 NOTE — TELEPHONE ENCOUNTER
Called patient. Verified patient's identity with two identifiers. Notified patient of results and Dr. Alex Leonard message. Patient verbalized understanding and denied further questions or concerns.

## 2021-08-04 ENCOUNTER — OFFICE VISIT (OUTPATIENT)
Dept: CARDIOLOGY CLINIC | Age: 56
End: 2021-08-04

## 2021-08-04 VITALS
DIASTOLIC BLOOD PRESSURE: 70 MMHG | HEART RATE: 67 BPM | WEIGHT: 217.6 LBS | SYSTOLIC BLOOD PRESSURE: 120 MMHG | HEIGHT: 71 IN | OXYGEN SATURATION: 98 % | BODY MASS INDEX: 30.46 KG/M2 | RESPIRATION RATE: 14 BRPM

## 2021-08-04 DIAGNOSIS — I42.0 DILATED CARDIOMYOPATHY (HCC): ICD-10-CM

## 2021-08-04 DIAGNOSIS — J44.9 CHRONIC OBSTRUCTIVE PULMONARY DISEASE, UNSPECIFIED COPD TYPE (HCC): ICD-10-CM

## 2021-08-04 DIAGNOSIS — I10 ESSENTIAL HYPERTENSION: Primary | ICD-10-CM

## 2021-08-04 PROCEDURE — 99213 OFFICE O/P EST LOW 20 MIN: CPT | Performed by: SPECIALIST

## 2021-08-04 NOTE — LETTER
8/4/2021 1:48 PM    Mr. Kayla Yee  [address] [facility] [address]        To Whom It May Concern:    Kayla Yee is currently under the care of [address] [place] [address]. From a cardiac standpoint, he can continue to work with limited duty max 3 days a week. If there are questions or concerns please have the patient contact our office.         Sincerely,          Venessa Posey MD

## 2021-08-04 NOTE — PROGRESS NOTES
HISTORY OF PRESENT ILLNESS  Shoaib Steele is a 50 y.o. male     SUMMARY:   Problem List  Date Reviewed: 8/4/2021        Codes Class Noted    Cervical radiculopathy ICD-10-CM: M54.12  ICD-9-CM: 723.4  7/12/2020        Arm paresthesia, left ICD-10-CM: R20.2  ICD-9-CM: 782.0  7/10/2020        Intractable headache ICD-10-CM: R51.9  ICD-9-CM: 784.0  7/10/2020        Dilated cardiomyopathy ([facility] [address] ICD-10-CM: I42.0  ICD-9-CM: 425.4  1/6/2020        HTN (hypertension) ICD-10-CM: I10  ICD-9-CM: 401.9  9/1/2016              Current Outpatient Medications on File Prior to Visit   Medication Sig    lisinopril-hydroCHLOROthiazide (PRINZIDE, ZESTORETIC) 20-25 mg per tablet Take 1 Tablet by mouth daily. Indications: high blood pressure    magnesium oxide (MAG-OX) 400 mg tablet Take 1 Tablet by mouth nightly.  metFORMIN (GLUCOPHAGE) 500 mg tablet Take 1 Tablet by mouth two (2) times daily (with meals).  escitalopram oxalate (LEXAPRO) 20 mg tablet Take 1 Tab by mouth daily. Indications: ANXIETY    tamsulosin (Flomax) 0.4 mg capsule Take 1 Cap by mouth two (2) times a day. Indications: PROSTATE    baclofen (LIORESAL) 20 mg tablet Take 1 Tab by mouth three (3) times daily. Indications: MUSCLE SPASM    carvediloL (COREG) 25 mg tablet Take 1 Tab by mouth two (2) times daily (with meals).  cloNIDine HCL (CATAPRES) 0.1 mg tablet Take 1 Tab by mouth two (2) times a day.  traZODone (DESYREL) 50 mg tablet Take 1 Tab by mouth nightly. Indications: SLEEP    ibuprofen (MOTRIN) 600 mg tablet Take 600 mg by mouth every six (6) hours as needed for Pain. No current facility-administered medications on file prior to visit.        CARDIOLOGY STUDIES TO DATE:  12/19 echo lvef 40-45%   12/19 lexiscan with fixed inf defect   12/19 normal cardiac cath  12/21/20  echo lvef 40-45%, mild mr, lvh   8/21 echo normal lvef, mild lvh, very mild ai      Chief Complaint   Patient presents with    Follow-up     HPI :  He is doing okay but he still bothered by dyspnea on exertion and feels like he just cannot work more than 3 days a week and sounds like he cannot. We repeated his echo yesterday and his LV function has recovered and has no significant valve issues. He is a past smoker so I suspect some of this may be related to COPD. He is not lost any weight since we last met. He recently saw his primary care and they stopped the amlodipine and put him on lisinopril HCTZ which is completely reasonable. Blood pressure is well controlled. He has some dependent lower extremity edema. CARDIAC ROS:   negative for chest pain, palpitations, syncope, orthopnea, paroxysmal nocturnal dyspnea, exertional chest pressure/discomfort, claudication    Family History   Problem Relation Age of Onset    Stroke Father     Cancer Sister        Past Medical History:   Diagnosis Date    Hypertension     NSTEMI (non-ST elevated myocardial infarction) ([facility] [address] 12/9/2019       GENERAL ROS:  A comprehensive review of systems was negative except for that written in the HPI.     Visit Vitals  /70 (BP 1 Location: Left arm, BP Patient Position: Sitting, BP Cuff Size: Adult)   Pulse 67   Resp 14   Ht 5' 11\" (1.803 m)   Wt 217 lb 9.6 oz (98.7 kg)   SpO2 98%   BMI 30.35 kg/m²       Wt Readings from Last 3 Encounters:   08/04/21 217 lb 9.6 oz (98.7 kg)   08/03/21 217 lb (98.4 kg)   08/02/21 217 lb 9.6 oz (98.7 kg)            BP Readings from Last 3 Encounters:   08/04/21 120/70   08/03/21 134/87   08/02/21 134/87       PHYSICAL EXAM  General appearance: alert, cooperative, no distress, appears stated age  Neurologic: Alert and oriented X 3  Neck: supple, symmetrical, trachea midline, no adenopathy, no carotid bruit and no JVD  Lungs: clear to auscultation bilaterally  Heart: regular rate and rhythm, S1, S2 normal, no murmur, click, rub or gallop  Extremities: edema tr ankle    Lab Results   Component Value Date/Time    Cholesterol, total 168 02/01/2021 02:01 PM Cholesterol, total 121 07/12/2020 01:42 AM    Cholesterol, total 166 12/09/2019 06:13 AM    Cholesterol, total 189 07/08/2015 07:45 PM    Cholesterol, total 207 (H) 01/14/2015 07:25 PM    HDL Cholesterol 31 (L) 02/01/2021 02:01 PM    HDL Cholesterol 31 07/12/2020 01:42 AM    HDL Cholesterol 47 12/09/2019 06:13 AM    HDL Cholesterol 34 07/08/2015 07:45 PM    HDL Cholesterol 43 01/14/2015 07:25 PM    LDL, calculated 91 02/01/2021 02:01 PM    LDL, calculated 77.8 07/12/2020 01:42 AM    LDL, calculated 99.6 12/09/2019 06:13 AM    LDL, calculated 124.6 (H) 07/08/2015 07:45 PM    LDL, calculated 142.2 (H) 01/14/2015 07:25 PM    Triglyceride 232 (H) 02/01/2021 02:01 PM    Triglyceride 61 07/12/2020 01:42 AM    Triglyceride 97 12/09/2019 06:13 AM    Triglyceride 152 (H) 07/08/2015 07:45 PM    Triglyceride 109 01/14/2015 07:25 PM    CHOL/HDL Ratio 5 (H) 02/01/2021 02:01 PM    CHOL/HDL Ratio 3.9 07/12/2020 01:42 AM    CHOL/HDL Ratio 3.5 12/09/2019 06:13 AM    CHOL/HDL Ratio 5.6 (H) 07/08/2015 07:45 PM    CHOL/HDL Ratio 4.8 01/14/2015 07:25 PM     ASSESSMENT :      At this point I do not think his symptoms are cardiac related. I do think he may have significant COPD so organ a refer him to the pulmonary doctors to get that evaluated. In the meantime he needs to continue all his medications and make sure his blood pressure stays well controlled. He needs no further cardiac testing at this time. current treatment plan is effective, no change in therapy  lab results and schedule of future lab studies reviewed with patient  reviewed diet, exercise and weight control    Encounter Diagnoses   Name Primary?  Essential hypertension Yes    Dilated cardiomyopathy ([facility] [address]      No orders of the defined types were placed in this encounter. Follow-up and Dispositions    · Return in about 6 months (around 2/4/2022).          Duane Gibes, MD  8/4/2021  Please note that this dictation was completed with [employer], the computer voice recognition software. Quite often unanticipated grammatical, syntax, homophones, and other interpretive errors are inadvertently transcribed by the computer software. Please disregard these errors. Please excuse any errors that have escaped final proofreading. Thank you.

## 2021-08-24 DIAGNOSIS — M62.838 MUSCLE SPASM OF RIGHT SHOULDER: ICD-10-CM

## 2021-08-25 DIAGNOSIS — F41.1 GENERALIZED ANXIETY DISORDER: ICD-10-CM

## 2021-08-25 DIAGNOSIS — R03.0 ELEVATED BLOOD PRESSURE READING: ICD-10-CM

## 2021-08-25 RX ORDER — ESCITALOPRAM OXALATE 20 MG/1
20 TABLET ORAL DAILY
Qty: 90 TABLET | Refills: 1 | Status: SHIPPED | OUTPATIENT
Start: 2021-08-25 | End: 2021-12-03

## 2021-08-25 RX ORDER — CARVEDILOL 25 MG/1
25 TABLET ORAL 2 TIMES DAILY WITH MEALS
Qty: 180 TABLET | Refills: 1 | Status: SHIPPED | OUTPATIENT
Start: 2021-08-25 | End: 2022-03-21 | Stop reason: SDUPTHER

## 2021-08-25 RX ORDER — BACLOFEN 20 MG/1
TABLET ORAL
Qty: 90 TABLET | Refills: 0 | Status: SHIPPED | OUTPATIENT
Start: 2021-08-25 | End: 2021-10-11

## 2021-08-25 NOTE — TELEPHONE ENCOUNTER
Last Refill: 03/01/21 05/03/21  Last Visit: 8/2/2021   Next Visit: 8/30/2021    Requested Prescriptions     Pending Prescriptions Disp Refills    carvediloL (COREG) 25 mg tablet 180 Tablet 1     Sig: Take 1 Tablet by mouth two (2) times daily (with meals).  escitalopram oxalate (LEXAPRO) 20 mg tablet 90 Tablet 1     Sig: Take 1 Tablet by mouth daily.  Indications: ANXIETY

## 2021-08-30 ENCOUNTER — OFFICE VISIT (OUTPATIENT)
Dept: INTERNAL MEDICINE CLINIC | Age: 56
End: 2021-08-30

## 2021-08-30 VITALS
WEIGHT: 217.2 LBS | OXYGEN SATURATION: 96 % | DIASTOLIC BLOOD PRESSURE: 77 MMHG | HEIGHT: 71 IN | RESPIRATION RATE: 16 BRPM | SYSTOLIC BLOOD PRESSURE: 110 MMHG | BODY MASS INDEX: 30.41 KG/M2 | HEART RATE: 66 BPM | TEMPERATURE: 98.4 F

## 2021-08-30 DIAGNOSIS — R73.03 BORDERLINE TYPE 2 DIABETES MELLITUS: ICD-10-CM

## 2021-08-30 DIAGNOSIS — R35.1 BENIGN PROSTATIC HYPERPLASIA WITH NOCTURIA: ICD-10-CM

## 2021-08-30 DIAGNOSIS — J40 BRONCHITIS: Primary | ICD-10-CM

## 2021-08-30 DIAGNOSIS — J01.10 ACUTE NON-RECURRENT FRONTAL SINUSITIS: ICD-10-CM

## 2021-08-30 DIAGNOSIS — I10 ESSENTIAL HYPERTENSION: ICD-10-CM

## 2021-08-30 DIAGNOSIS — N40.1 BENIGN PROSTATIC HYPERPLASIA WITH NOCTURIA: ICD-10-CM

## 2021-08-30 PROCEDURE — 99214 OFFICE O/P EST MOD 30 MIN: CPT | Performed by: NURSE PRACTITIONER

## 2021-08-30 RX ORDER — AMOXICILLIN AND CLAVULANATE POTASSIUM 875; 125 MG/1; MG/1
1 TABLET, FILM COATED ORAL 2 TIMES DAILY
Qty: 20 TABLET | Refills: 0 | Status: SHIPPED | OUTPATIENT
Start: 2021-08-30 | End: 2021-09-10 | Stop reason: SDUPTHER

## 2021-08-30 RX ORDER — GUAIFENESIN 100 MG/5ML
81 LIQUID (ML) ORAL DAILY
COMMUNITY

## 2021-08-30 NOTE — PROGRESS NOTES
Chief Complaint   Patient presents with    Hypertension     4 week f/u       SUBJECTIVE:    Christina Mitchell is a 50 y.o. male who is here today for a follow up appointment regarding his hypertension, BPH, and symptoms of possible bronchitis/sinus infection for the past week or 2. At our last encounter, the patient was taken off of his amlodipine/HCTZ due to swelling of his lower extremities and low blood pressure readings, and placed on lisinopril/HCTZ. However, the patient continued to have excessive fatigue and low blood pressure readings while on the medication, and subsequently stopped it. He continues his carvedilol 25 mg twice a day, and his blood pressure appears to be in an acceptable range at this time. He denies any adverse side effects of medication. He denies any chest pain, chest pressure, shortness of breath, palpitations, headaches, dizziness, or syncope episodes. Patient continues to take his tamsulosin for his BPH which appears to be working well. Patient also states possibility of upper respiratory infection of sinuses and chest which is also produce a productive cough. He has taken over-the-counter Mucinex which he states has been helpful with production, but is concerned that it is getting worse. He would like an antibiotic if possible. Current Outpatient Medications   Medication Sig Dispense Refill    aspirin 81 mg chewable tablet Take 81 mg by mouth daily.  amoxicillin-clavulanate (AUGMENTIN) 875-125 mg per tablet Take 1 Tablet by mouth two (2) times a day. 20 Tablet 0    baclofen (LIORESAL) 20 mg tablet TAKE 1 TABLET BY MOUTH THREE TIMES DAILY FOR MUSCLE SPASM 90 Tablet 0    carvediloL (COREG) 25 mg tablet Take 1 Tablet by mouth two (2) times daily (with meals). 180 Tablet 1    escitalopram oxalate (LEXAPRO) 20 mg tablet Take 1 Tablet by mouth daily.  Indications: ANXIETY 90 Tablet 1    metFORMIN (GLUCOPHAGE) 500 mg tablet Take 1 Tablet by mouth two (2) times daily (with meals). 180 Tablet 1    tamsulosin (Flomax) 0.4 mg capsule Take 1 Cap by mouth two (2) times a day. Indications: PROSTATE 180 Cap 1    traZODone (DESYREL) 50 mg tablet Take 1 Tab by mouth nightly. Indications: SLEEP 30 Tab 2    lisinopril-hydroCHLOROthiazide (PRINZIDE, ZESTORETIC) 20-25 mg per tablet Take 1 Tablet by mouth daily. Indications: high blood pressure (Patient not taking: Reported on 8/30/2021) 90 Tablet 1    magnesium oxide (MAG-OX) 400 mg tablet Take 1 Tablet by mouth nightly. (Patient not taking: Reported on 8/30/2021) 90 Tablet 1    cloNIDine HCL (CATAPRES) 0.1 mg tablet Take 1 Tab by mouth two (2) times a day. 180 Tab 1    ibuprofen (MOTRIN) 600 mg tablet Take 600 mg by mouth every six (6) hours as needed for Pain. (Patient not taking: Reported on 8/30/2021)       Past Medical History:   Diagnosis Date    Hypertension     [facility]     History reviewed. No pertinent surgical history.   No Known Allergies    REVIEW OF SYSTEMS:                                        POSITIVE= bold text  Negative = regular text    General:                     fever, chills, sweats, generalized fatigue, weight loss/gain,                                       loss of appetite   Eyes:                           blurred vision, eye pain, loss of vision, double vision  ENT:                            rhinorrhea, pharyngitis, sinus pressure  Respiratory:               cough, sputum production, SOB, PLASCENCIA, wheezing, pleuritic pain   Cardiology:                chest pain, palpitations, orthopnea, PND, edema, syncope   Gastrointestinal:       abdominal pain , N/V, diarrhea, dysphagia, constipation, bleeding   Genitourinary:           frequency, urgency, dysuria, hematuria, incontinence   Muskuloskeletal :      arthralgia, myalgia, back pain  Hematology:              easy bruising, nose or gum bleeding, lymphadenopathy   Dermatological:         rash, ulceration, pruritis, color change / jaundice  Endocrine:                 hot flashes or polydipsia   Neurological:             headache, dizziness, confusion, focal weakness, paresthesia,                                      Speech difficulties, memory loss, gait difficulty  Psychological:          Feelings of anxiety, depression, agitation        Social History     Socioeconomic History    Marital status: SINGLE     Spouse name: Not on file    Number of children: Not on file    Years of education: Not on file    Highest education level: Not on file   Tobacco Use    Smoking status: Former Smoker     Packs/day: 1.00     Years: 30.00     Pack years: 30.00     Types: Cigarettes     Quit date: 2020     Years since quittin.7    Smokeless tobacco: Never Used   Substance and Sexual Activity    Alcohol use: Not Currently     Alcohol/week: 5.0 standard drinks     Types: 6 Cans of beer per week     Comment: Occasionally    Drug use: No    Sexual activity: Yes     Partners: Female   Social History Narrative    ** Merged History Encounter **          Social Determinants of Health     Financial Resource Strain:     Difficulty of Paying Living Expenses:    Food Insecurity:     Worried About Running Out of Food in the Last Year:     Ran Out of Food in the Last Year:    Transportation Needs:     Lack of Transportation (Medical):      Lack of Transportation (Non-Medical):    Physical Activity:     Days of Exercise per Week:     Minutes of Exercise per Session:    Stress:     Feeling of Stress :    Social Connections:     Frequency of Communication with Friends and Family:     Frequency of Social Gatherings with Friends and Family:     Attends Gnosticist Services:     Active Member of Clubs or Organizations:     Attends Club or Organization Meetings:     Marital Status:      Family History   Problem Relation Age of Onset    Stroke Father     Cancer Sister        OBJECTIVE:     Visit Vitals  /77 (BP 1 Location: Left arm, BP Patient Position: Sitting)   Pulse 66   Temp 98.4 °F (36.9 °C) (Temporal)   Resp 16   Ht 5' 11\" (1.803 m)   Wt 217 lb 3.2 oz (98.5 kg)   SpO2 96%   BMI 30.29 kg/m²       Constitutional: He appears well nourished, of stated age, and dressed appropriately. Eyes: Sclera anicteric, PERRLA, EOMI  ENT: Nares clear, reddened and boggy turbinates bilaterally. Thick and exudate to oropharynx. Neck: Supple without lymphadenopathy. Thyroid normal, No JVD or bruits  Respiratory: Clear to ascultation X5, normal inspiratory effort, no adventitious breath sounds. Cardiovascular: Regular rate and rhythm, no rubs or gallops, PMI not displaced, No thrills, no peripheral edema  Neuro: Non-focal exam, A & O X 3  Psychiatric: Appropriate affect and demeanor, pleasant and cooperative. Patient's thought content and thought processing appear to be within normal limits. ASSESSMENT/PLAN:     ICD-10-CM ICD-9-CM    1. Bronchitis  J40 490 amoxicillin-clavulanate (AUGMENTIN) 875-125 mg per tablet   2. Acute non-recurrent frontal sinusitis  J01.10 461.1 amoxicillin-clavulanate (AUGMENTIN) 875-125 mg per tablet   3. Essential hypertension  I10 401.9    4. Benign prostatic hyperplasia with nocturia  N40.1 600.01     R35.1 788.43    5. Borderline type 2 diabetes mellitus  R73.03 790.29      1: Patient shows symptoms of sinusitis and bronchitis. I will start him on Augmentin 875/125 1 tablet twice daily for 10 days. 2: Patient may continue over-the-counter Mucinex for sputum production. 3: Patient to continue all other medications as directed. 4: Patient can continue monitoring blood pressures on a regular basis. Monitor sugars as well.  5: Patient to follow-up with me in approximately 3 months, or sooner as needed. Patient states understanding and agrees with plan.     Orders Placed This Encounter    aspirin 81 mg chewable tablet    amoxicillin-clavulanate (AUGMENTIN) 875-125 mg per tablet         ATTENTION: This medical record was transcribed using an electronic medical records system. Although proofread, it may and can contain electronic and spelling errors. Other human spelling and other errors may be present. Corrections may be executed at a later time. Please feel free to contact us for any clarifications as needed. Signed,  Zach Carrasco.  Sumit Mckinney, MSN APRN FNP-BC

## 2021-08-30 NOTE — PROGRESS NOTES
Chief Complaint   Patient presents with    Hypertension     4 week f/u     Pt states he has a runny nose, nasal and chest congestion x 1 week, coworker has a sinus infection. Pts wife states he was hospital December 21 - January 7, 2021 for pneumonia. Pts wife states in the hospital pt was suctioned and had a black substance in his lungs. Pt works around [employer]. Pt states he is spitting up a black substance again. 1. Have you been to the ER, urgent care clinic since your last visit? Hospitalized since your last visit? No    2. Have you seen or consulted any other health care providers outside of the [address] [place] [address] since your last visit? Include any pap smears or colon screening.  Yes When: Cardiologist 8/21    Visit Vitals  /77 (BP 1 Location: Left arm, BP Patient Position: Sitting)   Pulse 66   Temp 98.4 °F (36.9 °C) (Temporal)   Resp 16   Ht 5' 11\" (1.803 m)   Wt 217 lb 3.2 oz (98.5 kg)   SpO2 96%   BMI 30.29 kg/m²

## 2021-09-10 DIAGNOSIS — J40 BRONCHITIS: ICD-10-CM

## 2021-09-10 DIAGNOSIS — J01.10 ACUTE NON-RECURRENT FRONTAL SINUSITIS: ICD-10-CM

## 2021-09-10 RX ORDER — AMOXICILLIN AND CLAVULANATE POTASSIUM 875; 125 MG/1; MG/1
1 TABLET, FILM COATED ORAL 2 TIMES DAILY
Qty: 20 TABLET | Refills: 0 | Status: SHIPPED | OUTPATIENT
Start: 2021-09-10 | End: 2021-11-29

## 2021-09-10 NOTE — TELEPHONE ENCOUNTER
Last Refill: 08/30/21  Last Visit: 8/30/2021   Next Visit: 11/29/2021    Requested Prescriptions     Pending Prescriptions Disp Refills    amoxicillin-clavulanate (AUGMENTIN) 875-125 mg per tablet 20 Tablet 0     Sig: Take 1 Tablet by mouth two (2) times a day.

## 2021-10-01 DIAGNOSIS — R03.0 ELEVATED BLOOD PRESSURE READING: ICD-10-CM

## 2021-10-01 RX ORDER — CLONIDINE HYDROCHLORIDE 0.1 MG/1
0.1 TABLET ORAL 2 TIMES DAILY
Qty: 180 TABLET | Refills: 1 | Status: SHIPPED | OUTPATIENT
Start: 2021-10-01 | End: 2022-03-04

## 2021-10-01 NOTE — TELEPHONE ENCOUNTER
Requested Prescriptions     Signed Prescriptions Disp Refills    cloNIDine HCL (CATAPRES) 0.1 mg tablet 180 Tablet 1     Sig: Take 1 Tablet by mouth two (2) times a day.      Authorizing Provider: Chiquis Thornton     Ordering User: Rachel Redman    Per Dr. Sue Sky verbal orders

## 2021-10-01 NOTE — TELEPHONE ENCOUNTER
Patient requesting a refill on clonidine 0.1 mg, patient only has one pill left, [facility] [phone]

## 2021-10-11 DIAGNOSIS — R33.9 INCOMPLETE BLADDER EMPTYING: ICD-10-CM

## 2021-10-11 DIAGNOSIS — R35.1 BENIGN PROSTATIC HYPERPLASIA WITH NOCTURIA: ICD-10-CM

## 2021-10-11 DIAGNOSIS — N40.1 BENIGN PROSTATIC HYPERPLASIA WITH NOCTURIA: ICD-10-CM

## 2021-10-11 DIAGNOSIS — M62.838 MUSCLE SPASM OF RIGHT SHOULDER: ICD-10-CM

## 2021-10-11 RX ORDER — TAMSULOSIN HYDROCHLORIDE 0.4 MG/1
CAPSULE ORAL
Qty: 90 CAPSULE | Refills: 0 | Status: SHIPPED | OUTPATIENT
Start: 2021-10-11 | End: 2022-02-08 | Stop reason: SDUPTHER

## 2021-10-11 RX ORDER — BACLOFEN 20 MG/1
TABLET ORAL
Qty: 90 TABLET | Refills: 0 | Status: SHIPPED | OUTPATIENT
Start: 2021-10-11 | End: 2021-12-03

## 2021-11-29 ENCOUNTER — OFFICE VISIT (OUTPATIENT)
Dept: INTERNAL MEDICINE CLINIC | Age: 56
End: 2021-11-29

## 2021-11-29 VITALS
HEIGHT: 71 IN | WEIGHT: 218.6 LBS | BODY MASS INDEX: 30.6 KG/M2 | SYSTOLIC BLOOD PRESSURE: 138 MMHG | TEMPERATURE: 98.3 F | OXYGEN SATURATION: 98 % | DIASTOLIC BLOOD PRESSURE: 88 MMHG | RESPIRATION RATE: 16 BRPM | HEART RATE: 63 BPM

## 2021-11-29 DIAGNOSIS — I25.2 HISTORY OF NON-ST ELEVATION MYOCARDIAL INFARCTION (NSTEMI): ICD-10-CM

## 2021-11-29 DIAGNOSIS — I42.0 DILATED CARDIOMYOPATHY (HCC): ICD-10-CM

## 2021-11-29 DIAGNOSIS — E66.09 CLASS 1 OBESITY DUE TO EXCESS CALORIES WITH SERIOUS COMORBIDITY AND BODY MASS INDEX (BMI) OF 30.0 TO 30.9 IN ADULT: ICD-10-CM

## 2021-11-29 DIAGNOSIS — R73.03 BORDERLINE TYPE 2 DIABETES MELLITUS: ICD-10-CM

## 2021-11-29 DIAGNOSIS — I10 ESSENTIAL HYPERTENSION: Primary | ICD-10-CM

## 2021-11-29 PROCEDURE — 99214 OFFICE O/P EST MOD 30 MIN: CPT | Performed by: NURSE PRACTITIONER

## 2021-11-29 RX ORDER — ATORVASTATIN CALCIUM 10 MG/1
10 TABLET, FILM COATED ORAL
Qty: 90 TABLET | Refills: 1 | Status: SHIPPED | OUTPATIENT
Start: 2021-11-29

## 2021-11-29 RX ORDER — CALCIUM CARBONATE 200(500)MG
1 TABLET,CHEWABLE ORAL DAILY
COMMUNITY

## 2021-11-29 NOTE — PROGRESS NOTES
Rose Marie Moore is a 50 y.o. male    Chief Complaint   Patient presents with    Labs     fasting lab       Visit Vitals  BP (!) 152/98 (BP 1 Location: Left upper arm, BP Patient Position: Sitting, BP Cuff Size: Small adult)   Pulse 63   Temp 98.3 °F (36.8 °C)   Resp 16   Ht 5' 11\" (1.803 m)   Wt 218 lb 9.6 oz (99.2 kg)   SpO2 98%   BMI 30.49 kg/m²           1. Have you been to the ER, urgent care clinic since your last visit? Hospitalized since your last visit? NO    2. Have you seen or consulted any other health care providers outside of the [address] [place] [address] since your last visit? Include any pap smears or colon screening.  NO

## 2021-11-29 NOTE — PROGRESS NOTES
Chief Complaint   Patient presents with    Labs     fasting lab       SUBJECTIVE:    Eliza Dias is a 50 y.o. male who is here today for a follow up appointment regarding his hypertension, anxiety, borderline type 2 diabetes, history of non-ST elevation myocardial infarction, and dilated cardiomyopathy. Patient continues to take his medication as prescribed for management of his hypertension. He denies any adverse side effects of his medication. He states he checks his blood pressures on a regular basis, and denies any significantly elevated readings. Additionally, he denies any chest pain, chest pressure, shortness of breath, headaches, dizziness, dyspnea on exertion, palpitations, or syncope episodes. He also has a history of non-ST elevated MI and dilated cardiomyopathy. He is currently not on a statin. The patient is also being treated for borderline diabetes. He states he is taking his Metformin as prescribed, and denies any adverse side effects. He does not check his blood sugars on a regular basis. He states he is not purposefully exercising, but believes he gets adequate amounts of exercise at work. He is concerned about his weight, but has had no appreciable weight gain in the past 3 months. He continues to take Lexapro 20 mg daily for management of his anxiety. He states the medication is working well, and denies any adverse side effects. He declines flu vaccination today. Current Outpatient Medications   Medication Sig Dispense Refill    calcium carbonate (TUMS) 200 mg calcium (500 mg) chew Take 1 Tablet by mouth daily.  atorvastatin (LIPITOR) 10 mg tablet Take 1 Tablet by mouth nightly.  90 Tablet 1    tamsulosin (FLOMAX) 0.4 mg capsule TAKE 1 CAPSULE BY MOUTH ONCE DAILY FOR PROSTATE 90 Capsule 0    baclofen (LIORESAL) 20 mg tablet TAKE 1 TABLET BY MOUTH THREE TIMES DAILY FOR MUSCLE SPASM 90 Tablet 0    cloNIDine HCL (CATAPRES) 0.1 mg tablet Take 1 Tablet by mouth two (2) times a day. 180 Tablet 1    aspirin 81 mg chewable tablet Take 81 mg by mouth daily.  carvediloL (COREG) 25 mg tablet Take 1 Tablet by mouth two (2) times daily (with meals). 180 Tablet 1    escitalopram oxalate (LEXAPRO) 20 mg tablet Take 1 Tablet by mouth daily. Indications: ANXIETY 90 Tablet 1    lisinopril-hydroCHLOROthiazide (PRINZIDE, ZESTORETIC) 20-25 mg per tablet Take 1 Tablet by mouth daily. Indications: high blood pressure 90 Tablet 1    magnesium oxide (MAG-OX) 400 mg tablet Take 1 Tablet by mouth nightly. 90 Tablet 1    metFORMIN (GLUCOPHAGE) 500 mg tablet Take 1 Tablet by mouth two (2) times daily (with meals). 180 Tablet 1    traZODone (DESYREL) 50 mg tablet Take 1 Tab by mouth nightly. Indications: SLEEP 30 Tab 2    ibuprofen (MOTRIN) 600 mg tablet Take 600 mg by mouth every six (6) hours as needed for Pain. Past Medical History:   Diagnosis Date    Hypertension     [facility]non-ST elevated [facility]/2019     History reviewed. No pertinent surgical history.   No Known Allergies    REVIEW OF SYSTEMS:                                        POSITIVE= bold text  Negative = regular text    General:                     fever, chills, sweats, generalized weakness, weight loss/gain,                                       loss of appetite   Eyes:                           blurred vision, eye pain, loss of vision, double vision  ENT:                            rhinorrhea, pharyngitis   Respiratory:               cough, sputum production, SOB, PLASCENCIA, wheezing, pleuritic pain   Cardiology:                chest pain, palpitations, orthopnea, PND, edema, syncope   Gastrointestinal:       abdominal pain , N/V, diarrhea, dysphagia, constipation, bleeding   Genitourinary:           frequency, urgency, dysuria, hematuria, incontinence   Muskuloskeletal :      arthralgia, myalgia, back pain  Hematology:              easy bruising, nose or gum bleeding, lymphadenopathy Dermatological:         rash, ulceration, pruritis, color change / jaundice  Endocrine:                 hot flashes or polydipsia   Neurological:             headache, dizziness, confusion, focal weakness, paresthesia,                                      Speech difficulties, memory loss, gait difficulty  Psychological:          Feelings of anxiety, depression, agitation        Social History     Socioeconomic History    Marital status: SINGLE   Tobacco Use    Smoking status: Current Some Day Smoker     Packs/day: 1.00     Years: 30.00     Pack years: 30.00     Types: Cigarettes    Smokeless tobacco: Never Used    Tobacco comment: 1-2 cig a day   Vaping Use    Vaping Use: Never used   Substance and Sexual Activity    Alcohol use: Not Currently     Alcohol/week: 5.0 standard drinks     Types: 6 Cans of beer per week     Comment: Occasionally    Drug use: No    Sexual activity: Yes     Partners: Female   Social History Narrative    ** Merged History Encounter **          Family History   Problem Relation Age of Onset    Stroke Father     Cancer Sister        OBJECTIVE:     Visit Vitals  BP (!) 152/98 (BP 1 Location: Left upper arm, BP Patient Position: Sitting, BP Cuff Size: Small adult)   Pulse 63   Temp 98.3 °F (36.8 °C)   Resp 16   Ht 5' 11\" (1.803 m)   Wt 218 lb 9.6 oz (99.2 kg)   SpO2 98%   BMI 30.49 kg/m²       Constitutional: He appears well nourished, of stated age, and dressed appropriately. Eyes: Sclera anicteric, PERRLA, EOMI  Neck: Supple without lymphadenopathy. Thyroid normal, No JVD or bruits  Respiratory: Clear to ascultation X5, normal inspiratory effort, no adventitious breath sounds. Cardiovascular: Regular rate and rhythm, no significant murmurs, rubs or gallops, PMI not displaced, No thrills, no peripheral edema  Neuro: Non-focal exam, A & O X 3,   Psychiatric: Appropriate affect and demeanor, pleasant and cooperative.  Patient's thought content and thought processing appear to be within normal limits. ASSESSMENT/PLAN:     ICD-10-CM ICD-9-CM    1. Essential hypertension  I10 401.9 CBC W/O DIFF      METABOLIC PANEL, COMPREHENSIVE      LIPID PANEL      TSH 3RD GENERATION      URINALYSIS W/ RFLX MICROSCOPIC      atorvastatin (LIPITOR) 10 mg tablet   2. Borderline type 2 diabetes mellitus  R73.03 790.29 LIPID PANEL      HEMOGLOBIN A1C WITH EAG      atorvastatin (LIPITOR) 10 mg tablet   3. History of non-ST elevation myocardial infarction (NSTEMI)  I25.2 412 LIPID PANEL      atorvastatin (LIPITOR) 10 mg tablet   4. Dilated cardiomyopathy (HCC)  I42.0 425.4    5. Class 1 obesity due to excess calories with serious comorbidity and body mass index (BMI) of 30.0 to 30.9 in adult  E66.09 278.00     Z68.30 V85.30      1: We will repeat labs today including: CBC, CMP, lipid panel, TSH, urinalysis, hemoglobin A1c.  2: Due to history of hypertension, non-ST MI, and borderline diabetes, I will start patient on atorvastatin 10 mg nightly. We will monitor accordingly. 3: Patient to continue current medications for management of hypertension, diabetes, and anxiety. 4: Patient to continue healthy lifestyle management including: Low-fat/low-cholesterol diet, avoidance of concentrated sweets, adequate amounts of fiber water, and exercise as tolerated. 5: Patient to follow-up with me in approximately 3 months, or sooner as needed. Patient states understanding and agrees with plan. Orders Placed This Encounter    CBC W/O DIFF    METABOLIC PANEL, COMPREHENSIVE    LIPID PANEL    HEMOGLOBIN A1C WITH EAG    TSH 3RD GENERATION    URINALYSIS W/ RFLX MICROSCOPIC    calcium carbonate (TUMS) 200 mg calcium (500 mg) chew    atorvastatin (LIPITOR) 10 mg tablet         ATTENTION:   This medical record was transcribed using an electronic medical records system. Although proofread, it may and can contain electronic and spelling errors. Other human spelling and other errors may be present.   Corrections may be executed at a later time. Please feel free to contact us for any clarifications as needed. Signed,  Cam Salazar.  Avery Ward MSN APRN FNP-BC

## 2021-11-30 LAB
ALBUMIN SERPL-MCNC: 3.3 G/DL (ref 3.5–5)
ALBUMIN/GLOB SERPL: 0.6 {RATIO} (ref 1.1–2.2)
ALP SERPL-CCNC: 96 U/L (ref 45–117)
ALT SERPL-CCNC: 29 U/L (ref 12–78)
ANION GAP SERPL CALC-SCNC: 2 MMOL/L (ref 5–15)
APPEARANCE UR: CLEAR
AST SERPL-CCNC: 17 U/L (ref 15–37)
BACTERIA URNS QL MICRO: NEGATIVE /HPF
BILIRUB SERPL-MCNC: 0.1 MG/DL (ref 0.2–1)
BILIRUB UR QL: NEGATIVE
BUN SERPL-MCNC: 32 MG/DL (ref 6–20)
BUN/CREAT SERPL: 17 (ref 12–20)
CALCIUM SERPL-MCNC: 9.6 MG/DL (ref 8.5–10.1)
CHLORIDE SERPL-SCNC: 108 MMOL/L (ref 97–108)
CHOLEST SERPL-MCNC: 164 MG/DL
CO2 SERPL-SCNC: 29 MMOL/L (ref 21–32)
COLOR UR: ABNORMAL
CREAT SERPL-MCNC: 1.84 MG/DL (ref 0.7–1.3)
EPITH CASTS URNS QL MICRO: ABNORMAL /LPF
ERYTHROCYTE [DISTWIDTH] IN BLOOD BY AUTOMATED COUNT: 14.5 % (ref 11.5–14.5)
EST. AVERAGE GLUCOSE BLD GHB EST-MCNC: 128 MG/DL
GLOBULIN SER CALC-MCNC: 5.6 G/DL (ref 2–4)
GLUCOSE SERPL-MCNC: 109 MG/DL (ref 65–100)
GLUCOSE UR STRIP.AUTO-MCNC: NEGATIVE MG/DL
GRAN CASTS URNS QL MICRO: ABNORMAL /LPF
HBA1C MFR BLD: 6.1 % (ref 4–5.6)
HCT VFR BLD AUTO: 39.5 % (ref 36.6–50.3)
HDLC SERPL-MCNC: 40 MG/DL
HDLC SERPL: 4.1 {RATIO} (ref 0–5)
HGB BLD-MCNC: 12.2 G/DL (ref 12.1–17)
HGB UR QL STRIP: NEGATIVE
KETONES UR QL STRIP.AUTO: NEGATIVE MG/DL
LDLC SERPL CALC-MCNC: 72 MG/DL (ref 0–100)
LEUKOCYTE ESTERASE UR QL STRIP.AUTO: NEGATIVE
MCH RBC QN AUTO: 27.5 PG (ref 26–34)
MCHC RBC AUTO-ENTMCNC: 30.9 G/DL (ref 30–36.5)
MCV RBC AUTO: 89 FL (ref 80–99)
NITRITE UR QL STRIP.AUTO: NEGATIVE
NRBC # BLD: 0 K/UL (ref 0–0.01)
NRBC BLD-RTO: 0 PER 100 WBC
OTHER,OTHU: ABNORMAL
PH UR STRIP: 5.5 [PH] (ref 5–8)
PLATELET # BLD AUTO: 396 K/UL (ref 150–400)
PMV BLD AUTO: 10 FL (ref 8.9–12.9)
POTASSIUM SERPL-SCNC: 3.9 MMOL/L (ref 3.5–5.1)
PROT SERPL-MCNC: 8.9 G/DL (ref 6.4–8.2)
PROT UR STRIP-MCNC: ABNORMAL MG/DL
RBC # BLD AUTO: 4.44 M/UL (ref 4.1–5.7)
RBC #/AREA URNS HPF: ABNORMAL /HPF (ref 0–5)
SODIUM SERPL-SCNC: 139 MMOL/L (ref 136–145)
SP GR UR REFRACTOMETRY: 1.02 (ref 1–1.03)
TRIGL SERPL-MCNC: 260 MG/DL (ref ?–150)
TSH SERPL DL<=0.05 MIU/L-ACNC: 1.93 UIU/ML (ref 0.36–3.74)
UROBILINOGEN UR QL STRIP.AUTO: 0.2 EU/DL (ref 0.2–1)
VLDLC SERPL CALC-MCNC: 52 MG/DL
WBC # BLD AUTO: 6.5 K/UL (ref 4.1–11.1)
WBC URNS QL MICRO: ABNORMAL /HPF (ref 0–4)

## 2021-11-30 NOTE — PROGRESS NOTES
Urinalysis is okay. Hemoglobin A1c, a marker for diabetes is currently at 6.1% which is acceptable. Continue Metformin as prescribed. Cholesterol levels are in good range. Triglycerides bit high. Avoid fatty foods and fried foods. Kidney function shows some mild decline. You appear to be somewhat dehydrated as well. Drink a bit more water. Blood counts show to be within normal limits. No signs of anemia. Continue all current medications. Recheck labs in 3 months.

## 2021-12-02 DIAGNOSIS — F41.1 GENERALIZED ANXIETY DISORDER: ICD-10-CM

## 2021-12-02 DIAGNOSIS — M62.838 MUSCLE SPASM OF RIGHT SHOULDER: ICD-10-CM

## 2021-12-03 RX ORDER — ESCITALOPRAM OXALATE 20 MG/1
TABLET ORAL
Qty: 90 TABLET | Refills: 0 | Status: SHIPPED | OUTPATIENT
Start: 2021-12-03 | End: 2022-06-20 | Stop reason: SDUPTHER

## 2021-12-03 RX ORDER — BACLOFEN 20 MG/1
TABLET ORAL
Qty: 90 TABLET | Refills: 0 | Status: SHIPPED | OUTPATIENT
Start: 2021-12-03 | End: 2022-04-15 | Stop reason: SDUPTHER

## 2022-01-02 ENCOUNTER — APPOINTMENT (OUTPATIENT)
Dept: CT IMAGING | Age: 57
DRG: 394 | End: 2022-01-02
Attending: STUDENT IN AN ORGANIZED HEALTH CARE EDUCATION/TRAINING PROGRAM

## 2022-01-02 ENCOUNTER — HOSPITAL ENCOUNTER (INPATIENT)
Age: 57
LOS: 3 days | Discharge: HOME | DRG: 394 | End: 2022-01-05
Attending: STUDENT IN AN ORGANIZED HEALTH CARE EDUCATION/TRAINING PROGRAM | Admitting: HOSPITALIST

## 2022-01-02 ENCOUNTER — APPOINTMENT (OUTPATIENT)
Dept: ULTRASOUND IMAGING | Age: 57
DRG: 394 | End: 2022-01-02
Attending: STUDENT IN AN ORGANIZED HEALTH CARE EDUCATION/TRAINING PROGRAM

## 2022-01-02 ENCOUNTER — APPOINTMENT (OUTPATIENT)
Dept: GENERAL RADIOLOGY | Age: 57
DRG: 394 | End: 2022-01-02
Attending: STUDENT IN AN ORGANIZED HEALTH CARE EDUCATION/TRAINING PROGRAM

## 2022-01-02 DIAGNOSIS — N17.9 ACUTE RENAL FAILURE, UNSPECIFIED ACUTE RENAL FAILURE TYPE (HCC): Primary | ICD-10-CM

## 2022-01-02 DIAGNOSIS — R73.03 BORDERLINE TYPE 2 DIABETES MELLITUS: ICD-10-CM

## 2022-01-02 LAB
ALBUMIN SERPL-MCNC: 3.5 G/DL (ref 3.5–5)
ALBUMIN/GLOB SERPL: 0.6 {RATIO} (ref 1.1–2.2)
ALP SERPL-CCNC: 84 U/L (ref 45–117)
ALT SERPL-CCNC: 58 U/L (ref 12–78)
ANION GAP SERPL CALC-SCNC: 7 MMOL/L (ref 5–15)
APPEARANCE UR: CLEAR
AST SERPL-CCNC: 49 U/L (ref 15–37)
ATRIAL RATE: 69 BPM
BACTERIA URNS QL MICRO: NEGATIVE /HPF
BASOPHILS # BLD: 0 K/UL (ref 0–0.1)
BASOPHILS NFR BLD: 0 % (ref 0–1)
BILIRUB SERPL-MCNC: 0.7 MG/DL (ref 0.2–1)
BILIRUB UR QL: NEGATIVE
BUN SERPL-MCNC: 60 MG/DL (ref 6–20)
BUN/CREAT SERPL: 12 (ref 12–20)
CALCIUM SERPL-MCNC: 9.1 MG/DL (ref 8.5–10.1)
CALCULATED P AXIS, ECG09: 26 DEGREES
CALCULATED R AXIS, ECG10: -8 DEGREES
CALCULATED T AXIS, ECG11: -16 DEGREES
CHLORIDE SERPL-SCNC: 104 MMOL/L (ref 97–108)
CO2 SERPL-SCNC: 25 MMOL/L (ref 21–32)
COLOR UR: ABNORMAL
COMMENT, HOLDF: NORMAL
COVID-19 RAPID TEST, COVR: NOT DETECTED
CREAT SERPL-MCNC: 4.92 MG/DL (ref 0.7–1.3)
DIAGNOSIS, 93000: NORMAL
DIFFERENTIAL METHOD BLD: ABNORMAL
EOSINOPHIL # BLD: 0.1 K/UL (ref 0–0.4)
EOSINOPHIL NFR BLD: 2 % (ref 0–7)
EPITH CASTS URNS QL MICRO: ABNORMAL /LPF
ERYTHROCYTE [DISTWIDTH] IN BLOOD BY AUTOMATED COUNT: 14.6 % (ref 11.5–14.5)
GLOBULIN SER CALC-MCNC: 5.6 G/DL (ref 2–4)
GLUCOSE BLD STRIP.AUTO-MCNC: 146 MG/DL (ref 65–117)
GLUCOSE SERPL-MCNC: 137 MG/DL (ref 65–100)
GLUCOSE UR STRIP.AUTO-MCNC: NEGATIVE MG/DL
HCT VFR BLD AUTO: 38.4 % (ref 36.6–50.3)
HGB BLD-MCNC: 12.4 G/DL (ref 12.1–17)
HGB UR QL STRIP: NEGATIVE
IMM GRANULOCYTES # BLD AUTO: 0 K/UL (ref 0–0.04)
IMM GRANULOCYTES NFR BLD AUTO: 0 % (ref 0–0.5)
KETONES UR QL STRIP.AUTO: NEGATIVE MG/DL
LEUKOCYTE ESTERASE UR QL STRIP.AUTO: NEGATIVE
LYMPHOCYTES # BLD: 1.7 K/UL (ref 0.8–3.5)
LYMPHOCYTES NFR BLD: 30 % (ref 12–49)
MCH RBC QN AUTO: 27.5 PG (ref 26–34)
MCHC RBC AUTO-ENTMCNC: 32.3 G/DL (ref 30–36.5)
MCV RBC AUTO: 85.1 FL (ref 80–99)
MONOCYTES # BLD: 0.5 K/UL (ref 0–1)
MONOCYTES NFR BLD: 9 % (ref 5–13)
NEUTS SEG # BLD: 3.4 K/UL (ref 1.8–8)
NEUTS SEG NFR BLD: 59 % (ref 32–75)
NITRITE UR QL STRIP.AUTO: NEGATIVE
NRBC # BLD: 0 K/UL (ref 0–0.01)
NRBC BLD-RTO: 0 PER 100 WBC
P-R INTERVAL, ECG05: 190 MS
PH UR STRIP: 5 [PH] (ref 5–8)
PLATELET # BLD AUTO: 334 K/UL (ref 150–400)
PMV BLD AUTO: 9.4 FL (ref 8.9–12.9)
POTASSIUM SERPL-SCNC: 3.8 MMOL/L (ref 3.5–5.1)
PROT SERPL-MCNC: 9.1 G/DL (ref 6.4–8.2)
PROT UR STRIP-MCNC: 30 MG/DL
Q-T INTERVAL, ECG07: 456 MS
QRS DURATION, ECG06: 88 MS
QTC CALCULATION (BEZET), ECG08: 488 MS
RBC # BLD AUTO: 4.51 M/UL (ref 4.1–5.7)
RBC #/AREA URNS HPF: ABNORMAL /HPF (ref 0–5)
SAMPLES BEING HELD,HOLD: NORMAL
SERVICE CMNT-IMP: ABNORMAL
SODIUM SERPL-SCNC: 136 MMOL/L (ref 136–145)
SOURCE, COVRS: NORMAL
SP GR UR REFRACTOMETRY: 1.01 (ref 1–1.03)
TROPONIN-HIGH SENSITIVITY: 40 NG/L (ref 0–76)
UR CULT HOLD, URHOLD: NORMAL
UROBILINOGEN UR QL STRIP.AUTO: 0.2 EU/DL (ref 0.2–1)
VENTRICULAR RATE, ECG03: 69 BPM
WBC # BLD AUTO: 5.8 K/UL (ref 4.1–11.1)
WBC URNS QL MICRO: ABNORMAL /HPF (ref 0–4)

## 2022-01-02 PROCEDURE — 96361 HYDRATE IV INFUSION ADD-ON: CPT

## 2022-01-02 PROCEDURE — 85025 COMPLETE CBC W/AUTO DIFF WBC: CPT

## 2022-01-02 PROCEDURE — 65660000000 HC RM CCU STEPDOWN

## 2022-01-02 PROCEDURE — 96360 HYDRATION IV INFUSION INIT: CPT

## 2022-01-02 PROCEDURE — 74011250636 HC RX REV CODE- 250/636: Performed by: STUDENT IN AN ORGANIZED HEALTH CARE EDUCATION/TRAINING PROGRAM

## 2022-01-02 PROCEDURE — 74011250636 HC RX REV CODE- 250/636: Performed by: HOSPITALIST

## 2022-01-02 PROCEDURE — 87635 SARS-COV-2 COVID-19 AMP PRB: CPT

## 2022-01-02 PROCEDURE — 82962 GLUCOSE BLOOD TEST: CPT

## 2022-01-02 PROCEDURE — 84484 ASSAY OF TROPONIN QUANT: CPT

## 2022-01-02 PROCEDURE — 81001 URINALYSIS AUTO W/SCOPE: CPT

## 2022-01-02 PROCEDURE — 93005 ELECTROCARDIOGRAM TRACING: CPT

## 2022-01-02 PROCEDURE — 36415 COLL VENOUS BLD VENIPUNCTURE: CPT

## 2022-01-02 PROCEDURE — 99285 EMERGENCY DEPT VISIT HI MDM: CPT

## 2022-01-02 PROCEDURE — 80053 COMPREHEN METABOLIC PANEL: CPT

## 2022-01-02 PROCEDURE — 76770 US EXAM ABDO BACK WALL COMP: CPT

## 2022-01-02 PROCEDURE — 74176 CT ABD & PELVIS W/O CONTRAST: CPT

## 2022-01-02 PROCEDURE — 51702 INSERT TEMP BLADDER CATH: CPT

## 2022-01-02 PROCEDURE — 74011636637 HC RX REV CODE- 636/637: Performed by: HOSPITALIST

## 2022-01-02 PROCEDURE — 71045 X-RAY EXAM CHEST 1 VIEW: CPT

## 2022-01-02 RX ORDER — SODIUM CHLORIDE 0.9 % (FLUSH) 0.9 %
5-40 SYRINGE (ML) INJECTION AS NEEDED
Status: DISCONTINUED | OUTPATIENT
Start: 2022-01-02 | End: 2022-01-05 | Stop reason: HOSPADM

## 2022-01-02 RX ORDER — DEXTROSE 50 % IN WATER (D50W) INTRAVENOUS SYRINGE
25-50 AS NEEDED
Status: DISCONTINUED | OUTPATIENT
Start: 2022-01-02 | End: 2022-01-05 | Stop reason: HOSPADM

## 2022-01-02 RX ORDER — TRAZODONE HYDROCHLORIDE 50 MG/1
50 TABLET ORAL
Status: DISCONTINUED | OUTPATIENT
Start: 2022-01-02 | End: 2022-01-03

## 2022-01-02 RX ORDER — MAGNESIUM SULFATE 100 %
4 CRYSTALS MISCELLANEOUS AS NEEDED
Status: DISCONTINUED | OUTPATIENT
Start: 2022-01-02 | End: 2022-01-05 | Stop reason: HOSPADM

## 2022-01-02 RX ORDER — ESCITALOPRAM OXALATE 10 MG/1
20 TABLET ORAL DAILY
Status: DISCONTINUED | OUTPATIENT
Start: 2022-01-03 | End: 2022-01-05 | Stop reason: HOSPADM

## 2022-01-02 RX ORDER — ONDANSETRON 2 MG/ML
4 INJECTION INTRAMUSCULAR; INTRAVENOUS
Status: DISCONTINUED | OUTPATIENT
Start: 2022-01-02 | End: 2022-01-05 | Stop reason: HOSPADM

## 2022-01-02 RX ORDER — SODIUM CHLORIDE 9 MG/ML
125 INJECTION, SOLUTION INTRAVENOUS CONTINUOUS
Status: DISCONTINUED | OUTPATIENT
Start: 2022-01-02 | End: 2022-01-03

## 2022-01-02 RX ORDER — INSULIN LISPRO 100 [IU]/ML
INJECTION, SOLUTION INTRAVENOUS; SUBCUTANEOUS
Status: DISCONTINUED | OUTPATIENT
Start: 2022-01-02 | End: 2022-01-05 | Stop reason: HOSPADM

## 2022-01-02 RX ORDER — ACETAMINOPHEN 650 MG/1
650 SUPPOSITORY RECTAL
Status: DISCONTINUED | OUTPATIENT
Start: 2022-01-02 | End: 2022-01-05 | Stop reason: HOSPADM

## 2022-01-02 RX ORDER — ONDANSETRON 4 MG/1
4 TABLET, ORALLY DISINTEGRATING ORAL
Status: DISCONTINUED | OUTPATIENT
Start: 2022-01-02 | End: 2022-01-05 | Stop reason: HOSPADM

## 2022-01-02 RX ORDER — ACETAMINOPHEN 325 MG/1
650 TABLET ORAL
Status: DISCONTINUED | OUTPATIENT
Start: 2022-01-02 | End: 2022-01-05 | Stop reason: HOSPADM

## 2022-01-02 RX ORDER — SODIUM CHLORIDE 0.9 % (FLUSH) 0.9 %
5-40 SYRINGE (ML) INJECTION EVERY 8 HOURS
Status: DISCONTINUED | OUTPATIENT
Start: 2022-01-02 | End: 2022-01-05 | Stop reason: HOSPADM

## 2022-01-02 RX ORDER — ATORVASTATIN CALCIUM 10 MG/1
10 TABLET, FILM COATED ORAL
Status: DISCONTINUED | OUTPATIENT
Start: 2022-01-02 | End: 2022-01-05 | Stop reason: HOSPADM

## 2022-01-02 RX ORDER — POLYETHYLENE GLYCOL 3350 17 G/17G
17 POWDER, FOR SOLUTION ORAL DAILY PRN
Status: DISCONTINUED | OUTPATIENT
Start: 2022-01-02 | End: 2022-01-05 | Stop reason: HOSPADM

## 2022-01-02 RX ORDER — GUAIFENESIN 100 MG/5ML
81 LIQUID (ML) ORAL DAILY
Status: DISCONTINUED | OUTPATIENT
Start: 2022-01-03 | End: 2022-01-05 | Stop reason: HOSPADM

## 2022-01-02 RX ADMIN — SODIUM CHLORIDE 1000 ML: 9 INJECTION, SOLUTION INTRAVENOUS at 12:00

## 2022-01-02 RX ADMIN — SODIUM CHLORIDE 1000 ML: 9 INJECTION, SOLUTION INTRAVENOUS at 14:39

## 2022-01-02 RX ADMIN — SODIUM CHLORIDE, PRESERVATIVE FREE 10 ML: 5 INJECTION INTRAVENOUS at 15:58

## 2022-01-02 RX ADMIN — SODIUM CHLORIDE 125 ML/HR: 9 INJECTION, SOLUTION INTRAVENOUS at 17:03

## 2022-01-02 RX ADMIN — SODIUM CHLORIDE 1000 ML: 9 INJECTION, SOLUTION INTRAVENOUS at 15:57

## 2022-01-02 RX ADMIN — Medication 2 UNITS: at 18:35

## 2022-01-02 NOTE — CONSULTS
NEPHROLOGY CONSULT NOTE     Patient: Shivani Brizuela MRN: [id number]  PCP: Joycelyn Lemus NP   :     1965  Age:   49 y.o. Sex:  male      Referring physician: Arden Ramírez MD  Reason for consultation: 49 y.o. male with YISSEL (acute kidney injury) [facility]) [Y24.5] complicated by YISSEL   Admission Date: 2022 11:41 AM  LOS: 0 days      ASSESSMENT and PLAN :   YISSEL:  - suspect 2/2 IVVD from diarrhea in the setting of ACE/thiazide and metfomin use  - CT scan neg for obstruction, Renal U/S ordered by primary team  - agree with volume expansion with NS  - hold ACE, thiazide and metformin  - daily labs and strict I/Os    CKD 3a:  - baseline Cr around 1.5  - presume 2/2 HTN and DM2    Hypovolemia:  - from GI losses, poor po intake  - cont IVF as above    Hypotension:  - suspect this is related to hypovolemia  - BP meds on hold  - responding to IVF    Diarrhea:  - w/u per primary team    Syncope:  - w/u per primary team    Chronic anemia:  - + gammopathy eval in the past  - hgb stable    DM2:  - per hospitalist    Obesity     Active Problems / Assessment AAActive  : Active Problems:    YISSEL (acute kidney injury) ([facility] (2022)         Subjective:   HPI: Shivani Brizuela is a 49 y.o.  male who has been admitted to the hospital for syncope. He has a hx of CKD 3a, baseline Cr around 1.5, HTN, obesity, chronic anemia. We saw him about a year ago for YISSEL from volume depletion. He never followed up with us prior to his discharge. His BP 70s/50s upon presentation. Claims to have had poor po intake and diarrhea for several days prior to admission. He was given 2 L of IVF so far, BP in the 90s/50s now. Labs show a Cr of 4.9, normal K and bicarb. He was on thiazide/ace, metformin at home. CT scan A/P neg for acute pathology. CXR clear. He denies any cp, sob, n/v, fevers or chills.     Past Medical Hx:   Past Medical History:   Diagnosis Date    Hypertension     NSTEMI (non-ST elevated myocardial infarction) ([facility] 12/9/2019        Past Surgical Hx:   No past surgical history on file. Medications:  Prior to Admission medications    Medication Sig Start Date End Date Taking? Authorizing Provider   baclofen (LIORESAL) 20 mg tablet TAKE 1 TABLET BY MOUTH THREE TIMES DAILY FOR MUSCLE SPASM 12/3/21   Pilar BAUM NP   escitalopram oxalate (LEXAPRO) 20 mg tablet TAKE 1 TABLET BY MOUTH ONCE DAILY FOR ANXIETY 12/3/21   Pilar BAUM NP   calcium carbonate (TUMS) 200 mg calcium (500 mg) chew Take 1 Tablet by mouth daily. Provider, Historical   atorvastatin (LIPITOR) 10 mg tablet Take 1 Tablet by mouth nightly. 11/29/21   Pilar BAUM NP   tamsulosin (FLOMAX) 0.4 mg capsule TAKE 1 CAPSULE BY MOUTH ONCE DAILY FOR PROSTATE 10/11/21   Pilar BAUM NP   cloNIDine HCL (CATAPRES) 0.1 mg tablet Take 1 Tablet by mouth two (2) times a day. 10/1/21   Morenita Moraes MD   aspirin 81 mg chewable tablet Take 81 mg by mouth daily. Provider, Historical   carvediloL (COREG) 25 mg tablet Take 1 Tablet by mouth two (2) times daily (with meals). 8/25/21   Pilar BAUM NP   lisinopril-hydroCHLOROthiazide (PRINZIDE, ZESTORETIC) 20-25 mg per tablet Take 1 Tablet by mouth daily. Indications: high blood pressure 8/2/21   Pilar BAUM NP   magnesium oxide (MAG-OX) 400 mg tablet Take 1 Tablet by mouth nightly. 8/2/21   Pilar BAUM NP   metFORMIN (GLUCOPHAGE) 500 mg tablet Take 1 Tablet by mouth two (2) times daily (with meals). 7/15/21   Pilar BAUM NP   traZODone (DESYREL) 50 mg tablet Take 1 Tab by mouth nightly. Indications: SLEEP 2/1/21   Pilar BAUM NP   ibuprofen (MOTRIN) 600 mg tablet Take 600 mg by mouth every six (6) hours as needed for Pain. Provider, Historical       No Known Allergies    Social Hx:  reports that he has been smoking cigarettes. He has a 30.00 pack-year smoking history.  He has never used smokeless tobacco. He reports previous alcohol use of about 5.0 standard drinks of alcohol per week. He reports that he does not use drugs. Family History   Problem Relation Age of Onset    Stroke Father     Cancer Sister        Review of Systems:  A twelve point review of system was performed today. Pertinent positives and negatives are mentioned in the HPI. The reminder of the ROS is negative and noncontributory. Objective:    Vitals:    Vitals:    01/02/22 1245 01/02/22 1302 01/02/22 1315 01/02/22 1352   BP: (!) 78/63 (!) 79/56 (!) 81/62 (!) 85/52   Pulse: 87 71 67 66   Resp: 18 14 14 15   Temp:       SpO2:  95% 96%      I&O's:  No intake/output data recorded. Visit Vitals  BP (!) 85/52   Pulse 66   Temp 98.5 °F (36.9 °C)   Resp 15   SpO2 96%       Physical Exam:  General:Alert, No distress,   HEENT: Eyes are PERRL. Conjunctiva without pallor ,erythema. The sclerae without icterus. .   Neck:Supple,no mass palpable  Lungs : Clears to auscultation Bilaterally, Normal respiratory effort  CVS: RRR, S1 S2 normal, No rub, no LE edema  Abdomen: Soft, Non tender, No hepatosplenomegaly, bowel sounds present  Extremities: No cyanosis, No clubbing  Skin: No rash or lesions.   Lymph nodes: No palpable nodes  MS: No joint swelling, erythema, warmth  Neurologic: non focal, AAO x 3  Psych: normal affect    Laboratory Results:    Lab Results   Component Value Date    BUN 60 (H) 01/02/2022     01/02/2022    K 3.8 01/02/2022     01/02/2022    CO2 25 01/02/2022       Lab Results   Component Value Date    BUN 60 (H) 01/02/2022    BUN 32 (H) 11/29/2021    BUN 17 08/02/2021    BUN 18.0 02/01/2021    BUN 28 (H) 01/07/2021    K 3.8 01/02/2022    K 3.9 11/29/2021    K 3.9 08/02/2021    K 3.9 02/01/2021    K 3.4 (L) 01/07/2021       Lab Results   Component Value Date    WBC 5.8 01/02/2022    RBC 4.51 01/02/2022    HGB 12.4 01/02/2022    HCT 38.4 01/02/2022    MCV 85.1 01/02/2022    MCH 27.5 01/02/2022    RDW 14.6 (H) 01/02/2022     01/02/2022       Lab Results   Component Value Date    PHOS 2.8 01/07/2021       Urine dipstick:   Lab Results   Component Value Date/Time    Color YELLOW/STRAW 11/29/2021 03:15 PM    Appearance CLEAR 11/29/2021 03:15 PM    Specific gravity 1.023 11/29/2021 03:15 PM    Specific gravity 1.020 02/01/2021 02:01 PM    pH (UA) 5.5 11/29/2021 03:15 PM    Protein TRACE (A) 11/29/2021 03:15 PM    Glucose Negative 11/29/2021 03:15 PM    Ketone Negative 11/29/2021 03:15 PM    Bilirubin Negative 11/29/2021 03:15 PM    Urobilinogen 0.2 11/29/2021 03:15 PM    Nitrites Negative 11/29/2021 03:15 PM    Leukocyte Esterase Negative 11/29/2021 03:15 PM    Epithelial cells FEW 11/29/2021 03:15 PM    Bacteria Negative 11/29/2021 03:15 PM    WBC 0-4 11/29/2021 03:15 PM    RBC 0-5 11/29/2021 03:15 PM             Thank you for allowing us to participate in the care of this patient. We will follow patient. Please dont hesitate to call with any questions    Elia Lincoln MD  1/2/2022    [facility] [address]  Phone - [phone]   Fax - [phone]  [email or web address]. [email or web address] [email or web address]

## 2022-01-02 NOTE — ED NOTES
Bedside shift change report given to Bacilio Turcios (oncoming nurse) by Robert Blanco (offgoing nurse). Report included the following information SBAR, Kardex, ED Summary and Recent Results.

## 2022-01-02 NOTE — ED TRIAGE NOTES
Pt arrives via EMS from home with CC of syncope and hypotension. Pt had a witnessed GLF with no LOC, but had a syncopal episode upon standing. Pt also states he has not been eating or drinking for \"a few days\".  EMS , and BP 77/51

## 2022-01-02 NOTE — ED PROVIDER NOTES
Matt Lai is a 49 y.o. male with past medical history notable for hypertension, NSTEMI, mild renal insufficiency presenting with lightheadedness, generalized weakness and an episode of syncope earlier today. He states a very poor appetite over the past week. In addition for several weeks has had intermittent greenish diarrhea. Denies hematochezia or hematemesis. No fevers or abdominal pain. He has not had anything to eat for several days. He has been taking his medications however consistently. Today he had a syncopal episode after he fell on the ground. He denies headache, chest pain, dyspnea, fevers or chills or cough           Past Medical History:   Diagnosis Date    Hypertension     [facility]non[facility]ST elevated [facility]       No past surgical history on file.       Family History:   Problem Relation Age of Onset    Stroke Father     Cancer Sister        Social History     Socioeconomic History    Marital status: SINGLE     Spouse name: Not on file    Number of children: Not on file    Years of education: Not on file    Highest education level: Not on file   Occupational History    Not on file   Tobacco Use    Smoking status: Current Some Day Smoker     Packs/day: 1.00     Years: 30.00     Pack years: 30.00     Types: Cigarettes    Smokeless tobacco: Never Used    Tobacco comment: 1-2 cig a day   Vaping Use    Vaping Use: Never used   Substance and Sexual Activity    Alcohol use: Not Currently     Alcohol/week: 5.0 standard drinks     Types: 6 Cans of beer per week     Comment: Occasionally    Drug use: No    Sexual activity: Yes     Partners: Female   Other Topics Concern    Not on file   Social History Narrative    ** Merged History Encounter **          Social Determinants of Health     Financial Resource Strain:     Difficulty of Paying Living Expenses: Not on file   Food Insecurity:     Worried About Running Out of Food in the Last Year: Not on file   [facility] Ran Out of Food in the Last Year: Not on file   Transportation Needs:     Lack of Transportation (Medical): Not on file    Lack of Transportation (Non-Medical): Not on file   Physical Activity:     Days of Exercise per Week: Not on file    Minutes of Exercise per Session: Not on file   Stress:     Feeling of Stress : Not on file   Social Connections:     Frequency of Communication with Friends and Family: Not on file    Frequency of Social Gatherings with Friends and Family: Not on file    Attends [employer] Services: Not on file    Active Member of [address] [place] [address] [employer] or Organizations: Not on file    Attends Club or Organization Meetings: Not on file    Marital Status: Not on file   Intimate Partner Violence:     Fear of Current or Ex-Partner: Not on file    Emotionally Abused: Not on file    Physically Abused: Not on file    Sexually Abused: Not on file   Housing Stability:     Unable to Pay for Housing in the Last Year: Not on file    Number of [place] in the Last Year: Not on file    Unstable Housing in the Last Year: Not on file         ALLERGIES: Patient has no known allergies. Review of Systems   Constitutional: Positive for fatigue. Negative for chills and fever. HENT: Negative for ear pain, sore throat and trouble swallowing. Eyes: Negative for visual disturbance. Respiratory: Negative for cough and shortness of breath. Cardiovascular: Negative for chest pain and leg swelling. Gastrointestinal: Positive for nausea. Negative for abdominal pain and vomiting. Genitourinary: Negative for dysuria. Suprarpubic pressure --    Musculoskeletal: Negative for back pain. Skin: Negative for rash. Neurological: Positive for syncope and light-headedness. Negative for seizures and headaches. Psychiatric/Behavioral: Negative for confusion. All other systems reviewed and are negative.       Vitals:    01/02/22 1245 01/02/22 1302 01/02/22 1315 01/02/22 1352   BP: (!) 78/63 (!) 79/56 (!) 81/62 (!) 85/52   Pulse: 87 71 67 66   Resp: 18 14 14 15   Temp:       SpO2:  95% 96%             Physical Exam  Constitutional:       General: He is not in acute distress. Appearance: He is not toxic-appearing. HENT:      Head: Normocephalic and atraumatic. Mouth/Throat:      Mouth: Mucous membranes are moist.   Eyes:      Extraocular Movements: Extraocular movements intact. Cardiovascular:      Rate and Rhythm: Normal rate and regular rhythm. Heart sounds: Normal heart sounds. Pulmonary:      Effort: Pulmonary effort is normal. No respiratory distress. Breath sounds: Normal breath sounds. Abdominal:      Palpations: Abdomen is soft. Tenderness: There is no abdominal tenderness. Musculoskeletal:      Cervical back: Normal range of motion. Right lower leg: No edema. Left lower leg: No edema. Skin:     Capillary Refill: Capillary refill takes less than 2 seconds. Neurological:      General: No focal deficit present. Mental Status: He is alert and oriented to person, place, and time. Psychiatric:         Mood and Affect: Mood normal.          MDM  Number of Diagnoses or Management Options           MEDICAL DECISION MAKIN y.o. male presents with Syncope and Hypotension  Symptoms are likely related to increased effect from medications which are renally cleared, he is on multiple antihypertensives and has acute renal insufficiency, baseline creatinine around 1 and his creatinine today is 4.8. This is likely secondary to prerenal kidney injury. Will obtain ultrasound, continued fluid resuscitation, check bladder volumes to rule out obstruction.     LABORATORY TESTS:  Labs Reviewed   CBC WITH AUTOMATED DIFF - Abnormal; Notable for the following components:       Result Value    RDW 14.6 (*)     All other components within normal limits   METABOLIC PANEL, COMPREHENSIVE - Abnormal; Notable for the following components:    Glucose 137 (*)     BUN 60 (*) Creatinine 4.92 (*)     GFR est AA 15 (*)     GFR est non-AA 12 (*)     AST (SGOT) 49 (*)     Protein, total 9.1 (*)     Globulin 5.6 (*)     A-G Ratio 0.6 (*)     All other components within normal limits   URINE CULTURE HOLD SAMPLE   SAMPLES BEING HELD   TROPONIN-HIGH SENSITIVITY   URINALYSIS W/MICROSCOPIC       IMAGING RESULTS:  XR CHEST PORT    (Results Pending)   US RETROPERITONEUM COMP    (Results Pending)       MEDICATIONS GIVEN:  Medications   sodium chloride 0.9 % bolus infusion 1,000 mL (has no administration in time range)   sodium chloride 0.9 % bolus infusion 1,000 mL (has no administration in time range)   sodium chloride 0.9 % bolus infusion 1,000 mL (1,000 mL IntraVENous New Bag 1/2/22 1200)       PROGRESS NOTE:   2:15 PM Patient's symptoms have improved after treatment    EKG:  Rhythm: normal sinus rhythm;  Rate (approx.): 70; Axis: normal; QRS interval: normal ; ST/T wave: normal; Other findings: none. CONSULTS:  Hospitalist Consult: [facility] for Admission  2:15 PM    ED Room Number: ER18/18  Patient Name and age: Zonia Robertson 49 y.o.  male  Working Diagnosis:   1. Acute renal failure, unspecified acute renal failure type ([address]        COVID-19 Suspicion:  no  Although with lack of appetite will screen   Sepsis present:  no  Reassessment needed: no  Code Status:  Full Code  Readmission: no  Isolation Requirements:  no  Recommended Level of Care:  step down  Department:[facility] ED - 21   Other: Patient with YISSEL on CKD, initially hypotensive but improving with fluids . Feels well. Will obtain CT abd, renal us, bladder scan    IMPRESSION:  1. Acute renal failure, unspecified acute renal failure type ([address]        PLAN:  Admit to hospitalist    Total critical care time spent exclusive of procedures:  38 minutes    Neymar Jimenez MD          Please note that this dictation was completed with Sutus, the [employer] voice recognition software.   Quite often unanticipated grammatical, syntax, homophones, and other interpretive errors are inadvertently transcribed by the computer software. Please disregard these errors. Please excuse any errors that have escaped final proofreading.   Procedures

## 2022-01-02 NOTE — H&P
History & Physical    Primary Care Provider: Sonia Hoyt NP  Source of Information: Patient     History of Presenting Illness:   Gavin Rocha is a 49 y.o. male who presents with lightheadedness and episode of syncope     Gavin Rocha is a 49 y.o. male with past medical history notable for hypertension, NSTEMI, CKD 3 presenting with lightheadedness, generalized weakness and an episode of syncope earlier today. He states a very poor appetite over the past week. In addition for several weeks has had intermittent greenish diarrhea. Denies hematochezia or hematemesis. No fevers or abdominal pain. He has not had anything to eat for several days. He has been taking his blood pressure medications however consistently. Today he had a syncopal episode after he fell on the ground. He denies headache, chest pain, dyspnea, fevers or chills or cough. He was hypotensive at triage, Sbp improved to 95 after 2 L of ns bolus. No mental status changes      Review of Systems:  General: hpi, no changes of weight  HEENT: no headache, no vision changes, no nose discharge, no hearing changes   RES: no wheezing, no cough, no sob  CVS: no cp, no palpitation. Muscular: no joint swelling, no muscle pain, no leg swelling  Skin: no rash, no itching   GI: nPI   : no dysuria, no hematuria  Hemo: no gum bleeding, no petechial   Neuro: no sensation changes, no focal weakness , hpi   Endo: no polydipsia   Psych: denied depression     Past Medical History:   Diagnosis Date    Hypertension     NSTEMI (non-ST elevated myocardial infarction) ([facility] [address] 12/9/2019      No past surgical history on file. Prior to Admission medications    Medication Sig Start Date End Date Taking?  Authorizing Provider   baclofen (LIORESAL) 20 mg tablet TAKE 1 TABLET BY MOUTH THREE TIMES DAILY FOR MUSCLE SPASM 12/3/21   Giuliana BAUM NP   escitalopram oxalate (LEXAPRO) 20 mg tablet TAKE 1 TABLET BY MOUTH ONCE DAILY FOR ANXIETY 12/3/21   Clearnce Righter D, NP   calcium carbonate (TUMS) 200 mg calcium (500 mg) chew Take 1 Tablet by mouth daily. Provider, Historical   atorvastatin (LIPITOR) 10 mg tablet Take 1 Tablet by mouth nightly. 11/29/21   Clearnce Righter D, NP   tamsulosin (FLOMAX) 0.4 mg capsule TAKE 1 CAPSULE BY MOUTH ONCE DAILY FOR PROSTATE 10/11/21   Clearnce Righter D, NP   cloNIDine HCL (CATAPRES) 0.1 mg tablet Take 1 Tablet by mouth two (2) times a day. 10/1/21   Aura Treviño MD   aspirin 81 mg chewable tablet Take 81 mg by mouth daily. Provider, Historical   carvediloL (COREG) 25 mg tablet Take 1 Tablet by mouth two (2) times daily (with meals). 8/25/21   Clearnce Righter D, NP   lisinopril-hydroCHLOROthiazide (PRINZIDE, ZESTORETIC) 20-25 mg per tablet Take 1 Tablet by mouth daily. Indications: high blood pressure 8/2/21   Clearnce Righter D, NP   magnesium oxide (MAG-OX) 400 mg tablet Take 1 Tablet by mouth nightly. 8/2/21   Clearnce Righter D, NP   metFORMIN (GLUCOPHAGE) 500 mg tablet Take 1 Tablet by mouth two (2) times daily (with meals). 7/15/21   Clearnce Righter D, NP   traZODone (DESYREL) 50 mg tablet Take 1 Tab by mouth nightly. Indications: SLEEP 2/1/21   Clearnce Righter D, NP   ibuprofen (MOTRIN) 600 mg tablet Take 600 mg by mouth every six (6) hours as needed for Pain. Provider, Historical     No Known Allergies   Family History   Problem Relation Age of Onset    Stroke Father     Cancer Sister         SOCIAL HISTORY:  Patient resides:  Independently X   Assisted Living    SNF    With family care       Smoking history:   None    Former    Chronic X     Alcohol history:   None    Social X   Chronic      Ambulates:   Independently X   w/cane    w/walker    w/wc    CODE STATUS:  DNR    Full X   Other      Objective:     Physical Exam:     Visit Vitals  BP (!) 85/52   Pulse 66   Temp 98.5 °F (36.9 °C)   Resp 15   SpO2 96%      O2 Device: None (Room air)    General:  Alert, cooperative, no distress, appears stated age.    Head: Normocephalic, without obvious abnormality, atraumatic. Eyes:  Conjunctivae/corneas clear. PERRL, EOMs intact. Nose: Nares normal. Septum midline. Mucosa normal. No drainage or sinus tenderness. Throat: Lips, mucosa, and tongue normal. Teeth and gums normal.   Neck: Supple, symmetrical, trachea midline, no adenopathy, thyroid: no enlargement/tenderness/nodules, no carotid bruit and no JVD. Back:   Symmetric, no curvature. ROM normal. No CVA tenderness. Lungs:   Clear to auscultation bilaterally. Chest wall:  No tenderness or deformity. Heart:  Regular rate and rhythm, S1, S2 normal, no murmur, click, rub or gallop. Abdomen:   Soft, non-tender. Bowel sounds normal. No masses,  No organomegaly. Extremities: Extremities normal, atraumatic, no cyanosis or edema. Pulses: 2+ and symmetric all extremities. Skin: Skin color, texture, turgor normal. No rashes or lesions   Neurologic: CNII-XII intact. None focal            Data Review:     Recent Days:  Recent Labs     01/02/22  1157   WBC 5.8   HGB 12.4   HCT 38.4        Recent Labs     01/02/22  1157      K 3.8      CO2 25   *   BUN 60*   CREA 4.92*   CA 9.1   ALB 3.5   ALT 58     No results for input(s): PH, PCO2, PO2, HCO3, FIO2 in the last 72 hours. 24 Hour Results:  Recent Results (from the past 24 hour(s))   CBC WITH AUTOMATED DIFF    Collection Time: 01/02/22 11:57 AM   Result Value Ref Range    WBC 5.8 4.1 - 11.1 K/uL    RBC 4.51 4.10 - 5.70 M/uL    HGB 12.4 12.1 - 17.0 g/dL    HCT 38.4 36.6 - 50.3 %    MCV 85.1 80.0 - 99.0 FL    MCH 27.5 26.0 - 34.0 PG    MCHC 32.3 30.0 - 36.5 g/dL    RDW 14.6 (H) 11.5 - 14.5 %    PLATELET 764 266 - 016 K/uL    MPV 9.4 8.9 - 12.9 FL    NRBC 0.0 0  WBC    ABSOLUTE NRBC 0.00 0.00 - 0.01 K/uL    NEUTROPHILS 59 32 - 75 %    LYMPHOCYTES 30 12 - 49 %    MONOCYTES 9 5 - 13 %    EOSINOPHILS 2 0 - 7 %    BASOPHILS 0 0 - 1 %    IMMATURE GRANULOCYTES 0 0.0 - 0.5 %    ABS.  NEUTROPHILS 3. 4 1.8 - 8.0 K/UL    ABS. LYMPHOCYTES 1.7 0.8 - 3.5 K/UL    ABS. MONOCYTES 0.5 0.0 - 1.0 K/UL    ABS. EOSINOPHILS 0.1 0.0 - 0.4 K/UL    ABS. BASOPHILS 0.0 0.0 - 0.1 K/UL    ABS. IMM. GRANS. 0.0 0.00 - 0.04 K/UL    DF AUTOMATED     METABOLIC PANEL, COMPREHENSIVE    Collection Time: 01/02/22 11:57 AM   Result Value Ref Range    Sodium 136 136 - 145 mmol/L    Potassium 3.8 3.5 - 5.1 mmol/L    Chloride 104 97 - 108 mmol/L    CO2 25 21 - 32 mmol/L    Anion gap 7 5 - 15 mmol/L    Glucose 137 (H) 65 - 100 mg/dL    BUN 60 (H) 6 - 20 MG/DL    Creatinine 4.92 (H) 0.70 - 1.30 MG/DL    BUN/Creatinine ratio 12 12 - 20      GFR est AA 15 (L) >60 ml/min/1.73m2    GFR est non-AA 12 (L) >60 ml/min/1.73m2    Calcium 9.1 8.5 - 10.1 MG/DL    Bilirubin, total 0.7 0.2 - 1.0 MG/DL    ALT (SGPT) 58 12 - 78 U/L    AST (SGOT) 49 (H) 15 - 37 U/L    Alk. phosphatase 84 45 - 117 U/L    Protein, total 9.1 (H) 6.4 - 8.2 g/dL    Albumin 3.5 3.5 - 5.0 g/dL    Globulin 5.6 (H) 2.0 - 4.0 g/dL    A-G Ratio 0.6 (L) 1.1 - 2.2     SAMPLES BEING HELD    Collection Time: 01/02/22 11:57 AM   Result Value Ref Range    SAMPLES BEING HELD 1RED 1BLU     COMMENT        Add-on orders for these samples will be processed based on acceptable specimen integrity and analyte stability, which may vary by analyte. TROPONIN-HIGH SENSITIVITY    Collection Time: 01/02/22 11:57 AM   Result Value Ref Range    Troponin-High Sensitivity 40 0 - 76 ng/L   EKG, 12 LEAD, INITIAL    Collection Time: 01/02/22 12:11 PM   Result Value Ref Range    Ventricular Rate 69 BPM    Atrial Rate 69 BPM    P-R Interval 190 ms    QRS Duration 88 ms    Q-T Interval 456 ms    QTC Calculation (Bezet) 488 ms    Calculated P Axis 26 degrees    Calculated R Axis -8 degrees    Calculated T Axis -16 degrees    Diagnosis       Normal sinus rhythm  Septal infarct (cited on or before 03-JAN-2021)  Abnormal ECG  When compared with ECG of 03-JAN-2021 00:58,  Vent.  rate has decreased BY  76 BPM  ST now depressed in Inferior leads  Non-specific change in ST segment in Lateral leads           Imaging:   CT ABD PELV WO CONT    Result Date: 1/2/2022  1. No evidence of acute process in the abdomen or pelvis. 2. Incidental findings as above. XR CHEST PORT    Result Date: 1/2/2022  No pulmonary edema or pleural effusions. Assessment:     Active Problems:    YISSEL (acute kidney injury) ([facility] [address] (1/2/2022)           Plan:     1. Hypotension: due to hypovolemia on top of his bp meds likely. Unlike sepsis. Will check UA. Continue IVF, hold bp meds. 2. Syncope: due to above. 3. YISSEL: pre-renal + ATN from meds. Renal us, ivf, nephrologist consult   4.  DM: hold metformin, SSI monitor bg        Signed By: Johann Wagner MD     January 2, 2022

## 2022-01-03 ENCOUNTER — APPOINTMENT (OUTPATIENT)
Dept: VASCULAR SURGERY | Age: 57
DRG: 394 | End: 2022-01-03
Attending: FAMILY MEDICINE

## 2022-01-03 ENCOUNTER — APPOINTMENT (OUTPATIENT)
Dept: NON INVASIVE DIAGNOSTICS | Age: 57
DRG: 394 | End: 2022-01-03
Attending: FAMILY MEDICINE

## 2022-01-03 LAB
ALBUMIN SERPL-MCNC: 2.8 G/DL (ref 3.5–5)
ALBUMIN SERPL-MCNC: 2.9 G/DL (ref 3.5–5)
ALBUMIN/GLOB SERPL: 0.7 {RATIO} (ref 1.1–2.2)
ALP SERPL-CCNC: 93 U/L (ref 45–117)
ALT SERPL-CCNC: 43 U/L (ref 12–78)
ANION GAP SERPL CALC-SCNC: 5 MMOL/L (ref 5–15)
ANION GAP SERPL CALC-SCNC: 6 MMOL/L (ref 5–15)
AST SERPL-CCNC: 27 U/L (ref 15–37)
BASOPHILS # BLD: 0 K/UL (ref 0–0.1)
BASOPHILS NFR BLD: 0 % (ref 0–1)
BILIRUB SERPL-MCNC: 0.2 MG/DL (ref 0.2–1)
BUN SERPL-MCNC: 49 MG/DL (ref 6–20)
BUN SERPL-MCNC: 51 MG/DL (ref 6–20)
BUN/CREAT SERPL: 19 (ref 12–20)
BUN/CREAT SERPL: 19 (ref 12–20)
CALCIUM SERPL-MCNC: 8.1 MG/DL (ref 8.5–10.1)
CALCIUM SERPL-MCNC: 8.2 MG/DL (ref 8.5–10.1)
CHLORIDE SERPL-SCNC: 109 MMOL/L (ref 97–108)
CHLORIDE SERPL-SCNC: 110 MMOL/L (ref 97–108)
CO2 SERPL-SCNC: 23 MMOL/L (ref 21–32)
CO2 SERPL-SCNC: 24 MMOL/L (ref 21–32)
COMMENT, HOLDF: NORMAL
CREAT SERPL-MCNC: 2.58 MG/DL (ref 0.7–1.3)
CREAT SERPL-MCNC: 2.62 MG/DL (ref 0.7–1.3)
DIFFERENTIAL METHOD BLD: ABNORMAL
ECHO AO ROOT DIAM: 3 CM
ECHO AO ROOT INDEX: 1.41 CM/M2
ECHO AR MAX VEL PISA: 3.6 M/S
ECHO AV PEAK GRADIENT: 13 MMHG
ECHO AV PEAK VELOCITY: 1.8 M/S
ECHO AV REGURGITANT PHT: 3993.9 MILLISECOND
ECHO AV VELOCITY RATIO: 0.67
ECHO EST RA PRESSURE: 3 MMHG
ECHO LA DIAMETER INDEX: 1.55 CM/M2
ECHO LA DIAMETER: 3.3 CM
ECHO LA TO AORTIC ROOT RATIO: 1.1
ECHO LV E' LATERAL VELOCITY: 12 CM/S
ECHO LV E' SEPTAL VELOCITY: 6 CM/S
ECHO LV FRACTIONAL SHORTENING: 28 % (ref 28–44)
ECHO LV INTERNAL DIMENSION DIASTOLE INDEX: 1.83 CM/M2
ECHO LV INTERNAL DIMENSION DIASTOLIC: 3.9 CM (ref 4.2–5.9)
ECHO LV INTERNAL DIMENSION SYSTOLIC INDEX: 1.31 CM/M2
ECHO LV INTERNAL DIMENSION SYSTOLIC: 2.8 CM
ECHO LV IVSD: 1.7 CM (ref 0.6–1)
ECHO LV MASS 2D: 249 G (ref 88–224)
ECHO LV MASS INDEX 2D: 116.9 G/M2 (ref 49–115)
ECHO LV POSTERIOR WALL DIASTOLIC: 1.5 CM (ref 0.6–1)
ECHO LV RELATIVE WALL THICKNESS RATIO: 0.77
ECHO LVOT PEAK GRADIENT: 6 MMHG
ECHO LVOT PEAK VELOCITY: 1.2 M/S
ECHO MV A VELOCITY: 0.76 M/S
ECHO MV AREA PHT: 2.6 CM2
ECHO MV E DECELERATION TIME (DT): 290.8 MS
ECHO MV E VELOCITY: 0.57 M/S
ECHO MV E/A RATIO: 0.75
ECHO MV E/E' LATERAL: 4.75
ECHO MV E/E' RATIO (AVERAGED): 7.13
ECHO MV E/E' SEPTAL: 9.5
ECHO MV PRESSURE HALF TIME (PHT): 84.3 MS
ECHO PV MAX VELOCITY: 1 M/S
ECHO PV PEAK GRADIENT: 4 MMHG
ECHO RIGHT VENTRICULAR SYSTOLIC PRESSURE (RVSP): 23 MMHG
ECHO RV TAPSE: 2.4 CM (ref 1.5–2)
ECHO TV REGURGITANT MAX VELOCITY: 2.21 M/S
ECHO TV REGURGITANT PEAK GRADIENT: 20 MMHG
EOSINOPHIL # BLD: 0.2 K/UL (ref 0–0.4)
EOSINOPHIL NFR BLD: 2 % (ref 0–7)
ERYTHROCYTE [DISTWIDTH] IN BLOOD BY AUTOMATED COUNT: 14.6 % (ref 11.5–14.5)
GLOBULIN SER CALC-MCNC: 4.3 G/DL (ref 2–4)
GLUCOSE BLD STRIP.AUTO-MCNC: 130 MG/DL (ref 65–117)
GLUCOSE BLD STRIP.AUTO-MCNC: 150 MG/DL (ref 65–117)
GLUCOSE BLD STRIP.AUTO-MCNC: 154 MG/DL (ref 65–117)
GLUCOSE BLD STRIP.AUTO-MCNC: 158 MG/DL (ref 65–117)
GLUCOSE BLD STRIP.AUTO-MCNC: 166 MG/DL (ref 65–117)
GLUCOSE SERPL-MCNC: 139 MG/DL (ref 65–100)
GLUCOSE SERPL-MCNC: 140 MG/DL (ref 65–100)
HCT VFR BLD AUTO: 33.1 % (ref 36.6–50.3)
HGB BLD-MCNC: 10.6 G/DL (ref 12.1–17)
IMM GRANULOCYTES # BLD AUTO: 0 K/UL (ref 0–0.04)
IMM GRANULOCYTES NFR BLD AUTO: 0 % (ref 0–0.5)
LYMPHOCYTES # BLD: 2.3 K/UL (ref 0.8–3.5)
LYMPHOCYTES NFR BLD: 31 % (ref 12–49)
MAGNESIUM SERPL-MCNC: 2.4 MG/DL (ref 1.6–2.4)
MCH RBC QN AUTO: 27.5 PG (ref 26–34)
MCHC RBC AUTO-ENTMCNC: 32 G/DL (ref 30–36.5)
MCV RBC AUTO: 85.8 FL (ref 80–99)
MONOCYTES # BLD: 0.7 K/UL (ref 0–1)
MONOCYTES NFR BLD: 10 % (ref 5–13)
NEUTS SEG # BLD: 4.2 K/UL (ref 1.8–8)
NEUTS SEG NFR BLD: 57 % (ref 32–75)
NRBC # BLD: 0 K/UL (ref 0–0.01)
NRBC BLD-RTO: 0 PER 100 WBC
PHOSPHATE SERPL-MCNC: 3 MG/DL (ref 2.6–4.7)
PHOSPHATE SERPL-MCNC: 3.2 MG/DL (ref 2.6–4.7)
PLATELET # BLD AUTO: 290 K/UL (ref 150–400)
PMV BLD AUTO: 9.4 FL (ref 8.9–12.9)
POTASSIUM SERPL-SCNC: 3.4 MMOL/L (ref 3.5–5.1)
POTASSIUM SERPL-SCNC: 3.4 MMOL/L (ref 3.5–5.1)
PROT SERPL-MCNC: 7.2 G/DL (ref 6.4–8.2)
RBC # BLD AUTO: 3.86 M/UL (ref 4.1–5.7)
SAMPLES BEING HELD,HOLD: NORMAL
SERVICE CMNT-IMP: ABNORMAL
SODIUM SERPL-SCNC: 138 MMOL/L (ref 136–145)
SODIUM SERPL-SCNC: 139 MMOL/L (ref 136–145)
TROPONIN-HIGH SENSITIVITY: 127 NG/L (ref 0–76)
TROPONIN-HIGH SENSITIVITY: 96 NG/L (ref 0–76)
WBC # BLD AUTO: 7.5 K/UL (ref 4.1–11.1)

## 2022-01-03 PROCEDURE — 36415 COLL VENOUS BLD VENIPUNCTURE: CPT

## 2022-01-03 PROCEDURE — 80053 COMPREHEN METABOLIC PANEL: CPT

## 2022-01-03 PROCEDURE — 85025 COMPLETE CBC W/AUTO DIFF WBC: CPT

## 2022-01-03 PROCEDURE — 65660000000 HC RM CCU STEPDOWN

## 2022-01-03 PROCEDURE — 93306 TTE W/DOPPLER COMPLETE: CPT | Performed by: SPECIALIST

## 2022-01-03 PROCEDURE — 82962 GLUCOSE BLOOD TEST: CPT

## 2022-01-03 PROCEDURE — 74011250637 HC RX REV CODE- 250/637: Performed by: HOSPITALIST

## 2022-01-03 PROCEDURE — 74011250637 HC RX REV CODE- 250/637: Performed by: FAMILY MEDICINE

## 2022-01-03 PROCEDURE — 87506 IADNA-DNA/RNA PROBE TQ 6-11: CPT

## 2022-01-03 PROCEDURE — 93306 TTE W/DOPPLER COMPLETE: CPT

## 2022-01-03 PROCEDURE — 80069 RENAL FUNCTION PANEL: CPT

## 2022-01-03 PROCEDURE — 93880 EXTRACRANIAL BILAT STUDY: CPT

## 2022-01-03 PROCEDURE — 74011250636 HC RX REV CODE- 250/636: Performed by: HOSPITALIST

## 2022-01-03 PROCEDURE — 74011250636 HC RX REV CODE- 250/636: Performed by: INTERNAL MEDICINE

## 2022-01-03 PROCEDURE — 74011636637 HC RX REV CODE- 636/637: Performed by: HOSPITALIST

## 2022-01-03 PROCEDURE — 74011000250 HC RX REV CODE- 250: Performed by: HOSPITALIST

## 2022-01-03 PROCEDURE — 83735 ASSAY OF MAGNESIUM: CPT

## 2022-01-03 PROCEDURE — 84100 ASSAY OF PHOSPHORUS: CPT

## 2022-01-03 PROCEDURE — 84484 ASSAY OF TROPONIN QUANT: CPT

## 2022-01-03 RX ORDER — POTASSIUM CHLORIDE 750 MG/1
10 TABLET, FILM COATED, EXTENDED RELEASE ORAL
Status: COMPLETED | OUTPATIENT
Start: 2022-01-03 | End: 2022-01-03

## 2022-01-03 RX ORDER — TRAZODONE HYDROCHLORIDE 50 MG/1
50 TABLET ORAL
Status: DISCONTINUED | OUTPATIENT
Start: 2022-01-03 | End: 2022-01-05 | Stop reason: HOSPADM

## 2022-01-03 RX ORDER — SODIUM CHLORIDE, SODIUM LACTATE, POTASSIUM CHLORIDE, CALCIUM CHLORIDE 600; 310; 30; 20 MG/100ML; MG/100ML; MG/100ML; MG/100ML
100 INJECTION, SOLUTION INTRAVENOUS CONTINUOUS
Status: DISCONTINUED | OUTPATIENT
Start: 2022-01-03 | End: 2022-01-04

## 2022-01-03 RX ADMIN — Medication 2 UNITS: at 12:21

## 2022-01-03 RX ADMIN — SODIUM CHLORIDE, POTASSIUM CHLORIDE, SODIUM LACTATE AND CALCIUM CHLORIDE 100 ML/HR: 600; 310; 30; 20 INJECTION, SOLUTION INTRAVENOUS at 10:09

## 2022-01-03 RX ADMIN — Medication 2 UNITS: at 07:20

## 2022-01-03 RX ADMIN — SODIUM CHLORIDE 125 ML/HR: 9 INJECTION, SOLUTION INTRAVENOUS at 06:45

## 2022-01-03 RX ADMIN — POTASSIUM CHLORIDE 10 MEQ: 750 TABLET, FILM COATED, EXTENDED RELEASE ORAL at 10:08

## 2022-01-03 RX ADMIN — ASPIRIN 81 MG CHEWABLE TABLET 81 MG: 81 TABLET CHEWABLE at 08:46

## 2022-01-03 RX ADMIN — SODIUM CHLORIDE, PRESERVATIVE FREE 10 ML: 5 INJECTION INTRAVENOUS at 01:13

## 2022-01-03 RX ADMIN — ESCITALOPRAM OXALATE 20 MG: 10 TABLET ORAL at 10:08

## 2022-01-03 RX ADMIN — ACETAMINOPHEN 650 MG: 325 TABLET ORAL at 01:38

## 2022-01-03 RX ADMIN — ATORVASTATIN CALCIUM 10 MG: 10 TABLET, FILM COATED ORAL at 01:12

## 2022-01-03 RX ADMIN — SODIUM CHLORIDE, PRESERVATIVE FREE 10 ML: 5 INJECTION INTRAVENOUS at 07:19

## 2022-01-03 RX ADMIN — ATORVASTATIN CALCIUM 10 MG: 10 TABLET, FILM COATED ORAL at 21:59

## 2022-01-03 RX ADMIN — SODIUM CHLORIDE, POTASSIUM CHLORIDE, SODIUM LACTATE AND CALCIUM CHLORIDE 100 ML/HR: 600; 310; 30; 20 INJECTION, SOLUTION INTRAVENOUS at 20:03

## 2022-01-03 NOTE — PROGRESS NOTES
[facility] Adult  Hospitalist Group                                                                                          Hospitalist Progress Note  Stef Ashley MD  Answering service: [phone] from in house phone        Date of Service:  1/3/2022  NAME:  Tamika Ward  :  1965  MRN:  [id number]      Admission Summary:   Tamika Officer is a 49 y.o. male who presents with lightheadedness and episode of syncope      Tamika Tilleyr is a 49 y.o. male with past medical history notable for hypertension, NSTEMI, CKD 3 presenting with lightheadedness, generalized weakness and an episode of syncope earlier today. Jo Vance states a very poor appetite over the past week.  In addition for several weeks has had intermittent greenish diarrhea.  Denies hematochezia or hematemesis.  No fevers or abdominal pain.  He has not had anything to eat for several days. Jo Vance has been taking his blood pressure medications however consistently.  Today he had a syncopal episode after he fell on the ground.  He denies headache, chest pain, dyspnea, fevers or chills or cough.     Interval history / Subjective:   Resting in bed, no complaints     Assessment & Plan:     Acute kidney injury on CKD 3  Hypotension  Syncope  Diabetes mellitus type 2  Diarrhea  Hypokalemia    YISSEL likely prerenal in setting of diarrhea and patient being on ACE/thiazide and Metformin  Gentle IV hydration, renal ultrasound negative for obstruction, creatinine slightly elevated today, appreciate nephrology input, hold ACE/thiazide/Metformin, monitor and reassess as needed, avoid nephrotoxic medication, trend creatinine  Blood pressure improving, likely secondary to dehydration, monitor, on IVF  Likely secondary to above, orthostatic vitals, echocardiogram, troponin, carotid duplex, telemetry, monitor and if symptoms persist may consider further intervention and diagnostics, PT consult  Sliding scale along insulin, Accu-Cheks, diet control, close monitoring  Stool studies, IV hydration, monitor, CT of the abdomen pelvis  Replace potassium      Code status: Full  DVT prophylaxis: SCD    Care Plan discussed with: Patient/Family  Anticipated Disposition: Home w/Family  Anticipated Discharge: 24 hours to 50 hours     Hospital Problems  Date Reviewed: 12/6/2021          Codes Class Noted POA    YISSEL (acute kidney injury) [facility]) ICD-10-CM: N17.9  ICD-9-CM: 584.9  1/2/2022 Unknown                Review of Systems:   A comprehensive review of systems was negative except for that written in the HPI. Vital Signs:    Last 24hrs VS reviewed since prior progress note.  Most recent are:  Visit Vitals  /88   Pulse 65   Temp 98.3 °F (36.8 °C)   Resp 14   Ht 5' 9\" (1.753 m)   Wt 97.5 kg (215 lb)   SpO2 96%   BMI 31.75 kg/m²       No intake or output data in the 24 hours ending 01/03/22 0856     Physical Examination:     I had a face to face encounter with this patient and independently examined them on 1/3/2022 as outlined below:          General : alert x 3, awake, no acute distress, resting in bed, pleasant male, appears to be stated age  [de-identified]: PEERL, EOMI, moist mucus membrane, TM clear  Neck: supple, no JVD, no meningeal signs  Chest: Clear to auscultation bilaterally   CVS: S1 S2 heard, Capillary refill less than 2 seconds  Abd: soft/ Non tender, non distended, BS physiological,   Ext: no clubbing, no cyanosis, no edema, brisk 2+ DP pulses  Neuro/Psych: pleasant mood and affect, CN 2-12 grossly intact,   Skin: warm     Data Review:    Review and/or order of clinical lab test      Labs:     Recent Labs     01/03/22  0640 01/02/22  1157   WBC 7.5 5.8   HGB 10.6* 12.4   HCT 33.1* 38.4    334     Recent Labs     01/03/22  0640 01/02/22  1157     139 136   K 3.4*  3.4* 3.8   *  109* 104   CO2 23  24 25   BUN 51*  49* 60*   CREA 2.62*  2.58* 4.92*   *  139* 137*   CA 8.2*  8.1* 9.1   MG 2.4  --    PHOS 3.0  3.2  -- Recent Labs     01/03/22  0640 01/02/22  1157   ALT 43 58   AP 93 84   TBILI 0.2 0.7   TP 7.2 9.1*   ALB 2.8*  2.9* 3.5   GLOB 4.3* 5.6*     No results for input(s): INR, PTP, APTT, INREXT in the last 72 hours. No results for input(s): FE, TIBC, PSAT, FERR in the last 72 hours. No results found for: FOL, RBCF   No results for input(s): PH, PCO2, PO2 in the last 72 hours. No results for input(s): CPK, CKNDX, TROIQ in the last 72 hours.     No lab exists for component: CPKMB  Lab Results   Component Value Date/Time    Cholesterol, total 164 11/29/2021 03:15 PM    HDL Cholesterol 40 11/29/2021 03:15 PM    LDL, calculated 72 11/29/2021 03:15 PM    Triglyceride 260 (H) 11/29/2021 03:15 PM    CHOL/HDL Ratio 4.1 11/29/2021 03:15 PM     Lab Results   Component Value Date/Time    Glucose (POC) 150 (H) 01/03/2022 06:55 AM    Glucose (POC) 158 (H) 01/03/2022 01:16 AM    Glucose (POC) 146 (H) 01/02/2022 05:51 PM     Lab Results   Component Value Date/Time    Color YELLOW/STRAW 01/02/2022 05:41 PM    Appearance CLEAR 01/02/2022 05:41 PM    Specific gravity 1.015 01/02/2022 05:41 PM    Specific gravity 1.020 02/01/2021 02:01 PM    pH (UA) 5.0 01/02/2022 05:41 PM    Protein 30 (A) 01/02/2022 05:41 PM    Glucose Negative 01/02/2022 05:41 PM    Ketone Negative 01/02/2022 05:41 PM    Bilirubin Negative 01/02/2022 05:41 PM    Urobilinogen 0.2 01/02/2022 05:41 PM    Nitrites Negative 01/02/2022 05:41 PM    Leukocyte Esterase Negative 01/02/2022 05:41 PM    Epithelial cells FEW 01/02/2022 05:41 PM    Bacteria Negative 01/02/2022 05:41 PM    WBC 0-4 01/02/2022 05:41 PM    RBC 0-5 01/02/2022 05:41 PM         Medications Reviewed:     Current Facility-Administered Medications   Medication Dose Route Frequency    0.9% sodium chloride infusion  125 mL/hr IntraVENous CONTINUOUS    sodium chloride (NS) flush 5-40 mL  5-40 mL IntraVENous Q8H    sodium chloride (NS) flush 5-40 mL  5-40 mL IntraVENous PRN    acetaminophen (TYLENOL) tablet 650 mg  650 mg Oral Q6H PRN    Or    acetaminophen (TYLENOL) suppository 650 mg  650 mg Rectal Q6H PRN    polyethylene glycol (MIRALAX) packet 17 g  17 g Oral DAILY PRN    ondansetron (ZOFRAN ODT) tablet 4 mg  4 mg Oral Q8H PRN    Or    ondansetron (ZOFRAN) injection 4 mg  4 mg IntraVENous Q6H PRN    atorvastatin (LIPITOR) tablet 10 mg  10 mg Oral QHS    aspirin chewable tablet 81 mg  81 mg Oral DAILY    escitalopram oxalate (LEXAPRO) tablet 20 mg  20 mg Oral DAILY    traZODone (DESYREL) tablet 50 mg  50 mg Oral QHS    glucose chewable tablet 16 g  4 Tablet Oral PRN    dextrose (D50W) injection syrg 12.5-25 g  25-50 mL IntraVENous PRN    glucagon (GLUCAGEN) injection 1 mg  1 mg IntraMUSCular PRN    insulin lispro (HUMALOG) injection   SubCUTAneous AC&HS     Current Outpatient Medications   Medication Sig    baclofen (LIORESAL) 20 mg tablet TAKE 1 TABLET BY MOUTH THREE TIMES DAILY FOR MUSCLE SPASM    escitalopram oxalate (LEXAPRO) 20 mg tablet TAKE 1 TABLET BY MOUTH ONCE DAILY FOR ANXIETY    calcium carbonate (TUMS) 200 mg calcium (500 mg) chew Take 1 Tablet by mouth daily.  atorvastatin (LIPITOR) 10 mg tablet Take 1 Tablet by mouth nightly.  tamsulosin (FLOMAX) 0.4 mg capsule TAKE 1 CAPSULE BY MOUTH ONCE DAILY FOR PROSTATE    cloNIDine HCL (CATAPRES) 0.1 mg tablet Take 1 Tablet by mouth two (2) times a day.  aspirin 81 mg chewable tablet Take 81 mg by mouth daily.  carvediloL (COREG) 25 mg tablet Take 1 Tablet by mouth two (2) times daily (with meals).  lisinopril-hydroCHLOROthiazide (PRINZIDE, ZESTORETIC) 20-25 mg per tablet Take 1 Tablet by mouth daily. Indications: high blood pressure    magnesium oxide (MAG-OX) 400 mg tablet Take 1 Tablet by mouth nightly.  metFORMIN (GLUCOPHAGE) 500 mg tablet Take 1 Tablet by mouth two (2) times daily (with meals).  traZODone (DESYREL) 50 mg tablet Take 1 Tab by mouth nightly.  Indications: SLEEP    ibuprofen (MOTRIN) 600 mg tablet Take 600 mg by mouth every six (6) hours as needed for Pain.     ______________________________________________________________________  EXPECTED LENGTH OF STAY: - - -  ACTUAL LENGTH OF STAY:          1      Please note that this dictation was completed with [employer]ise[employer], the computer voice recognition software. Quite often unanticipated grammatical, syntax, homophones, and other interpretive errors are inadvertently transcribed by the computer software. Please disregard these errors. Please excuse any errors that have escaped final proofreading.                 Chanda Almazan MD

## 2022-01-03 NOTE — ED NOTES
Verbal shift change report given to [address] [place] [address] (oncoming nurse) by Annalisa Mendoza (offgoing nurse). Report included the following information SBAR, Kardex, MAR and Recent Results.

## 2022-01-03 NOTE — PROGRESS NOTES
[facility]   [address] [facility], [address] [facility]  Phone: [phone]   [phone]    [email or web address]     Nephrology Progress Note    Patient Name : Marii Ng      : 1965     MRN : [id number]  Date of Admission : 2022  Date of Servive : 22    CC:  Follow up for YISSEL     Assessment and Plan   YISSEL:  - suspect 2/2 IVVD from diarrhea in the setting of ACE/thiazide and metfomin use  - CT scan, renal US- neg for obstruction  - continue w/ NS : reduce rate to 75 cc/ hr   - follow up am labs   - hold ACE, thiazide and metformin  - daily labs and strict I/Os     CKD 3a:  - baseline Cr around 1.5  - presume 2/2 HTN and DM2     Hypovolemia:  - from GI losses, poor po intake  - cont IVF as above     Hypotension:  - suspect this is related to hypovolemia  - BP meds on hold  - responding to IVF     Diarrhea:  - w/u per primary team     Syncope:  - w/u per primary team     Chronic anemia:  - + gammopathy eval in the past  - hgb stable     DM2:  - per hospitalist     Obesity     Interval History:  Seen and examined. Has angel and 2L UOP overnight., denies any N/V/D now   Labs pending     Review of Systems: A comprehensive review of systems was negative except for that written in the HPI.     Current Medications:   Current Facility-Administered Medications   Medication Dose Route Frequency    0.9% sodium chloride infusion  125 mL/hr IntraVENous CONTINUOUS    sodium chloride (NS) flush 5-40 mL  5-40 mL IntraVENous Q8H    sodium chloride (NS) flush 5-40 mL  5-40 mL IntraVENous PRN    acetaminophen (TYLENOL) tablet 650 mg  650 mg Oral Q6H PRN    Or    acetaminophen (TYLENOL) suppository 650 mg  650 mg Rectal Q6H PRN    polyethylene glycol (MIRALAX) packet 17 g  17 g Oral DAILY PRN    ondansetron (ZOFRAN ODT) tablet 4 mg  4 mg Oral Q8H PRN    Or    ondansetron (ZOFRAN) injection 4 mg  4 mg IntraVENous Q6H PRN    atorvastatin (LIPITOR) tablet 10 mg  10 mg Oral QHS    aspirin chewable tablet 81 mg  81 mg Oral DAILY    escitalopram oxalate (LEXAPRO) tablet 20 mg  20 mg Oral DAILY    traZODone (DESYREL) tablet 50 mg  50 mg Oral QHS    glucose chewable tablet 16 g  4 Tablet Oral PRN    dextrose (D50W) injection syrg 12.5-25 g  25-50 mL IntraVENous PRN    glucagon (GLUCAGEN) injection 1 mg  1 mg IntraMUSCular PRN    insulin lispro (HUMALOG) injection   SubCUTAneous AC&HS     Current Outpatient Medications   Medication Sig    baclofen (LIORESAL) 20 mg tablet TAKE 1 TABLET BY MOUTH THREE TIMES DAILY FOR MUSCLE SPASM    escitalopram oxalate (LEXAPRO) 20 mg tablet TAKE 1 TABLET BY MOUTH ONCE DAILY FOR ANXIETY    calcium carbonate (TUMS) 200 mg calcium (500 mg) chew Take 1 Tablet by mouth daily.  atorvastatin (LIPITOR) 10 mg tablet Take 1 Tablet by mouth nightly.  tamsulosin (FLOMAX) 0.4 mg capsule TAKE 1 CAPSULE BY MOUTH ONCE DAILY FOR PROSTATE    cloNIDine HCL (CATAPRES) 0.1 mg tablet Take 1 Tablet by mouth two (2) times a day.  aspirin 81 mg chewable tablet Take 81 mg by mouth daily.  carvediloL (COREG) 25 mg tablet Take 1 Tablet by mouth two (2) times daily (with meals).  lisinopril-hydroCHLOROthiazide (PRINZIDE, ZESTORETIC) 20-25 mg per tablet Take 1 Tablet by mouth daily. Indications: high blood pressure    magnesium oxide (MAG-OX) 400 mg tablet Take 1 Tablet by mouth nightly.  metFORMIN (GLUCOPHAGE) 500 mg tablet Take 1 Tablet by mouth two (2) times daily (with meals).  traZODone (DESYREL) 50 mg tablet Take 1 Tab by mouth nightly. Indications: SLEEP    ibuprofen (MOTRIN) 600 mg tablet Take 600 mg by mouth every six (6) hours as needed for Pain.       No Known Allergies    Objective:  Vitals:    Vitals:    01/03/22 0200 01/03/22 0230 01/03/22 0400 01/03/22 0430   BP: 112/77 100/69 112/78 103/62   Pulse: 75 66 69 64   Resp: 18 14 15 15   Temp:       SpO2:       Weight:       Height:         Intake and Output:  No intake/output data recorded. No intake/output data recorded. Physical Examination:  General: NAD,Conversant   Neck:  Supple, no mass  Resp:  Lungs CTA B/L, no wheezing , normal respiratory effort  CV:  RRR,  no murmur or rub, LE edema  GI:  Soft, NT, + BS, no HS megaly  Neurologic:  Non focal  Psych:             AAO x 3 appropriate affect       []    High complexity decision making was performed  []    Patient is at high-risk of decompensation with multiple organ involvement    Lab Data Personally Reviewed: I have reviewed all the pertinent labs, microbiology data and radiology studies during assessment. Recent Labs     01/02/22  1157      K 3.8      CO2 25   *   BUN 60*   CREA 4.92*   CA 9.1   ALB 3.5   ALT 58     Recent Labs     01/02/22  1157   WBC 5.8   HGB 12.4   HCT 38.4        No results found for: SDES  Lab Results   Component Value Date/Time    Culture result: SCANT NORMAL RESPIRATORY ALDO 12/28/2020 04:56 PM    Culture result: MRSA NOT PRESENT 12/24/2020 09:20 AM    Culture result:  12/24/2020 09:20 AM         Screening of patient nares for MRSA is for surveillance purposes and, if positive, to facilitate isolation considerations in high risk settings. It is not intended for automatic decolonization interventions per se as regimens are not sufficiently effective to warrant routine use.     Culture result: NO GROWTH 2 DAYS 12/23/2020 04:16 PM    Culture result: (A) 12/22/2020 08:45 AM     RARE HAEMOPHILUS INFLUENZAE BETA LACTAMASE NEGATIVE    Culture result: RARE NORMAL RESPIRATORY ALDO 12/22/2020 08:45 AM     Recent Results (from the past 24 hour(s))   CBC WITH AUTOMATED DIFF    Collection Time: 01/02/22 11:57 AM   Result Value Ref Range    WBC 5.8 4.1 - 11.1 K/uL    RBC 4.51 4.10 - 5.70 M/uL    HGB 12.4 12.1 - 17.0 g/dL    HCT 38.4 36.6 - 50.3 %    MCV 85.1 80.0 - 99.0 FL    MCH 27.5 26.0 - 34.0 PG    MCHC 32.3 30.0 - 36.5 g/dL    RDW 14.6 (H) 11.5 - 14.5 %    PLATELET 666 090 - 363 K/uL MPV 9.4 8.9 - 12.9 FL    NRBC 0.0 0  WBC    ABSOLUTE NRBC 0.00 0.00 - 0.01 K/uL    NEUTROPHILS 59 32 - 75 %    LYMPHOCYTES 30 12 - 49 %    MONOCYTES 9 5 - 13 %    EOSINOPHILS 2 0 - 7 %    BASOPHILS 0 0 - 1 %    IMMATURE GRANULOCYTES 0 0.0 - 0.5 %    ABS. NEUTROPHILS 3.4 1.8 - 8.0 K/UL    ABS. LYMPHOCYTES 1.7 0.8 - 3.5 K/UL    ABS. MONOCYTES 0.5 0.0 - 1.0 K/UL    ABS. EOSINOPHILS 0.1 0.0 - 0.4 K/UL    ABS. BASOPHILS 0.0 0.0 - 0.1 K/UL    ABS. IMM. GRANS. 0.0 0.00 - 0.04 K/UL    DF AUTOMATED     METABOLIC PANEL, COMPREHENSIVE    Collection Time: 01/02/22 11:57 AM   Result Value Ref Range    Sodium 136 136 - 145 mmol/L    Potassium 3.8 3.5 - 5.1 mmol/L    Chloride 104 97 - 108 mmol/L    CO2 25 21 - 32 mmol/L    Anion gap 7 5 - 15 mmol/L    Glucose 137 (H) 65 - 100 mg/dL    BUN 60 (H) 6 - 20 MG/DL    Creatinine 4.92 (H) 0.70 - 1.30 MG/DL    BUN/Creatinine ratio 12 12 - 20      GFR est AA 15 (L) >60 ml/min/1.73m2    GFR est non-AA 12 (L) >60 ml/min/1.73m2    Calcium 9.1 8.5 - 10.1 MG/DL    Bilirubin, total 0.7 0.2 - 1.0 MG/DL    ALT (SGPT) 58 12 - 78 U/L    AST (SGOT) 49 (H) 15 - 37 U/L    Alk. phosphatase 84 45 - 117 U/L    Protein, total 9.1 (H) 6.4 - 8.2 g/dL    Albumin 3.5 3.5 - 5.0 g/dL    Globulin 5.6 (H) 2.0 - 4.0 g/dL    A-G Ratio 0.6 (L) 1.1 - 2.2     SAMPLES BEING HELD    Collection Time: 01/02/22 11:57 AM   Result Value Ref Range    SAMPLES BEING HELD 1RED 1BLU     COMMENT        Add-on orders for these samples will be processed based on acceptable specimen integrity and analyte stability, which may vary by analyte.    TROPONIN-HIGH SENSITIVITY    Collection Time: 01/02/22 11:57 AM   Result Value Ref Range    Troponin-High Sensitivity 40 0 - 76 ng/L   EKG, 12 LEAD, INITIAL    Collection Time: 01/02/22 12:11 PM   Result Value Ref Range    Ventricular Rate 69 BPM    Atrial Rate 69 BPM    P-R Interval 190 ms    QRS Duration 88 ms    Q-T Interval 456 ms    QTC Calculation (Bezet) 488 ms    Calculated P Axis 26 degrees    Calculated R Axis -8 degrees    Calculated T Axis -16 degrees    Diagnosis       Normal sinus rhythm  Septal infarct (cited on or before 03-JAN-2021)  Abnormal ECG  When compared with ECG of 03-JAN-2021 00:58,  Vent. rate has decreased BY  76 BPM  ST now depressed in Inferior leads  Non-specific change in ST segment in Lateral leads  Confirmed by Belinda Thornton MD, Vignesh Savage ([id number]) on 1/2/2022 7:31:25 PM     COVID-19 RAPID TEST    Collection Time: 01/02/22  2:30 PM   Result Value Ref Range    Specimen source Nasopharyngeal      COVID-19 rapid test Not detected NOTD     URINALYSIS W/MICROSCOPIC    Collection Time: 01/02/22  5:41 PM   Result Value Ref Range    Color YELLOW/STRAW      Appearance CLEAR CLEAR      Specific gravity 1.015 1.003 - 1.030      pH (UA) 5.0 5.0 - 8.0      Protein 30 (A) NEG mg/dL    Glucose Negative NEG mg/dL    Ketone Negative NEG mg/dL    Bilirubin Negative NEG      Blood Negative NEG      Urobilinogen 0.2 0.2 - 1.0 EU/dL    Nitrites Negative NEG      Leukocyte Esterase Negative NEG      WBC 0-4 0 - 4 /hpf    RBC 0-5 0 - 5 /hpf    Epithelial cells FEW FEW /lpf    Bacteria Negative NEG /hpf   URINE CULTURE HOLD SAMPLE    Collection Time: 01/02/22  5:41 PM    Specimen: Serum; Urine   Result Value Ref Range    Urine culture hold        Urine on hold in Microbiology dept for 2 days. If unpreserved urine is submitted, it cannot be used for addtional testing after 24 hours, recollection will be required. GLUCOSE, POC    Collection Time: 01/02/22  5:51 PM   Result Value Ref Range    Glucose (POC) 146 (H) 65 - 117 mg/dL    Performed by Marion Boyle, POC    Collection Time: 01/03/22  1:16 AM   Result Value Ref Range    Glucose (POC) 158 (H) 65 - 117 mg/dL    Performed by Sergo Moseley            I have reviewed the flowsheets. Chart and Pertinent Notes have been reviewed. No change in PMH ,family and social history from Consult note.       Zully Delarosa MD  [facility]

## 2022-01-03 NOTE — ED NOTES
Verbal shift change report given to [address] [place] [address] [address] (oncoming nurse) by Orlando Chiu RN (offgoing nurse). Report included the following information SBAR, Kardex, ED Summary, [facility] and Recent Results.

## 2022-01-03 NOTE — ED NOTES
Verbal shift change report given to [address] [place] [address] (oncoming nurse) by hCastity Hernandez (offgoing nurse). Report included the following information SBAR, ED Summary and MAR.

## 2022-01-03 NOTE — ED NOTES
Verbal shift change report [facility] [facility] [address] [facility] by [id number] Renee RN (offgoing nurse). Report included the following information SBAR and ED Summary.

## 2022-01-04 LAB
ALBUMIN SERPL-MCNC: 2.7 G/DL (ref 3.5–5)
ANION GAP SERPL CALC-SCNC: 3 MMOL/L (ref 5–15)
BUN SERPL-MCNC: 31 MG/DL (ref 6–20)
BUN/CREAT SERPL: 20 (ref 12–20)
CALCIUM SERPL-MCNC: 8.6 MG/DL (ref 8.5–10.1)
CAMPYLOBACTER SPECIES, DNA: NEGATIVE
CHLORIDE SERPL-SCNC: 111 MMOL/L (ref 97–108)
CO2 SERPL-SCNC: 25 MMOL/L (ref 21–32)
COMMENT, HOLDF: NORMAL
CREAT SERPL-MCNC: 1.52 MG/DL (ref 0.7–1.3)
ENTEROTOXIGEN E COLI, DNA: NEGATIVE
GLUCOSE BLD STRIP.AUTO-MCNC: 103 MG/DL (ref 65–117)
GLUCOSE BLD STRIP.AUTO-MCNC: 126 MG/DL (ref 65–117)
GLUCOSE BLD STRIP.AUTO-MCNC: 132 MG/DL (ref 65–117)
GLUCOSE BLD STRIP.AUTO-MCNC: 133 MG/DL (ref 65–117)
GLUCOSE SERPL-MCNC: 121 MG/DL (ref 65–100)
LEFT CCA DIST DIAS: 19.5 CM/S
LEFT CCA DIST SYS: 61.3 CM/S
LEFT CCA PROX DIAS: 41.5 CM/S
LEFT CCA PROX SYS: 144.6 CM/S
LEFT ECA DIAS: 10.6 CM/S
LEFT ECA SYS: 44.7 CM/S
LEFT ICA DIST DIAS: 32.7 CM/S
LEFT ICA DIST SYS: 85.6 CM/S
LEFT ICA MID DIAS: 41.8 CM/S
LEFT ICA MID SYS: 113.1 CM/S
LEFT ICA PROX DIAS: 18.6 CM/S
LEFT ICA PROX SYS: 42.8 CM/S
LEFT ICA/CCA SYS: 1.8
LEFT VERTEBRAL DIAS: 17.5 CM/S
LEFT VERTEBRAL SYS: 55.9 CM/S
P SHIGELLOIDES DNA STL QL NAA+PROBE: NEGATIVE
PHOSPHATE SERPL-MCNC: 2.5 MG/DL (ref 2.6–4.7)
POTASSIUM SERPL-SCNC: 3.6 MMOL/L (ref 3.5–5.1)
RIGHT CCA DIST DIAS: 12.9 CM/S
RIGHT CCA DIST SYS: 61.1 CM/S
RIGHT CCA PROX DIAS: 12.9 CM/S
RIGHT CCA PROX SYS: 92.5 CM/S
RIGHT ECA DIAS: 19.4 CM/S
RIGHT ECA SYS: 80.7 CM/S
RIGHT ICA DIST DIAS: 48.8 CM/S
RIGHT ICA DIST SYS: 85.9 CM/S
RIGHT ICA MID DIAS: 29.6 CM/S
RIGHT ICA MID SYS: 66.9 CM/S
RIGHT ICA PROX DIAS: 10.9 CM/S
RIGHT ICA PROX SYS: 52.6 CM/S
RIGHT ICA/CCA SYS: 1.4
RIGHT VERTEBRAL DIAS: 11.2 CM/S
RIGHT VERTEBRAL SYS: 53.8 CM/S
SALMONELLA SPECIES, DNA: NEGATIVE
SAMPLES BEING HELD,HOLD: NORMAL
SERVICE CMNT-IMP: ABNORMAL
SERVICE CMNT-IMP: NORMAL
SHIGA TOXIN PRODUCING, DNA: NEGATIVE
SHIGELLA SP+EIEC IPAH STL QL NAA+PROBE: NEGATIVE
SODIUM SERPL-SCNC: 139 MMOL/L (ref 136–145)
VIBRIO SPECIES, DNA: NEGATIVE
Y. ENTEROCOLITICA, DNA: NEGATIVE

## 2022-01-04 PROCEDURE — 80069 RENAL FUNCTION PANEL: CPT

## 2022-01-04 PROCEDURE — 82962 GLUCOSE BLOOD TEST: CPT

## 2022-01-04 PROCEDURE — 65660000000 HC RM CCU STEPDOWN

## 2022-01-04 PROCEDURE — 74011000250 HC RX REV CODE- 250: Performed by: HOSPITALIST

## 2022-01-04 PROCEDURE — 74011250637 HC RX REV CODE- 250/637: Performed by: HOSPITALIST

## 2022-01-04 RX ORDER — CARVEDILOL 3.12 MG/1
3.12 TABLET ORAL 2 TIMES DAILY WITH MEALS
Status: DISCONTINUED | OUTPATIENT
Start: 2022-01-05 | End: 2022-01-05

## 2022-01-04 RX ORDER — TAMSULOSIN HYDROCHLORIDE 0.4 MG/1
0.4 CAPSULE ORAL DAILY
Status: DISCONTINUED | OUTPATIENT
Start: 2022-01-05 | End: 2022-01-05 | Stop reason: HOSPADM

## 2022-01-04 RX ADMIN — ASPIRIN 81 MG CHEWABLE TABLET 81 MG: 81 TABLET CHEWABLE at 09:24

## 2022-01-04 RX ADMIN — ESCITALOPRAM OXALATE 20 MG: 10 TABLET ORAL at 09:55

## 2022-01-04 RX ADMIN — SODIUM CHLORIDE, PRESERVATIVE FREE 10 ML: 5 INJECTION INTRAVENOUS at 14:00

## 2022-01-04 RX ADMIN — SODIUM CHLORIDE, PRESERVATIVE FREE 10 ML: 5 INJECTION INTRAVENOUS at 21:52

## 2022-01-04 RX ADMIN — ATORVASTATIN CALCIUM 10 MG: 10 TABLET, FILM COATED ORAL at 21:51

## 2022-01-04 NOTE — PROGRESS NOTES
[facility]   [address] [facility], [address] [facility]  Phone: [phone]   [phone]    [email or web address]     Nephrology Progress Note    Patient Name : Lynn Ferreira      : 1965     MRN : [id number]  Date of Admission : 2022  Date of Servive : 22    CC:  Follow up for YISSEL     Assessment and Plan   YISSEL:  - suspect 2/2 IVVD from diarrhea in the setting of ACE/thiazide and metfomin use  - CT scan, renal US- neg for obstruction  -Resolved with IVF  -Okay for discharge from renal standpoint. Recommend holding lisinopril HCTZ until he comes for outpatient follow-up. -Okay to resume Metformin  -Counseled on avoiding ibuprofen  -He really needs to come for outpatient follow-up for CKD management     CKD 3a:  - baseline Cr around 1.5  - presumed 2/2 HTN and DM2     Hypovolemia:  - from GI losses, poor po intake  -Resolved with IVF     Hypotension:  -Resolved with IVF  -Hold lisinopril/HCTZ on discharge.  -Can start hydralazine 50 mg twice daily     Diarrhea:  -Resolved     Syncope:  - w/u per primary team     Chronic anemia:  - + gammopathy eval in the past  - hgb stable     DM2:  - per hospitalist     Obesity     Interval History:  Seen and examined. He is still waiting for bed and is in ER since admission. BP has normalized. Serum creatinine is down to his baseline of 1.5 mg/dL. He denies any more diarrhea, nausea or vomiting. He denies any abdominal pain. Review of Systems: A comprehensive review of systems was negative except for that written in the HPI.     Current Medications:   Current Facility-Administered Medications   Medication Dose Route Frequency    traZODone (DESYREL) tablet 50 mg  50 mg Oral QHS PRN    sodium chloride (NS) flush 5-40 mL  5-40 mL IntraVENous Q8H    sodium chloride (NS) flush 5-40 mL  5-40 mL IntraVENous PRN    acetaminophen (TYLENOL) tablet 650 mg  650 mg Oral Q6H PRN    Or    acetaminophen (TYLENOL) suppository 650 mg  650 mg Rectal Q6H PRN    polyethylene glycol (MIRALAX) packet 17 g  17 g Oral DAILY PRN    ondansetron (ZOFRAN ODT) tablet 4 mg  4 mg Oral Q8H PRN    Or    ondansetron (ZOFRAN) injection 4 mg  4 mg IntraVENous Q6H PRN    atorvastatin (LIPITOR) tablet 10 mg  10 mg Oral QHS    aspirin chewable tablet 81 mg  81 mg Oral DAILY    escitalopram oxalate (LEXAPRO) tablet 20 mg  20 mg Oral DAILY    glucose chewable tablet 16 g  4 Tablet Oral PRN    dextrose (D50W) injection syrg 12.5-25 g  25-50 mL IntraVENous PRN    glucagon (GLUCAGEN) injection 1 mg  1 mg IntraMUSCular PRN    insulin lispro (HUMALOG) injection   SubCUTAneous AC&HS     Current Outpatient Medications   Medication Sig    baclofen (LIORESAL) 20 mg tablet TAKE 1 TABLET BY MOUTH THREE TIMES DAILY FOR MUSCLE SPASM    escitalopram oxalate (LEXAPRO) 20 mg tablet TAKE 1 TABLET BY MOUTH ONCE DAILY FOR ANXIETY    calcium carbonate (TUMS) 200 mg calcium (500 mg) chew Take 1 Tablet by mouth daily.  atorvastatin (LIPITOR) 10 mg tablet Take 1 Tablet by mouth nightly.  tamsulosin (FLOMAX) 0.4 mg capsule TAKE 1 CAPSULE BY MOUTH ONCE DAILY FOR PROSTATE    cloNIDine HCL (CATAPRES) 0.1 mg tablet Take 1 Tablet by mouth two (2) times a day.  aspirin 81 mg chewable tablet Take 81 mg by mouth daily.  carvediloL (COREG) 25 mg tablet Take 1 Tablet by mouth two (2) times daily (with meals).  lisinopril-hydroCHLOROthiazide (PRINZIDE, ZESTORETIC) 20-25 mg per tablet Take 1 Tablet by mouth daily. Indications: high blood pressure    magnesium oxide (MAG-OX) 400 mg tablet Take 1 Tablet by mouth nightly.  metFORMIN (GLUCOPHAGE) 500 mg tablet Take 1 Tablet by mouth two (2) times daily (with meals).  traZODone (DESYREL) 50 mg tablet Take 1 Tab by mouth nightly. Indications: SLEEP    ibuprofen (MOTRIN) 600 mg tablet Take 600 mg by mouth every six (6) hours as needed for Pain.       No Known Allergies    Objective:  Vitals:    Vitals: 01/04/22 0534 01/04/22 0700 01/04/22 0800 01/04/22 0900   BP: (!) 136/95 (!) 143/90  133/85   Pulse: 68 71  67   Resp: 14 20  13   Temp:   98.7 °F (37.1 °C)    SpO2: 96% 96%     Weight:       Height:         Intake and Output:  No intake/output data recorded. 01/02 1901 - 01/04 0700  In: 1000 [I.V.:1000]  Out: 2400 [Urine:2400]    Physical Examination:  General: NAD,Conversant   Neck:  Supple, no mass  Resp:  Lungs CTA B/L, no wheezing , normal respiratory effort  CV:  RRR,  no murmur or rub, LE edema  GI:  Soft, NT, + BS, no HS megaly  Neurologic:  Non focal  Psych:             AAO x 3 appropriate affect       []    High complexity decision making was performed  []    Patient is at high-risk of decompensation with multiple organ involvement    Lab Data Personally Reviewed: I have reviewed all the pertinent labs, microbiology data and radiology studies during assessment. Recent Labs     01/04/22  0222 01/03/22  0640 01/02/22  1157    139  138 136   K 3.6 3.4*  3.4* 3.8   * 109*  110* 104   CO2 25 24  23 25   * 139*  140* 137*   BUN 31* 49*  51* 60*   CREA 1.52* 2.58*  2.62* 4.92*   CA 8.6 8.1*  8.2* 9.1   MG  --  2.4  --    PHOS 2.5* 3.0  3.2  --    ALB 2.7* 2.9*  2.8* 3.5   ALT  --  43 58     Recent Labs     01/03/22  0640 01/02/22  1157   WBC 7.5 5.8   HGB 10.6* 12.4   HCT 33.1* 38.4    334     No results found for: SDES  Lab Results   Component Value Date/Time    Culture result: SCANT NORMAL RESPIRATORY ALDO 12/28/2020 04:56 PM    Culture result: MRSA NOT PRESENT 12/24/2020 09:20 AM    Culture result:  12/24/2020 09:20 AM         Screening of patient nares for MRSA is for surveillance purposes and, if positive, to facilitate isolation considerations in high risk settings. It is not intended for automatic decolonization interventions per se as regimens are not sufficiently effective to warrant routine use.     Culture result: NO GROWTH 2 DAYS 12/23/2020 04:16 PM    Culture result: (A) 12/22/2020 08:45 AM     RARE HAEMOPHILUS INFLUENZAE BETA LACTAMASE NEGATIVE    Culture result: RARE NORMAL RESPIRATORY ALDO 12/22/2020 08:45 AM     Recent Results (from the past 24 hour(s))   TROPONIN-HIGH SENSITIVITY    Collection Time: 01/03/22 10:12 AM   Result Value Ref Range    Troponin-High Sensitivity 96 (HH) 0 - 76 ng/L   ECHO ADULT COMPLETE    Collection Time: 01/03/22 11:52 AM   Result Value Ref Range    IVSd 1.7 (A) 0.6 - 1.0 cm    LVIDd 3.9 (A) 4.2 - 5.9 cm    LVIDs 2.8 cm    LVPWd 1.5 (A) 0.6 - 1.0 cm    LVOT Peak Gradient 6 mmHg    LVOT Peak Velocity 1.2 m/s    RVSP 23 mmHg    LA Diameter 3.3 cm    Est. RA Pressure 3 mmHg    AR PHT 3,993.9 millisecond    AR Max Velocity PISA 3.6 m/s    AV Peak Gradient 13 mmHg    AV Peak Velocity 1.8 m/s    MV A Velocity 0.76 m/s    MV E Wave Deceleration Time 290.8 ms    MV E Velocity 0.57 m/s    LV E' Lateral Velocity 12 cm/s    LV E' Septal Velocity 6 cm/s    MV PHT 84.3 ms    MV Area by PHT 2.6 cm2    PV Peak Gradient 4 mmHg    PV Max Velocity 1.0 m/s    TAPSE 2.4 (A) 1.5 - 2.0 cm    TR Peak Gradient 20 mmHg    TR Max Velocity 2.21 m/s    Aortic Root 3.0 cm    Fractional Shortening 2D 28 28 - 44 %    LVIDd Index 1.83 cm/m2    LVIDs Index 1.31 cm/m2    LV RWT Ratio 0.77     LV Mass 2D 249.0 (A) 88 - 224 g    LV Mass 2D Index 116.9 (A) 49 - 115 g/m2    MV E/A 0.75     E/E' Ratio (Averaged) 7.13     E/E' Lateral 4.75     E/E' Septal 9.50     LA Size Index 1.55 cm/m2    LA/AO Root Ratio 1.10     Ao Root Index 1.41 cm/m2    AV Velocity Ratio 0.67    GLUCOSE, POC    Collection Time: 01/03/22 12:17 PM   Result Value Ref Range    Glucose (POC) 166 (H) 65 - 117 mg/dL    Performed by Ethan Desouza    Collection Time: 01/03/22  2:16 PM   Result Value Ref Range    Right CCA prox sys 92.5 cm/s    Right CCA prox raygoza 12.9 cm/s    Right cca dist sys 61.1 cm/s    Right CCA dist raygoza 12.9 cm/s    Right eca sys 80.7 cm/s    RIGHT EXTERNAL CAROTID ARTERY D 19.40 cm/s    Right ICA prox sys 52.6 cm/s    Right ICA prox raygoza 10.9 cm/s    Right ICA mid sys 66.9 cm/s    Right ICA mid raygoza 29.6 cm/s    Right ICA dist sys 85.9 cm/s    Right ICA dist raygoza 48.8 cm/s    Right vertebral sys 53.8 cm/s    RIGHT VERTEBRAL ARTERY D 11.20 cm/s    Right ICA/CCA sys 1.4     Left CCA prox sys 144.6 cm/s    Left CCA prox raygoza 41.5 cm/s    Left CCA dist sys 61.3 cm/s    Left CCA dist raygoza 19.5 cm/s    Left ECA sys 44.7 cm/s    LEFT EXTERNAL CAROTID ARTERY D 10.60 cm/s    Left ICA prox sys 42.8 cm/s    Left ICA prox raygoza 18.6 cm/s    Left ICA mid sys 113.1 cm/s    Left ICA mid raygoza 41.8 cm/s    Left ICA dist sys 85.6 cm/s    Left ICA dist raygoza 32.7 cm/s    Left vertebral sys 55.9 cm/s    LEFT VERTEBRAL ARTERY D 17.50 cm/s    Left ICA/CCA sys 1.80    TROPONIN-HIGH SENSITIVITY    Collection Time: 01/03/22  4:07 PM   Result Value Ref Range    Troponin-High Sensitivity 127 (HH) 0 - 76 ng/L   GLUCOSE, POC    Collection Time: 01/03/22  6:19 PM   Result Value Ref Range    Glucose (POC) 154 (H) 65 - 117 mg/dL    Performed by Fatemeh Pen    GLUCOSE, POC    Collection Time: 01/03/22 10:01 PM   Result Value Ref Range    Glucose (POC) 130 (H) 65 - 117 mg/dL    Performed by Fatemeh Pen    RENAL FUNCTION PANEL    Collection Time: 01/04/22  2:22 AM   Result Value Ref Range    Sodium 139 136 - 145 mmol/L    Potassium 3.6 3.5 - 5.1 mmol/L    Chloride 111 (H) 97 - 108 mmol/L    CO2 25 21 - 32 mmol/L    Anion gap 3 (L) 5 - 15 mmol/L    Glucose 121 (H) 65 - 100 mg/dL    BUN 31 (H) 6 - 20 MG/DL    Creatinine 1.52 (H) 0.70 - 1.30 MG/DL    BUN/Creatinine ratio 20 12 - 20      GFR est AA 58 (L) >60 ml/min/1.73m2    GFR est non-AA 48 (L) >60 ml/min/1.73m2    Calcium 8.6 8.5 - 10.1 MG/DL    Phosphorus 2.5 (L) 2.6 - 4.7 MG/DL    Albumin 2.7 (L) 3.5 - 5.0 g/dL   SAMPLES BEING HELD    Collection Time: 01/04/22  2:22 AM   Result Value Ref Range    SAMPLES BEING HELD 1LAV,1BLUE,1PST,1RED COMMENT        Add-on orders for these samples will be processed based on acceptable specimen integrity and analyte stability, which may vary by analyte. GLUCOSE, POC    Collection Time: 01/04/22  7:13 AM   Result Value Ref Range    Glucose (POC) 126 (H) 65 - 117 mg/dL    Performed by Kami Davis            I have reviewed the flowsheets. Chart and Pertinent Notes have been reviewed. No change in PMH ,family and social history from Consult note.       Priscilla Dumont [facility]

## 2022-01-04 NOTE — ED NOTES
Bedside and Verbal shift change report given to RN Aspen Wheatley (oncoming nurse) by Jeniffer Venegas (offgoing nurse). Report included the following information SBAR, Kardex, ED Summary and MAR.

## 2022-01-04 NOTE — PROGRESS NOTES
[facility] Adult  Hospitalist Group                                                                                          Hospitalist Progress Note  Chetna Ramos MD  Answering service: [phone] or [phone] from in house phone        Date of Service:  2022  NAME:  Maria E Hammond  :  1965  MRN:  [id number]      Admission Summary:   Maria E Hammond is a 49 y.o. male who presents with lightheadedness and episode of syncope      Maria E Hammond is a 49 y.o. male with past medical history notable for hypertension, NSTEMI, CKD 3 presenting with lightheadedness, generalized weakness and an episode of syncope earlier today. Bethanie Cronin states a very poor appetite over the past week.  In addition for several weeks has had intermittent greenish diarrhea.  Denies hematochezia or hematemesis.  No fevers or abdominal pain.  He has not had anything to eat for several days. Bethanie Cronin has been taking his blood pressure medications however consistently.  Today he had a syncopal episode after he fell on the ground.  He denies headache, chest pain, dyspnea, fevers or chills or cough.     Interval history / Subjective:   Resting in bed, no complaints     Assessment & Plan:     Acute kidney injury on CKD 3  Labile blood pressure  Syncope  Diabetes mellitus type 2  Diarrhea  Hypokalemia resolved    YISSEL resolving, YISSEL likely prerenal in setting of diarrhea and patient being on ACE/thiazide and Metformin, renal ultrasound negative for obstruction, creatinine baseline appreciate nephrology input, hold ACE/thiazide/ monitor and reassess as needed, avoid nephrotoxic medication, trend creatinine  Patient now hypertensive, restart Coreg at a low dose, orthostatic vitals pending, calibrate blood pressure medication as needed  Likely secondary to above, orthostatic vitals, echocardiogram noted, troponin slightly elevated, likely demand, cardiology consult, aspirin, trend, carotid duplex with no hemodynamic significant stenosis, telemetry, monitor and if symptoms persist may consider further intervention and diagnostics, PT consult  Sliding scale along insulin, Accu-Cheks, diet control, close monitoring  Diarrhea resolved, monitor        Code status: Full  DVT prophylaxis: SCD    Care Plan discussed with: Patient/Family  Anticipated Disposition: Home w/Family  Anticipated Discharge: 24 hours to 48 hours     Hospital Problems  Date Reviewed: 1/3/2022          Codes Class Noted POA    YISSEL (acute kidney injury) [facility]) ICD-10-CM: N17.9  ICD-9-CM: 584.9  1/2/2022 Unknown                Review of Systems:   A comprehensive review of systems was negative except for that written in the HPI. Vital Signs:    Last 24hrs VS reviewed since prior progress note.  Most recent are:  Visit Vitals  BP (!) 156/91   Pulse 71   Temp 98.5 °F (36.9 °C)   Resp 17   Ht 5' 9\" (1.753 m)   Wt 97 kg (213 lb 13.5 oz)   SpO2 95%   BMI 31.58 kg/m²         Intake/Output Summary (Last 24 hours) at 1/4/2022 1512  Last data filed at 1/4/2022 1209  Gross per 24 hour   Intake 1120 ml   Output 2650 ml   Net -1530 ml        Physical Examination:     I had a face to face encounter with this patient and independently examined them on 1/4/2022 as outlined below:          General : alert x 3, awake, no acute distress, resting in bed, pleasant male, appears to be stated age  [de-identified]: PEERL, EOMI, moist mucus membrane, TM clear, large lipoma on the forehead  Neck: supple, no JVD, no meningeal signs  Chest: Clear to auscultation bilaterally   CVS: S1 S2 heard, Capillary refill less than 2 seconds  Abd: soft/ Non tender, non distended, BS physiological,   Ext: no clubbing, no cyanosis, no edema, brisk 2+ DP pulses  Neuro/Psych: pleasant mood and affect, CN 2-12 grossly intact,   Skin: warm     Data Review:    Review and/or order of clinical lab test      Labs:     Recent Labs     01/03/22  0640 01/02/22  1157   WBC 7.5 5.8   HGB 10.6* 12.4   HCT 33.1* 38.4    334     Recent Labs 01/04/22 0222 01/03/22 0640 01/02/22  1157    139  138 136   K 3.6 3.4*  3.4* 3.8   * 109*  110* 104   CO2 25 24  23 25   BUN 31* 49*  51* 60*   CREA 1.52* 2.58*  2.62* 4.92*   * 139*  140* 137*   CA 8.6 8.1*  8.2* 9.1   MG  --  2.4  --    PHOS 2.5* 3.0  3.2  --      Recent Labs     01/04/22 0222 01/03/22  0640 01/02/22  1157   ALT  --  43 58   AP  --  93 84   TBILI  --  0.2 0.7   TP  --  7.2 9.1*   ALB 2.7* 2.9*  2.8* 3.5   GLOB  --  4.3* 5.6*     No results for input(s): INR, PTP, APTT, INREXT, INREXT in the last 72 hours. No results for input(s): FE, TIBC, PSAT, FERR in the last 72 hours. No results found for: FOL, RBCF   No results for input(s): PH, PCO2, PO2 in the last 72 hours. No results for input(s): CPK, CKNDX, TROIQ in the last 72 hours.     No lab exists for component: CPKMB  Lab Results   Component Value Date/Time    Cholesterol, total 164 11/29/2021 03:15 PM    HDL Cholesterol 40 11/29/2021 03:15 PM    LDL, calculated 72 11/29/2021 03:15 PM    Triglyceride 260 (H) 11/29/2021 03:15 PM    CHOL/HDL Ratio 4.1 11/29/2021 03:15 PM     Lab Results   Component Value Date/Time    Glucose (POC) 132 (H) 01/04/2022 12:25 PM    Glucose (POC) 126 (H) 01/04/2022 07:13 AM    Glucose (POC) 130 (H) 01/03/2022 10:01 PM    Glucose (POC) 154 (H) 01/03/2022 06:19 PM    Glucose (POC) 166 (H) 01/03/2022 12:17 PM     Lab Results   Component Value Date/Time    Color YELLOW/STRAW 01/02/2022 05:41 PM    Appearance CLEAR 01/02/2022 05:41 PM    Specific gravity 1.015 01/02/2022 05:41 PM    Specific gravity 1.020 02/01/2021 02:01 PM    pH (UA) 5.0 01/02/2022 05:41 PM    Protein 30 (A) 01/02/2022 05:41 PM    Glucose Negative 01/02/2022 05:41 PM    Ketone Negative 01/02/2022 05:41 PM    Bilirubin Negative 01/02/2022 05:41 PM    Urobilinogen 0.2 01/02/2022 05:41 PM    Nitrites Negative 01/02/2022 05:41 PM    Leukocyte Esterase Negative 01/02/2022 05:41 PM    Epithelial cells FEW 01/02/2022 05:41 PM    Bacteria Negative 01/02/2022 05:41 PM    WBC 0-4 01/02/2022 05:41 PM    RBC 0-5 01/02/2022 05:41 PM         Medications Reviewed:     Current Facility-Administered Medications   Medication Dose Route Frequency    traZODone (DESYREL) tablet 50 mg  50 mg Oral QHS PRN    sodium chloride (NS) flush 5-40 mL  5-40 mL IntraVENous Q8H    sodium chloride (NS) flush 5-40 mL  5-40 mL IntraVENous PRN    acetaminophen (TYLENOL) tablet 650 mg  650 mg Oral Q6H PRN    Or    acetaminophen (TYLENOL) suppository 650 mg  650 mg Rectal Q6H PRN    polyethylene glycol (MIRALAX) packet 17 g  17 g Oral DAILY PRN    ondansetron (ZOFRAN ODT) tablet 4 mg  4 mg Oral Q8H PRN    Or    ondansetron (ZOFRAN) injection 4 mg  4 mg IntraVENous Q6H PRN    atorvastatin (LIPITOR) tablet 10 mg  10 mg Oral QHS    aspirin chewable tablet 81 mg  81 mg Oral DAILY    escitalopram oxalate (LEXAPRO) tablet 20 mg  20 mg Oral DAILY    glucose chewable tablet 16 g  4 Tablet Oral PRN    dextrose (D50W) injection syrg 12.5-25 g  25-50 mL IntraVENous PRN    glucagon (GLUCAGEN) injection 1 mg  1 mg IntraMUSCular PRN    insulin lispro (HUMALOG) injection   SubCUTAneous AC&HS     ______________________________________________________________________  EXPECTED LENGTH OF STAY: - - -  ACTUAL LENGTH OF STAY:          2      Please note that this dictation was completed with [employer], the computer voice recognition software. Quite often unanticipated grammatical, syntax, homophones, and other interpretive errors are inadvertently transcribed by the computer software. Please disregard these errors. Please excuse any errors that have escaped final proofreading.                 Xuan Awad MD

## 2022-01-04 NOTE — PROGRESS NOTES
RAFIA:  Anticipate discharge home pending medical progress. Patient is uninsured. Transportation likely in car with girlfriend. Reason for Admission: YISSEL                     RUR Score:   10%                  Plan for utilizing home health:  NA        PCP: First and Last name:  Joaquin Dean NP     Name of Practice:    Are you a current patient: Yes/No: Yes   Approximate date of last visit: October or November 2021   Can you participate in a virtual visit with your PCP:                     Current Advanced Directive/Advance Care Plan: Full Code      Healthcare Decision Maker:   Click here to complete [address] including selection of the Healthcare Decision Maker Relationship (ie \"Primary\")             Primary Decision Maker: Mila Garduno Girlfriend - [phone]                  Transition of Care Plan:    CM met with patient at bedside on this date. Patient is alert and oriented x4. Demographics confirmed. Patient lives in a home with his girlfriend. There are about 5 steps to enter, but no steps within the home. No DME. Patient is ambulatory, independent in ADLs and drives. Hx: HTN, NSTEMI, CKD stage 3. PCP confirmed and pharmacy used is [facility] located at [address] [place] [address] [address] phone: [phone]. Barrier to discharge is lack of medical insurance. Care Management Interventions  PCP Verified by CM: Yes (Dr. Mariano Garcia)  Mode of Transport at Discharge:  Other (see comment) (in car with girlfriend)  [facility]: No  Discharge Durable Medical Equipment: No  Physical Therapy Consult: No  Occupational Therapy Consult: No  Speech Therapy Consult: No  Support Systems: Spouse/Significant Other  Confirm Follow Up Transport: Family  The Plan for Transition of Care is Related to the Following Treatment Goals : home  Discharge Location  Discharge Placement: [id number] [address] [place] [address], [address] [facility]  [phone]

## 2022-01-05 VITALS
HEART RATE: 71 BPM | DIASTOLIC BLOOD PRESSURE: 98 MMHG | OXYGEN SATURATION: 97 % | RESPIRATION RATE: 17 BRPM | TEMPERATURE: 98.7 F | WEIGHT: 213.85 LBS | SYSTOLIC BLOOD PRESSURE: 147 MMHG | BODY MASS INDEX: 31.67 KG/M2 | HEIGHT: 69 IN

## 2022-01-05 LAB
ALBUMIN SERPL-MCNC: 2.8 G/DL (ref 3.5–5)
ANION GAP SERPL CALC-SCNC: 4 MMOL/L (ref 5–15)
BUN SERPL-MCNC: 17 MG/DL (ref 6–20)
BUN/CREAT SERPL: 13 (ref 12–20)
CALCIUM SERPL-MCNC: 9.2 MG/DL (ref 8.5–10.1)
CHLORIDE SERPL-SCNC: 107 MMOL/L (ref 97–108)
CO2 SERPL-SCNC: 25 MMOL/L (ref 21–32)
CREAT SERPL-MCNC: 1.3 MG/DL (ref 0.7–1.3)
GLUCOSE BLD STRIP.AUTO-MCNC: 122 MG/DL (ref 65–117)
GLUCOSE BLD STRIP.AUTO-MCNC: 126 MG/DL (ref 65–117)
GLUCOSE BLD STRIP.AUTO-MCNC: 149 MG/DL (ref 65–117)
GLUCOSE SERPL-MCNC: 120 MG/DL (ref 65–100)
PHOSPHATE SERPL-MCNC: 1.9 MG/DL (ref 2.6–4.7)
POTASSIUM SERPL-SCNC: 3.5 MMOL/L (ref 3.5–5.1)
SERVICE CMNT-IMP: ABNORMAL
SODIUM SERPL-SCNC: 136 MMOL/L (ref 136–145)
TROPONIN-HIGH SENSITIVITY: 54 NG/L (ref 0–76)

## 2022-01-05 PROCEDURE — 74011250637 HC RX REV CODE- 250/637: Performed by: FAMILY MEDICINE

## 2022-01-05 PROCEDURE — 84484 ASSAY OF TROPONIN QUANT: CPT

## 2022-01-05 PROCEDURE — 99253 IP/OBS CNSLTJ NEW/EST LOW 45: CPT | Performed by: SPECIALIST

## 2022-01-05 PROCEDURE — 74011636637 HC RX REV CODE- 636/637: Performed by: HOSPITALIST

## 2022-01-05 PROCEDURE — 74011250637 HC RX REV CODE- 250/637: Performed by: HOSPITALIST

## 2022-01-05 PROCEDURE — 74011250637 HC RX REV CODE- 250/637: Performed by: INTERNAL MEDICINE

## 2022-01-05 PROCEDURE — 82962 GLUCOSE BLOOD TEST: CPT

## 2022-01-05 PROCEDURE — 74011000250 HC RX REV CODE- 250: Performed by: HOSPITALIST

## 2022-01-05 PROCEDURE — 80069 RENAL FUNCTION PANEL: CPT

## 2022-01-05 PROCEDURE — 36415 COLL VENOUS BLD VENIPUNCTURE: CPT

## 2022-01-05 RX ORDER — HYDRALAZINE HYDROCHLORIDE 25 MG/1
25 TABLET, FILM COATED ORAL 3 TIMES DAILY
Qty: 90 TABLET | Refills: 0 | Status: SHIPPED | OUTPATIENT
Start: 2022-01-05 | End: 2022-02-04

## 2022-01-05 RX ORDER — CARVEDILOL 12.5 MG/1
25 TABLET ORAL 2 TIMES DAILY WITH MEALS
Status: DISCONTINUED | OUTPATIENT
Start: 2022-01-05 | End: 2022-01-05 | Stop reason: HOSPADM

## 2022-01-05 RX ORDER — HYDRALAZINE HYDROCHLORIDE 25 MG/1
25 TABLET, FILM COATED ORAL 3 TIMES DAILY
Status: DISCONTINUED | OUTPATIENT
Start: 2022-01-05 | End: 2022-01-05 | Stop reason: HOSPADM

## 2022-01-05 RX ADMIN — HYDRALAZINE HYDROCHLORIDE 25 MG: 25 TABLET, FILM COATED ORAL at 09:11

## 2022-01-05 RX ADMIN — ACETAMINOPHEN 650 MG: 325 TABLET ORAL at 04:45

## 2022-01-05 RX ADMIN — ASPIRIN 81 MG CHEWABLE TABLET 81 MG: 81 TABLET CHEWABLE at 09:11

## 2022-01-05 RX ADMIN — ESCITALOPRAM OXALATE 20 MG: 10 TABLET ORAL at 09:11

## 2022-01-05 RX ADMIN — SODIUM CHLORIDE, PRESERVATIVE FREE 10 ML: 5 INJECTION INTRAVENOUS at 07:21

## 2022-01-05 RX ADMIN — TAMSULOSIN HYDROCHLORIDE 0.4 MG: 0.4 CAPSULE ORAL at 09:11

## 2022-01-05 RX ADMIN — SODIUM CHLORIDE, PRESERVATIVE FREE 10 ML: 5 INJECTION INTRAVENOUS at 14:00

## 2022-01-05 RX ADMIN — HYDRALAZINE HYDROCHLORIDE 25 MG: 25 TABLET, FILM COATED ORAL at 16:34

## 2022-01-05 RX ADMIN — CARVEDILOL 3.12 MG: 3.12 TABLET, FILM COATED ORAL at 09:11

## 2022-01-05 RX ADMIN — Medication 2 UNITS: at 07:20

## 2022-01-05 RX ADMIN — CARVEDILOL 25 MG: 12.5 TABLET, FILM COATED ORAL at 16:34

## 2022-01-05 NOTE — PROGRESS NOTES
Bedside shift change report given to JAMES Little (oncoming nurse) by Caryle Breath (offgoing nurse).  Report included the following information SBAR, Intake/Output, MAR, Recent Results and Cardiac Rhythm SR.

## 2022-01-05 NOTE — CONSULTS
[facility]  Cardiology Care Note                                    Initial visit      Patient Name: Mynor Rodriguez - WYR:[birth year] - RVO:[id number]  Primary Cardiologist: Reji Malik MD  Consulting Cardiologist: Nish Ulloa MD  Reason for Consult: elevated troponin       HPI:  Mr. Josee Mata is a 49 y.o. male with Dilated cardiomyopathy with recovered EF, HTN, DM. He presents to ER on 1/2/2021 with chief c/o syncope and hypotension. Per chart, pt had a witnessed ground level fall with no LOC but then had syncopal episode upon standing. Pt recalls that he got up, fell into a wall and then past out. He was incontinent of urine when he awoke. He does not recall any pre-event dizziness or lightheadedness. EMS was called and BP was 77/51, . At [facility] ER, pt was given 2 liters of IVF with improvement in BP. He reports that he has had diarreha for multiple days and has had poor po intake. He denies fevers, chills. Denies any history of chest pain. Now with YISSEL and his BP is elevated. He is uncertain of exactly what bp meds he is taking. His echocardiogram demonstrates low normal,  unchanged LV function (EF 55-60%). He had LHC in 12/2019 after abnormal stress test (fixed defect, no ischemia). LHC showed normal coronary arteries. Assessment and Plan     1. Elevated troponin  - -Denies any history of chest pain   -Troponin mildly elevated -58   -12 lead EKG: NSR, no changes from prior EKG 1/3/2021   -He had LHC in 12/2019 which demonstrated normal coronaries.    -Echo 1/3/21: EF 55-60%, NWMA  01/02/22    ECHO ADULT COMPLETE 01/03/2022 1/3/2022    Interpretation Summary    Left Ventricle: Left ventricle size is normal. Moderately increased wall thickness. Normal wall motion. Normal left ventricular systolic function with a visually estimated EF of 55 - 60%. Normal diastolic function.    Mitral Valve: Mild systolic anterior motion. Signed by: Marquis Chavez MD on 1/3/2022  2:17 PM       -Troponin elevation in setting of hypotension episode and abnormal renal function. Suspect due to demand given [facility] in 12/2019    2. Syncope   -Suspect due to dehydration given, hx of diarrhea, poor po intake and YISSEL,  hypotension responsive to IVF   -No evidence of bradycardia, heart block on telemetry. 3. HTN   -Hypotensive on presentation   -BP now elevated. Recheck this afternoon after meds.   -Coreg. 3.125 mg BID here (25 mg BID PTA)   -Home lisinopril-HCTZ held due to YISSEL   -hydralazine 25 mg TID started per primary team   -on clonidine 0.1 mg BID PTA    4. History of dilated CM:   -with recovered EF: EF now 55-60%. -off ACE-I due to YISSEL. Hydralazine started. Continue coreg     5. DM   Per primary team.  Lab Results   Component Value Date/Time    Hemoglobin A1c 6.1 (H) 11/29/2021 03:15 PM     6. Anemia: mild   Hgb 10.6. Denies any hx of blood in stool/black stools   -eval per primary team.      Further plan per Dr. Ayse Torres   Patient seen on the day of progress note and examined  and agree with Advance Practice Provider (MARIA E, NP,PA)  assessment and plans.   No cp or sob or palpitations  Echo essentially unchanged  Cath with no cad in 2019  ecg with nsr nstt but no changes from previous   no rhythm issues noted thus far  IVF as per primary team  No additional cardiac interventions for now  Follow up with Dr. Christina Barrera in the next week after dc            Patient Active Problem List   Diagnosis Code    Essential hypertension I10    Dilated cardiomyopathy ([facility] I42.0    Arm paresthesia, left R20.2    Intractable headache R51.9    Cervical radiculopathy M54.12    Borderline type 2 diabetes mellitus R73.03    History of non-ST elevation myocardial infarction (NSTEMI) I25.2    Class 1 obesity due to excess calories with serious comorbidity and body mass index (BMI) of 30.0 to 30.9 in adult E66.09, Z68.30    [facility] N17.9   Cardiac testin/19 echo lvef 40-45%    lexiscan with fixed inf defect    normal cardiac cath   20  echo lvef 40-45%, mild mr, lvh   echo normal lvef, mild lvh, very mild ai      Review of Systems:    [] Patient unable to provide secondary to condition    CONSTITUTIONAL: negative     ENT/MOUTH: negative     EYES: negative    Cardiology: negative     RESPIRATORY: negative      GASTROINTESTINAL: diarrhea     GENITOURINARY: negative     MUSCULOSKELETAL: negative      SKIN: negative    NEUROLOGICAL: syncope , recent falls      PSYCHOLOGICAL: negative      HEME/LYMPH: negative    ENDOCRINE: negative        Past Medical History:   Diagnosis Date    Hypertension     NSTEMI (non-ST elevated [facility]) [facility] 2019     No past surgical history on file.   Current Facility-Administered Medications   Medication Dose Route Frequency    hydrALAZINE (APRESOLINE) tablet 25 mg  25 mg Oral TID    carvediloL (COREG) tablet 3.125 mg  3.125 mg Oral BID WITH MEALS    tamsulosin (FLOMAX) capsule 0.4 mg  0.4 mg Oral DAILY    traZODone (DESYREL) tablet 50 mg  50 mg Oral QHS PRN    sodium chloride (NS) flush 5-40 mL  5-40 mL IntraVENous Q8H    sodium chloride (NS) flush 5-40 mL  5-40 mL IntraVENous PRN    acetaminophen (TYLENOL) tablet 650 mg  650 mg Oral Q6H PRN    Or    acetaminophen (TYLENOL) suppository 650 mg  650 mg Rectal Q6H PRN    polyethylene glycol (MIRALAX) packet 17 g  17 g Oral DAILY PRN    ondansetron (ZOFRAN ODT) tablet 4 mg  4 mg Oral Q8H PRN    Or    ondansetron (ZOFRAN) injection 4 mg  4 mg IntraVENous Q6H PRN    atorvastatin (LIPITOR) tablet 10 mg  10 mg Oral QHS    aspirin chewable tablet 81 mg  81 mg Oral DAILY    escitalopram oxalate (LEXAPRO) tablet 20 mg  20 mg Oral DAILY    glucose chewable tablet 16 g  4 Tablet Oral PRN    dextrose (D50W) injection syrg 12.5-25 g  25-50 mL IntraVENous PRN    glucagon (GLUCAGEN) injection 1 mg  1 mg IntraMUSCular PRN    insulin lispro (HUMALOG) injection   SubCUTAneous AC&HS       No Known Allergies     FmHx: family history includes Cancer in his sister; Stroke in his father. Social Hx :  reports that he has been smoking cigarettes. He has a 30.00 pack-year smoking history. He has never used smokeless tobacco. He reports previous alcohol use of about 5.0 standard drinks of alcohol per week. He reports that he does not use drugs. Objective:    Physical Exam    Vitals:   Vitals:    01/05/22 0200 01/05/22 0400 01/05/22 0433 01/05/22 0600   BP:   (!) 174/96    Pulse: 75 75 68 69   Resp:   16    Temp:   98.6 °F (37 °C)    SpO2:   97%    Weight:       Height:           General:    Alert, cooperative, no distress, appears stated age. Neck:   Supple, no carotid bruit and no JVD. Back:     Symmetric,   Lungs:     Clear to auscultation bilaterally. Heart[de-identified]    Regular rate and rhythm, S1, S2 normal, no murmur, click, rub or gallop. Abdomen:     Soft, non-tender. Bowel sounds normal.    Extremities:   Extremities normal, atraumatic, no cyanosis or edema. Vascular:   Pulses - 2+   Skin:   Skin color normal. No rashes or lesions on visible areas   Neurologic:   Alert, YANG       Telemetry: NSR    ECG:   EKG Results     Procedure 720 Value Units Date/Time    EKG, 12 LEAD, INITIAL [[id number]] Collected: 01/02/22 1211    Order Status: Completed Updated: 01/02/22 1931     Ventricular Rate 69 BPM      Atrial Rate 69 BPM      P-R Interval 190 ms      QRS Duration 88 ms      Q-T Interval 456 ms      QTC Calculation (Bezet) 488 ms      Calculated P Axis 26 degrees      Calculated R Axis -8 degrees      Calculated T Axis -16 degrees      Diagnosis --     Normal sinus rhythm  Septal infarct (cited on or before 03-JAN-2021)  Abnormal ECG  When compared with ECG of 03-JAN-2021 00:58,  Vent.  rate has decreased BY  76 BPM  ST now depressed in Inferior leads  Non-specific change in ST segment in Lateral leads  Confirmed by Justen Ash MD, Karlee Rodriguez ([id number]) on 1/2/2022 7:31:25 PM            Data Review:     Radiology:   XR Results (most recent):  Results from [place] encounter on 01/02/22    XR CHEST PORT    Narrative  EXAM:  XR CHEST PORT    INDICATION:  Renal failure    COMPARISON: 1/4/2021    TECHNIQUE: AP portable upright chest view    FINDINGS: The lungs are clear. Heart size and mediastinal contours are normal.  No pleural effusion or pneumothorax. No interstitial edema. Impression  No pulmonary edema or pleural effusions. No results for input(s): CPK, TROIQ in the last 72 hours. No lab exists for component: CKQMB, CPKMB, BMPP  Recent Labs     01/05/22  0459 01/04/22  0222    139   K 3.5 3.6    111*   CO2 25 25   BUN 17 31*   CREA 1.30 1.52*   * 121*   PHOS 1.9* 2.5*   CA 9.2 8.6     Recent Labs     01/03/22  0640 01/02/22  1157   WBC 7.5 5.8   HGB 10.6* 12.4   HCT 33.1* 38.4    334     Recent Labs     01/03/22  0640 01/02/22  1157   AP 93 84     No results for input(s): CHOL, LDLC in the last 72 hours. No lab exists for component: TGL, HDLC,  HBA1C  No results for input(s): CRP, TSH, TSHEXT in the last 72 hours. No lab exists for component: ESR    Jason Arita NP    [facility] [address] [address] [place] [address] [address]  [phone]      CC:Pastor Abarca NP

## 2022-01-05 NOTE — DISCHARGE SUMMARY
Discharge Summary       PATIENT ID: Dax Guerra  MRN: [id number]   YOB: 1965    DATE OF ADMISSION: 1/2/2022 11:41 AM    DATE OF DISCHARGE: 01/05/22  PRIMARY CARE PROVIDER: Mercy Jameson NP     ATTENDING PHYSICIAN: Alfredo Duong MD    DISCHARGING PROVIDER: Alfredo Duong MD    To contact this individual call [phone] and ask the  to page. If unavailable ask to be transferred the Adult Hospitalist Department. CONSULTATIONS: IP CONSULT TO HOSPITALIST  IP CONSULT TO NEPHROLOGY  IP CONSULT TO CARDIOLOGY    PROCEDURES/SURGERIES: * No surgery found *    ADMITTING [address] [place] [address] COURSE:   Acute kidney injury on CKD 3  Labile blood pressure  Syncope  Diabetes mellitus type 2, poorly controlled  Diarrhea  Hypokalemia   Hypotension  Chronic anemia      Dax Guerra is a 49 y.o. male with past medical history notable for hypertension, NSTEMI, CKD 3 presenting with lightheadedness, generalized weakness and an episode of syncope. Austin Morales states a very poor appetite over the past week.  In addition for several weeks has had intermittent greenish diarrhea.  Denies hematochezia or hematemesis.  No fevers or abdominal pain.  He has not had anything to eat for several days. Austin Morales denied headache, chest pain, dyspnea, fevers or chills or cough. Noted to be hypotensive upon arrival to ED and given bolus of IVF with improvement in BPs. Also noted to have YISSEL, thought to be 2/2 to IVVD in the setting of deiarrhea, ACE/thiazide use. CT Scan neg for obstruction, renal US wnl. Able to tolerate diet by day of discharge, improved GI symptoms. Concern for elevation in troponin thought to be 2/2 high BP, downtrending at time of d/c, seen by Cardiology, no further work-up at this time. Plan for patient to follow-up with Renal and Cardiology for bloodwork and BP control.     DISCHARGE DIAGNOSES / PLAN:      YISSEL on CKD stage 3: resolved with IVF  - baseline Cr around 1.5  - likely 2/2 IVVD from diarrhea in the setting of ACE/thiazide and metfomin use  - CT scan, renal US- neg for obstruction  - hold ACE, thiazide upon discharge for now,  Will f/u with renal as outpatient to resume  - Resume metformin  - Patient counseled on use of metformin     Hypovolemia, resolved:  - from GI losses, poor po intake     Hypotension:  - holding ACE and thiazide as above  - patient reportedly on clonidine, as this has been stopped during this hospital stay discussed with Cards that will continue to hold for now until follow-up as outpatient  - patient getting coreg 3.125 but on 25 BID at home; given hypertension this AM will resume home dosing  - New rx for hydralazine    Diarrhea:  - resolved     Syncope:  - likely 2/2 dehydration  - EKG NSR, unchanged from prior, no heart block/bradycardia on telemetry, echo on 1/3 with EF 55-60%, normal wall motion     Chronic anemia, stable:  - + gammopathy eval in the past  - hgb stable     DM2, poorly controlled, with hyperglycemia:  - resume metformin        ADDITIONAL CARE RECOMMENDATIONS:   - Do not take your lisinopril, hydrochlorothiazide, and clonidine medication for now. Review with your primary care doctor and the kidney doctors on when you should start these medications again.  - You have a prescription for a new medication called hydralazine.  - Please be sure to follow-up with the heart doctor and the kidney doctor. - Note several incidental findings on CT abdomen, discuss with patient. PENDING TEST RESULTS:   At the time of discharge the following test results are still pending: None    FOLLOW UP APPOINTMENTS:    Follow-up Information     Follow up With Specialties Details Why Contact Info    Taz Hale NP Nurse Practitioner On 1/13/2022 8:40 a.m.   Cardiology follow up with Dr. Nyla Henderson nurse practitioner for post hospitalization and bp check [facility] [address]      Juan Owens MD Nephrology Schedule an appointment as soon as possible for a visit in 2 weeks To see the kidney doctor [facility] hospital  [phone]               DIET: Heart healthy    ACTIVITY: Activity as tolerated        DISCHARGE MEDICATIONS:  Current Discharge Medication List      START taking these medications    Details   hydrALAZINE (APRESOLINE) 25 mg tablet Take 1 Tablet by mouth three (3) times daily for 30 days. Qty: 90 Tablet, Refills: 0  Start date: 1/5/2022, End date: 2/4/2022         CONTINUE these medications which have NOT CHANGED    Details   baclofen (LIORESAL) 20 mg tablet TAKE 1 TABLET BY MOUTH THREE TIMES DAILY FOR MUSCLE SPASM  Qty: 90 Tablet, Refills: 0    Associated Diagnoses: Muscle spasm of right shoulder      escitalopram oxalate (LEXAPRO) 20 mg tablet TAKE 1 TABLET BY MOUTH ONCE DAILY FOR ANXIETY  Qty: 90 Tablet, Refills: 0    Associated Diagnoses: Generalized anxiety disorder      calcium carbonate (TUMS) 200 mg calcium (500 mg) chew Take 1 Tablet by mouth daily. atorvastatin (LIPITOR) 10 mg tablet Take 1 Tablet by mouth nightly. Qty: 90 Tablet, Refills: 1    Associated Diagnoses: Essential hypertension; Borderline type 2 diabetes mellitus; History of non-ST elevation myocardial infarction (NSTEMI)      tamsulosin (FLOMAX) 0.4 mg capsule TAKE 1 CAPSULE BY MOUTH ONCE DAILY FOR PROSTATE  Qty: 90 Capsule, Refills: 0    Associated Diagnoses: Benign prostatic hyperplasia with nocturia; Incomplete bladder emptying      aspirin 81 mg chewable tablet Take 81 mg by mouth daily. carvediloL (COREG) 25 mg tablet Take 1 Tablet by mouth two (2) times daily (with meals). Qty: 180 Tablet, Refills: 1    Associated Diagnoses: Elevated blood pressure reading      magnesium oxide (MAG-OX) 400 mg tablet Take 1 Tablet by mouth nightly.   Qty: 90 Tablet, Refills: 1    Associated Diagnoses: Nocturnal leg cramps      metFORMIN (GLUCOPHAGE) 500 mg tablet Take 1 Tablet by mouth two (2) times daily (with meals). Qty: 180 Tablet, Refills: 1    Associated Diagnoses: Type 2 diabetes mellitus with hyperglycemia, without long-term current use of insulin (HCC)      traZODone (DESYREL) 50 mg tablet Take 1 Tab by mouth nightly. Indications: SLEEP  Qty: 30 Tab, Refills: 2    Associated Diagnoses: Insomnia, unspecified type         STOP taking these medications       lisinopril-hydroCHLOROthiazide (PRINZIDE, ZESTORETIC) 20-25 mg per tablet Comments:   Reason for Stopping:         ibuprofen (MOTRIN) 600 mg tablet Comments:   Reason for Stopping:                 NOTIFY YOUR PHYSICIAN FOR ANY OF THE FOLLOWING:   Fever over 101 degrees for 24 hours. Chest pain, shortness of breath, fever, chills, nausea, vomiting, diarrhea, change in mentation, falling, weakness, bleeding. Severe pain or pain not relieved by medications. Or, any other signs or symptoms that you may have questions about. DISPOSITION:  x Home With:   OT  PT  HH  RN       Long term SNF/Inpatient Rehab    Independent/assisted living    Hospice    Other:       PATIENT CONDITION AT DISCHARGE:     Functional status    Poor     Deconditioned    x Independent      Cognition    x Lucid     Forgetful     Dementia      Catheters/lines (plus indication)    Granados     PICC     PEG    x None      Code status   x  Full code     DNR      PHYSICAL EXAMINATION AT DISCHARGE:  General:          Alert, cooperative, no distress, appears stated age. HEENT:           Atraumatic, anicteric sclerae, pink conjunctivae                          No oral ulcers, mucosa moist, throat clear, dentition fair  Neck:               Supple, symmetrical  Lungs:             Clear to auscultation bilaterally. No Wheezing or Rhonchi. No rales. Chest wall:      No tenderness  No Accessory muscle use. Heart:              Regular  rhythm,  No  murmur   No edema  Abdomen:        Soft, non-tender. Not distended. Bowel sounds normal  Extremities:     No cyanosis.   No clubbing, Skin turgor normal, Capillary refill normal  Skin:                Not pale. Not Jaundiced  No rashes   Psych:             Not anxious or agitated.   Neurologic:      Alert, moves all extremities, answers questions appropriately and responds to commands       CHRONIC MEDICAL DIAGNOSES:  Problem List as of 1/5/2022 Date Reviewed: 1/3/2022          Codes Class Noted - Resolved    YISSEL (acute kidney injury) (RUST 75.) ICD-10-CM: N17.9  ICD-9-CM: 584.9  1/2/2022 - Present        Borderline type 2 diabetes mellitus ICD-10-CM: R73.03  ICD-9-CM: 790.29  11/29/2021 - Present        History of non-ST elevation myocardial infarction (NSTEMI) ICD-10-CM: I25.2  ICD-9-CM: 412  11/29/2021 - Present        Class 1 obesity due to excess calories with serious comorbidity and body mass index (BMI) of 30.0 to 30.9 in adult ICD-10-CM: E66.09, Z68.30  ICD-9-CM: 278.00, V85.30  11/29/2021 - Present        Cervical radiculopathy ICD-10-CM: M54.12  ICD-9-CM: 723.4  7/12/2020 - Present        Arm paresthesia, left ICD-10-CM: R20.2  ICD-9-CM: 782.0  7/10/2020 - Present        Intractable headache ICD-10-CM: R51.9  ICD-9-CM: 784.0  7/10/2020 - Present        Dilated cardiomyopathy (RUST 75.) ICD-10-CM: I42.0  ICD-9-CM: 425.4  1/6/2020 - Present        Essential hypertension ICD-10-CM: I10  ICD-9-CM: 401.9  9/1/2016 - Present        RESOLVED: Hypoxia ICD-10-CM: R09.02  ICD-9-CM: 799.02  12/21/2020 - 1/6/2021        RESOLVED: NSTEMI (non-ST elevated myocardial infarction) (RUST 75.) ICD-10-CM: I21.4  ICD-9-CM: 410.70  12/9/2019 - 3/1/2021              Greater than 30 minutes were spent with the patient on counseling and coordination of care    Signed:   Treasure Ortiz MD  1/5/2022  11:46 AM

## 2022-01-05 NOTE — PROGRESS NOTES
Consult received for Positive Trop and chest pain. Spoke to nurse. No chest pain at the time of my conversation with nurse. Chart review show Normal cors in 2019. CRI. Neil trop. Continue Heparin. Morning cardiology team to do further disposition. Michael George [place], [facility] [place]

## 2022-01-05 NOTE — PROGRESS NOTES
Patient troponin level 54, per Magali Mccarty., Chemistry Lab. Physician notified. Patient with NPO diet order starting at [facility] today. Patient had breakfast tray.

## 2022-01-05 NOTE — DISCHARGE INSTRUCTIONS
Dear Cas Valentino,    Thank you for choosing [address] [place] [address] for your healthcare needs. We strive to provide EXCELLENT care to you and your family. In an effort to explain clearly why you were here in the hospital, I've also written a very brief summary below. Other details including formal diagnosis, medication changes, and follow up appointment recommendations can also be found in this packet. You were admitted for acute kidney injury and for evaluation for syncope (fainting episode). You were started on fluids, monitored closely with blood work. You also received care from specialist physicians in the following specialties:  IP CONSULT TO HOSPITALIST  IP CONSULT TO NEPHROLOGY  IP CONSULT TO CARDIOLOGY    Here are the updates:  - Do not take your lisinopril, hydrochlorothiazide, and clonidine medication for now. Review with your primary care doctor and the kidney doctors on when you should start these medications again.  - You have a prescription for a new medication called hydralazine.  - Please be sure to follow-up with the heart doctor and the kidney doctor. - Your hemoglobin A1c blood work was done and is measured at 6.1, which is slightly higher than normal and means you are at risk for diabetes. Please follow-up with your primary doctor. - You blood work was significant for anemia - your hemoglobin was measured at 10.1. You did not report signs of bleeding. Please follow this up with your primary doctor. It is important to let you know that on the picture of your kidneys there were a few small cysts, some scarring, as well as evidence of kidney stones. Also, on your adrenal glands there are small \"adrenal adenomas\". I have included the official report below. Please follow this up with your doctor. CT Abdomen/Pelvis  Liver:  No focal liver lesions. Biliary system: Gallbladder is unremarkable.   Spleen: Normal.  Pancreas: Normal.  Kidneys/Ureters/Bladder: Few simple cysts noted in the kidneys; these do not  require follow-up. There are focal areas of scarring in the upper and lower  poles of the right kidney. There are small bilateral nonobstructing renal stones  measuring no larger than 2 mm. No hydronephrosis or hydroureter. The bladder is  normal.  Adrenals: Small bilateral subcentimeter adrenal adenomas are noted. These do not  require follow-up. Stomach/bowel: No dilation or abnormal wall thickening is present. No free  intraperitoneal air noted. Normal appendix. Reproductive Organs: Prostate is normal in size. Vasculature: Normal caliber arteries. Severe calcific atherosclerosis of the  abdominal aorta. Nodes: No pathologically enlarged lymph nodes. Fluid: No free fluid. Bones/Soft Tissue: No acute fractures or aggressive osseous lesions are seen. Severe facet arthropathy in the lower lumbar spine. IMPRESSION  1. No evidence of acute process in the abdomen or pelvis. Remember that it is important for you to take your medications exactly as they are prescribed. It is helpful to keep a list of your medication with the names, dosages, and times to be taken in your wallet. Make sure to also see your primary care doctor for follow-up. Bring these papers with you and be sure to review your medication list with your doctor. I cannot stress the importance of follow up enough. I've included the information for your follow-up appointments below: Follow-up Information     Follow up With Specialties Details Why Contact David Porras NP Nurse Practitioner On 1/13/2022 8:40 a.m.   Cardiology follow up with Dr. Jesús Nettles nurse practitioner for post hospitalization and bp check [facility] [address]      Judith Gracia MD Nephrology Schedule an appointment as soon as possible for a visit in 2 weeks To see the kidney doctor [facility]  [phone]            Should you have any fever over 101 degrees for 24 hours, chest pain, shortness of breath, fever, chills, nausea, vomiting, diarrhea, change in mentation, falling, weakness, bleeding, or worsening pain, please seek medical attention immediately. Finally, as your discharging physician, you may be receiving a survey regarding my care. I would greatly value and appreciate your input in the survey as we strive for excellence. If you have any questions, I can be reached at [phone].   Thank you so much again for allowing me to care for you at [address] [place] [address].    Respectfully yours,  Raleigh Vazquez MD

## 2022-01-05 NOTE — PROGRESS NOTES
Patient to discharge home with wife transporting. IV removed with catheter intact. Patient given medications as ordered, tolerated well with no adverse reactions noted. Granados removed patient voiding clear pink tinged urine with no complaints of pain or discomfort.

## 2022-01-05 NOTE — PROGRESS NOTES
[facility]   [address] [facility], [address] [facility]  Phone: [phone]   [phone]    [email or web address]     Nephrology Progress Note    Patient Name : Nancy Matias      : 1965     MRN : [id number]  Date of Admission : 2022  Date of Servive : 22    CC:  Follow up for YISSEL     Assessment and Plan   YISSEL:  - suspect 2/2 IVVD from diarrhea in the setting of ACE/thiazide and metfomin use  - CT scan, renal US- neg for obstruction  -Resolved with IVF  -Okay for discharge from renal standpoint. Recommend holding lisinopril HCTZ until he comes for outpatient follow-up. -Okay to resume Metformin  -Counseled on avoiding ibuprofen  -Follow-up with us in 4 weeks     CKD 3a:  - baseline Cr around 1.5  - presumed 2/2 HTN and DM2     Hypovolemia:  - from GI losses, poor po intake  -Resolved with IVF     Hypotension:  -Resolved with IVF  -Hold lisinopril/HCTZ on discharge.  -Added hydralazine. Continue Coreg     Diarrhea:  -Resolved     Syncope:  - w/u per primary team     Chronic anemia:  - + gammopathy eval in the past  - hgb stable     DM2:  - per hospitalist     Obesity     Interval History:  Seen and examined. Denies any nausea vomiting or diarrhea. Creatinine down to 1.3 mg/dL. BP is elevated. Review of Systems: A comprehensive review of systems was negative except for that written in the HPI.     Current Medications:   Current Facility-Administered Medications   Medication Dose Route Frequency    carvediloL (COREG) tablet 3.125 mg  3.125 mg Oral BID WITH MEALS    tamsulosin (FLOMAX) capsule 0.4 mg  0.4 mg Oral DAILY    traZODone (DESYREL) tablet 50 mg  50 mg Oral QHS PRN    sodium chloride (NS) flush 5-40 mL  5-40 mL IntraVENous Q8H    sodium chloride (NS) flush 5-40 mL  5-40 mL IntraVENous PRN    acetaminophen (TYLENOL) tablet 650 mg  650 mg Oral Q6H PRN    Or    acetaminophen (TYLENOL) suppository 650 mg  650 mg Rectal Q6H PRN    polyethylene glycol (MIRALAX) packet 17 g  17 g Oral DAILY PRN    ondansetron (ZOFRAN ODT) tablet 4 mg  4 mg Oral Q8H PRN    Or    ondansetron (ZOFRAN) injection 4 mg  4 mg IntraVENous Q6H PRN    atorvastatin (LIPITOR) tablet 10 mg  10 mg Oral QHS    aspirin chewable tablet 81 mg  81 mg Oral DAILY    escitalopram oxalate (LEXAPRO) tablet 20 mg  20 mg Oral DAILY    glucose chewable tablet 16 g  4 Tablet Oral PRN    dextrose (D50W) injection syrg 12.5-25 g  25-50 mL IntraVENous PRN    glucagon (GLUCAGEN) injection 1 mg  1 mg IntraMUSCular PRN    insulin lispro (HUMALOG) injection   SubCUTAneous AC&HS      No Known Allergies    Objective:  Vitals:    Vitals:    01/05/22 0200 01/05/22 0400 01/05/22 0433 01/05/22 0600   BP:   (!) 174/96    Pulse: 75 75 68 69   Resp:   16    Temp:   98.6 °F (37 °C)    SpO2:   97%    Weight:       Height:         Intake and Output:  No intake/output data recorded. 01/03 1901 - 01/05 0700  In: 1240 [P.O.:240; I.V.:1000]  Out: 5500 [Urine:5500]    Physical Examination:  General: NAD,Conversant   Neck:  Supple, no mass  Resp:  Lungs CTA B/L, no wheezing , normal respiratory effort  CV:  RRR,  no murmur or rub, LE edema  GI:  Soft, NT, + BS, no HS megaly  Neurologic:  Non focal  Psych:             AAO x 3 appropriate affect       []    High complexity decision making was performed  []    Patient is at high-risk of decompensation with multiple organ involvement    Lab Data Personally Reviewed: I have reviewed all the pertinent labs, microbiology data and radiology studies during assessment.     Recent Labs     01/05/22  0459 01/04/22  0222 01/03/22  0640 01/02/22  1157    139 139  138 136   K 3.5 3.6 3.4*  3.4* 3.8    111* 109*  110* 104   CO2 25 25 24  23 25   * 121* 139*  140* 137*   BUN 17 31* 49*  51* 60*   CREA 1.30 1.52* 2.58*  2.62* 4.92*   CA 9.2 8.6 8.1*  8.2* 9.1   MG  --   --  2.4  --    PHOS 1.9* 2.5* 3.0  3.2  --    ALB 2.8* 2.7* 2.9*  2.8* 3.5 ALT  --   --  43 58     Recent Labs     01/03/22  0640 01/02/22  1157   WBC 7.5 5.8   HGB 10.6* 12.4   HCT 33.1* 38.4    334     No results found for: SDES  Lab Results   Component Value Date/Time    Culture result: SCANT NORMAL RESPIRATORY ALDO 12/28/2020 04:56 PM    Culture result: MRSA NOT PRESENT 12/24/2020 09:20 AM    Culture result:  12/24/2020 09:20 AM         Screening of patient nares for MRSA is for surveillance purposes and, if positive, to facilitate isolation considerations in high risk settings. It is not intended for automatic decolonization interventions per se as regimens are not sufficiently effective to warrant routine use.     Culture result: NO GROWTH 2 DAYS 12/23/2020 04:16 PM    Culture result: (A) 12/22/2020 08:45 AM     RARE HAEMOPHILUS INFLUENZAE BETA LACTAMASE NEGATIVE    Culture result: RARE NORMAL RESPIRATORY ALDO 12/22/2020 08:45 AM     Recent Results (from the past 24 hour(s))   GLUCOSE, POC    Collection Time: 01/04/22 12:25 PM   Result Value Ref Range    Glucose (POC) 132 (H) 65 - 117 mg/dL    Performed by Gavin Ardon    GLUCOSE, POC    Collection Time: 01/04/22  4:38 PM   Result Value Ref Range    Glucose (POC) 133 (H) 65 - 117 mg/dL    Performed by Natalya German    GLUCOSE, POC    Collection Time: 01/04/22  9:41 PM   Result Value Ref Range    Glucose (POC) 103 65 - 117 mg/dL    Performed by Aries Escalante    RENAL FUNCTION PANEL    Collection Time: 01/05/22  4:59 AM   Result Value Ref Range    Sodium 136 136 - 145 mmol/L    Potassium 3.5 3.5 - 5.1 mmol/L    Chloride 107 97 - 108 mmol/L    CO2 25 21 - 32 mmol/L    Anion gap 4 (L) 5 - 15 mmol/L    Glucose 120 (H) 65 - 100 mg/dL    BUN 17 6 - 20 MG/DL    Creatinine 1.30 0.70 - 1.30 MG/DL    BUN/Creatinine ratio 13 12 - 20      GFR est AA >60 >60 ml/min/1.73m2    GFR est non-AA 58 (L) >60 ml/min/1.73m2    Calcium 9.2 8.5 - 10.1 MG/DL    Phosphorus 1.9 (L) 2.6 - 4.7 MG/DL    Albumin 2.8 (L) 3.5 - 5.0 g/dL   GLUCOSE, POC    Collection Time: 01/05/22  7:07 AM   Result Value Ref Range    Glucose (POC) 149 (H) 65 - 117 mg/dL    Performed by Eran Valladares            I have reviewed the flowsheets. Chart and Pertinent Notes have been reviewed. No change in PMH ,family and social history from Consult note.       Priscilla Arroyo [facility]

## 2022-01-13 ENCOUNTER — TELEPHONE (OUTPATIENT)
Dept: CARDIOLOGY CLINIC | Age: 57
End: 2022-01-13

## 2022-01-13 NOTE — TELEPHONE ENCOUNTER
Patient was a no show today. Have tried calling this patient several times- no answer and voicemail is not set up yet. No other phone numbers. Pt needs to reschedule 01/13/2022 appointment for first week of FEB with Manjinder JERRY NP -may use the 8:20 slot if needed.      [address]

## 2022-01-13 NOTE — TELEPHONE ENCOUNTER
Patient did not show for appt today  Please see if he would like to reschedule to see me in first week of Feb (can use an 8:20am spot if needed)

## 2022-02-08 DIAGNOSIS — N40.1 BENIGN PROSTATIC HYPERPLASIA WITH NOCTURIA: ICD-10-CM

## 2022-02-08 DIAGNOSIS — R35.1 BENIGN PROSTATIC HYPERPLASIA WITH NOCTURIA: ICD-10-CM

## 2022-02-08 DIAGNOSIS — R33.9 INCOMPLETE BLADDER EMPTYING: ICD-10-CM

## 2022-02-08 RX ORDER — TAMSULOSIN HYDROCHLORIDE 0.4 MG/1
0.4 CAPSULE ORAL DAILY
Qty: 90 CAPSULE | Refills: 1 | Status: SHIPPED | OUTPATIENT
Start: 2022-02-08 | End: 2022-04-21 | Stop reason: ALTCHOICE

## 2022-02-08 NOTE — TELEPHONE ENCOUNTER
Last Refill: 10/11/21  Last Visit: 2021   Next Visit: 3/4/2022    Requested Prescriptions     Pending Prescriptions Disp Refills    tamsulosin (FLOMAX) 0.4 mg capsule 90 Capsule 1     Si Capsule daily.

## 2022-03-04 ENCOUNTER — OFFICE VISIT (OUTPATIENT)
Dept: INTERNAL MEDICINE CLINIC | Age: 57
End: 2022-03-04

## 2022-03-04 VITALS
DIASTOLIC BLOOD PRESSURE: 102 MMHG | HEIGHT: 71 IN | HEART RATE: 75 BPM | RESPIRATION RATE: 16 BRPM | WEIGHT: 225.2 LBS | BODY MASS INDEX: 31.53 KG/M2 | SYSTOLIC BLOOD PRESSURE: 174 MMHG | OXYGEN SATURATION: 98 % | TEMPERATURE: 97.7 F

## 2022-03-04 DIAGNOSIS — I42.0 DILATED CARDIOMYOPATHY (HCC): ICD-10-CM

## 2022-03-04 DIAGNOSIS — R73.03 BORDERLINE TYPE 2 DIABETES MELLITUS: ICD-10-CM

## 2022-03-04 DIAGNOSIS — I25.2 HISTORY OF NON-ST ELEVATION MYOCARDIAL INFARCTION (NSTEMI): ICD-10-CM

## 2022-03-04 DIAGNOSIS — N18.31 CHRONIC RENAL FAILURE, STAGE 3A (HCC): ICD-10-CM

## 2022-03-04 DIAGNOSIS — I10 ESSENTIAL HYPERTENSION: Primary | ICD-10-CM

## 2022-03-04 LAB
CREAT UR-MCNC: 94 MG/DL
MICROALBUMIN UR-MCNC: 16.1 MG/DL
MICROALBUMIN/CREAT UR-RTO: 171 MG/G (ref 0–30)

## 2022-03-04 PROCEDURE — 99214 OFFICE O/P EST MOD 30 MIN: CPT | Performed by: NURSE PRACTITIONER

## 2022-03-04 RX ORDER — HYDRALAZINE HYDROCHLORIDE 25 MG/1
25 TABLET, FILM COATED ORAL 3 TIMES DAILY
Qty: 90 TABLET | Refills: 2 | Status: SHIPPED | OUTPATIENT
Start: 2022-03-04 | End: 2022-04-21

## 2022-03-04 NOTE — PROGRESS NOTES
Corey Mckinley is a 49 y.o. male    Chief Complaint   Patient presents with    Follow-up     3 month follow up       Visit Vitals  BP (!) 210/130 (BP 1 Location: Left upper arm, BP Patient Position: Sitting, BP Cuff Size: Small adult)   Pulse 75   Temp 97.7 °F (36.5 °C)   Resp 16   Ht 5' 11\" (1.803 m)   Wt 225 lb 3.2 oz (102.2 kg)   SpO2 98%   BMI 31.41 kg/m²           1. Have you been to the ER, urgent care clinic since your last visit? Hospitalized since your last visit? NO    2. Have you seen or consulted any other health care providers outside of the [address] [place] [address] since your last visit? Include any pap smears or colon screening.  NO

## 2022-03-04 NOTE — PROGRESS NOTES
Chief Complaint   Patient presents with    Follow-up     3 month follow up       SUBJECTIVE:    Radha Kaur is a 49 y.o. male who is here today for a follow up after hospitalization for syncope episode and excessively low blood pressure with possible acute kidney injury which occurred in January. He was hospitalized from 01/02/2022 to 01/05/2022 and discharged home. At that time, he was told to discontinue his clonidine, lisinopril, and hydrochlorothiazide. His Coreg was increased to 25 mg twice a day, and he was placed on hydralazine 25 mg 3 times a day. However, the patient states that he had not discontinued the clonidine, started the hydralazine, and blood pressures began to drop excessively low. As a result, he stopped taking the hydralazine, thinking this was the culprit of his excessively low blood pressures. His blood pressure is notably elevated today. He denies any chest pain, chest pressure, shortness of breath, headaches, dizziness, blurred vision, palpitations, or syncope episodes since discharge. The patient has a history of borderline diabetes and is currently taking Metformin as prescribed. He denies any side effects of the medication, and is tolerating this well. He does not check his blood sugars on a regular basis. Current Outpatient Medications   Medication Sig Dispense Refill    hydrALAZINE (APRESOLINE) 25 mg tablet Take 1 Tablet by mouth three (3) times daily. Indications: high blood pressure 90 Tablet 2    tamsulosin (FLOMAX) 0.4 mg capsule Take 1 Capsule by mouth daily. 90 Capsule 1    baclofen (LIORESAL) 20 mg tablet TAKE 1 TABLET BY MOUTH THREE TIMES DAILY FOR MUSCLE SPASM 90 Tablet 0    escitalopram oxalate (LEXAPRO) 20 mg tablet TAKE 1 TABLET BY MOUTH ONCE DAILY FOR ANXIETY 90 Tablet 0    calcium carbonate (TUMS) 200 mg calcium (500 mg) chew Take 1 Tablet by mouth daily.  atorvastatin (LIPITOR) 10 mg tablet Take 1 Tablet by mouth nightly.  90 Tablet 1    aspirin 81 mg chewable tablet Take 81 mg by mouth daily.  carvediloL (COREG) 25 mg tablet Take 1 Tablet by mouth two (2) times daily (with meals). 180 Tablet 1    magnesium oxide (MAG-OX) 400 mg tablet Take 1 Tablet by mouth nightly. 90 Tablet 1    metFORMIN (GLUCOPHAGE) 500 mg tablet Take 1 Tablet by mouth two (2) times daily (with meals). 180 Tablet 1    traZODone (DESYREL) 50 mg tablet Take 1 Tab by mouth nightly. Indications: SLEEP 30 Tab 2     Past Medical History:   Diagnosis Date    Hypertension     NSTEMI (non-ST elevated myocardial infarction) ([facility] 12/9/2019     History reviewed. No pertinent surgical history.   No Known Allergies    REVIEW OF SYSTEMS:                                        POSITIVE= bold text  Negative = regular text    General:                     fever, chills, sweats, generalized weakness, weight loss/gain,                                       loss of appetite   Eyes:                           blurred vision, eye pain, loss of vision, double vision  ENT:                            rhinorrhea, pharyngitis   Respiratory:               cough, sputum production, SOB, PLASCENCIA, wheezing, pleuritic pain   Cardiology:                chest pain, palpitations, orthopnea, PND, edema, syncope   Gastrointestinal:       abdominal pain , N/V, diarrhea, dysphagia, constipation, bleeding   Genitourinary:           frequency, urgency, dysuria, hematuria, incontinence   Muskuloskeletal :      arthralgia, myalgia, back pain  Hematology:              easy bruising, nose or gum bleeding, lymphadenopathy   Dermatological:         rash, ulceration, pruritis, color change / jaundice  Endocrine:                 hot flashes or polydipsia   Neurological:             headache, dizziness, confusion, focal weakness, paresthesia,                                      Speech difficulties, memory loss, gait difficulty  Psychological:          Feelings of anxiety, depression, agitation        Social History Socioeconomic History    Marital status: SINGLE   Tobacco Use    Smoking status: Current Some Day Smoker     Packs/day: 1.00     Years: 30.00     Pack years: 30.00     Types: Cigarettes    Smokeless tobacco: Never Used    Tobacco comment: 1-2 cig a day   Vaping Use    Vaping Use: Never used   Substance and Sexual Activity    Alcohol use: Not Currently     Alcohol/week: 5.0 standard drinks     Types: 6 Cans of beer per week     Comment: Occasionally    Drug use: No    Sexual activity: Yes     Partners: Female   Social History Narrative    ** Merged History Encounter **          Family History   Problem Relation Age of Onset    Stroke Father     Cancer Sister        OBJECTIVE:     Visit Vitals  BP (!) 174/102 (BP 1 Location: Left upper arm, BP Patient Position: Sitting)   Pulse 75   Temp 97.7 °F (36.5 °C)   Resp 16   Ht 5' 11\" (1.803 m)   Wt 225 lb 3.2 oz (102.2 kg)   SpO2 98%   BMI 31.41 kg/m²       Constitutional: He appears well nourished, of stated age, and dressed appropriately. Eyes: Sclera anicteric, PERRLA, EOMI  Neck: Supple without lymphadenopathy. Thyroid normal, No JVD or bruits  Respiratory: Clear to ascultation X5, normal inspiratory effort, no adventitious breath sounds. Cardiovascular: Regular rate and rhythm, no rubs or gallops, PMI not displaced, No thrills, no peripheral edema  Neuro: Non-focal exam, A & O X 3.   Psychiatric: Appropriate affect and demeanor, pleasant and cooperative. Patient's thought content and thought processing appear to be within normal limits. ASSESSMENT/PLAN:     ICD-10-CM ICD-9-CM    1. Essential hypertension  B94 606.0 METABOLIC PANEL, COMPREHENSIVE      MICROALBUMIN, UR, RAND W/ MICROALB/CREAT RATIO      CBC W/O DIFF      hydrALAZINE (APRESOLINE) 25 mg tablet      CBC W/O DIFF      METABOLIC PANEL, COMPREHENSIVE   2. History of non-ST elevation myocardial infarction (NSTEMI)  I25.2 412    3.  Dilated cardiomyopathy (HCC)  I42.0 425.4 hydrALAZINE (APRESOLINE) 25 mg tablet   4. Chronic renal failure, stage 3a (HCC)  N05.15 629.7 METABOLIC PANEL, COMPREHENSIVE      CBC W/O DIFF      hydrALAZINE (APRESOLINE) 25 mg tablet      CBC W/O DIFF      METABOLIC PANEL, COMPREHENSIVE   5. Borderline type 2 diabetes mellitus  R73.03 790.29 HEMOGLOBIN A1C WITH EAG      HEMOGLOBIN A1C WITH EAG     1: We will repeat labs today including: CMP, urinalysis with microalbumin, CBC, and hemoglobin A1c.  2: Patient to discontinue clonidine. Start hydralazine 25 mg 3 times a day. Patient to monitor blood pressures closely and check at least twice a day and record results. May use clonidine if blood pressure in excess of 180/100 every 8 hours as needed. Patient states understanding. 3: Patient to continue carvedilol 25 mg twice a day. 4: Patient to continue all other medications as directed. 5: Patient to follow-up with me for blood pressure recheck in approximately 6 weeks, or sooner as needed. Patient states understanding and agrees with plan. Orders Placed This Encounter    METABOLIC PANEL, COMPREHENSIVE    MICROALBUMIN, UR, RAND W/ MICROALB/CREAT RATIO    HEMOGLOBIN A1C WITH EAG    CBC W/O DIFF    hydrALAZINE (APRESOLINE) 25 mg tablet         ATTENTION:   This medical record was transcribed using an electronic medical records system. Although proofread, it may and can contain electronic and spelling errors. Other human spelling and other errors may be present. Corrections may be executed at a later time. Please feel free to contact us for any clarifications as needed. Follow-up and Dispositions    · Return in about 6 weeks (around 4/15/2022) for Follow-up -blood pressure recheck. Signed,  Juhi Treviño.  Ria Hull, MSN APRN FNP-BC

## 2022-03-04 NOTE — PATIENT INSTRUCTIONS
Stop taking clonidine twice a day. Use 1 tablet every 8 hours only if blood pressure is greater than 180/100. Start hydralazine 25 mg 3 times a day. Continue carvedilol 25 mg twice a day. Monitor your blood pressures at least 2 times a day, and record results. Bring these results to your next appointment.

## 2022-03-05 LAB
ALBUMIN SERPL-MCNC: 3.4 G/DL (ref 3.5–5)
ALBUMIN/GLOB SERPL: 0.7 {RATIO} (ref 1.1–2.2)
ALP SERPL-CCNC: 91 U/L (ref 45–117)
ALT SERPL-CCNC: 29 U/L (ref 12–78)
ANION GAP SERPL CALC-SCNC: 4 MMOL/L (ref 5–15)
AST SERPL-CCNC: 17 U/L (ref 15–37)
BILIRUB SERPL-MCNC: 0.2 MG/DL (ref 0.2–1)
BUN SERPL-MCNC: 23 MG/DL (ref 6–20)
BUN/CREAT SERPL: 17 (ref 12–20)
CALCIUM SERPL-MCNC: 9.9 MG/DL (ref 8.5–10.1)
CHLORIDE SERPL-SCNC: 106 MMOL/L (ref 97–108)
CO2 SERPL-SCNC: 28 MMOL/L (ref 21–32)
CREAT SERPL-MCNC: 1.33 MG/DL (ref 0.7–1.3)
ERYTHROCYTE [DISTWIDTH] IN BLOOD BY AUTOMATED COUNT: 16 % (ref 11.5–14.5)
EST. AVERAGE GLUCOSE BLD GHB EST-MCNC: 143 MG/DL
GLOBULIN SER CALC-MCNC: 5 G/DL (ref 2–4)
GLUCOSE SERPL-MCNC: 133 MG/DL (ref 65–100)
HBA1C MFR BLD: 6.6 % (ref 4–5.6)
HCT VFR BLD AUTO: 39.7 % (ref 36.6–50.3)
HGB BLD-MCNC: 12.5 G/DL (ref 12.1–17)
MCH RBC QN AUTO: 26.7 PG (ref 26–34)
MCHC RBC AUTO-ENTMCNC: 31.5 G/DL (ref 30–36.5)
MCV RBC AUTO: 84.6 FL (ref 80–99)
NRBC # BLD: 0 K/UL (ref 0–0.01)
NRBC BLD-RTO: 0 PER 100 WBC
PLATELET # BLD AUTO: 329 K/UL (ref 150–400)
PMV BLD AUTO: 9.5 FL (ref 8.9–12.9)
POTASSIUM SERPL-SCNC: 3.6 MMOL/L (ref 3.5–5.1)
PROT SERPL-MCNC: 8.4 G/DL (ref 6.4–8.2)
RBC # BLD AUTO: 4.69 M/UL (ref 4.1–5.7)
SODIUM SERPL-SCNC: 138 MMOL/L (ref 136–145)
WBC # BLD AUTO: 7.6 K/UL (ref 4.1–11.1)

## 2022-03-07 NOTE — PROGRESS NOTES
Blood counts are normal with no signs of anemia. Hemoglobin A1c shows your diabetes to be in good control at 6.6%. Continue Metformin as prescribed. Metabolic panel looks okay. Urine show leakage of protein through kidneys. We will need to continue to monitor your blood pressure closely, and get it under control.

## 2022-03-11 ENCOUNTER — TELEPHONE (OUTPATIENT)
Dept: INTERNAL MEDICINE CLINIC | Age: 57
End: 2022-03-11

## 2022-03-18 PROBLEM — M54.12 CERVICAL RADICULOPATHY: Status: ACTIVE | Noted: 2020-07-12

## 2022-03-18 PROBLEM — I25.2 HISTORY OF NON-ST ELEVATION MYOCARDIAL INFARCTION (NSTEMI): Status: ACTIVE | Noted: 2021-11-29

## 2022-03-19 PROBLEM — N18.31 CHRONIC RENAL FAILURE, STAGE 3A (HCC): Status: ACTIVE | Noted: 2022-03-04

## 2022-03-19 PROBLEM — R73.03 BORDERLINE TYPE 2 DIABETES MELLITUS: Status: ACTIVE | Noted: 2021-11-29

## 2022-03-20 PROBLEM — E66.09 CLASS 1 OBESITY DUE TO EXCESS CALORIES WITH SERIOUS COMORBIDITY AND BODY MASS INDEX (BMI) OF 30.0 TO 30.9 IN ADULT: Status: ACTIVE | Noted: 2021-11-29

## 2022-03-20 PROBLEM — N17.9 AKI (ACUTE KIDNEY INJURY) (HCC): Status: ACTIVE | Noted: 2022-01-02

## 2022-03-20 PROBLEM — R20.2 ARM PARESTHESIA, LEFT: Status: ACTIVE | Noted: 2020-07-10

## 2022-03-20 PROBLEM — R51.9 INTRACTABLE HEADACHE: Status: ACTIVE | Noted: 2020-07-10

## 2022-03-20 PROBLEM — I42.0 DILATED CARDIOMYOPATHY (HCC): Status: ACTIVE | Noted: 2020-01-06

## 2022-03-21 DIAGNOSIS — R03.0 ELEVATED BLOOD PRESSURE READING: ICD-10-CM

## 2022-03-21 RX ORDER — CARVEDILOL 25 MG/1
25 TABLET ORAL 2 TIMES DAILY WITH MEALS
Qty: 180 TABLET | Refills: 1 | Status: SHIPPED | OUTPATIENT
Start: 2022-03-21

## 2022-04-15 ENCOUNTER — OFFICE VISIT (OUTPATIENT)
Dept: INTERNAL MEDICINE CLINIC | Age: 57
End: 2022-04-15

## 2022-04-15 VITALS
HEIGHT: 71 IN | SYSTOLIC BLOOD PRESSURE: 152 MMHG | RESPIRATION RATE: 16 BRPM | WEIGHT: 214 LBS | DIASTOLIC BLOOD PRESSURE: 92 MMHG | OXYGEN SATURATION: 98 % | BODY MASS INDEX: 29.96 KG/M2 | HEART RATE: 72 BPM | TEMPERATURE: 98.5 F

## 2022-04-15 DIAGNOSIS — R10.11 RUQ ABDOMINAL PAIN: ICD-10-CM

## 2022-04-15 DIAGNOSIS — R73.03 BORDERLINE TYPE 2 DIABETES MELLITUS: ICD-10-CM

## 2022-04-15 DIAGNOSIS — G47.62 NOCTURNAL LEG CRAMPS: ICD-10-CM

## 2022-04-15 DIAGNOSIS — I42.0 DILATED CARDIOMYOPATHY (HCC): ICD-10-CM

## 2022-04-15 DIAGNOSIS — I25.2 HISTORY OF NON-ST ELEVATION MYOCARDIAL INFARCTION (NSTEMI): ICD-10-CM

## 2022-04-15 DIAGNOSIS — I10 ESSENTIAL HYPERTENSION: ICD-10-CM

## 2022-04-15 DIAGNOSIS — M62.838 MUSCLE SPASM OF RIGHT SHOULDER: ICD-10-CM

## 2022-04-15 DIAGNOSIS — N18.31 CHRONIC RENAL FAILURE, STAGE 3A (HCC): Primary | ICD-10-CM

## 2022-04-15 PROCEDURE — 99214 OFFICE O/P EST MOD 30 MIN: CPT | Performed by: NURSE PRACTITIONER

## 2022-04-15 RX ORDER — LOSARTAN POTASSIUM 25 MG/1
25 TABLET ORAL DAILY
Qty: 90 TABLET | Refills: 1 | Status: SHIPPED | OUTPATIENT
Start: 2022-04-15 | End: 2022-04-21 | Stop reason: SDUPTHER

## 2022-04-15 RX ORDER — LANOLIN ALCOHOL/MO/W.PET/CERES
400 CREAM (GRAM) TOPICAL
Qty: 90 TABLET | Refills: 1 | Status: SHIPPED | OUTPATIENT
Start: 2022-04-15 | End: 2022-06-08 | Stop reason: SDUPTHER

## 2022-04-15 RX ORDER — BACLOFEN 20 MG/1
TABLET ORAL
Qty: 90 TABLET | Refills: 2 | Status: SHIPPED | OUTPATIENT
Start: 2022-04-15

## 2022-04-15 NOTE — PROGRESS NOTES
Chief Complaint   Patient presents with    Follow-up     6 wk follow up       SUBJECTIVE:    Juan Nicole is a 49 y.o. male who is here today for a follow up appointment regarding his hypertension, borderline type 2 diabetes, dilated cardiomyopathy, history of non-ST elevated MI, chronic renal insufficiency stage IIIa, and complains of right upper quadrant abdominal pain off and on for the past 2 weeks. The patient states she is taking his blood pressure medications as prescribed, and denies any adverse side effects. He states he is monitoring his blood pressures at home; typically with systolic readings in the 535C and diastolic readings in the 82A. He denies any chest pain, chest pressure, shortness of breath, headaches, dizziness, blurred vision, palpitations, or syncope episodes. Additionally, he complains of some right upper quadrant abdominal pain which has been bothering him for the past 2 weeks. He states pain is often worse after eating. He states occasional nausea, and has vomited occasionally as well. He denies any hematemesis, black tarry stools, or blood in the stools. He denies any significant weight loss or reduction in his dietary intake overall. Current Outpatient Medications   Medication Sig Dispense Refill    losartan (COZAAR) 25 mg tablet Take 1 Tablet by mouth daily. 90 Tablet 1    baclofen (LIORESAL) 20 mg tablet TAKE 1 TABLET BY MOUTH THREE TIMES DAILY FOR MUSCLE SPASM 90 Tablet 2    magnesium oxide (MAG-OX) 400 mg tablet Take 1 Tablet by mouth nightly. 90 Tablet 1    carvediloL (COREG) 25 mg tablet Take 1 Tablet by mouth two (2) times daily (with meals). 180 Tablet 1    hydrALAZINE (APRESOLINE) 25 mg tablet Take 1 Tablet by mouth three (3) times daily. Indications: high blood pressure 90 Tablet 2    tamsulosin (FLOMAX) 0.4 mg capsule Take 1 Capsule by mouth daily.  90 Capsule 1    escitalopram oxalate (LEXAPRO) 20 mg tablet TAKE 1 TABLET BY MOUTH ONCE DAILY FOR ANXIETY 90 Tablet 0    calcium carbonate (TUMS) 200 mg calcium (500 mg) chew Take 1 Tablet by mouth daily.  atorvastatin (LIPITOR) 10 mg tablet Take 1 Tablet by mouth nightly. 90 Tablet 1    aspirin 81 mg chewable tablet Take 81 mg by mouth daily.  metFORMIN (GLUCOPHAGE) 500 mg tablet Take 1 Tablet by mouth two (2) times daily (with meals). 180 Tablet 1    traZODone (DESYREL) 50 mg tablet Take 1 Tab by mouth nightly. Indications: SLEEP 30 Tab 2     Past Medical History:   Diagnosis Date    Hypertension     NSTEMI (non-ST elevated myocardial infarction) ([facility] 12/9/2019     History reviewed. No pertinent surgical history.   No Known Allergies    REVIEW OF SYSTEMS:                                        POSITIVE= bold text  Negative = regular text    General:                     fever, chills, sweats, generalized weakness, weight loss/gain,                                       loss of appetite   Eyes:                           blurred vision, eye pain, loss of vision, double vision  ENT:                            rhinorrhea, pharyngitis   Respiratory:               cough, sputum production, SOB, PLASCENCIA, wheezing, pleuritic pain   Cardiology:                chest pain, palpitations, orthopnea, PND, edema, syncope   Gastrointestinal:       abdominal pain , N/V, diarrhea, dysphagia, constipation, bleeding   Genitourinary:           frequency, urgency, dysuria, hematuria, incontinence   Muskuloskeletal :      arthralgia, myalgia, back pain  Hematology:              easy bruising, nose or gum bleeding, lymphadenopathy   Dermatological:         rash, ulceration, pruritis, color change / jaundice  Endocrine:                 hot flashes or polydipsia   Neurological:             headache, dizziness, confusion, focal weakness, paresthesia,                                      Speech difficulties, memory loss, gait difficulty  Psychological:          Feelings of anxiety, depression, agitation        Social History Socioeconomic History    Marital status: SINGLE   Tobacco Use    Smoking status: Current Some Day Smoker     Packs/day: 1.00     Years: 30.00     Pack years: 30.00     Types: Cigarettes    Smokeless tobacco: Never Used    Tobacco comment: 1-2 cig a day   Vaping Use    Vaping Use: Never used   Substance and Sexual Activity    Alcohol use: Not Currently     Alcohol/week: 5.0 standard drinks     Types: 6 Cans of beer per week     Comment: Occasionally    Drug use: No    Sexual activity: Yes     Partners: Female   Social History Narrative    ** Merged History Encounter **          Family History   Problem Relation Age of Onset    Stroke Father     Cancer Sister        OBJECTIVE:     Visit Vitals  BP (!) 152/92 (BP 1 Location: Left upper arm, BP Patient Position: Sitting)   Pulse 72   Temp 98.5 °F (36.9 °C)   Resp 16   Ht 5' 11\" (1.803 m)   Wt 214 lb (97.1 kg)   SpO2 98%   BMI 29.85 kg/m²       Constitutional: He appears well nourished, of stated age, and dressed appropriately. Eyes: Sclera anicteric, PERRLA, EOMI  Neck: Supple without lymphadenopathy. Thyroid normal, No JVD or bruits  Respiratory: Clear to ascultation X5, normal inspiratory effort, no adventitious breath sounds. Cardiovascular: Regular rate and rhythm, no rubs or gallops, PMI not displaced, No thrills, no peripheral edema  Gastrointestinal: Abdomen non-distended, soft, mildly tender to right upper quadrant, bowel sounds normal and active X4. Hematologic: No purpura, petechiae or unexplained bruising  Lymphatic: No lymph node enlargemant. Neuro: Non-focal exam, A & O X 3.   Psychiatric: Appropriate affect and demeanor, pleasant and cooperative. Patient's thought content and thought processing appear to be within normal limits. ASSESSMENT/PLAN:     ICD-10-CM ICD-9-CM    1. Chronic renal failure, stage 3a (HCC)  N18.31 585.3    2. Dilated cardiomyopathy (HCC)  I42.0 425.4    3.  Essential hypertension  I10 401.9 losartan (COZAAR) 25 mg tablet   4. History of non-ST elevation myocardial infarction (NSTEMI)  I25.2 412    5. Borderline type 2 diabetes mellitus  R73.03 790.29    6. RUQ abdominal pain  R10.11 789.01 US ABD LTD   7. Muscle spasm of right shoulder  M62.838 728.85 baclofen (LIORESAL) 20 mg tablet   8. Nocturnal leg cramps  G47.62 327.52 magnesium oxide (MAG-OX) 400 mg tablet     1: I am concerned the right upper quadrant pain may be secondary to biliary colic. I will arrange for ultrasound of gallbladder for this. 2: Blood pressure shows improvement but remains in poor control. I will add losartan 25 mg to patient's current regimen. Continue to monitor blood pressures closely at least twice a day and record results. 3: Continue all other medications as prescribed. 4: Continue healthy lifestyle management. Avoid fatty foods and fried foods. 5: Refills of medications provided today. 6: Patient to follow-up with me in approximately 4 months, or sooner as needed. Patient states understanding and agrees with plan. Orders Placed This Encounter    US ABD LTD    losartan (COZAAR) 25 mg tablet    baclofen (LIORESAL) 20 mg tablet    magnesium oxide (MAG-OX) 400 mg tablet         ATTENTION:   This medical record was transcribed using an electronic medical records system. Although proofread, it may and can contain electronic and spelling errors. Other human spelling and other errors may be present. Corrections may be executed at a later time. Please feel free to contact us for any clarifications as needed. Signed,  Natanael Shah.  Ramya Pritchard, MSN APRN FNP-BC

## 2022-04-15 NOTE — PROGRESS NOTES
Juan Nicole is a 49 y.o. male    Chief Complaint   Patient presents with    Follow-up     6 wk follow up       Visit Vitals  BP (!) 180/100 (BP 1 Location: Left upper arm, BP Patient Position: Sitting, BP Cuff Size: Small adult)   Pulse 72   Temp 98.5 °F (36.9 °C)   Resp 16   Ht 5' 11\" (1.803 m)   Wt 214 lb (97.1 kg)   SpO2 98%   BMI 29.85 kg/m²           1. Have you been to the ER, urgent care clinic since your last visit? Hospitalized since your last visit? NO    2. Have you seen or consulted any other health care providers outside of the [address] [place] [address] since your last visit? Include any pap smears or colon screening.  NO

## 2022-04-18 ENCOUNTER — TELEPHONE (OUTPATIENT)
Dept: CARDIOLOGY CLINIC | Age: 57
End: 2022-04-18

## 2022-04-18 DIAGNOSIS — Q61.02 MULTIPLE RENAL CYSTS: Primary | ICD-10-CM

## 2022-04-18 DIAGNOSIS — N18.31 CHRONIC RENAL FAILURE, STAGE 3A (HCC): ICD-10-CM

## 2022-04-18 DIAGNOSIS — R10.11 RUQ ABDOMINAL PAIN: ICD-10-CM

## 2022-04-18 NOTE — TELEPHONE ENCOUNTER
Patient was admitted to [facility] in [facility] for hypotension. Patient is taking hydrALAZINE (APRESOLINE) 25 mg tablet and discontinue the amlodipine. The chief complaint is that the blood pressure is now skyrocketing. Patient's spouse Jose Chaidez called to request that the patient's med be reviewed to determine whether the patient should discontinue some of the meds.           SPCRK:[phone]

## 2022-04-20 NOTE — TELEPHONE ENCOUNTER
I called Monday, but was unable to reach anyone. No call back yesterday. Called today. LM on  requesting call back. I stated we should schedule f/u appointment with Dr. Nica Wadsworth. Looks like he missed hospital f/u with [facility] and no appointment currently scheduled to see anyone in our office.

## 2022-04-21 ENCOUNTER — OFFICE VISIT (OUTPATIENT)
Dept: CARDIOLOGY CLINIC | Age: 57
End: 2022-04-21

## 2022-04-21 VITALS
DIASTOLIC BLOOD PRESSURE: 100 MMHG | HEART RATE: 76 BPM | SYSTOLIC BLOOD PRESSURE: 160 MMHG | WEIGHT: 214 LBS | BODY MASS INDEX: 29.96 KG/M2 | HEIGHT: 71 IN | RESPIRATION RATE: 18 BRPM | OXYGEN SATURATION: 96 %

## 2022-04-21 DIAGNOSIS — I10 ESSENTIAL HYPERTENSION: Primary | ICD-10-CM

## 2022-04-21 DIAGNOSIS — I42.0 DILATED CARDIOMYOPATHY (HCC): ICD-10-CM

## 2022-04-21 DIAGNOSIS — J44.9 CHRONIC OBSTRUCTIVE PULMONARY DISEASE, UNSPECIFIED COPD TYPE (HCC): ICD-10-CM

## 2022-04-21 PROCEDURE — 99214 OFFICE O/P EST MOD 30 MIN: CPT | Performed by: SPECIALIST

## 2022-04-21 RX ORDER — LOSARTAN POTASSIUM 100 MG/1
100 TABLET ORAL DAILY
Qty: 30 TABLET | Refills: 1 | Status: SHIPPED | OUTPATIENT
Start: 2022-04-21 | End: 2022-05-03 | Stop reason: ALTCHOICE

## 2022-04-21 NOTE — PATIENT INSTRUCTIONS
Increase losartan to 100 mg daily (new prescription sent to pharmacy). Stop hydralazine. Follow up with Dr. Guerline Ohara in 1 month as scheduled.

## 2022-04-21 NOTE — PROGRESS NOTES
HISTORY OF PRESENT ILLNESS  Magdaleno Adames is a 49 y.o. male     SUMMARY:   Problem List  Date Reviewed: 4/21/2022          Codes Class Noted    Chronic renal failure, stage 3a ([facility] ICD-10-CM: N18.31  ICD-9-CM: 585.3  3/4/2022        YISSEL (acute kidney injury) ([facility] ICD-10-CM: N17.9  ICD-9-CM: 584.9  1/2/2022        Borderline type 2 diabetes mellitus ICD-10-CM: R73.03  ICD-9-CM: 790.29  11/29/2021        History of non-ST elevation myocardial infarction (NSTEMI) ICD-10-CM: I25.2  ICD-9-CM: 412  11/29/2021        Class 1 obesity due to excess calories with serious comorbidity and body mass index (BMI) of 30.0 to 30.9 in adult ICD-10-CM: E66.09, Z68.30  ICD-9-CM: 278.00, V85.30  11/29/2021        Cervical radiculopathy ICD-10-CM: M54.12  ICD-9-CM: 723.4  7/12/2020        Arm paresthesia, left ICD-10-CM: R20.2  ICD-9-CM: 782.0  7/10/2020        Intractable headache ICD-10-CM: R51.9  ICD-9-CM: 784.0  7/10/2020        Dilated cardiomyopathy ([facility] ICD-10-CM: I42.0  ICD-9-CM: 425.4  1/6/2020        Essential hypertension ICD-10-CM: I10  ICD-9-CM: 401.9  9/1/2016              Current Outpatient Medications on File Prior to Visit   Medication Sig    losartan (COZAAR) 25 mg tablet Take 1 Tablet by mouth daily.  baclofen (LIORESAL) 20 mg tablet TAKE 1 TABLET BY MOUTH THREE TIMES DAILY FOR MUSCLE SPASM    magnesium oxide (MAG-OX) 400 mg tablet Take 1 Tablet by mouth nightly.  carvediloL (COREG) 25 mg tablet Take 1 Tablet by mouth two (2) times daily (with meals).  hydrALAZINE (APRESOLINE) 25 mg tablet Take 1 Tablet by mouth three (3) times daily. Indications: high blood pressure    escitalopram oxalate (LEXAPRO) 20 mg tablet TAKE 1 TABLET BY MOUTH ONCE DAILY FOR ANXIETY    calcium carbonate (TUMS) 200 mg calcium (500 mg) chew Take 1 Tablet by mouth daily.  atorvastatin (LIPITOR) 10 mg tablet Take 1 Tablet by mouth nightly.  aspirin 81 mg chewable tablet Take 81 mg by mouth daily.     metFORMIN (GLUCOPHAGE) 500 mg tablet Take 1 Tablet by mouth two (2) times daily (with meals).  traZODone (DESYREL) 50 mg tablet Take 1 Tab by mouth nightly. Indications: SLEEP     No current facility-administered medications on file prior to visit. CARDIOLOGY STUDIES TO DATE:  12/19 echo lvef 40-45%   12/19 lexiscan with fixed inf defect   12/19 normal cardiac cath   12/21/20  echo lvef 40-45%, mild mr, lvh  8/21 echo normal lvef, mild lvh, very mild ai  1/22 echo  ·  Left Ventricle: Left ventricle size is normal. Moderately increased wall thickness. Normal wall motion. Normal left ventricular systolic function with a visually estimated EF of 55 - 60%. Normal diastolic function. ·  Mitral Valve: Mild systolic anterior motion. Chief Complaint   Patient presents with    Follow-up     HPI :  He was back in the hospital in January with volume depletion and acute kidney injury which resolved. An echo was performed at that time which I reviewed and summarized above and his LV function is now normal fact its been normal since last summer. In spite of this he still has a fair amount of shortness of breath with activity. Blood pressure is not well controlled. When he was in the hospital they stopped his lisinopril and his primary care recently started him on low-dose losartan. He is only intermittently been taking the hydralazine. Unfortunately he started smoking again.   He is having some numbness and his left arm from time to time and atypical sounding chest pain and I reassured him that this was not cardiac given the fact that he had a normal cath not even 3 years ago  CARDIAC ROS:   negative for palpitations, syncope, orthopnea, paroxysmal nocturnal dyspnea, exertional chest pressure/discomfort, claudication, lower extremity edema    Family History   Problem Relation Age of Onset    Stroke Father     Cancer Sister        Past Medical History:   Diagnosis Date    Hypertension     NSTEMI (non-ST elevated myocardial infarction) ([facility] 12/9/2019       GENERAL ROS:  A comprehensive review of systems was negative except for that written in the HPI.     Visit Vitals  BP (!) 160/100 (BP 1 Location: Right arm, BP Patient Position: Sitting, BP Cuff Size: Adult)   Pulse 76   Resp 18   Ht 5' 11\" (1.803 m)   Wt 214 lb (97.1 kg)   SpO2 96%   BMI 29.85 kg/m²       Wt Readings from Last 3 Encounters:   04/21/22 214 lb (97.1 kg)   04/15/22 214 lb (97.1 kg)   03/04/22 225 lb 3.2 oz (102.2 kg)            BP Readings from Last 3 Encounters:   04/21/22 (!) 160/100   04/15/22 (!) 152/92   03/04/22 (!) 174/102       PHYSICAL EXAM  General appearance: alert, cooperative, no distress, appears stated age  Neurologic: Alert and oriented X 3  Neck: supple, symmetrical, trachea midline, no adenopathy, no carotid bruit and no JVD  Lungs: clear to auscultation bilaterally  Heart: regular rate and rhythm, S1, S2 normal, no murmur, click, rub or gallop  Extremities: extremities normal, atraumatic, no cyanosis or edema    Lab Results   Component Value Date/Time    Cholesterol, total 164 11/29/2021 03:15 PM    Cholesterol, total 168 02/01/2021 02:01 PM    Cholesterol, total 121 07/12/2020 01:42 AM    Cholesterol, total 166 12/09/2019 06:13 AM    Cholesterol, total 189 07/08/2015 07:45 PM    HDL Cholesterol 40 11/29/2021 03:15 PM    HDL Cholesterol 31 (L) 02/01/2021 02:01 PM    HDL Cholesterol 31 07/12/2020 01:42 AM    HDL Cholesterol 47 12/09/2019 06:13 AM    HDL Cholesterol 34 07/08/2015 07:45 PM    LDL, calculated 72 11/29/2021 03:15 PM    LDL, calculated 91 02/01/2021 02:01 PM    LDL, calculated 77.8 07/12/2020 01:42 AM    LDL, calculated 99.6 12/09/2019 06:13 AM    LDL, calculated 124.6 (H) 07/08/2015 07:45 PM    Triglyceride 260 (H) 11/29/2021 03:15 PM    Triglyceride 232 (H) 02/01/2021 02:01 PM    Triglyceride 61 07/12/2020 01:42 AM    Triglyceride 97 12/09/2019 06:13 AM    Triglyceride 152 (H) 07/08/2015 07:45 PM    CHOL/HDL Ratio 4.1 11/29/2021 03:15 PM    CHOL/HDL Ratio 5 (H) 02/01/2021 02:01 PM    CHOL/HDL Ratio 3.9 07/12/2020 01:42 AM    CHOL/HDL Ratio 3.5 12/09/2019 06:13 AM    CHOL/HDL Ratio 5.6 (H) 07/08/2015 07:45 PM     ASSESSMENT :      He is stable and I think asymptomatic from a cardiac perspective those blood pressures not well controlled. Will have him just stop the hydralazine or only use it if his pressure stays up and go ahead and increase his losartan to 100 mg a day. I told him he should call us in about a week and if his blood pressure still high I am going to add a calcium channel blocker probably amlodipine. He needs no further cardiac testing this time. I also told him that given the fact that his heart muscle function is normal his ongoing issues with shortness of breath are probably related to some underlying lung disease. current treatment plan is effective, no change in therapy  lab results and schedule of future lab studies reviewed with patient  reviewed diet, exercise and weight control  very strongly urged to quit smoking to reduce cardiovascular risk    Encounter Diagnoses   Name Primary?  Essential hypertension Yes    Dilated cardiomyopathy (HCC)     Chronic obstructive pulmonary disease, unspecified COPD type ([facility]      No orders of the defined types were placed in this encounter. Follow-up and Dispositions    · Return in about 4 weeks (around 5/19/2022). Terrie Cano MD  4/21/2022  Please note that this dictation was completed with [employer], the computer voice recognition software. Quite often unanticipated grammatical, syntax, homophones, and other interpretive errors are inadvertently transcribed by the computer software. Please disregard these errors. Please excuse any errors that have escaped final proofreading. Thank you.

## 2022-04-22 ENCOUNTER — HOSPITAL ENCOUNTER (OUTPATIENT)
Dept: ULTRASOUND IMAGING | Age: 57
Discharge: HOME | End: 2022-04-22
Attending: NURSE PRACTITIONER

## 2022-04-22 DIAGNOSIS — R10.11 RUQ ABDOMINAL PAIN: ICD-10-CM

## 2022-04-22 PROCEDURE — 76705 ECHO EXAM OF ABDOMEN: CPT

## 2022-04-25 NOTE — PROGRESS NOTES
Pt would like a referral to urology. Re:  Fortunately, the gallbladder appears normal.     There are some cysts on your right kidney which may be contributing to pain. If still having pain, I would like to refer you to urology for further evaluation.

## 2022-04-25 NOTE — TELEPHONE ENCOUNTER
----- Message from Roxanna Zamora sent at 4/25/2022  8:19 AM EDT -----  Pt would like a referral to urology. Re:  Fortunately, the gallbladder appears normal.     There are some cysts on your right kidney which may be contributing to pain. If still having pain, I would like to refer you to urology for further evaluation.

## 2022-04-25 NOTE — PROGRESS NOTES
Fortunately, the gallbladder appears normal.    There are some cysts on your right kidney which may be contributing to pain. If still having pain, I would like to refer you to urology for further evaluation.

## 2022-04-29 ENCOUNTER — TELEPHONE (OUTPATIENT)
Dept: CARDIOLOGY CLINIC | Age: 57
End: 2022-04-29

## 2022-04-29 DIAGNOSIS — I10 ESSENTIAL HYPERTENSION: ICD-10-CM

## 2022-04-29 RX ORDER — AMLODIPINE BESYLATE 5 MG/1
5 TABLET ORAL DAILY
Qty: 30 TABLET | Refills: 1 | Status: SHIPPED | OUTPATIENT
Start: 2022-04-29 | End: 2022-07-11

## 2022-04-29 NOTE — TELEPHONE ENCOUNTER
Patient called. Verified patient's identity with two identifiers. Patient reported BP this am at 5:45 was 173/111. It has been high all week with lowest reading 153/105. Patient denied any headaches, dizziness, or other sxs. He is taking higher dose of losartan and reported taking 100 mg bid. I asked to verify if he meant taking 50 mg twice a day or if he was confused with carvedilol that is taken twice a day. He said no, he is taking losartan 100 mg tab twice a day (he did not have the bottles on him, so could not read off label). I said Dr. Michael Mobley will likely change losartan to something else, or add something like a diuretic?, but I did review last note and he mentioned could add amlodipine if BP still elevated after one week on increased losartan dose. Patient agreed to try and will continue taking losartan and other meds as he has been along with amlodipine 5 mg ONCE daily. When I call him back with Dr. April Gonsalez advice on losartan or changing it, will again ask pt to verify his dose and how many times he is taking it. Patient verbalized understanding and denied further questions or concerns. Requested Prescriptions     Signed Prescriptions Disp Refills    amLODIPine (NORVASC) 5 mg tablet 30 Tablet 1     Sig: Take 1 Tablet by mouth daily.      Authorizing Provider: Cesilia López     Ordering User: Pedro Yuan    Per Dr. April Gonsalez verbal orders

## 2022-05-02 NOTE — TELEPHONE ENCOUNTER
Patient called back. I told him Dr. Eloise Johnston agreed with starting amlodipine 5 mg daily, which pt confirmed he started Saturday. Verified he has been taking losartan 100 mg bid and also carvedilol 25 mg bid. He is only taking amlodipine once a day. I again explained losartan is only supposed to be taken once a day, but will ask Dr. Eloise Johnston to advise, maybe switch to different ARB. Patient stated his BP has still been elevated around 160's/100's, but denied sxs except slight headache sometimes. He continues to work and feel fine otherwise. I will call him back tomorrow. Dr. Eloise Johnston-  Should we give it a few more full days on amlodipine? Either way, should probably switch ARB, correct?

## 2022-05-02 NOTE — TELEPHONE ENCOUNTER
Lets add amlodipine 5mg. He is prob taking coreg twice a day, but need to verify that he is on correct dose losartan.

## 2022-05-03 RX ORDER — VALSARTAN 320 MG/1
320 TABLET ORAL DAILY
Qty: 30 TABLET | Refills: 5 | Status: SHIPPED | OUTPATIENT
Start: 2022-05-03

## 2022-05-03 NOTE — TELEPHONE ENCOUNTER
Called pt. Verified patient's identity with two identifiers. Notified pt of Dr. Reinaldo Rosas advice. Pt agreed. rx sent. Patient will touch base with us early next week. Patient verbalized understanding and denied further questions or concerns. Requested Prescriptions     Signed Prescriptions Disp Refills    amLODIPine (NORVASC) 5 mg tablet 30 Tablet 1     Sig: Take 1 Tablet by mouth daily. Authorizing Provider: Osito Jalloh     Ordering User: Parker Hood valsartan (DIOVAN) 320 mg tablet 30 Tablet 5     Sig: Take 1 Tablet by mouth daily. Authorizing Provider: Osito Jalloh     Ordering User: Omari Bruner    Per Dr. Reinaldo Rosas verbal orders     Valsartan sent today. Amlodipine sent yesterday.

## 2022-06-08 DIAGNOSIS — G47.62 NOCTURNAL LEG CRAMPS: ICD-10-CM

## 2022-06-08 NOTE — TELEPHONE ENCOUNTER
PCP: Emilie Godfrey NP    Last appt: 4/15/2022  Future Appointments   Date Time Provider Alberto Walton   8/15/2022  3:30 PM Emilie Godfrey NP [facility]       Requested Prescriptions     Pending Prescriptions Disp Refills    magnesium oxide (MAG-OX) 400 mg tablet 90 Tablet 1     Sig: Take 1 Tablet by mouth nightly.        Prior labs and Blood pressures:  BP Readings from Last 3 Encounters:   04/21/22 (!) 160/100   04/15/22 (!) 152/92   03/04/22 (!) 174/102     Lab Results   Component Value Date/Time    Sodium 138 03/04/2022 03:17 PM    Potassium 3.6 03/04/2022 03:17 PM    Chloride 106 03/04/2022 03:17 PM    CO2 28 03/04/2022 03:17 PM    Anion gap 4 (L) 03/04/2022 03:17 PM    Glucose 133 (H) 03/04/2022 03:17 PM    BUN 23 (H) 03/04/2022 03:17 PM    Creatinine 1.33 (H) 03/04/2022 03:17 PM    BUN/Creatinine ratio 17 03/04/2022 03:17 PM    GFR est AA >60 03/04/2022 03:17 PM    GFR est non-AA 56 (L) 03/04/2022 03:17 PM    Calcium 9.9 03/04/2022 03:17 PM     Lab Results   Component Value Date/Time    Hemoglobin A1c 6.6 (H) 03/04/2022 03:17 PM     Lab Results   Component Value Date/Time    Cholesterol, total 164 11/29/2021 03:15 PM    HDL Cholesterol 40 11/29/2021 03:15 PM    LDL, calculated 72 11/29/2021 03:15 PM    VLDL, calculated 52 11/29/2021 03:15 PM    Triglyceride 260 (H) 11/29/2021 03:15 PM    CHOL/HDL Ratio 4.1 11/29/2021 03:15 PM     No results found for: NICHOLAS Stack    Lab Results   Component Value Date/Time    TSH 1.93 11/29/2021 03:15 PM    TSH, 3rd generation 1.43 02/01/2021 02:03 PM

## 2022-06-09 RX ORDER — LANOLIN ALCOHOL/MO/W.PET/CERES
400 CREAM (GRAM) TOPICAL
Qty: 90 TABLET | Refills: 1 | Status: SHIPPED | OUTPATIENT
Start: 2022-06-09

## 2022-06-20 DIAGNOSIS — F41.1 GENERALIZED ANXIETY DISORDER: ICD-10-CM

## 2022-06-20 RX ORDER — ESCITALOPRAM OXALATE 20 MG/1
TABLET ORAL
Qty: 90 TABLET | Refills: 1 | Status: SHIPPED | OUTPATIENT
Start: 2022-06-20

## 2022-06-20 NOTE — TELEPHONE ENCOUNTER
----- Message from Gerard Hudson sent at 6/20/2022 12:34 PM EDT -----  Subject: Refill Request    QUESTIONS  Name of Medication? Other - \"anxiety medicine\"   Patient-reported dosage and instructions? every morning   How many days do you have left? 0  Preferred Pharmacy? [address]  Pharmacy phone number (if available)? [phone]  Additional Information for Provider? Patient does not remember the name of   this medication  ---------------------------------------------------------------------------  --------------  CALL BACK INFO  What is the best way for the office to contact you? Do not leave any   message, patient will call back for answer  Preferred Call Back Phone Number? [phone]  ---------------------------------------------------------------------------  --------------  SCRIPT ANSWERS  Relationship to Patient?  Self

## 2022-06-28 DIAGNOSIS — R03.0 ELEVATED BLOOD PRESSURE READING: ICD-10-CM

## 2022-06-29 RX ORDER — CLONIDINE HYDROCHLORIDE 0.1 MG/1
TABLET ORAL
Qty: 180 TABLET | Refills: 0 | OUTPATIENT
Start: 2022-06-29

## 2022-07-11 DIAGNOSIS — I10 ESSENTIAL HYPERTENSION: ICD-10-CM

## 2022-07-11 RX ORDER — AMLODIPINE BESYLATE 5 MG/1
TABLET ORAL
Qty: 30 TABLET | Refills: 0 | Status: SHIPPED | OUTPATIENT
Start: 2022-07-11

## 2022-07-11 NOTE — TELEPHONE ENCOUNTER
Patient followed up refill request for amlodipine 5mg, pt out of medication          [facility] [phone]

## 2022-08-29 NOTE — PROGRESS NOTES
Problem: Mobility Impaired (Adult and Pediatric)  Goal: *Acute Goals and Plan of Care (Insert Text)  Description: FUNCTIONAL STATUS PRIOR TO ADMISSION: Patient was independent and active without use of DME. 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 HOME SUPPORT PRIOR TO ADMISSION: The patient lived with his wife but did not require assist.    Physical Therapy Goals  Initiated 1/4/2021  1. Patient will move from supine to sit and sit to supine , scoot up and down, and roll side to side in bed with minimal assistance within 7 day(s). 2.  Patient will transfer from bed to chair and chair to bed with moderate assistance x1 using the least restrictive device within 7 day(s). 3.  Patient will perform sit to stand with minimal assistance within 7 day(s). 4.  Patient will ambulate with moderate assistance  for 20 feet with the least restrictive device within 7 day(s). 5.  Patient will be independent with HEP within 7 days. Outcome: Progressing Towards Goal   PHYSICAL THERAPY TREATMENT  Patient: Rk Peterson (88 y.o. male)  Date: 1/7/2021  Diagnosis: Hypoxia [R09.02] <principal problem not specified>       Precautions: Fall, Bed Alarm  Chart, physical therapy assessment, plan of care and goals were reviewed. ASSESSMENT  Patient continues with skilled PT services and is progressing towards goals. Patient received sitting up EOB. Demonstrating much improved upright mobility, improved balance and gait. Assessed without RW and with improved pattern. Worked on stop start and turns with no LOB. Occasional slight deviations but no LOB with increased distance. Managed steps without difficulty with step over step ascending and step to step descending. Feel patient is more appropriate at this time for outpatient PT to address high level balance. .     Current Level of Function Impacting Discharge (mobility/balance): high level balance supervision    Other factors to consider for discharge: PLAN :  Patient continues to benefit from skilled intervention to address the above impairments. Continue treatment per established plan of care. to address goals. Recommendation for discharge: (in order for the patient to meet his/her long term goals)  Outpatient physical therapy follow up recommended for balance, gait    This discharge recommendation:  Has been made in collaboration with the attending provider and/or case management    IF patient discharges home will need the following DME: none       SUBJECTIVE:   Patient stated Cleave Horns took away those alarms and now I'm better because I can move more.     OBJECTIVE DATA SUMMARY:   Critical Behavior:  Neurologic State: Alert  Orientation Level: Oriented X4  Cognition: Follows commands  Safety/Judgement: Awareness of environment, Fall prevention  Functional Mobility Training:  Bed Mobility:     Supine to Sit: Modified independent     Scooting: Independent        Transfers:  Sit to Stand: Independent  Stand to Sit: Independent                             Balance:  Sitting: Intact  Standing: Impaired; Without support  Standing - Static: Good  Standing - Dynamic : Good  Ambulation/Gait Training:  Distance (ft): 200 Feet (ft)  Assistive Device: Gait belt  Ambulation - Level of Assistance: Supervision        Gait Abnormalities: Decreased step clearance; Path deviations         Stairs:  Number of Stairs Trained: 12  Stairs - Level of Assistance: Stand-by assistance   Rail Use: Right     Activity Tolerance:   Good and SpO2 stable on RA    After treatment patient left in no apparent distress:   Call bell within reach and sitting EOB    COMMUNICATION/COLLABORATION:   The patients plan of care was discussed with: Case management.      Raymundo Ordaz, PT   Time Calculation: 12 mins No

## 2023-05-24 RX ORDER — TRAZODONE HYDROCHLORIDE 50 MG/1
50 TABLET ORAL
COMMUNITY
Start: 2021-02-01

## 2023-05-24 RX ORDER — UREA 10 %
1 LOTION (ML) TOPICAL DAILY
COMMUNITY

## 2023-05-24 RX ORDER — VALSARTAN 320 MG/1
320 TABLET ORAL DAILY
COMMUNITY
Start: 2022-05-03

## 2023-05-24 RX ORDER — ASPIRIN 81 MG/1
81 TABLET, CHEWABLE ORAL DAILY
COMMUNITY

## 2023-05-24 RX ORDER — AMLODIPINE BESYLATE 5 MG/1
1 TABLET ORAL DAILY
COMMUNITY
Start: 2022-07-11

## 2023-05-24 RX ORDER — MAGNESIUM OXIDE 400 MG/1
1 TABLET ORAL NIGHTLY
COMMUNITY
Start: 2022-06-09

## 2023-05-24 RX ORDER — ESCITALOPRAM OXALATE 20 MG/1
TABLET ORAL
COMMUNITY
Start: 2022-06-20

## 2023-05-24 RX ORDER — ATORVASTATIN CALCIUM 10 MG/1
1 TABLET, FILM COATED ORAL NIGHTLY
COMMUNITY
Start: 2021-11-29

## 2023-05-24 RX ORDER — BACLOFEN 20 MG/1
TABLET ORAL
COMMUNITY
Start: 2022-04-15

## 2023-05-24 RX ORDER — CARVEDILOL 25 MG/1
25 TABLET ORAL 2 TIMES DAILY WITH MEALS
COMMUNITY
Start: 2022-03-21

## 2023-10-04 NOTE — CARDIO/PULMONARY
Cardiac Rehab: Living with Heart Failure Booklet given to Brittany Eden. Met with the pt. to begin HF education. Educated using teach back method. Discussed diagnosis definition and assessed patient understanding. Reviewed importance of daily weight monitoring and Low Sodium diet (2326-1542 mg. daily). Brittany Eden has a working scale at home and instructed to use it daily with HF calender. Brittany Eden repeatedly stated \" what am I suppose to eat\". Reviewed the recipes and sample food menu's in the educational material. Introduced the use of the [employer] alin for his Smart Phone to track his sodium. Encouraged activity and rest periods within symptom limitations and as ordered by physician. Reviewed coreg, purpose of medication, potential side effects, compliance, and what to do if dose is missed. Discussed importance of reporting signs and symptoms of exacerbation, and when to report them to the doctor, to prevent re-hospitalization. Brittany Eden was encouraged to keep all appointments with doctor. Smoking history assessed. Patient is a current smoker. Smoking Cessation Program link added to the AVS. Spoke with Estella Arguello primary nurse and she will order a dietary consult prior to discharge. Edilberto Castellanos RN Brittany Eden could benefit from further education on the following HF topics: dietary consult would be beneficial with his wife included. He is not on any outpatient medications. Initially had reentrant tachycardia probably caused by pain; was given metoprolol x1 currently sinus rhythm. Tachycardia was secondary to pain and anxiety, no further episode.   BP and heart rate stable off of any meds

## 2024-03-15 NOTE — PROGRESS NOTES
Problem: Self Care Deficits Care Plan (Adult)  Goal: *Acute Goals and Plan of Care (Insert Text)  Description:   FUNCTIONAL STATUS PRIOR TO ADMISSION: Patient was independent and active without use of DME.     HOME SUPPORT: The patient lived with wife but did not require assist.    Occupational Therapy Goals  Initiated 1/4/2021  1. Patient will perform self-feeding with moderate assistance  within 7 day(s). 2.  Patient will perform grooming 2/5 with moderate assistance  within 7 day(s). 3.  Patient will perform standing upper body ADLs 1 min with moderate assistance  within 7 day(s). 4.  Patient will perform toilet transfers with moderate assistance  within 7 day(s). 5.  Patient will perform all aspects of toileting with moderate assistance  within 7 day(s). 6.  Patient will participate in upper extremity therapeutic exercise/activities with moderate assistance within 7 day(s). Outcome: Progressing Towards Goal     OCCUPATIONAL THERAPY TREATMENT  Patient: Dai Nolen (51 y.o. male)  Date: 1/5/2021  Diagnosis: Hypoxia [R09.02] <principal problem not specified>       Precautions: Fall, Bed Alarm  Chart, occupational therapy assessment, plan of care, and goals were reviewed. ASSESSMENT  Patient continues with skilled OT services and is progressing towards goals. Pt is received OOB in chair on 6L O2 via NC and is motivated to participate with therapy. He is able to increase mobility distance (see PT note) with use of RW and benefits from education on postural exercises due to forward flexed posture with fatigue. Pt with baseline limited ROM in shoulders (L > R deficits) and noted he uses L UE to assist R UE to perform simple grooming tasks with increased fatigue. Continue to recommend IPR and anticipate pt will be excellent candidate due to high PLOF and motivation to participate with therapy.      Current Level of Function Impacting Discharge (ADLs): up to min A x 2 for ADL transfers/SPT to bed/chair; up to mod/max A for LB ADLs; SBA to min A for grooming tasks    Other factors to consider for discharge: O2 requirements (currently 6L/min); high PLOF; on vent for 2 weeks; motivated; supportive family         PLAN :  Patient continues to benefit from skilled intervention to address the above impairments. Continue treatment per established plan of care. to address goals. Recommend with staff: OOB to chair with 2 person assist + RW and gait belt; BSC for toileting; encourage participation in UB ADLs    Recommend next OT session: LB ADLs; toilet transfer    Recommendation for discharge: (in order for the patient to meet his/her long term goals)  Therapy 3 hours per day 5-7 days per week    This discharge recommendation:  Has been made in collaboration with the attending provider and/or case management    IF patient discharges home will need the following DME: TBD       SUBJECTIVE:   Patient stated I still have that knot on my head, don't I.    OBJECTIVE DATA SUMMARY:   Cognitive/Behavioral Status:  Neurologic State: Alert  Orientation Level: Oriented to person;Oriented to place;Oriented to situation  Cognition: Appropriate decision making; Follows commands  Perception: Appears intact  Perseveration: No perseveration noted  Safety/Judgement: Awareness of environment; Fall prevention    Functional Mobility and Transfers for ADLs:  Bed Mobility:  Supine to Sit: (Pt received OOB in chair)  Sit to Supine: Moderate assistance(for LE mgmt)    Transfers:  Sit to Stand: Minimum assistance;Assist x2    Balance:  Sitting: Without support  Sitting - Static: Good (unsupported)  Sitting - Dynamic: Fair (occasional)  Standing: Impaired; With support  Standing - Static: Constant support; Fair  Standing - Dynamic : Constant support;Fair;Poor    ADL Intervention:  Grooming  Grooming Assistance: Minimum assistance(fatigues quickly; impaired coordination; L UE helps R UE)  Position Performed:  Other (comment)(semi-supine with HOB raised)  Washing Face: Set-up; Stand-by assistance(observed pt to have L UE assist R UE as he fatigues)  Brushing Teeth: Supervision;Minimum assistance(to manage blue swab and mouth wash; imp coordination)  Cues: Physical assistance; Tactile cues provided;Verbal cues provided    Cognitive Retraining  Safety/Judgement: Awareness of environment; Fall prevention    Therapeutic Exercises:   Pt instructed on and performs simple UB exercises up in chair in prep for ADLs and mobility. Pt with baseline ROM impairments in bilateral shoulders (L > R deficits) and educated on alternatives for pain control. Forward flexion to ~90* 1 set x 5  Shoulder abduction to ~90* 1 set x 5  UE rows 1 set x 3   Punches to therapists hand at 80* 1 set x 5    Pain:  Pt reporting minimal pain    Activity Tolerance:   Fair and desaturates with exertion and requires oxygen    After treatment patient left in no apparent distress:   Supine in bed, Call bell within reach and Side rails x 3    COMMUNICATION/COLLABORATION:   The patients plan of care was discussed with: Physical therapist and Registered nurse.      Abby Lo OT  Time Calculation: 30 mins Male

## (undated) DEVICE — KIT MFLD ISOLATN NACL CNTRST PRT TBNG SPIK W/ PRSS TRNSDUC

## (undated) DEVICE — KIT MED IMAG CNTRST AGNT W/ IOPAMIDOL REUSE

## (undated) DEVICE — ANGIOGRAPHIC CATHETER: Brand: IMPULSE™

## (undated) DEVICE — ANGIOGRAPHY KIT

## (undated) DEVICE — CATH DIAG IMPLS FL4 5FRX100CM -- IMPULSE 16391-22

## (undated) DEVICE — PACK PROCEDURE SURG HRT CATH

## (undated) DEVICE — SPLINT WR POS F/ARTERIAL ACC -- BX/10

## (undated) DEVICE — KIT HND CTRL 3 W STPCOCK ROT END 54IN PREM HI PRSS TBNG AT

## (undated) DEVICE — SPECIAL PROCEDURE DRAPE 32" X 34": Brand: SPECIAL PROCEDURE DRAPE

## (undated) DEVICE — GLIDESHEATH SLENDER STAINLESS STEEL KIT: Brand: GLIDESHEATH SLENDER

## (undated) DEVICE — TR BAND RADIAL ARTERY COMPRESSION DEVICE: Brand: TR BAND